# Patient Record
Sex: FEMALE | Race: WHITE | NOT HISPANIC OR LATINO
[De-identification: names, ages, dates, MRNs, and addresses within clinical notes are randomized per-mention and may not be internally consistent; named-entity substitution may affect disease eponyms.]

---

## 2017-02-23 ENCOUNTER — APPOINTMENT (OUTPATIENT)
Dept: INTERNAL MEDICINE | Facility: CLINIC | Age: 82
End: 2017-02-23

## 2017-02-23 VITALS
OXYGEN SATURATION: 96 % | BODY MASS INDEX: 23.92 KG/M2 | TEMPERATURE: 98.2 F | SYSTOLIC BLOOD PRESSURE: 140 MMHG | WEIGHT: 130 LBS | HEIGHT: 62 IN | HEART RATE: 74 BPM | DIASTOLIC BLOOD PRESSURE: 60 MMHG

## 2017-02-23 VITALS — DIASTOLIC BLOOD PRESSURE: 76 MMHG | SYSTOLIC BLOOD PRESSURE: 136 MMHG

## 2017-02-23 DIAGNOSIS — Z86.19 PERSONAL HISTORY OF OTHER INFECTIOUS AND PARASITIC DISEASES: ICD-10-CM

## 2017-02-28 PROBLEM — Z86.19 HISTORY OF SHINGLES: Status: ACTIVE | Noted: 2017-02-28

## 2017-03-07 LAB
ALBUMIN SERPL ELPH-MCNC: 4.3 G/DL
ALP BLD-CCNC: 47 U/L
ALT SERPL-CCNC: 19 U/L
ANION GAP SERPL CALC-SCNC: 16 MMOL/L
AST SERPL-CCNC: 21 U/L
BASOPHILS # BLD AUTO: 0.02 K/UL
BASOPHILS NFR BLD AUTO: 0.3 %
BILIRUB SERPL-MCNC: 0.5 MG/DL
BUN SERPL-MCNC: 30 MG/DL
CALCIUM SERPL-MCNC: 10.1 MG/DL
CHLORIDE SERPL-SCNC: 102 MMOL/L
CHOLEST SERPL-MCNC: 201 MG/DL
CHOLEST/HDLC SERPL: 2.4 RATIO
CO2 SERPL-SCNC: 23 MMOL/L
CREAT SERPL-MCNC: 0.76 MG/DL
EOSINOPHIL # BLD AUTO: 0.22 K/UL
EOSINOPHIL NFR BLD AUTO: 2.8 %
GLUCOSE SERPL-MCNC: 87 MG/DL
HCT VFR BLD CALC: 40.5 %
HDLC SERPL-MCNC: 85 MG/DL
HGB BLD-MCNC: 12.7 G/DL
IMM GRANULOCYTES NFR BLD AUTO: 0.3 %
LDLC SERPL CALC-MCNC: 103 MG/DL
LYMPHOCYTES # BLD AUTO: 1.58 K/UL
LYMPHOCYTES NFR BLD AUTO: 20.4 %
MAN DIFF?: NORMAL
MCHC RBC-ENTMCNC: 30.2 PG
MCHC RBC-ENTMCNC: 31.4 GM/DL
MCV RBC AUTO: 96.4 FL
MONOCYTES # BLD AUTO: 0.85 K/UL
MONOCYTES NFR BLD AUTO: 11 %
NEUTROPHILS # BLD AUTO: 5.07 K/UL
NEUTROPHILS NFR BLD AUTO: 65.2 %
PLATELET # BLD AUTO: 229 K/UL
POTASSIUM SERPL-SCNC: 4.6 MMOL/L
PROT SERPL-MCNC: 7.4 G/DL
RBC # BLD: 4.2 M/UL
RBC # FLD: 15.9 %
SODIUM SERPL-SCNC: 141 MMOL/L
TRIGL SERPL-MCNC: 64 MG/DL
TSH SERPL-ACNC: 1.4 UIU/ML
WBC # FLD AUTO: 7.76 K/UL

## 2017-03-16 ENCOUNTER — RX RENEWAL (OUTPATIENT)
Age: 82
End: 2017-03-16

## 2017-05-28 ENCOUNTER — INPATIENT (INPATIENT)
Facility: HOSPITAL | Age: 82
LOS: 5 days | Discharge: HOME | DRG: 64 | End: 2017-06-03
Attending: PSYCHIATRY & NEUROLOGY | Admitting: PSYCHIATRY & NEUROLOGY
Payer: MEDICARE

## 2017-05-28 VITALS
HEART RATE: 79 BPM | SYSTOLIC BLOOD PRESSURE: 149 MMHG | DIASTOLIC BLOOD PRESSURE: 84 MMHG | OXYGEN SATURATION: 97 % | RESPIRATION RATE: 18 BRPM | TEMPERATURE: 98 F

## 2017-05-28 LAB — GAS PNL BLDV: SIGNIFICANT CHANGE UP

## 2017-05-28 PROCEDURE — 93010 ELECTROCARDIOGRAM REPORT: CPT

## 2017-05-28 PROCEDURE — 99285 EMERGENCY DEPT VISIT HI MDM: CPT | Mod: 25,GC

## 2017-05-29 DIAGNOSIS — Z98.890 OTHER SPECIFIED POSTPROCEDURAL STATES: Chronic | ICD-10-CM

## 2017-05-29 DIAGNOSIS — I63.9 CEREBRAL INFARCTION, UNSPECIFIED: ICD-10-CM

## 2017-05-29 LAB
ALBUMIN SERPL ELPH-MCNC: 4.6 G/DL — SIGNIFICANT CHANGE UP (ref 3.3–5)
ALP SERPL-CCNC: 56 U/L — SIGNIFICANT CHANGE UP (ref 40–120)
ALT FLD-CCNC: 28 U/L RC — SIGNIFICANT CHANGE UP (ref 10–45)
ANION GAP SERPL CALC-SCNC: 16 MMOL/L — SIGNIFICANT CHANGE UP (ref 5–17)
APPEARANCE UR: CLEAR — SIGNIFICANT CHANGE UP
APTT BLD: 35.2 SEC — SIGNIFICANT CHANGE UP (ref 27.5–37.4)
AST SERPL-CCNC: 30 U/L — SIGNIFICANT CHANGE UP (ref 10–40)
BASOPHILS # BLD AUTO: 0.1 K/UL — SIGNIFICANT CHANGE UP (ref 0–0.2)
BASOPHILS NFR BLD AUTO: 0.6 % — SIGNIFICANT CHANGE UP (ref 0–2)
BILIRUB SERPL-MCNC: 0.2 MG/DL — SIGNIFICANT CHANGE UP (ref 0.2–1.2)
BILIRUB UR-MCNC: NEGATIVE — SIGNIFICANT CHANGE UP
BUN SERPL-MCNC: 33 MG/DL — HIGH (ref 7–23)
CALCIUM SERPL-MCNC: 9.6 MG/DL — SIGNIFICANT CHANGE UP (ref 8.4–10.5)
CHLORIDE SERPL-SCNC: 102 MMOL/L — SIGNIFICANT CHANGE UP (ref 96–108)
CHOLEST SERPL-MCNC: 177 MG/DL — SIGNIFICANT CHANGE UP (ref 10–199)
CO2 SERPL-SCNC: 25 MMOL/L — SIGNIFICANT CHANGE UP (ref 22–31)
COLOR SPEC: COLORLESS — SIGNIFICANT CHANGE UP
CREAT SERPL-MCNC: 0.87 MG/DL — SIGNIFICANT CHANGE UP (ref 0.5–1.3)
DIFF PNL FLD: NEGATIVE — SIGNIFICANT CHANGE UP
EOSINOPHIL # BLD AUTO: 0.2 K/UL — SIGNIFICANT CHANGE UP (ref 0–0.5)
EOSINOPHIL NFR BLD AUTO: 2.8 % — SIGNIFICANT CHANGE UP (ref 0–6)
GLUCOSE SERPL-MCNC: 98 MG/DL — SIGNIFICANT CHANGE UP (ref 70–99)
GLUCOSE UR QL: NEGATIVE — SIGNIFICANT CHANGE UP
HBA1C BLD-MCNC: 5.8 % — HIGH (ref 4–5.6)
HCT VFR BLD CALC: 38.6 % — SIGNIFICANT CHANGE UP (ref 34.5–45)
HDLC SERPL-MCNC: 79 MG/DL — SIGNIFICANT CHANGE UP (ref 40–125)
HGB BLD-MCNC: 12.9 G/DL — SIGNIFICANT CHANGE UP (ref 11.5–15.5)
INR BLD: 0.98 RATIO — SIGNIFICANT CHANGE UP (ref 0.88–1.16)
KETONES UR-MCNC: NEGATIVE — SIGNIFICANT CHANGE UP
LEUKOCYTE ESTERASE UR-ACNC: NEGATIVE — SIGNIFICANT CHANGE UP
LIPID PNL WITH DIRECT LDL SERPL: 91 MG/DL — SIGNIFICANT CHANGE UP
LYMPHOCYTES # BLD AUTO: 1.5 K/UL — SIGNIFICANT CHANGE UP (ref 1–3.3)
LYMPHOCYTES # BLD AUTO: 17.8 % — SIGNIFICANT CHANGE UP (ref 13–44)
MCHC RBC-ENTMCNC: 31.9 PG — SIGNIFICANT CHANGE UP (ref 27–34)
MCHC RBC-ENTMCNC: 33.3 GM/DL — SIGNIFICANT CHANGE UP (ref 32–36)
MCV RBC AUTO: 95.7 FL — SIGNIFICANT CHANGE UP (ref 80–100)
MONOCYTES # BLD AUTO: 0.9 K/UL — SIGNIFICANT CHANGE UP (ref 0–0.9)
MONOCYTES NFR BLD AUTO: 10.4 % — SIGNIFICANT CHANGE UP (ref 2–14)
NEUTROPHILS # BLD AUTO: 5.6 K/UL — SIGNIFICANT CHANGE UP (ref 1.8–7.4)
NEUTROPHILS NFR BLD AUTO: 68.3 % — SIGNIFICANT CHANGE UP (ref 43–77)
NITRITE UR-MCNC: NEGATIVE — SIGNIFICANT CHANGE UP
PH UR: 6.5 — SIGNIFICANT CHANGE UP (ref 5–8)
PLATELET # BLD AUTO: 193 K/UL — SIGNIFICANT CHANGE UP (ref 150–400)
POTASSIUM SERPL-MCNC: 4.1 MMOL/L — SIGNIFICANT CHANGE UP (ref 3.5–5.3)
POTASSIUM SERPL-SCNC: 4.1 MMOL/L — SIGNIFICANT CHANGE UP (ref 3.5–5.3)
PROT SERPL-MCNC: 7.5 G/DL — SIGNIFICANT CHANGE UP (ref 6–8.3)
PROT UR-MCNC: NEGATIVE — SIGNIFICANT CHANGE UP
PROTHROM AB SERPL-ACNC: 10.7 SEC — SIGNIFICANT CHANGE UP (ref 9.8–12.7)
RBC # BLD: 4.04 M/UL — SIGNIFICANT CHANGE UP (ref 3.8–5.2)
RBC # FLD: 12 % — SIGNIFICANT CHANGE UP (ref 10.3–14.5)
SODIUM SERPL-SCNC: 143 MMOL/L — SIGNIFICANT CHANGE UP (ref 135–145)
SP GR SPEC: 1.01 — LOW (ref 1.01–1.02)
TOTAL CHOLESTEROL/HDL RATIO MEASUREMENT: 2.2 RATIO — LOW (ref 3.3–7.1)
TRIGL SERPL-MCNC: 35 MG/DL — SIGNIFICANT CHANGE UP (ref 10–149)
TROPONIN T SERPL-MCNC: <0.01 NG/ML — SIGNIFICANT CHANGE UP (ref 0–0.06)
UROBILINOGEN FLD QL: NEGATIVE — SIGNIFICANT CHANGE UP
WBC # BLD: 8.2 K/UL — SIGNIFICANT CHANGE UP (ref 3.8–10.5)
WBC # FLD AUTO: 8.2 K/UL — SIGNIFICANT CHANGE UP (ref 3.8–10.5)
WBC UR QL: SIGNIFICANT CHANGE UP /HPF (ref 0–5)

## 2017-05-29 PROCEDURE — 99223 1ST HOSP IP/OBS HIGH 75: CPT

## 2017-05-29 PROCEDURE — 70450 CT HEAD/BRAIN W/O DYE: CPT | Mod: 26

## 2017-05-29 PROCEDURE — 71010: CPT | Mod: 26

## 2017-05-29 RX ORDER — ATORVASTATIN CALCIUM 80 MG/1
80 TABLET, FILM COATED ORAL AT BEDTIME
Qty: 0 | Refills: 0 | Status: DISCONTINUED | OUTPATIENT
Start: 2017-05-29 | End: 2017-05-30

## 2017-05-29 RX ORDER — ENOXAPARIN SODIUM 100 MG/ML
40 INJECTION SUBCUTANEOUS EVERY 24 HOURS
Qty: 0 | Refills: 0 | Status: DISCONTINUED | OUTPATIENT
Start: 2017-05-29 | End: 2017-05-30

## 2017-05-29 RX ORDER — ASPIRIN/CALCIUM CARB/MAGNESIUM 324 MG
81 TABLET ORAL ONCE
Qty: 0 | Refills: 0 | Status: DISCONTINUED | OUTPATIENT
Start: 2017-05-29 | End: 2017-05-29

## 2017-05-29 RX ORDER — ASPIRIN/CALCIUM CARB/MAGNESIUM 324 MG
81 TABLET ORAL DAILY
Qty: 0 | Refills: 0 | Status: DISCONTINUED | OUTPATIENT
Start: 2017-05-29 | End: 2017-05-29

## 2017-05-29 RX ORDER — AMIODARONE HYDROCHLORIDE 400 MG/1
200 TABLET ORAL DAILY
Qty: 0 | Refills: 0 | Status: DISCONTINUED | OUTPATIENT
Start: 2017-05-29 | End: 2017-05-30

## 2017-05-29 RX ORDER — SODIUM CHLORIDE 9 MG/ML
1000 INJECTION INTRAMUSCULAR; INTRAVENOUS; SUBCUTANEOUS
Qty: 0 | Refills: 0 | Status: DISCONTINUED | OUTPATIENT
Start: 2017-05-29 | End: 2017-06-02

## 2017-05-29 RX ORDER — AMIODARONE HYDROCHLORIDE 400 MG/1
150 TABLET ORAL ONCE
Qty: 0 | Refills: 0 | Status: COMPLETED | OUTPATIENT
Start: 2017-05-29 | End: 2017-05-29

## 2017-05-29 RX ORDER — DIGOXIN 250 MCG
0.25 TABLET ORAL ONCE
Qty: 0 | Refills: 0 | Status: COMPLETED | OUTPATIENT
Start: 2017-05-29 | End: 2017-05-29

## 2017-05-29 RX ORDER — ASPIRIN/CALCIUM CARB/MAGNESIUM 324 MG
300 TABLET ORAL DAILY
Qty: 0 | Refills: 0 | Status: DISCONTINUED | OUTPATIENT
Start: 2017-05-29 | End: 2017-05-30

## 2017-05-29 RX ORDER — METOPROLOL TARTRATE 50 MG
5 TABLET ORAL EVERY 6 HOURS
Qty: 0 | Refills: 0 | Status: DISCONTINUED | OUTPATIENT
Start: 2017-05-29 | End: 2017-05-29

## 2017-05-29 RX ADMIN — AMIODARONE HYDROCHLORIDE 600 MILLIGRAM(S): 400 TABLET ORAL at 16:06

## 2017-05-29 RX ADMIN — Medication 0.25 MILLIGRAM(S): at 16:06

## 2017-05-29 RX ADMIN — ENOXAPARIN SODIUM 40 MILLIGRAM(S): 100 INJECTION SUBCUTANEOUS at 15:21

## 2017-05-29 RX ADMIN — SODIUM CHLORIDE 65 MILLILITER(S): 9 INJECTION INTRAMUSCULAR; INTRAVENOUS; SUBCUTANEOUS at 21:59

## 2017-05-29 RX ADMIN — SODIUM CHLORIDE 65 MILLILITER(S): 9 INJECTION INTRAMUSCULAR; INTRAVENOUS; SUBCUTANEOUS at 04:13

## 2017-05-29 NOTE — ED PROVIDER NOTE - OBJECTIVE STATEMENT
89 y/o F hx of htn bibems s/p aphasia/difficulty responding appropriately . Daughter last spoke to pt approx 1 hour prior to arrival unknown last normal. Pt denies any 89 y/o F hx of htn bibems s/p aphasia/difficulty responding appropriately . Daughter last spoke to pt approx 1 hour prior to arrival unknown last normal.

## 2017-05-29 NOTE — H&P ADULT. - HISTORY OF PRESENT ILLNESS
89 yo  woman brought by EMS for aphasia, confusion and not feeling good/herself. Last normal was 12PM. Pt leaves alone, but at 12PM, she spoke to her daughter in-law and was fine. At 5:46PM, she told her good friend that she didn't feel good and that she feels funny. At 10PM, she called one of her daughters and reported that she is going to bed because she doesn't feel good. Then, She called and left a message for another daughter where she sounds confused. So, the last daughter called the previous one who decided to go see her and called EMS who brought her here. 89 yo  woman brought by EMS for aphasia, confusion and not feeling good/herself. Last normal was 12PM. Pt leaves alone, but at 12PM, she spoke to her daughter in-law and was fine. At 5:46PM, she told her good friend that she didn't feel good and that she feels funny. At 10PM, she called one of her daughters and reported that she is going to bed because she doesn't feel good. Then, She called and left a message for another daughter where she sounds confused. So, the last daughter called the previous one who decided to go see her and called EMS who brought her here. Pt was found to be aphasic and confused. NIHSS 3. MRS 0 89 yo  woman brought by EMS for aphasia, confusion and not feeling good/herself. Last normal was 12PM. Pt leaves alone, but at 12PM, she spoke to her daughter in-law and was fine. At 5:46PM, she told her good friend that she didn't feel good and that she feels funny. At 10PM, she called one of her daughters and reported that she is going to bed because she doesn't feel good. Then, She called and left a message for another daughter where she sounds confused. So, the last daughter called the previous one who decided to go see her and called EMS who brought her here. Pt was found to be aphasic and confused. NIHSS 5. MRS 0 89 yo  woman brought by EMS for aphasia, confusion and not feeling good/herself. Last known normal was 12PM. Pt lives alone, but at 12PM, she spoke to her daughter in-law and was fine. At 5:46PM, she told her good friend that she didn't feel good and that she feels funny. Per her friend, patient sounded loopy at that time.  At 10PM, she called one of her daughters and reported that she is going to bed because she doesn't feel good. Then, She called and left a message for another daughter where she sounds confused. Family denies weakness, numbness, diplopia, recent infection Pt was found to be aphasic and confused. NIHSS 5. MRS 0

## 2017-05-29 NOTE — H&P ADULT. - PROBLEM SELECTOR PLAN 1
[] CT head  [] MRI head  [] MRA head w/o contrast  [] MRA neck w/contrast  [] TTE   [] SCOTT  [] TELE  [] LABS: CBC, BMP, HgbA1C, Lipid panel  [] DVT prophylactic  [] PT/OT/SS Would get MRI brain without contrast to look at the extent and distribution of the stroke and MRA H/N to look at the cerebral vasculature.   Aspirin for secondary stroke prevention at this time. Atorvastatin 80, titrate the dose according to LDL.   TTE with bubble study and telemetry to look for a cardiac source of embolism.   DVT prophylaxis, Neurochecks  Permissive HTN up to 220/120 mmHg for first 24 hours after the symptom onset followed by gradual normotension.   HbA1C and LDL.   PT/OT/Speech and swallow/safety eval.

## 2017-05-29 NOTE — ED PROVIDER NOTE - ATTENDING CONTRIBUTION TO CARE
89yoF with initial complaint malaise around noon. Then found to be aphasic by family. Pt not able to provide more history.   On exam, pt not following commands.   A: Aphasia 2/2 CVA  P: CT brain, EKG, labs, CXR, neuro/stroke consult.

## 2017-05-29 NOTE — H&P ADULT. - RADIOLOGY RESULTS AND INTERPRETATION
CT head: Area of hypodensity involving the left insula and left temporal lobe suspicious for an acute infarct in the left MCA territory distribution.   No acute intracranial hemorrhage.

## 2017-05-29 NOTE — H&P ADULT. - MENTAL STATUS
Awake and alert only to self. Confused to place and time. Aphasic. Follow simple commands. Awake and alert only to self. Can not say place and time. fluent verbal output normal prosody no obvious paraphasias some circumlocutions, and repetitions. simple naming for some object intact lost for others, p had difficulty following some commands. could not understand command to repeat

## 2017-05-29 NOTE — H&P ADULT. - ATTENDING COMMENTS
Ms. Bethea is a 89 yo  woman with PMHx of HTN, and HLD brought by EMS to [facility] for Aphasia, confusion and not feeling good. Patient last normal was 12PM. So TPA was not given. PE c/w sensory aphasia. CT head shows: Area of hypodensity involving the left insula and left temporal lobe suspicious for an acute infarct in the left MCA territory distribution. No acute intracranial hemorrhage. Patient was outside window for TPA or endovascular. NIHSS:5 on admission  On neurological exam she is fluent, does not follow simple or complex commands, wernicke's aphasia; appears to understand parts of conversation, able to mimic.it is difficult to assess if she has a visual field deficit.  impression: Left MCA territory, inferior division infarction  Recommendations:   ASA, DVT prophylaxis, statins  Limits of hypertension  MRI brain stroke protocol, vascular imaging  PT/OT rehabilitation consult, speech therapy consult  I spoke with the patient's niece in detail and reviewed the above plan in detail.

## 2017-05-29 NOTE — ED PROVIDER NOTE - PHYSICAL EXAMINATION
Jasper Marshall DO:   Gen: Skin pink and warm  HEENT: PERRL, EOMI, atraumatic  Neck: supple  Heart: RRR, S1S2  Lungs: CTA bl. no w/r/r  Abd: soft, nontender, nondistended  Ext: No deformity. No erythema. No calf tenderness or edema.   Neuro: Not following simple commands. CN2-12 appear intact. Able to move all 4 extremities.

## 2017-05-29 NOTE — H&P ADULT. - ASSESSMENT
89 yo  woman with PMHx of HTN, and HLD brought by EMS to [facility] for Aphasia, confusion and not feeling good. Patient last normal was 12PM. So TPA was not given. Patient was found to be aphasic and confused with no other focal deficit. CT head shows... 89 yo  woman with PMHx of HTN, and HLD brought by EMS to [facility] for Aphasia, confusion and not feeling good. Patient last normal was 12PM. So TPA was not given. Patient was found to be aphasic and confused with no other focal deficit. CT head shows: Area of hypodensity involving the left insula and left temporal lobe suspicious for an acute infarct in the left MCA territory distribution. No acute intracranial hemorrhage. 89 yo  woman with PMHx of HTN, and HLD brought by EMS to [facility] for Aphasia, confusion and not feeling good. Patient last normal was 12PM. So TPA was not given. Patient was found to be aphasic and confused with no other focal deficit. CT head shows: Area of hypodensity involving the left insula and left temporal lobe suspicious for an acute infarct in the left MCA territory distribution. No acute intracranial hemorrhage. Patient was outside window for TPA or endovascular. NIHSS:5  Dysphagia:  hMRS: 1 89 yo  woman with PMHx of HTN, and HLD brought by EMS to [facility] for Aphasia, confusion and not feeling good. Patient last normal was 12PM. So TPA was not given. PE c/w sensory aphasia. CT head shows: Area of hypodensity involving the left insula and left temporal lobe suspicious for an acute infarct in the left MCA territory distribution. No acute intracranial hemorrhage. Patient was outside window for TPA or endovascular. NIHSS:5  Dysphagia: pending  hMRS: 1

## 2017-05-30 LAB
CK MB CFR SERPL CALC: 2.7 NG/ML — SIGNIFICANT CHANGE UP (ref 0–3.8)
CK SERPL-CCNC: 35 U/L — SIGNIFICANT CHANGE UP (ref 25–170)
TROPONIN T SERPL-MCNC: <0.01 NG/ML — SIGNIFICANT CHANGE UP (ref 0–0.06)

## 2017-05-30 PROCEDURE — 93010 ELECTROCARDIOGRAM REPORT: CPT

## 2017-05-30 PROCEDURE — 99233 SBSQ HOSP IP/OBS HIGH 50: CPT

## 2017-05-30 PROCEDURE — 70551 MRI BRAIN STEM W/O DYE: CPT | Mod: 26

## 2017-05-30 PROCEDURE — 99223 1ST HOSP IP/OBS HIGH 75: CPT

## 2017-05-30 PROCEDURE — 70548 MR ANGIOGRAPHY NECK W/DYE: CPT | Mod: 26

## 2017-05-30 RX ORDER — ASPIRIN/CALCIUM CARB/MAGNESIUM 324 MG
81 TABLET ORAL DAILY
Qty: 0 | Refills: 0 | Status: DISCONTINUED | OUTPATIENT
Start: 2017-05-30 | End: 2017-05-30

## 2017-05-30 RX ORDER — APIXABAN 2.5 MG/1
2.5 TABLET, FILM COATED ORAL
Qty: 0 | Refills: 0 | Status: DISCONTINUED | OUTPATIENT
Start: 2017-05-30 | End: 2017-06-03

## 2017-05-30 RX ORDER — SERTRALINE 25 MG/1
25 TABLET, FILM COATED ORAL DAILY
Qty: 0 | Refills: 0 | Status: DISCONTINUED | OUTPATIENT
Start: 2017-05-30 | End: 2017-05-31

## 2017-05-30 RX ORDER — AMIODARONE HYDROCHLORIDE 400 MG/1
200 TABLET ORAL DAILY
Qty: 0 | Refills: 0 | Status: DISCONTINUED | OUTPATIENT
Start: 2017-05-30 | End: 2017-05-31

## 2017-05-30 RX ORDER — ATORVASTATIN CALCIUM 80 MG/1
80 TABLET, FILM COATED ORAL AT BEDTIME
Qty: 0 | Refills: 0 | Status: DISCONTINUED | OUTPATIENT
Start: 2017-05-30 | End: 2017-05-31

## 2017-05-30 RX ORDER — METOPROLOL TARTRATE 50 MG
5 TABLET ORAL ONCE
Qty: 0 | Refills: 0 | Status: COMPLETED | OUTPATIENT
Start: 2017-05-30 | End: 2017-05-30

## 2017-05-30 RX ORDER — ASPIRIN/CALCIUM CARB/MAGNESIUM 324 MG
300 TABLET ORAL DAILY
Qty: 0 | Refills: 0 | Status: DISCONTINUED | OUTPATIENT
Start: 2017-05-30 | End: 2017-05-30

## 2017-05-30 RX ADMIN — Medication 300 MILLIGRAM(S): at 06:35

## 2017-05-30 RX ADMIN — Medication 5 MILLIGRAM(S): at 06:16

## 2017-05-30 RX ADMIN — ATORVASTATIN CALCIUM 80 MILLIGRAM(S): 80 TABLET, FILM COATED ORAL at 21:49

## 2017-05-30 RX ADMIN — APIXABAN 2.5 MILLIGRAM(S): 2.5 TABLET, FILM COATED ORAL at 21:49

## 2017-05-30 RX ADMIN — ENOXAPARIN SODIUM 40 MILLIGRAM(S): 100 INJECTION SUBCUTANEOUS at 19:18

## 2017-05-30 RX ADMIN — SODIUM CHLORIDE 65 MILLILITER(S): 9 INJECTION INTRAMUSCULAR; INTRAVENOUS; SUBCUTANEOUS at 21:49

## 2017-05-30 NOTE — SPEECH LANGUAGE PATHOLOGY EVALUATION - SLP DIAGNOSIS
Initial evaluation completed. Pt presents with a nonfluent aphasia with deficits in expressive and receptive language. Language is marked by phonemic paraphasias and anomia. Repetition is impaired at the word level. Pt is able to read and write at at least the phrase level. Perseveration noted throughout assessment. Pt demonstrates awareness of deficits and is easily frustrated. Further assessment tomorrow, 5/31.

## 2017-05-30 NOTE — SWALLOW BEDSIDE ASSESSMENT ADULT - ASR SWALLOW ASPIRATION MONITOR
throat clearing/Monitor for s/s aspiration/laryngeal penetration. If noted:  D/C p.o. intake, provide non-oral nutrition/hydration/meds, and contact this service @ [phone]/change of breathing pattern/upper respiratory infection/pneumonia/cough/gurgly voice/fever

## 2017-05-30 NOTE — OCCUPATIONAL THERAPY INITIAL EVALUATION ADULT - NS ASR FOLLOW COMMAND OT EVAL
with tactile and visual cues able to mimic/aphasia/oral apraxia/75% of the time/able to follow single-step instructions

## 2017-05-30 NOTE — OCCUPATIONAL THERAPY INITIAL EVALUATION ADULT - PLANNED THERAPY INTERVENTIONS, OT EVAL
balance training/bed mobility training/ADL retraining/cognitive, visual perceptual/transfer training

## 2017-05-30 NOTE — OCCUPATIONAL THERAPY INITIAL EVALUATION ADULT - ADDITIONAL COMMENTS
At 10PM, she called one of her daughters and reported that she is going to bed because she doesn't feel good. Then, She called and left a message for another daughter where she sounds confused. Pt was found to be aphasic and confused. CTH: Area of hypodensity involving the left insula and left temporal lobe suspicious for an acute infarct in the left MCA territory distribution.

## 2017-05-30 NOTE — SWALLOW BEDSIDE ASSESSMENT ADULT - SLP PERTINENT HISTORY OF CURRENT PROBLEM
89 yo  woman with PMHx of HTN, and HLD brought by EMS to [facility] for Aphasia, confusion and not feeling good. Patient last normal was 12PM. So TPA was not given. PE c/w sensory aphasia. CT head shows: Area of hypodensity involving the left insula and left temporal lobe suspicious for an acute infarct in the left MCA territory distribution. No acute intracranial hemorrhage. Patient was outside window for TPA or endovascular. NIHSS:5 Dysphagia: pending; hMRS: 1 per attending note: On neurological exam she is fluent, does not follow simple or complex commands, wernicke's aphasia; appears to understand parts of conversation, able to mimic.it is difficult to assess if she has a visual field deficit. impression: Left MCA territory, inferior division infarction

## 2017-05-30 NOTE — OCCUPATIONAL THERAPY INITIAL EVALUATION ADULT - PERTINENT HX OF CURRENT PROBLEM, REHAB EVAL
89 yo F brought by EMS for aphasia, confusion & not feeling good/herself. Last known normal was 12PM. Pt lives alone, but at 12PM, she spoke to her daughter in-law and was fine. At 5:46PM, she told her good friend that she didn't feel good and that she feels funny. Per her friend, pt sounded loopy at that time. .. see below

## 2017-05-30 NOTE — PHYSICAL THERAPY INITIAL EVALUATION ADULT - ADDITIONAL COMMENTS
5/29 CT head: CT head: Area of hypodensity involving the left insula and left temporal lobe suspicious for an acute infarct in the left MCA territory distribution. No acute intracranial hemorrhage.

## 2017-05-30 NOTE — PHYSICAL THERAPY INITIAL EVALUATION ADULT - LIVES WITH, PROFILE
alone alone/As per pt daughters at b/s, pt lives alone in apartment with 2 steps to entrance then elevator access.  PTA pt was independent with all functional mobiltiy & ambulating without AD.

## 2017-05-30 NOTE — SWALLOW BEDSIDE ASSESSMENT ADULT - SWALLOW EVAL: DIAGNOSIS
Pt presents with suspected pharyngeal dysphagia marked by a delay in the trigger of the swallow and overt s/s of laryngeal penetration and/or aspiration with honey and nectar thickened fluids.

## 2017-05-30 NOTE — OCCUPATIONAL THERAPY INITIAL EVALUATION ADULT - RANGE OF MOTION EXAMINATION, UPPER EXTREMITY
except B digits 2* dupturyens contracture/bilateral UE Active ROM was WFL  (within functional limits)

## 2017-05-30 NOTE — SPEECH LANGUAGE PATHOLOGY EVALUATION - SLP VERBAL STRATEGIES
written cues, i.e. family members names, pt's name, common nouns, personally related words and phrases

## 2017-05-30 NOTE — SWALLOW BEDSIDE ASSESSMENT ADULT - SLP GENERAL OBSERVATIONS
Pt supine in bed, alert, +aphasia, unable to answer orientation questions even when given choices. Able to follow simple commands with visual model (some perseveration noted, motor vs thought), no dysarthria. Pt failed dysphagia screen 5/29 part 3; Pt supine in bed, alert, +aphasia, unable to answer orientation questions even when given choices. Able to follow simple commands with visual model (some perseveration noted, motor vs thought), no dysarthria.

## 2017-05-30 NOTE — SWALLOW BEDSIDE ASSESSMENT ADULT - SWALLOW EVAL: RECOMMENDED FEEDING/EATING TECHNIQUES
position upright (90 degrees)/small sips/bites/Provide meds via alternate source or crushed in applesauce./maintain upright posture during/after eating for 30 mins

## 2017-05-30 NOTE — SWALLOW BEDSIDE ASSESSMENT ADULT - COMMENTS
5/29 CT Head: Area of hypodensity involving the left insula and left temporal lobe suspicious for an acute infarct in the left MCA territory distribution. No acute intracranial hemorrhage. Episodes of sustained tachycardia, Cardizem IVP. 5/29 CT Head: Area of hypodensity involving the left insula and left temporal lobe suspicious for an acute infarct in the left MCA territory distribution. No acute intracranial hemorrhage. Episodes of sustained tachycardia, Cardizem IVP. Seen by cards, converted to sinus this am, c/w amiodarone, check TTE, will need AC when cleared by neuro. Await MRI.

## 2017-05-31 PROCEDURE — 99497 ADVNCD CARE PLAN 30 MIN: CPT | Mod: 25

## 2017-05-31 PROCEDURE — 99222 1ST HOSP IP/OBS MODERATE 55: CPT

## 2017-05-31 PROCEDURE — 99233 SBSQ HOSP IP/OBS HIGH 50: CPT

## 2017-05-31 PROCEDURE — 93306 TTE W/DOPPLER COMPLETE: CPT | Mod: 26

## 2017-05-31 RX ORDER — ATORVASTATIN CALCIUM 80 MG/1
40 TABLET, FILM COATED ORAL AT BEDTIME
Qty: 0 | Refills: 0 | Status: DISCONTINUED | OUTPATIENT
Start: 2017-05-31 | End: 2017-06-03

## 2017-05-31 RX ORDER — SERTRALINE 25 MG/1
100 TABLET, FILM COATED ORAL DAILY
Qty: 0 | Refills: 0 | Status: DISCONTINUED | OUTPATIENT
Start: 2017-05-31 | End: 2017-06-03

## 2017-05-31 RX ORDER — AMIODARONE HYDROCHLORIDE 400 MG/1
200 TABLET ORAL DAILY
Qty: 0 | Refills: 0 | Status: DISCONTINUED | OUTPATIENT
Start: 2017-05-31 | End: 2017-06-03

## 2017-05-31 RX ORDER — METOPROLOL TARTRATE 50 MG
5 TABLET ORAL ONCE
Qty: 0 | Refills: 0 | Status: DISCONTINUED | OUTPATIENT
Start: 2017-05-31 | End: 2017-06-01

## 2017-05-31 RX ADMIN — AMIODARONE HYDROCHLORIDE 200 MILLIGRAM(S): 400 TABLET ORAL at 06:30

## 2017-05-31 RX ADMIN — APIXABAN 2.5 MILLIGRAM(S): 2.5 TABLET, FILM COATED ORAL at 17:50

## 2017-05-31 RX ADMIN — ATORVASTATIN CALCIUM 40 MILLIGRAM(S): 80 TABLET, FILM COATED ORAL at 20:47

## 2017-05-31 RX ADMIN — SODIUM CHLORIDE 65 MILLILITER(S): 9 INJECTION INTRAMUSCULAR; INTRAVENOUS; SUBCUTANEOUS at 06:31

## 2017-05-31 RX ADMIN — APIXABAN 2.5 MILLIGRAM(S): 2.5 TABLET, FILM COATED ORAL at 06:30

## 2017-05-31 NOTE — SPEECH LANGUAGE PATHOLOGY EVALUATION - BODY PART ID
intact/0/5xs; improved after visual and verbal cues 0/2xs; pt then provided command via reading and task comprehension improved significantly/intact

## 2017-05-31 NOTE — SWALLOW BEDSIDE ASSESSMENT ADULT - SLP GENERAL OBSERVATIONS
Pt received in bed. +aphasia, Grossly A&Ox3 with field of two written choices. Able to follow simple commands with visual model, no dysarthria.

## 2017-05-31 NOTE — SWALLOW BEDSIDE ASSESSMENT ADULT - SWALLOW EVAL: PATIENT/FAMILY GOALS STATEMENT
Pt's daughter reports pt with chronic throat clearing and audible swallows during PO intake.
"Can I have water?" Family reports no dysphagia PTA.

## 2017-05-31 NOTE — SWALLOW BEDSIDE ASSESSMENT ADULT - SWALLOW EVAL: DIAGNOSIS
Pt presents with suspected pharyngeal dysphagia marked by a delay in the trigger of the swallow and overt s/s of laryngeal penetration and/or aspiration with honey and nectar thickened fluids. Pt presents with suspected pharyngeal dysphagia marked by a delay in the trigger of the swallow, audible swallow of honey thick liquid and overt s/s of laryngeal penetration and/or aspiration with honey thickened fluids.

## 2017-05-31 NOTE — SWALLOW BEDSIDE ASSESSMENT ADULT - SWALLOW EVAL: RECOMMENDED FEEDING/EATING TECHNIQUES
position upright (90 degrees)/maintain upright posture during/after eating for 30 mins/small bites/allow for swallow between intakes/crush medication (when feasible)

## 2017-05-31 NOTE — SWALLOW BEDSIDE ASSESSMENT ADULT - ASR SWALLOW ASPIRATION MONITOR
change of breathing pattern/upper respiratory infection/throat clearing/cough/fever/pneumonia/Monitor for s/s aspiration/laryngeal penetration. If noted:  D/C p.o. intake, provide non-oral nutrition/hydration/meds, and contact this service @ [phone]/jeff voice

## 2017-05-31 NOTE — SPEECH LANGUAGE PATHOLOGY EVALUATION - 1-STEP
yes/1/4xs
pt is able to follow 1/5 one step directives independently; improves to 100% with visual models. Some perseveration noted on prior command.

## 2017-05-31 NOTE — SWALLOW BEDSIDE ASSESSMENT ADULT - COMMENTS
5/29 CT Head: Area of hypodensity involving the left insula and left temporal lobe suspicious for an acute infarct in the left MCA territory distribution. No acute intracranial hemorrhage. Episodes of sustained tachycardia, Cardizem IVP. Seen by cards, converted to sinus this am, c/w amiodarone, check TTE, will need AC when cleared by neuro. s/p Bedside Swallow evaluation with rx for dysphagia 1 with no PO fluids. NGT placed on 5/30, pt/family asked for it to be removed. 5/30 Brain MRI: Acute left middle cerebral artery territory infarct involving the left temporal lobe and a small portion of the subinsular cortex. Neck MRA: No evidence for hemodynamically significant stenosis. Brain MRA: No high-grade stenosis or occlusion. Neuro: L MCA distribution stroke - likely etiology being cardioembolism in the setting of newly diagnosed A flutter but possibility of large vessel disease needs to be r/o. Palliative care consult called for GOC discussion. Per neuro, family to discuss code status, pt full code at this point.

## 2017-05-31 NOTE — SWALLOW BEDSIDE ASSESSMENT ADULT - SLP PERTINENT HISTORY OF CURRENT PROBLEM
89 yo  woman with PMHx of HTN, and HLD brought by EMS to [facility] for Aphasia, confusion and not feeling good. Patient last normal was 12PM. So TPA was not given. PE c/w sensory aphasia. CT head shows: Area of hypodensity involving the left insula and left temporal lobe suspicious for an acute infarct in the left MCA territory distribution. No acute intracranial hemorrhage. Patient was outside window for TPA or endovascular. NIHSS:5 Dysphagia: pending; hMRS: 1 per attending note: On neurological exam she is fluent, does not follow simple or complex commands, wernicke's aphasia; appears to understand parts of conversation, able to mimic.it is difficult to assess if she has a visual field deficit. impression: Left MCA territory, inferior division infarction

## 2017-05-31 NOTE — SPEECH LANGUAGE PATHOLOGY EVALUATION - SLP GENERAL OBSERVATIONS
Pt supine in bed, alert, +aphasia, unable to answer orientation questions even when given choices. Able to follow simple commands with visual model (some perseveration noted, motor vs thought), no dysarthria.
Pt received in bed. Alert and cooperative for evaluation.

## 2017-05-31 NOTE — SPEECH LANGUAGE PATHOLOGY EVALUATION - SLP DIAGNOSIS
Pt presents with a nonfluent aphasia and, as per neurology team, pure word deafness. Pt with breakdown of receptive language at the simple level, pt with discrepancy in auditory comprehension and reading. Pt with greater deficits noted in auditory comprehension, inconsistently improved with reading task. Expressive language deficits include perseveration of speech, semantic/phonemic paraphasias, word finding deficits. Pt also noted with perseveration of task and reduced task comprehension. Pt presents with a nonfluent aphasia and, as per neurology team, pure word deafness. Pt with breakdown of receptive language at the simple level, with a discrepancy in auditory comprehension and reading. Pt with greater deficits noted in auditory comprehension, inconsistently improved with reading task. Expressive language deficits include perseveration of speech, semantic/phonemic paraphasias, word finding deficits. Pt also noted with perseveration of task and reduced task comprehension.

## 2017-05-31 NOTE — SPEECH LANGUAGE PATHOLOGY EVALUATION - SLP ORIENTATION
Grossly A&Ox3 when presented via writing and given field of two choices (to self verbally, to month and year given field of two written choices, to place given field of two written choices).

## 2017-05-31 NOTE — SPEECH LANGUAGE PATHOLOGY EVALUATION - COMMENTS
5/29 CT Head: Area of hypodensity involving the left insula and left temporal lobe suspicious for an acute infarct in the left MCA territory distribution. No acute intracranial hemorrhage. Episodes of sustained tachycardia, Cardizem IVP. Seen by cards, converted to sinus this am, c/w amiodarone, check TTE, will need AC when cleared by neuro. s/p Bedside Swallow evaluation with rx for dysphagia 1 with no PO fluids. NGT placed on 5/30, pt/family asked for it to be removed. 5/30 Brain MRI: Acute left middle cerebral artery territory infarct involving the left temporal lobe and a small portion of the subinsular cortex. Neck MRA: No evidence for hemodynamically significant stenosis. Brain MRA: No high-grade stenosis or occlusion. Neuro: L MCA distribution stroke - likely etiology being cardioembolism in the setting of newly diagnosed A flutter but possibility of large vessel disease needs to be r/o. Palliative care consult called for GOC discussion. Per neuro, family to discuss code status, pt full code at this point. Pt would benefit from continued restorative language tx post discharge. Opposites: 1/4xs Not formally assessed + paraphasias

## 2017-05-31 NOTE — SWALLOW BEDSIDE ASSESSMENT ADULT - PHARYNGEAL PHASE
Within functional limits audible swallow/Throat clear post oral intake/Delayed throat clear post oral intake/Delayed pharyngeal swallow

## 2017-06-01 ENCOUNTER — TRANSCRIPTION ENCOUNTER (OUTPATIENT)
Age: 82
End: 2017-06-01

## 2017-06-01 LAB
ANION GAP SERPL CALC-SCNC: 15 MMOL/L — SIGNIFICANT CHANGE UP (ref 5–17)
BUN SERPL-MCNC: 23 MG/DL — SIGNIFICANT CHANGE UP (ref 7–23)
CALCIUM SERPL-MCNC: 8.8 MG/DL — SIGNIFICANT CHANGE UP (ref 8.4–10.5)
CHLORIDE SERPL-SCNC: 107 MMOL/L — SIGNIFICANT CHANGE UP (ref 96–108)
CO2 SERPL-SCNC: 19 MMOL/L — LOW (ref 22–31)
CREAT SERPL-MCNC: 0.6 MG/DL — SIGNIFICANT CHANGE UP (ref 0.5–1.3)
GLUCOSE SERPL-MCNC: 105 MG/DL — HIGH (ref 70–99)
HCT VFR BLD CALC: 35.9 % — SIGNIFICANT CHANGE UP (ref 34.5–45)
HGB BLD-MCNC: 11.8 G/DL — SIGNIFICANT CHANGE UP (ref 11.5–15.5)
MCHC RBC-ENTMCNC: 30.1 PG — SIGNIFICANT CHANGE UP (ref 27–34)
MCHC RBC-ENTMCNC: 32.9 GM/DL — SIGNIFICANT CHANGE UP (ref 32–36)
MCV RBC AUTO: 91.6 FL — SIGNIFICANT CHANGE UP (ref 80–100)
PLATELET # BLD AUTO: 188 K/UL — SIGNIFICANT CHANGE UP (ref 150–400)
POTASSIUM SERPL-MCNC: 3.8 MMOL/L — SIGNIFICANT CHANGE UP (ref 3.5–5.3)
POTASSIUM SERPL-SCNC: 3.8 MMOL/L — SIGNIFICANT CHANGE UP (ref 3.5–5.3)
RBC # BLD: 3.92 M/UL — SIGNIFICANT CHANGE UP (ref 3.8–5.2)
RBC # FLD: 14.1 % — SIGNIFICANT CHANGE UP (ref 10.3–14.5)
SODIUM SERPL-SCNC: 141 MMOL/L — SIGNIFICANT CHANGE UP (ref 135–145)
T3 SERPL-MCNC: 88 NG/DL — SIGNIFICANT CHANGE UP (ref 80–200)
T4 AB SER-ACNC: 6.9 UG/DL — SIGNIFICANT CHANGE UP (ref 4.6–12)
TSH SERPL-MCNC: 3.08 UIU/ML — SIGNIFICANT CHANGE UP (ref 0.27–4.2)
WBC # BLD: 8.29 K/UL — SIGNIFICANT CHANGE UP (ref 3.8–10.5)
WBC # FLD AUTO: 8.29 K/UL — SIGNIFICANT CHANGE UP (ref 3.8–10.5)

## 2017-06-01 PROCEDURE — 99233 SBSQ HOSP IP/OBS HIGH 50: CPT

## 2017-06-01 PROCEDURE — 99232 SBSQ HOSP IP/OBS MODERATE 35: CPT

## 2017-06-01 PROCEDURE — 74230 X-RAY XM SWLNG FUNCJ C+: CPT | Mod: 26

## 2017-06-01 RX ORDER — METOPROLOL TARTRATE 50 MG
5 TABLET ORAL EVERY 6 HOURS
Qty: 0 | Refills: 0 | Status: DISCONTINUED | OUTPATIENT
Start: 2017-06-01 | End: 2017-06-01

## 2017-06-01 RX ORDER — METOPROLOL TARTRATE 50 MG
50 TABLET ORAL
Qty: 0 | Refills: 0 | Status: DISCONTINUED | OUTPATIENT
Start: 2017-06-01 | End: 2017-06-03

## 2017-06-01 RX ORDER — METOPROLOL TARTRATE 50 MG
5 TABLET ORAL ONCE
Qty: 0 | Refills: 0 | Status: COMPLETED | OUTPATIENT
Start: 2017-06-01 | End: 2017-06-01

## 2017-06-01 RX ORDER — ONDANSETRON 8 MG/1
4 TABLET, FILM COATED ORAL EVERY 6 HOURS
Qty: 0 | Refills: 0 | Status: DISCONTINUED | OUTPATIENT
Start: 2017-06-01 | End: 2017-06-03

## 2017-06-01 RX ORDER — METOPROLOL TARTRATE 50 MG
5 TABLET ORAL ONCE
Qty: 0 | Refills: 0 | Status: DISCONTINUED | OUTPATIENT
Start: 2017-06-01 | End: 2017-06-03

## 2017-06-01 RX ADMIN — Medication 5 MILLIGRAM(S): at 11:04

## 2017-06-01 RX ADMIN — SERTRALINE 100 MILLIGRAM(S): 25 TABLET, FILM COATED ORAL at 11:04

## 2017-06-01 RX ADMIN — Medication 5 MILLIGRAM(S): at 19:57

## 2017-06-01 RX ADMIN — APIXABAN 2.5 MILLIGRAM(S): 2.5 TABLET, FILM COATED ORAL at 06:01

## 2017-06-01 RX ADMIN — APIXABAN 2.5 MILLIGRAM(S): 2.5 TABLET, FILM COATED ORAL at 18:19

## 2017-06-01 RX ADMIN — Medication 50 MILLIGRAM(S): at 21:42

## 2017-06-01 RX ADMIN — ATORVASTATIN CALCIUM 40 MILLIGRAM(S): 80 TABLET, FILM COATED ORAL at 21:43

## 2017-06-01 RX ADMIN — AMIODARONE HYDROCHLORIDE 200 MILLIGRAM(S): 400 TABLET ORAL at 06:01

## 2017-06-01 RX ADMIN — Medication 5 MILLIGRAM(S): at 18:19

## 2017-06-01 NOTE — DISCHARGE NOTE ADULT - CARE PLAN
Principal Discharge DX:	Cerebrovascular accident (CVA), unspecified mechanism  Goal:	prevent recurrence  Instructions for follow-up, activity and diet:	take medications as indicated   follow up with your doctors   return to ED immediately for any new symptoms Principal Discharge DX:	Cerebrovascular accident (CVA), unspecified mechanism  Goal:	prevent recurrence  Instructions for follow-up, activity and diet:	take medications as indicated   follow up with your doctors   return to ED immediately for any new symptoms  Diet Mechanichal soft with thin liquids

## 2017-06-01 NOTE — SWALLOW VFSS/MBS ASSESSMENT ADULT - LARYNGEAL PENETRATION DURING THE SWALLOW - SILENT
Trace/remaining shallow on the laryngeal surface of the epiglottis, without descent over the laryngeal surface of the epiglottis/Trace shallow, over the laryngeal surface of the epiglottis with retrieval/Trace Trace/isolated instance when presented via straw, shallow, over the laryngeal surface of the epiglottis and with retrieval on subsequent swallow

## 2017-06-01 NOTE — DISCHARGE NOTE ADULT - NS AS DC STROKE ED MATERIALS
Stroke Education Booklet/Call 911 for Stroke/Stroke Warning Signs and Symptoms/Risk Factors for Stroke/Prescribed Medications/Need for Followup After Discharge

## 2017-06-01 NOTE — SWALLOW VFSS/MBS ASSESSMENT ADULT - PHARYNGEAL PHASE COMMENTS
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   Spontaneous repeat swallow does not significantly reduced residue in oropharynx/pharyngocele. 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   Spontaneous repeat swallow does not significantly reduce residue in oropharynx/pharyngocele.

## 2017-06-01 NOTE — SWALLOW VFSS/MBS ASSESSMENT ADULT - DEMONSTRATES NEED FOR REFERRAL TO ANOTHER SERVICE
ENT consult as clinically indicated given finding of pharyngocele; GI given retention of material in the cervical esophagus.

## 2017-06-01 NOTE — DISCHARGE NOTE ADULT - MEDICATION SUMMARY - MEDICATIONS TO TAKE
I will START or STAY ON the medications listed below when I get home from the hospital:    amiodarone 200 mg oral tablet  -- 1 tab(s) by mouth once a day  -- Indication: For a flutter    apixaban 2.5 mg oral tablet  -- 1 tab(s) by mouth 2 times a day  -- Indication: For a flutter    sertraline 100 mg oral tablet  -- 1 tab(s) by mouth once a day  -- Indication: For depresion    atorvastatin 40 mg oral tablet  -- 1 tab(s) by mouth once a day (at bedtime)  -- Indication: For DLD    metoprolol tartrate 50 mg oral tablet  -- 1 tab(s) by mouth 2 times a day  -- Indication: For A flutter I will START or STAY ON the medications listed below when I get home from the hospital:    amiodarone 200 mg oral tablet  -- 1 tab(s) by mouth once a day  -- Indication: For a flutter    Eliquis 5 mg oral tablet  -- 1 tab(s) by mouth 2 times a day  -- Indication: For aflutter    sertraline 100 mg oral tablet  -- 1 tab(s) by mouth once a day  -- Indication: For depresion    atorvastatin 40 mg oral tablet  -- 1 tab(s) by mouth once a day (at bedtime)  -- Indication: For DLD    metoprolol tartrate 50 mg oral tablet  -- 1 tab(s) by mouth 2 times a day  -- Indication: For A flutter

## 2017-06-01 NOTE — SWALLOW VFSS/MBS ASSESSMENT ADULT - RECOMMENDED FEEDING/EATING TECHNIQUES
allow for swallow between intakes/no straws/maintain upright posture during/after eating for 30 mins/alternate food with liquid/position upright (90 degrees)/small sips/bites/allow for repeat swallows, small, single cup sips, no straws/crush medication (when feasible)

## 2017-06-01 NOTE — DISCHARGE NOTE ADULT - CARE PROVIDER_API CALL
Malik Madrid ([email or web address]), Neurology; Vascular Neurology  [address] [place] [address]  Phone: [phone]  Fax: [phone]    Krzysztof Broja (DO), Cardiology; Internal Medicine  [address] [place] [address] [address]  [place] [address]  Phone: [phone]  Fax: [phone]

## 2017-06-01 NOTE — SWALLOW VFSS/MBS ASSESSMENT ADULT - ADDITIONAL RECOMMENDATIONS
Maintain good oral hygiene. Maintain good oral hygiene.  Restorative swallow tx. Tx to include trials of soft solid texture with SLP only for possible diet upgrade to soft texture diet pending improved oral management skills.

## 2017-06-01 NOTE — DISCHARGE NOTE ADULT - HOSPITAL COURSE
89 yo  woman brought by EMS for aphasia, confusion. On arrival to [facility], NIHSS 5. MRS 0, patient not a tPA or endovascular candidate. Admitted to the stroke service for further workup where MRI 89 yo  woman brought by EMS on 5/29 for aphasia, confusion and not feeling good/herself. Last known normal was 12PM. Pt lives alone, but at 12PM, she spoke to her daughter in-law and was fine. At 5:46PM, she told her good friend that she didn't feel good and that she feels funny. Per her friend, patient sounded loopy at that time.  At 10PM, she called one of her daughters and reported that she is going to bed because she doesn't feel good. Then, She called and left a message for another daughter where she sounds confused. Family denies weakness, numbness, diplopia, recent infection Pt was found to be aphasic and confused. NIHSS 5. MRS 0. CT head showed left MCA stroke. She was admitted to Neurology service for further management. MRI brain showed left mca stroke. MRA head and neck was normal. On telemetry she was found to have atrial flutter with heart rate into 160's. She was treated with amiodarone and metoprolol and apixaban for anticoagulation. TTE showed mitral regurg and Dilated LA. Swallow therapy reccomended Mechanichal soft diet. PT recommended acute rehab. She was discharged to rehab in safe condition and will followup with Neurology as an outpatient

## 2017-06-01 NOTE — SWALLOW VFSS/MBS ASSESSMENT ADULT - LARYNGEAL PENETRATION AFTER THE SWALLOW - SILENT
during repeat swallow, remaining shallow on the laryngeal surface of the epiglottis, without descent/Mild Mild/during repeat swallow, of material from pharyngocele mixed with oral residue, without retrieval and with descent further into the laryngeal vestibule when fluoro re-initiated.

## 2017-06-01 NOTE — DISCHARGE NOTE ADULT - PATIENT PORTAL LINK FT
“You can access the Follow[facility] Patient Portal, offered by [facility], by registering with the following website: [email or web address]”

## 2017-06-01 NOTE — SWALLOW VFSS/MBS ASSESSMENT ADULT - DIAGNOSTIC IMPRESSIONS
Pt presents with an oropharyngeal dysphagia. Pt presents with an oropharyngeal dysphagia characterized by pharyngeal residue post swallow, silent laryngeal penetration with retrieval with thin puree texture, honey thick liquid, nectar thick liquid and thin liquid and silent laryngeal penetration without retrieval with thick puree texture. Airway protection and pharyngeal residue improved with thick puree texture with alternation of food and liquid. 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 Pt presents with an oropharyngoesophageal dysphagia characterized by delayed oral transit time, reduced A-P transport, pharyngeal residue post swallow, shallow, silent laryngeal penetration with retrieval with thin puree texture, honey thick liquid, nectar thick liquid and thin liquid and silent laryngeal penetration without retrieval with thick puree texture. Alternation of food and liquid reduced pharyngeal residue and subsequently improved airway protection with thick puree texture. 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 Pt with reduced movement of the palate suggestive of VPI. Esophageal findings include cricopharyngeal bar vs other and retention of material in the cervical esophagus.   Disorders: reduced lingual strength/ROM/Rate of motion, reduced BOT to posterior pharyngeal wall contact, reduced hyo-laryngeal excursion, reduced laryngeal closure, reduced pharyngeal contractility, signs of reduced supraglottic sensation.

## 2017-06-01 NOTE — SWALLOW VFSS/MBS ASSESSMENT ADULT - NS SWALLOW VFSS REC ASPIR MON
fever/pneumonia/change of breathing pattern/cough/gurgly voice/throat clearing/upper respiratory infection/Monitor for s/s aspiration/laryngeal penetration. If noted:  D/C p.o. intake, provide non-oral nutrition/hydration/meds, and contact this service @ [phone]

## 2017-06-01 NOTE — DISCHARGE NOTE ADULT - PLAN OF CARE
prevent recurrence take medications as indicated   follow up with your doctors   return to ED immediately for any new symptoms take medications as indicated   follow up with your doctors   return to ED immediately for any new symptoms  Diet Mechanichal soft with thin liquids

## 2017-06-01 NOTE — SWALLOW VFSS/MBS ASSESSMENT ADULT - ESOPHAGEAL STAGE
Trace retention of material in the cervical esophagus.  + cricopharyngeal bar vs other. Retention of material in the cervical esophagus. Trace retrograde flow of material to the pyriform sinuses.  + cricopharyngeal bar vs other. + aerophagia Retention of material in the cervical esophagus.   + cricopharyngeal bar vs other.

## 2017-06-01 NOTE — SWALLOW VFSS/MBS ASSESSMENT ADULT - SLP PERTINENT HISTORY OF CURRENT PROBLEM
89 yo  woman with PMHx of HTN, and HLD brought by EMS to [facility] for Aphasia, confusion and not feeling good. Patient last normal was 12PM. So TPA was not given. PE c/w sensory aphasia. CT head shows: Area of hypodensity involving the left insula and left temporal lobe suspicious for an acute infarct in the left MCA territory distribution. No acute intracranial hemorrhage. Patient was outside window for TPA or endovascular. NIHSS:5 Dysphagia: pending; hMRS: 1 per attending note: On neurological exam she is fluent, does not follow simple or complex commands, wernicke's aphasia; appears to understand parts of conversation, able to mimic.it is difficult to assess if she has a visual field deficit. impression: Left MCA territory, inferior division infarction

## 2017-06-01 NOTE — SWALLOW VFSS/MBS ASSESSMENT ADULT - SUCCESSFUL STRATEGIES TRIALED DURING PROCEDURE
alternation of food and liquid reduces pharyngeal residue and subsequently improves airway protection

## 2017-06-01 NOTE — SWALLOW VFSS/MBS ASSESSMENT ADULT - ORAL PHASE
Delayed oral transit time/Reduced anterior - posterior transport/Residue in oral cavity Uncontrolled bolus / spillover in hailey-pharynx/Maximal amount of premature spillover to the valleculae - inconsistent/Reduced anterior - posterior transport/Incomplete tongue to palate contact/Delayed oral transit time/Residue in oral cavity within functional limits Within functional limits Reduced anterior - posterior transport/Residue in oral cavity/Delayed oral transit time

## 2017-06-02 LAB
ANION GAP SERPL CALC-SCNC: 14 MMOL/L — SIGNIFICANT CHANGE UP (ref 5–17)
BUN SERPL-MCNC: 31 MG/DL — HIGH (ref 7–23)
CALCIUM SERPL-MCNC: 8.5 MG/DL — SIGNIFICANT CHANGE UP (ref 8.4–10.5)
CHLORIDE SERPL-SCNC: 107 MMOL/L — SIGNIFICANT CHANGE UP (ref 96–108)
CO2 SERPL-SCNC: 20 MMOL/L — LOW (ref 22–31)
CREAT SERPL-MCNC: 0.59 MG/DL — SIGNIFICANT CHANGE UP (ref 0.5–1.3)
GLUCOSE SERPL-MCNC: 107 MG/DL — HIGH (ref 70–99)
HCT VFR BLD CALC: 34.5 % — SIGNIFICANT CHANGE UP (ref 34.5–45)
HGB BLD-MCNC: 11.1 G/DL — LOW (ref 11.5–15.5)
MCHC RBC-ENTMCNC: 29.9 PG — SIGNIFICANT CHANGE UP (ref 27–34)
MCHC RBC-ENTMCNC: 32.2 GM/DL — SIGNIFICANT CHANGE UP (ref 32–36)
MCV RBC AUTO: 93 FL — SIGNIFICANT CHANGE UP (ref 80–100)
PLATELET # BLD AUTO: 175 K/UL — SIGNIFICANT CHANGE UP (ref 150–400)
POTASSIUM SERPL-MCNC: 3.6 MMOL/L — SIGNIFICANT CHANGE UP (ref 3.5–5.3)
POTASSIUM SERPL-SCNC: 3.6 MMOL/L — SIGNIFICANT CHANGE UP (ref 3.5–5.3)
RBC # BLD: 3.71 M/UL — LOW (ref 3.8–5.2)
RBC # FLD: 14 % — SIGNIFICANT CHANGE UP (ref 10.3–14.5)
SODIUM SERPL-SCNC: 141 MMOL/L — SIGNIFICANT CHANGE UP (ref 135–145)
WBC # BLD: 6.82 K/UL — SIGNIFICANT CHANGE UP (ref 3.8–10.5)
WBC # FLD AUTO: 6.82 K/UL — SIGNIFICANT CHANGE UP (ref 3.8–10.5)

## 2017-06-02 PROCEDURE — 99232 SBSQ HOSP IP/OBS MODERATE 35: CPT

## 2017-06-02 PROCEDURE — 99233 SBSQ HOSP IP/OBS HIGH 50: CPT

## 2017-06-02 RX ORDER — AMIODARONE HYDROCHLORIDE 400 MG/1
1 TABLET ORAL
Qty: 0 | Refills: 0 | DISCHARGE
Start: 2017-06-02

## 2017-06-02 RX ORDER — APIXABAN 2.5 MG/1
1 TABLET, FILM COATED ORAL
Qty: 0 | Refills: 0 | COMMUNITY
Start: 2017-06-02

## 2017-06-02 RX ORDER — METOPROLOL TARTRATE 50 MG
1 TABLET ORAL
Qty: 0 | Refills: 0 | DISCHARGE
Start: 2017-06-02

## 2017-06-02 RX ORDER — ATORVASTATIN CALCIUM 80 MG/1
1 TABLET, FILM COATED ORAL
Qty: 0 | Refills: 0 | DISCHARGE
Start: 2017-06-02

## 2017-06-02 RX ORDER — SERTRALINE 25 MG/1
1 TABLET, FILM COATED ORAL
Qty: 0 | Refills: 0 | DISCHARGE
Start: 2017-06-02

## 2017-06-02 RX ADMIN — SODIUM CHLORIDE 65 MILLILITER(S): 9 INJECTION INTRAMUSCULAR; INTRAVENOUS; SUBCUTANEOUS at 08:31

## 2017-06-02 RX ADMIN — Medication 50 MILLIGRAM(S): at 20:57

## 2017-06-02 RX ADMIN — AMIODARONE HYDROCHLORIDE 200 MILLIGRAM(S): 400 TABLET ORAL at 07:09

## 2017-06-02 RX ADMIN — APIXABAN 2.5 MILLIGRAM(S): 2.5 TABLET, FILM COATED ORAL at 07:09

## 2017-06-02 RX ADMIN — Medication 50 MILLIGRAM(S): at 07:51

## 2017-06-02 RX ADMIN — APIXABAN 2.5 MILLIGRAM(S): 2.5 TABLET, FILM COATED ORAL at 18:18

## 2017-06-02 RX ADMIN — ATORVASTATIN CALCIUM 40 MILLIGRAM(S): 80 TABLET, FILM COATED ORAL at 20:57

## 2017-06-02 RX ADMIN — SERTRALINE 100 MILLIGRAM(S): 25 TABLET, FILM COATED ORAL at 11:06

## 2017-06-03 ENCOUNTER — CHART COPY (OUTPATIENT)
Age: 82
End: 2017-06-03

## 2017-06-03 VITALS
HEART RATE: 57 BPM | OXYGEN SATURATION: 95 % | DIASTOLIC BLOOD PRESSURE: 81 MMHG | SYSTOLIC BLOOD PRESSURE: 134 MMHG | TEMPERATURE: 98 F | RESPIRATION RATE: 18 BRPM

## 2017-06-03 PROCEDURE — 92523 SPEECH SOUND LANG COMPREHEN: CPT

## 2017-06-03 PROCEDURE — 85027 COMPLETE CBC AUTOMATED: CPT

## 2017-06-03 PROCEDURE — 83605 ASSAY OF LACTIC ACID: CPT

## 2017-06-03 PROCEDURE — G0515: CPT

## 2017-06-03 PROCEDURE — 81001 URINALYSIS AUTO W/SCOPE: CPT

## 2017-06-03 PROCEDURE — 80061 LIPID PANEL: CPT

## 2017-06-03 PROCEDURE — 97166 OT EVAL MOD COMPLEX 45 MIN: CPT

## 2017-06-03 PROCEDURE — 84443 ASSAY THYROID STIM HORMONE: CPT

## 2017-06-03 PROCEDURE — 82553 CREATINE MB FRACTION: CPT

## 2017-06-03 PROCEDURE — 97161 PT EVAL LOW COMPLEX 20 MIN: CPT

## 2017-06-03 PROCEDURE — 84295 ASSAY OF SERUM SODIUM: CPT

## 2017-06-03 PROCEDURE — 84484 ASSAY OF TROPONIN QUANT: CPT

## 2017-06-03 PROCEDURE — 93005 ELECTROCARDIOGRAM TRACING: CPT

## 2017-06-03 PROCEDURE — 85610 PROTHROMBIN TIME: CPT

## 2017-06-03 PROCEDURE — 82550 ASSAY OF CK (CPK): CPT

## 2017-06-03 PROCEDURE — 92610 EVALUATE SWALLOWING FUNCTION: CPT

## 2017-06-03 PROCEDURE — 84480 ASSAY TRIIODOTHYRONINE (T3): CPT

## 2017-06-03 PROCEDURE — 70548 MR ANGIOGRAPHY NECK W/DYE: CPT

## 2017-06-03 PROCEDURE — 97530 THERAPEUTIC ACTIVITIES: CPT

## 2017-06-03 PROCEDURE — 84132 ASSAY OF SERUM POTASSIUM: CPT

## 2017-06-03 PROCEDURE — 80053 COMPREHEN METABOLIC PANEL: CPT

## 2017-06-03 PROCEDURE — 85730 THROMBOPLASTIN TIME PARTIAL: CPT

## 2017-06-03 PROCEDURE — 82330 ASSAY OF CALCIUM: CPT

## 2017-06-03 PROCEDURE — 82435 ASSAY OF BLOOD CHLORIDE: CPT

## 2017-06-03 PROCEDURE — 84436 ASSAY OF TOTAL THYROXINE: CPT

## 2017-06-03 PROCEDURE — 97116 GAIT TRAINING THERAPY: CPT

## 2017-06-03 PROCEDURE — 71045 X-RAY EXAM CHEST 1 VIEW: CPT

## 2017-06-03 PROCEDURE — 82947 ASSAY GLUCOSE BLOOD QUANT: CPT

## 2017-06-03 PROCEDURE — 70551 MRI BRAIN STEM W/O DYE: CPT

## 2017-06-03 PROCEDURE — 93306 TTE W/DOPPLER COMPLETE: CPT

## 2017-06-03 PROCEDURE — 99285 EMERGENCY DEPT VISIT HI MDM: CPT | Mod: 25

## 2017-06-03 PROCEDURE — 74230 X-RAY XM SWLNG FUNCJ C+: CPT

## 2017-06-03 PROCEDURE — 82803 BLOOD GASES ANY COMBINATION: CPT

## 2017-06-03 PROCEDURE — 83036 HEMOGLOBIN GLYCOSYLATED A1C: CPT

## 2017-06-03 PROCEDURE — 85014 HEMATOCRIT: CPT

## 2017-06-03 PROCEDURE — 80048 BASIC METABOLIC PNL TOTAL CA: CPT

## 2017-06-03 PROCEDURE — 92611 MOTION FLUOROSCOPY/SWALLOW: CPT

## 2017-06-03 PROCEDURE — 99233 SBSQ HOSP IP/OBS HIGH 50: CPT

## 2017-06-03 PROCEDURE — 70544 MR ANGIOGRAPHY HEAD W/O DYE: CPT

## 2017-06-03 PROCEDURE — 70450 CT HEAD/BRAIN W/O DYE: CPT

## 2017-06-03 RX ORDER — APIXABAN 2.5 MG/1
1 TABLET, FILM COATED ORAL
Qty: 0 | Refills: 0 | DISCHARGE
Start: 2017-06-03

## 2017-06-03 RX ORDER — APIXABAN 2.5 MG/1
5 TABLET, FILM COATED ORAL
Qty: 0 | Refills: 0 | Status: DISCONTINUED | OUTPATIENT
Start: 2017-06-03 | End: 2017-06-03

## 2017-06-03 RX ADMIN — SERTRALINE 100 MILLIGRAM(S): 25 TABLET, FILM COATED ORAL at 11:39

## 2017-06-03 RX ADMIN — APIXABAN 2.5 MILLIGRAM(S): 2.5 TABLET, FILM COATED ORAL at 06:28

## 2017-06-03 RX ADMIN — Medication 50 MILLIGRAM(S): at 06:28

## 2017-06-03 RX ADMIN — AMIODARONE HYDROCHLORIDE 200 MILLIGRAM(S): 400 TABLET ORAL at 06:28

## 2017-06-03 NOTE — PROVIDER CONTACT NOTE (OTHER) - BACKGROUND
HX OF FLUTTER
Dx: Cerebral infarction
hx stroke, CVA
Admitted for left MCA territory infarct. Pt was tachycardic during AM shift; meds IVP given prior.
patient diagnosed with stroke
Admitted for left MCA territory infarct. New onset A.flutter since yesterday. Pt HR fluctuated throughout the night between 80-130s; Cardizem 5mg IVP given once during PM shift.

## 2017-06-03 NOTE — PROVIDER CONTACT NOTE (OTHER) - RECOMMENDATIONS
EVAL/TX( RATE CONTROL) MONITOR BP
Eval and treat
Eval and treat
MD notified
No new orders.
12 lead EKG, assess and treat

## 2017-06-03 NOTE — PROVIDER CONTACT NOTE (OTHER) - DATE AND TIME:
02-Jun-2017 14:02
03-Jun-2017 08:00
29-May-2017 14:30
29-May-2017 21:33
30-May-2017 06:00
31-May-2017 21:00

## 2017-06-03 NOTE — PROVIDER CONTACT NOTE (OTHER) - ASSESSMENT
Pt resting comfortably in bed, no acute distress. /66, , 98% o2, temp 37C.
pT bp 145/81 OSAT 93% ON RA  SUPPLEMENTAL O2 REPALCED AFTER BEING REMOVED BY PT AND FAMILY
Pt states she "dose not feel well." Pt complains of slight chest pain and abdominal pain. HR in the 130-150s on tele. /82, , RR 18 02 95%, temp 98F.
pt asymptomatic. O2 88-96%; HR fluctuating between ; /84; RR 20. pt placed back in bed from chair, assessment performed
Pt AOx3, asymptomatic with no c/o of head ache, SOB, or chest pain.  /80, HR 58, T 99.4, Sp)2 95% on room air.
pt. asymptomatic. VSS except for HR of 52.

## 2017-06-03 NOTE — PROVIDER CONTACT NOTE (OTHER) - ACTION/TREATMENT ORDERED:
EKG, cardiac enzymes, Lopressor 5mg IVP, and administer Aspirin suppository now
METROPROPOL 5 MG X1 iv
12 lead EKG at bedside, cardizem 10mg IV push
Cardizem 5mg IVP
MD orders no changes. pt. to be d/c home
Monitoring continued.

## 2017-06-03 NOTE — PROVIDER CONTACT NOTE (OTHER) - REASON
-130s
10 beats y complex
Pt bradycardic with HR of 47; pt AOx3, asymptomatic with no c/o of head ache, SOB, or chest pain.
Pt states "she does not feel well" HR 130s
tachycardia; possible arrhythmia
pt with a flutter approx 130s-140s sudden onset maintained on tel.

## 2017-06-03 NOTE — PROVIDER CONTACT NOTE (OTHER) - SITUATION
pt with a flutter approx 130s-140s sudden onset maintained on tel.
Pt bradycardic with HR of 47; pt AOx3, asymptomatic with no c/o of head ache, SOB, or chest pain.
Pt states she "dose not feel well." HR in the 130-150s on tele.
pt on continuous tele monitoring. strip showing tachycardia and possible arrhythmia.
tele monitoring. 10 beats Y complex
Sustained heart rate between 120-130s

## 2017-06-14 ENCOUNTER — NON-APPOINTMENT (OUTPATIENT)
Age: 82
End: 2017-06-14

## 2017-06-14 ENCOUNTER — APPOINTMENT (OUTPATIENT)
Dept: CARDIOLOGY | Facility: CLINIC | Age: 82
End: 2017-06-14

## 2017-06-14 VITALS
SYSTOLIC BLOOD PRESSURE: 154 MMHG | HEART RATE: 64 BPM | DIASTOLIC BLOOD PRESSURE: 82 MMHG | WEIGHT: 134 LBS | BODY MASS INDEX: 24.51 KG/M2 | OXYGEN SATURATION: 89 %

## 2017-06-14 VITALS — DIASTOLIC BLOOD PRESSURE: 70 MMHG | SYSTOLIC BLOOD PRESSURE: 125 MMHG

## 2017-06-15 ENCOUNTER — MEDICATION RENEWAL (OUTPATIENT)
Age: 82
End: 2017-06-15

## 2017-06-15 ENCOUNTER — APPOINTMENT (OUTPATIENT)
Dept: CARDIOLOGY | Facility: CLINIC | Age: 82
End: 2017-06-15

## 2017-06-15 LAB
ALBUMIN SERPL ELPH-MCNC: 3.9 G/DL
ALP BLD-CCNC: 70 U/L
ALT SERPL-CCNC: 123 U/L
ANION GAP SERPL CALC-SCNC: 21 MMOL/L
AST SERPL-CCNC: 61 U/L
BILIRUB SERPL-MCNC: 0.4 MG/DL
BUN SERPL-MCNC: 28 MG/DL
CALCIUM SERPL-MCNC: 10.1 MG/DL
CHLORIDE SERPL-SCNC: 101 MMOL/L
CO2 SERPL-SCNC: 19 MMOL/L
CREAT SERPL-MCNC: 1.02 MG/DL
GLUCOSE SERPL-MCNC: 24 MG/DL
NT-PROBNP SERPL-MCNC: 1209 PG/ML
POTASSIUM SERPL-SCNC: 3.9 MMOL/L
PROT SERPL-MCNC: 6.9 G/DL
SODIUM SERPL-SCNC: 141 MMOL/L

## 2017-06-16 ENCOUNTER — CLINICAL ADVICE (OUTPATIENT)
Age: 82
End: 2017-06-16

## 2017-06-21 ENCOUNTER — APPOINTMENT (OUTPATIENT)
Dept: CARDIOLOGY | Facility: CLINIC | Age: 82
End: 2017-06-21

## 2017-06-21 VITALS
WEIGHT: 126 LBS | OXYGEN SATURATION: 94 % | HEART RATE: 63 BPM | DIASTOLIC BLOOD PRESSURE: 72 MMHG | BODY MASS INDEX: 23.05 KG/M2 | SYSTOLIC BLOOD PRESSURE: 122 MMHG

## 2017-06-22 LAB
ANION GAP SERPL CALC-SCNC: 17 MMOL/L
BUN SERPL-MCNC: 21 MG/DL
CALCIUM SERPL-MCNC: 9.7 MG/DL
CHLORIDE SERPL-SCNC: 102 MMOL/L
CO2 SERPL-SCNC: 24 MMOL/L
CREAT SERPL-MCNC: 1.08 MG/DL
GLUCOSE SERPL-MCNC: 107 MG/DL
POTASSIUM SERPL-SCNC: 4.3 MMOL/L
SODIUM SERPL-SCNC: 143 MMOL/L

## 2017-06-30 ENCOUNTER — MEDICATION RENEWAL (OUTPATIENT)
Age: 82
End: 2017-06-30

## 2017-07-05 ENCOUNTER — MEDICATION RENEWAL (OUTPATIENT)
Age: 82
End: 2017-07-05

## 2017-07-06 ENCOUNTER — APPOINTMENT (OUTPATIENT)
Dept: INTERNAL MEDICINE | Facility: CLINIC | Age: 82
End: 2017-07-06

## 2017-07-06 VITALS
OXYGEN SATURATION: 95 % | SYSTOLIC BLOOD PRESSURE: 120 MMHG | DIASTOLIC BLOOD PRESSURE: 70 MMHG | TEMPERATURE: 98.2 F | BODY MASS INDEX: 22.74 KG/M2 | WEIGHT: 122 LBS | HEIGHT: 61.5 IN | HEART RATE: 53 BPM

## 2017-07-06 VITALS — SYSTOLIC BLOOD PRESSURE: 130 MMHG | HEART RATE: 56 BPM | DIASTOLIC BLOOD PRESSURE: 74 MMHG

## 2017-07-20 ENCOUNTER — APPOINTMENT (OUTPATIENT)
Dept: CARDIOLOGY | Facility: CLINIC | Age: 82
End: 2017-07-20

## 2017-07-20 VITALS
HEART RATE: 51 BPM | BODY MASS INDEX: 22.92 KG/M2 | OXYGEN SATURATION: 95 % | HEIGHT: 61.5 IN | TEMPERATURE: 97.8 F | SYSTOLIC BLOOD PRESSURE: 134 MMHG | DIASTOLIC BLOOD PRESSURE: 65 MMHG | WEIGHT: 123 LBS

## 2017-07-21 LAB
ALBUMIN SERPL ELPH-MCNC: 4.3 G/DL
ALP BLD-CCNC: 57 U/L
ALT SERPL-CCNC: 21 U/L
ANION GAP SERPL CALC-SCNC: 20 MMOL/L
AST SERPL-CCNC: 20 U/L
BASOPHILS # BLD AUTO: 0.02 K/UL
BASOPHILS NFR BLD AUTO: 0.3 %
BILIRUB SERPL-MCNC: 0.5 MG/DL
BUN SERPL-MCNC: 20 MG/DL
CALCIUM SERPL-MCNC: 10 MG/DL
CHLORIDE SERPL-SCNC: 101 MMOL/L
CHOLEST SERPL-MCNC: 174 MG/DL
CHOLEST/HDLC SERPL: 2.3 RATIO
CO2 SERPL-SCNC: 23 MMOL/L
CREAT SERPL-MCNC: 1.01 MG/DL
EOSINOPHIL # BLD AUTO: 0.17 K/UL
EOSINOPHIL NFR BLD AUTO: 2.6 %
GLUCOSE SERPL-MCNC: 72 MG/DL
HCT VFR BLD CALC: 42.2 %
HDLC SERPL-MCNC: 75 MG/DL
HGB BLD-MCNC: 13.4 G/DL
IMM GRANULOCYTES NFR BLD AUTO: 0.2 %
LDLC SERPL CALC-MCNC: 88 MG/DL
LYMPHOCYTES # BLD AUTO: 0.96 K/UL
LYMPHOCYTES NFR BLD AUTO: 15 %
MAN DIFF?: NORMAL
MCHC RBC-ENTMCNC: 30 PG
MCHC RBC-ENTMCNC: 31.8 GM/DL
MCV RBC AUTO: 94.4 FL
MONOCYTES # BLD AUTO: 0.58 K/UL
MONOCYTES NFR BLD AUTO: 9 %
NEUTROPHILS # BLD AUTO: 4.68 K/UL
NEUTROPHILS NFR BLD AUTO: 72.9 %
PLATELET # BLD AUTO: 208 K/UL
POTASSIUM SERPL-SCNC: 4.2 MMOL/L
PROT SERPL-MCNC: 7.6 G/DL
RBC # BLD: 4.47 M/UL
RBC # FLD: 15.3 %
SODIUM SERPL-SCNC: 144 MMOL/L
TRIGL SERPL-MCNC: 53 MG/DL
WBC # FLD AUTO: 6.42 K/UL

## 2017-07-28 NOTE — ED ADULT NURSE NOTE - CARDIO ASSESSMENT
WDL cranial nerves 2-12 intact/no dysmetria, normal finger to nose. Is stil slightly post ictal, is grunting to answers

## 2017-08-03 ENCOUNTER — MEDICATION RENEWAL (OUTPATIENT)
Age: 82
End: 2017-08-03

## 2017-08-03 RX ORDER — RISEDRONATE SODIUM 150 MG/1
150 TABLET, FILM COATED ORAL
Qty: 3 | Refills: 2 | Status: DISCONTINUED | COMMUNITY
Start: 2017-07-06 | End: 2017-08-03

## 2017-08-23 ENCOUNTER — APPOINTMENT (OUTPATIENT)
Dept: UROLOGY | Facility: CLINIC | Age: 82
End: 2017-08-23
Payer: MEDICARE

## 2017-08-23 ENCOUNTER — OUTPATIENT (OUTPATIENT)
Dept: OUTPATIENT SERVICES | Facility: HOSPITAL | Age: 82
LOS: 1 days | End: 2017-08-23
Payer: MEDICARE

## 2017-08-23 VITALS
HEART RATE: 53 BPM | TEMPERATURE: 97.8 F | SYSTOLIC BLOOD PRESSURE: 130 MMHG | DIASTOLIC BLOOD PRESSURE: 71 MMHG | RESPIRATION RATE: 16 BRPM

## 2017-08-23 DIAGNOSIS — Z98.890 OTHER SPECIFIED POSTPROCEDURAL STATES: Chronic | ICD-10-CM

## 2017-08-23 DIAGNOSIS — R35.0 FREQUENCY OF MICTURITION: ICD-10-CM

## 2017-08-23 PROCEDURE — 52000 CYSTOURETHROSCOPY: CPT

## 2017-08-24 LAB
APPEARANCE: CLEAR
BACTERIA: NEGATIVE
BILIRUBIN URINE: NEGATIVE
BLOOD URINE: NEGATIVE
COLOR: YELLOW
CORE LAB FLUID CYTOLOGY: NORMAL
GLUCOSE QUALITATIVE U: NORMAL MG/DL
HYALINE CASTS: 1 /LPF
KETONES URINE: NEGATIVE
LEUKOCYTE ESTERASE URINE: NEGATIVE
MICROSCOPIC-UA: NORMAL
NITRITE URINE: NEGATIVE
PH URINE: 5.5
PROTEIN URINE: NEGATIVE MG/DL
RED BLOOD CELLS URINE: 1 /HPF
SPECIFIC GRAVITY URINE: 1.01
SQUAMOUS EPITHELIAL CELLS: 1 /HPF
UROBILINOGEN URINE: NORMAL MG/DL
WHITE BLOOD CELLS URINE: 0 /HPF

## 2017-08-25 DIAGNOSIS — N39.41 URGE INCONTINENCE: ICD-10-CM

## 2017-08-25 DIAGNOSIS — C67.9 MALIGNANT NEOPLASM OF BLADDER, UNSPECIFIED: ICD-10-CM

## 2017-08-26 LAB — BACTERIA UR CULT: NORMAL

## 2017-09-26 ENCOUNTER — MEDICATION RENEWAL (OUTPATIENT)
Age: 82
End: 2017-09-26

## 2017-09-28 ENCOUNTER — NON-APPOINTMENT (OUTPATIENT)
Age: 82
End: 2017-09-28

## 2017-09-28 ENCOUNTER — APPOINTMENT (OUTPATIENT)
Dept: CARDIOLOGY | Facility: CLINIC | Age: 82
End: 2017-09-28
Payer: MEDICARE

## 2017-09-28 VITALS
SYSTOLIC BLOOD PRESSURE: 128 MMHG | OXYGEN SATURATION: 94 % | WEIGHT: 122 LBS | BODY MASS INDEX: 22.68 KG/M2 | HEART RATE: 54 BPM | DIASTOLIC BLOOD PRESSURE: 62 MMHG

## 2017-09-28 PROCEDURE — 36415 COLL VENOUS BLD VENIPUNCTURE: CPT

## 2017-09-28 PROCEDURE — 99215 OFFICE O/P EST HI 40 MIN: CPT

## 2017-09-28 PROCEDURE — 93000 ELECTROCARDIOGRAM COMPLETE: CPT

## 2017-09-29 LAB
ALBUMIN SERPL ELPH-MCNC: 4.4 G/DL
ALP BLD-CCNC: 58 U/L
ALT SERPL-CCNC: 77 U/L
ANION GAP SERPL CALC-SCNC: 16 MMOL/L
AST SERPL-CCNC: 39 U/L
BASOPHILS # BLD AUTO: 0.02 K/UL
BASOPHILS NFR BLD AUTO: 0.3 %
BILIRUB SERPL-MCNC: 0.5 MG/DL
BUN SERPL-MCNC: 19 MG/DL
CALCIUM SERPL-MCNC: 9.6 MG/DL
CHLORIDE SERPL-SCNC: 103 MMOL/L
CO2 SERPL-SCNC: 26 MMOL/L
CREAT SERPL-MCNC: 0.94 MG/DL
EOSINOPHIL # BLD AUTO: 0.11 K/UL
EOSINOPHIL NFR BLD AUTO: 1.7 %
GLUCOSE SERPL-MCNC: 86 MG/DL
HCT VFR BLD CALC: 41.3 %
HGB BLD-MCNC: 13.2 G/DL
IMM GRANULOCYTES NFR BLD AUTO: 0.2 %
LYMPHOCYTES # BLD AUTO: 1.04 K/UL
LYMPHOCYTES NFR BLD AUTO: 16.4 %
MAN DIFF?: NORMAL
MCHC RBC-ENTMCNC: 29.6 PG
MCHC RBC-ENTMCNC: 32 GM/DL
MCV RBC AUTO: 92.6 FL
MONOCYTES # BLD AUTO: 0.69 K/UL
MONOCYTES NFR BLD AUTO: 10.9 %
NEUTROPHILS # BLD AUTO: 4.46 K/UL
NEUTROPHILS NFR BLD AUTO: 70.5 %
NT-PROBNP SERPL-MCNC: 1830 PG/ML
PLATELET # BLD AUTO: 205 K/UL
POTASSIUM SERPL-SCNC: 4.4 MMOL/L
PROT SERPL-MCNC: 7.2 G/DL
RBC # BLD: 4.46 M/UL
RBC # FLD: 16.8 %
SODIUM SERPL-SCNC: 145 MMOL/L
TSH SERPL-ACNC: 2.32 UIU/ML
WBC # FLD AUTO: 6.33 K/UL

## 2017-10-02 ENCOUNTER — APPOINTMENT (OUTPATIENT)
Dept: INTERNAL MEDICINE | Facility: CLINIC | Age: 82
End: 2017-10-02
Payer: MEDICARE

## 2017-10-02 VITALS
OXYGEN SATURATION: 95 % | TEMPERATURE: 97.7 F | SYSTOLIC BLOOD PRESSURE: 130 MMHG | HEART RATE: 53 BPM | BODY MASS INDEX: 22.74 KG/M2 | DIASTOLIC BLOOD PRESSURE: 70 MMHG | HEIGHT: 61.5 IN | WEIGHT: 122 LBS

## 2017-10-02 DIAGNOSIS — F32.9 MAJOR DEPRESSIVE DISORDER, SINGLE EPISODE, UNSPECIFIED: ICD-10-CM

## 2017-10-02 PROCEDURE — 99497 ADVNCD CARE PLAN 30 MIN: CPT

## 2017-10-02 PROCEDURE — 99214 OFFICE O/P EST MOD 30 MIN: CPT | Mod: 25

## 2017-10-02 RX ORDER — SENNOSIDES 8.6 MG/1
8.6 TABLET ORAL
Qty: 60 | Refills: 0 | Status: COMPLETED | COMMUNITY
Start: 2017-06-07

## 2017-10-02 RX ORDER — ACETAMINOPHEN 325 MG/1
325 TABLET ORAL
Qty: 90 | Refills: 0 | Status: COMPLETED | COMMUNITY
Start: 2017-06-07

## 2017-11-14 ENCOUNTER — NON-APPOINTMENT (OUTPATIENT)
Age: 82
End: 2017-11-14

## 2017-11-14 ENCOUNTER — MEDICATION RENEWAL (OUTPATIENT)
Age: 82
End: 2017-11-14

## 2017-11-14 ENCOUNTER — APPOINTMENT (OUTPATIENT)
Dept: CARDIOLOGY | Facility: CLINIC | Age: 82
End: 2017-11-14
Payer: MEDICARE

## 2017-11-14 VITALS
WEIGHT: 121 LBS | TEMPERATURE: 98.9 F | DIASTOLIC BLOOD PRESSURE: 68 MMHG | OXYGEN SATURATION: 92 % | HEART RATE: 60 BPM | BODY MASS INDEX: 22.55 KG/M2 | SYSTOLIC BLOOD PRESSURE: 128 MMHG | HEIGHT: 61.5 IN

## 2017-11-14 PROCEDURE — 93000 ELECTROCARDIOGRAM COMPLETE: CPT

## 2017-11-14 PROCEDURE — 99214 OFFICE O/P EST MOD 30 MIN: CPT

## 2017-11-15 ENCOUNTER — MEDICATION RENEWAL (OUTPATIENT)
Age: 82
End: 2017-11-15

## 2017-12-14 ENCOUNTER — APPOINTMENT (OUTPATIENT)
Dept: INTERNAL MEDICINE | Facility: CLINIC | Age: 82
End: 2017-12-14

## 2017-12-28 ENCOUNTER — MEDICATION RENEWAL (OUTPATIENT)
Age: 82
End: 2017-12-28

## 2018-03-19 ENCOUNTER — NON-APPOINTMENT (OUTPATIENT)
Age: 83
End: 2018-03-19

## 2018-03-19 ENCOUNTER — APPOINTMENT (OUTPATIENT)
Dept: CARDIOLOGY | Facility: CLINIC | Age: 83
End: 2018-03-19
Payer: MEDICARE

## 2018-03-19 VITALS
SYSTOLIC BLOOD PRESSURE: 135 MMHG | OXYGEN SATURATION: 98 % | DIASTOLIC BLOOD PRESSURE: 77 MMHG | BODY MASS INDEX: 22.74 KG/M2 | HEIGHT: 61.5 IN | TEMPERATURE: 98.6 F | HEART RATE: 53 BPM | WEIGHT: 122 LBS

## 2018-03-19 PROCEDURE — 93000 ELECTROCARDIOGRAM COMPLETE: CPT

## 2018-03-19 PROCEDURE — 99214 OFFICE O/P EST MOD 30 MIN: CPT

## 2018-03-19 PROCEDURE — 36415 COLL VENOUS BLD VENIPUNCTURE: CPT

## 2018-03-20 LAB
ALBUMIN SERPL ELPH-MCNC: 4.1 G/DL
ALP BLD-CCNC: 76 U/L
ALT SERPL-CCNC: 44 U/L
ANION GAP SERPL CALC-SCNC: 17 MMOL/L
AST SERPL-CCNC: 33 U/L
BILIRUB SERPL-MCNC: 0.4 MG/DL
BUN SERPL-MCNC: 26 MG/DL
CALCIUM SERPL-MCNC: 9.8 MG/DL
CHLORIDE SERPL-SCNC: 96 MMOL/L
CO2 SERPL-SCNC: 26 MMOL/L
CREAT SERPL-MCNC: 1.07 MG/DL
GLUCOSE SERPL-MCNC: 86 MG/DL
POTASSIUM SERPL-SCNC: 3.5 MMOL/L
PROT SERPL-MCNC: 7.2 G/DL
SODIUM SERPL-SCNC: 139 MMOL/L
TSH SERPL-ACNC: 2.68 UIU/ML

## 2018-04-10 ENCOUNTER — CHART COPY (OUTPATIENT)
Age: 83
End: 2018-04-10

## 2018-04-12 ENCOUNTER — RX RENEWAL (OUTPATIENT)
Age: 83
End: 2018-04-12

## 2018-04-23 ENCOUNTER — MEDICATION RENEWAL (OUTPATIENT)
Age: 83
End: 2018-04-23

## 2018-05-01 ENCOUNTER — MEDICATION RENEWAL (OUTPATIENT)
Age: 83
End: 2018-05-01

## 2018-05-24 ENCOUNTER — APPOINTMENT (OUTPATIENT)
Dept: CARDIOLOGY | Facility: CLINIC | Age: 83
End: 2018-05-24
Payer: MEDICARE

## 2018-05-24 VITALS
OXYGEN SATURATION: 95 % | TEMPERATURE: 98.3 F | WEIGHT: 122 LBS | BODY MASS INDEX: 22.74 KG/M2 | HEART RATE: 57 BPM | HEIGHT: 61.5 IN | SYSTOLIC BLOOD PRESSURE: 125 MMHG | DIASTOLIC BLOOD PRESSURE: 63 MMHG

## 2018-05-24 PROCEDURE — 99214 OFFICE O/P EST MOD 30 MIN: CPT

## 2018-05-29 ENCOUNTER — APPOINTMENT (OUTPATIENT)
Dept: CARDIOLOGY | Facility: CLINIC | Age: 83
End: 2018-05-29
Payer: MEDICARE

## 2018-05-29 VITALS
SYSTOLIC BLOOD PRESSURE: 133 MMHG | BODY MASS INDEX: 20.16 KG/M2 | WEIGHT: 121 LBS | TEMPERATURE: 97.5 F | HEIGHT: 65.1 IN | HEART RATE: 57 BPM | OXYGEN SATURATION: 97 % | DIASTOLIC BLOOD PRESSURE: 75 MMHG

## 2018-05-29 PROCEDURE — 99214 OFFICE O/P EST MOD 30 MIN: CPT

## 2018-05-29 PROCEDURE — 93306 TTE W/DOPPLER COMPLETE: CPT

## 2018-06-07 NOTE — DISCHARGE NOTE ADULT - NS AS DC STROKE ANTITHROMB CONTRAIN
Patient ID:  Lashonda Estevez  [id number]  1960 58 year old    CHIEF COMPLAINT: Fever        ASSESSMENT AND PLAN:  #. Fever  -Failed outpatient antibiotic course  -Cellulitis versus erythema nodosum  -Dermatology on consult, appreciate recommendation.  -Infectious disease on consult, appreciate recommendation.  -IV vancomycin  -Hold IV Solu-Medrol until patient has skin biopsy.  -Tylenol and Toradol as needed for pain and fever control  -Blood culture pending.  #. History of breast cancer: Noted  #. Type 2 diabetes mellitus with hyperglycemia  -Carb controlled diet  -Restart home insulin  -Insulin sliding scale  #. Inflammatory bowel disease: Noted  #. History of seizures  -Continue medication  #. History of CVA: Noted  #. F/E/N: Normal saline 1 25 cc an hour: Replace as needed: Carb controlled diet.  #. Prophylaxis: Lovenox and famotidine.  #. Disposition: MedSurg and full code.    Code status: Full code    This physician, recommends Lashonda Estevez be admitted as an INPATIENT to the medical unit/ICU. The expected LOS exceeds 2 midnights. Reason(s) for INPATIENT CARE include fever. Based on severity of presenting sx, co-morbidities, and lab/imaging, this patient has an increased risk of adverse outcomes.       HPI: 58-year-old female with past medical history of breast cancer presented to [facility] with a chief complaint of fever. Patient has had fever for 3-4 days and noticed to have developed rash of bilateral lower extremity over past 3 days. Rash is warm, tender and nodular. Per patient the rash has been spreading over past 3 days and now it's involving her upper extremities. She has decrease in appetite but denies any nausea, vomiting, abdominal discomfort, constipation or diarrhea.    Patient was recently seen by infectious disease and dermatologist as an outpatient for similar rash and was diagnosed with erythema nodosum. She finished clindamycin course and was started with steroids and her  wrist symptoms resolved. No skin biopsy were done recently due to resolution of rash with IV steroids. She also recently started new chemotherapy regimen for her breast cancer.    In the ER she was evaluated. She does not any leukocytosis. Patient does have Temp and she is tachycardic. Patient rash involves bilateral legs below the knee. She does have decrease range of motion of her knee due to pain. Ankle has full range of motion. [facility] for signs of sepsis.    PAST MEDICAL HISTORY:     Disorder of bone and cartilage, unspecified     11/23/2011    Hyperlipidemia                                                Asthma                                                        Rhinitis                                                      Fibromyalgia                                                  Inflammatory arthritis                                        IBS (irritable bowel syndrome)                                Chronic constipation                                          Seizures (CMS/HCC)                                            Weakness generalized                                          TIA (transient ischemic attack)                               Anemia                                                        History of hypoglycemia                                       Multinodular goiter                             2013          Inflammatory bowel disease                                    Esophageal reflux                                             Migraine                                                      Sleep apnea                                                     Comment: cpap    Unspecified sinusitis (chronic)                               Osteoporosis                                                  Anxiety                                                       Hypothyroid                                                    Nontoxic multinodular goiter                                  Vitamin D insufficiency                                       Diabetes mellitus (CMS/HCC)                                   Recurrent UTI                                                   Comment: last one 6/27/2016-trip to ER and admitted to                hospital     Depression                                                    H. pylori infection                             7/28/2016     Breast CA (CMS/HCC)                             8/2016          Comment: Right    Cerebral infarction (CMS/HCC)                   2014            Comment: tia    PAST SURGICAL HISTORY:    HYSTERECTOMY                                    10/06/2011    DEXA BONE DENSITY AXIAL SKELETON                11/23/2011    HAND TENDON SURGERY                             11/17/2011      Comment: excision lesion on tendon sheath of hand    BARTHOLIN GLAND CYST EXCISION                   05/02/2011    HAND TENDON SURGERY                             12/30/2010      Comment: arthroplasty carpal joint with implant    HAND TENDON SURGERY                             09/04/2008      Comment: tendon sheath incision/trigger finger    VAGINAL DELIVERY                                                Comment: x4     SHOULDER ARTHROSCOPY                            11/19/2013      Comment: right shoulder- rotator cuff repair, biceps                tendonesis     THYROID LOBECTOMY,UNILAT                        1/9/2014        Comment: left thyroid lobectomy -Dr Harlan Kothari     SERVICE TO GASTROENTEROLOGY                     12/21/2011      Comment: colonoscopy with polypectomy     SERVICE TO GASTROENTEROLOGY                     7/12/13         Comment: EGD    SERVICE TO GASTROENTEROLOGY                     2/23/2009       Comment: FLEX SIGMOID     BUNIONECTOMY                                    12/11/2014      Comment: Dr Vinicius Acosta -with osteotomy     ESOPHAGOGASTRODUODENOSCOPY TRANSORAL FLEX  W/BX* 7/28/2016     ECHO HEART RESTING W DOPPLER & COLOR FLOW       04/07/2017      Comment: Post-chemo    PORTACATH PLACEMENT                             10/15/2016    AXILLARY SURGERY                                                Comment: Cyst removal    REMOVAL OF ACCESS PORT                          06/06/2017    BREAST SURGERY                                  8/30/2016       Comment: Right mastectomy with sentinel lymph node                biopsy    ECHO HEART RESTING W DOPPLER & COLOR FLOW       03/27/2018    FAMILY HISTORY:  Family History   Problem Relation Age of Onset   • Diabetes Mother    • Arthritis Mother    • Heart disease Mother    • Stroke Father         x4    • Arthritis Brother    • High cholesterol Daughter    • Liver Disease Sister    • Kidney disease Sister    • Arthritis Sister    • Arthritis Sister    • Diabetes Sister    • Arthritis Sister    • High cholesterol Brother    • Arthritis Brother    • Arthritis Daughter    • Stroke/TIA Daughter    • Gastrointestinal Daughter         acid reflux    • Stroke/TIA Daughter         related to medications    • Cancer Maternal Aunt         breast   • Cancer Maternal Aunt        SOCIAL HISTORY:  Social History     Social History   • Marital status:      Spouse name: N/A   • Number of children: 4   • Years of education: N/A     Occupational History   • Not on file.     Social History Main Topics   • Smoking status: Never Smoker   • Smokeless tobacco: Never Used   • Alcohol use No   • Drug use: No   • Sexual activity: Not on file     Other Topics Concern   • Not on file     Social History Narrative   • No narrative on file       MEDICATIONS:     (Not in a hospital admission)    ALLERGIES:  Allergies as of 06/07/2018 - Reviewed 06/07/2018   Allergen Reaction Noted   • Contrast media SEIZURES and Nausea & Vomiting 07/08/2013   • Benadryl [altaryl]     • Latex  08/24/2013   • Percocet [oxycodone-acetaminophen] RASH    • Shrimp     • Cefdinir GI UPSET  11/08/2016   • Cephalexin GI UPSET 11/08/2016   • Penicillins RASH    • Meloxicam RASH    • Sulfate RASH    • Zithromax [azithromycin dihydrate] RASH        REVIEW OF SYSTEMS:  11 systems reviewed, all systems are negative other than stated in history of present illness..      PHYSICAL EXAM:  VITAL SIGNS:   Visit Vitals  /62   Pulse 119   Temp 100.1 °F (37.8 °C)   Resp 22   Ht 5' 3\" (1.6 m)   Wt 72.6 kg   SpO2 95%   BMI 28.34 kg/m²     GEN: Alert, oriented x 3, no acute distress. Well developed female appears stated age.  EYES: PERRLA, no scleral icterus, conjunctiva are pink.  ENT: Oral mucous membranes are moist. No oral lesions.  NECK: Supple. Trachea is midline. No thyromegaly.  CV: Regular rate and rhythm. + heart murmur. .  LUNGS: Clear to auscultation bilaterally. Normal respiratory effort.  ABD: Soft, non-tender, non-distended, active bowel sounds. No HSM appreciated.  EXT: No edema, cyanosis or clubbing.  MS: Normal muscle mass and tone. 3/5 strength of RLE and 4/5 of LLE..  SKIN: Warm, tender, nodular, erythematous rash of b/l extremeties. Tender nodules of LUE.        LABS:   Lab Results   Component Value Date    SODIUM 136 06/07/2018    POTASSIUM 4.2 06/07/2018    CHLORIDE 98 06/07/2018    CO2 28 06/07/2018    GLUCOSE 167 (H) 06/07/2018    BUN 10 06/07/2018    CREATININE 0.60 06/07/2018    TIMMY 1.19 12/28/2017    ALBUMIN 2.8 (L) 06/07/2018    BILIRUBIN 0.8 06/07/2018    LACTA 2.1 (HH) 05/14/2018    AST 34 06/07/2018    PHOS 4.3 12/28/2017    INR 1.0 05/14/2018     Lab Results   Component Value Date    PTT 25 05/14/2018    WBC 7.4 06/07/2018    HGB 9.2 (L) 06/07/2018    HCT 29.5 (L) 06/07/2018     06/07/2018    BAND 1 05/14/2018    BNP 8 03/29/2017    RAPDTR <0.02 12/24/2017    CPK 23 (L) 08/06/2013    NH3 11 10/20/2014    CRP 12.5 (H) 06/07/2018    TSH 0.804 05/09/2018         Richard Bourgeois MD  6/7/2018  4:12 PM     risk of bleeding

## 2018-06-18 ENCOUNTER — RX RENEWAL (OUTPATIENT)
Age: 83
End: 2018-06-18

## 2018-06-18 ENCOUNTER — MEDICATION RENEWAL (OUTPATIENT)
Age: 83
End: 2018-06-18

## 2018-06-27 ENCOUNTER — RX RENEWAL (OUTPATIENT)
Age: 83
End: 2018-06-27

## 2018-07-06 ENCOUNTER — LABORATORY RESULT (OUTPATIENT)
Age: 83
End: 2018-07-06

## 2018-07-06 ENCOUNTER — APPOINTMENT (OUTPATIENT)
Dept: INTERNAL MEDICINE | Facility: CLINIC | Age: 83
End: 2018-07-06
Payer: MEDICARE

## 2018-07-06 VITALS
OXYGEN SATURATION: 97 % | SYSTOLIC BLOOD PRESSURE: 100 MMHG | BODY MASS INDEX: 22.37 KG/M2 | WEIGHT: 120 LBS | DIASTOLIC BLOOD PRESSURE: 60 MMHG | HEIGHT: 61.5 IN | HEART RATE: 63 BPM | TEMPERATURE: 97.7 F

## 2018-07-06 VITALS — DIASTOLIC BLOOD PRESSURE: 82 MMHG | SYSTOLIC BLOOD PRESSURE: 120 MMHG

## 2018-07-06 DIAGNOSIS — Z87.898 PERSONAL HISTORY OF OTHER SPECIFIED CONDITIONS: ICD-10-CM

## 2018-07-06 DIAGNOSIS — Z87.19 PERSONAL HISTORY OF OTHER DISEASES OF THE DIGESTIVE SYSTEM: ICD-10-CM

## 2018-07-06 DIAGNOSIS — Z71.89 OTHER SPECIFIED COUNSELING: ICD-10-CM

## 2018-07-06 DIAGNOSIS — M25.511 PAIN IN RIGHT SHOULDER: ICD-10-CM

## 2018-07-06 PROCEDURE — G0444 DEPRESSION SCREEN ANNUAL: CPT | Mod: 59

## 2018-07-06 PROCEDURE — 36415 COLL VENOUS BLD VENIPUNCTURE: CPT

## 2018-07-06 PROCEDURE — G0439: CPT

## 2018-07-06 PROCEDURE — 99214 OFFICE O/P EST MOD 30 MIN: CPT | Mod: 25

## 2018-07-08 PROBLEM — Z87.19 HISTORY OF RECTAL BLEEDING: Status: RESOLVED | Noted: 2017-02-28 | Resolved: 2018-07-08

## 2018-07-08 PROBLEM — Z87.898 HISTORY OF CHEST PAIN: Status: RESOLVED | Noted: 2017-06-14 | Resolved: 2018-07-08

## 2018-07-08 PROBLEM — Z71.89 COUNSELING REGARDING END OF LIFE DECISION MAKING: Status: RESOLVED | Noted: 2017-10-03 | Resolved: 2018-07-08

## 2018-07-08 NOTE — HISTORY OF PRESENT ILLNESS
[FreeTextEntry1] : Annual wellness visit\par Hypertension\par Hyperlipidemia\par Atrial fibrillation\par Chronic anticoagulation\par Unsteady gait\par  [de-identified] : Patient is independent with all activities but is not driving. She feels her balance is not perfect and wants to continue physical therapy.  She doesn't want to use  a  cane or walker. She did lose her balance once a cracked a rib.   She has no bruising or bleeding .  She is in the supermarket she walks with a cart.   She sees ophthalmology.  She  needs reading glasses.   She sees ENT  and was told she has  some diminished hearing but normal for age .  She sees  the dentist.   She hasn't had issues with chest pain shortness of breath or palpitations.  She has no edema.  She has had no neurologic episodes She is due to get a followup chest x-ray per cardiology for pleural effusions. \par She has ongoing stresses related to family issues and sees a psychologist regularly

## 2018-07-08 NOTE — ASSESSMENT
[FreeTextEntry1] : Her  blood pressure is controlled. She does not appear to be in congestive heart failure or atrial fibrillation. . She will go for a followup chest x-ray as recommended by cardiology. She will continue with regular cardiology followup. Blood work was sent for CBC, comprehensive  metabolic panel lipids and thyroid. Advanced directives were reviewed and the patient has them been in place. We discussed the shingrix vaccine. \par She was given a referral for physical therapy for balance and shoulder pain. Fall precautions reviewed.  Patient told if any head trauma must be evaluated in the ER. \par She was reminded to follow up with urology for her history of bladder cancer. \par She will go for a bone density in 2 years.  she has been on Alendronate for 1 year

## 2018-07-08 NOTE — HEALTH RISK ASSESSMENT
[Good] : ~his/her~ current health as good [Fair] :  ~his/her~ mood as fair [Any fall with injury in past year] : Patient reported fall with injury in the past year [0] : 1) Little interest or pleasure doing things: Not at all (0) [1] : 2) Feeling down, depressed, or hopeless for several days (1) [None] : None [Alone] : lives alone [Retired] : retired [College] : College [] :  [Feels Safe at Home] : Feels safe at home [Fully functional (bathing, dressing, toileting, transferring, walking, feeding)] : Fully functional (bathing, dressing, toileting, transferring, walking, feeding) [Reports changes in hearing] : Reports changes in hearing [Reports changes in vision] : Reports changes in vision [Smoke Detector] : smoke detector [Sunscreen] : uses sunscreen [Discussed at today's visit] : Advance Directives Discussed at today's visit [Designated Healthcare Proxy] : Designated healthcare proxy [Name: ___] : Health Care Proxy's Name: [unfilled]  [Relationship: ___] : Relationship: [unfilled] [Independent] : managing finances [Some assistance needed] : housekeeping [] : No [de-identified] : rib fracture [[id number]] : 1 [Change in mental status noted] : No change in mental status noted [Language] : denies difficulty with language [Behavior] : denies difficulty with behavior [Learning/Retaining New Information] : denies difficulty learning/retaining new information [Handling Complex Tasks] : denies difficulty handling complex tasks [Reasoning] : denies difficulty with reasoning [Spatial Ability and Orientation] : denies difficulty with spatial ability and orientation [Sexually Active] : not sexually active [High Risk Behavior] : no high risk behavior [Reports changes in dental health] : Reports no changes in dental health [Guns at Home] : no guns at home [Seat Belt] : does not use seat belt [MammogramComments] : patient defers [BoneDensityDate] : 2/2017 [BoneDensityComments] : osteoporosis [de-identified] : not driving [de-identified] : decreased due to age [de-identified] : reading glASSES

## 2018-07-17 LAB
25(OH)D3 SERPL-MCNC: 38.6 NG/ML
ALBUMIN SERPL ELPH-MCNC: 4.3 G/DL
ALP BLD-CCNC: 50 U/L
ALT SERPL-CCNC: 30 U/L
ANION GAP SERPL CALC-SCNC: 15 MMOL/L
APPEARANCE: CLEAR
AST SERPL-CCNC: 33 U/L
BACTERIA: NEGATIVE
BASOPHILS # BLD AUTO: 0.02 K/UL
BASOPHILS NFR BLD AUTO: 0.3 %
BILIRUB SERPL-MCNC: 0.4 MG/DL
BILIRUBIN URINE: NEGATIVE
BLOOD URINE: NEGATIVE
BUN SERPL-MCNC: 19 MG/DL
CALCIUM SERPL-MCNC: 9.8 MG/DL
CHLORIDE SERPL-SCNC: 101 MMOL/L
CHOLEST SERPL-MCNC: 208 MG/DL
CHOLEST/HDLC SERPL: 2.5 RATIO
CO2 SERPL-SCNC: 26 MMOL/L
COLOR: YELLOW
CREAT SERPL-MCNC: 1.04 MG/DL
EOSINOPHIL # BLD AUTO: 0.11 K/UL
EOSINOPHIL NFR BLD AUTO: 1.7 %
GLUCOSE QUALITATIVE U: NEGATIVE MG/DL
GLUCOSE SERPL-MCNC: 70 MG/DL
HCT VFR BLD CALC: 40.7 %
HDLC SERPL-MCNC: 83 MG/DL
HGB BLD-MCNC: 12.6 G/DL
HYALINE CASTS: 3 /LPF
IMM GRANULOCYTES NFR BLD AUTO: 0.2 %
KETONES URINE: NEGATIVE
LDLC SERPL CALC-MCNC: 111 MG/DL
LEUKOCYTE ESTERASE URINE: NEGATIVE
LYMPHOCYTES # BLD AUTO: 1.37 K/UL
LYMPHOCYTES NFR BLD AUTO: 20.9 %
MAN DIFF?: NORMAL
MCHC RBC-ENTMCNC: 30.2 PG
MCHC RBC-ENTMCNC: 31 GM/DL
MCV RBC AUTO: 97.6 FL
MICROSCOPIC-UA: NORMAL
MONOCYTES # BLD AUTO: 0.77 K/UL
MONOCYTES NFR BLD AUTO: 11.7 %
NEUTROPHILS # BLD AUTO: 4.28 K/UL
NEUTROPHILS NFR BLD AUTO: 65.2 %
NITRITE URINE: NEGATIVE
PH URINE: 5.5
PLATELET # BLD AUTO: 195 K/UL
POTASSIUM SERPL-SCNC: 4.2 MMOL/L
PROT SERPL-MCNC: 7.5 G/DL
PROTEIN URINE: NEGATIVE MG/DL
RBC # BLD: 4.17 M/UL
RBC # FLD: 15.3 %
RED BLOOD CELLS URINE: 2 /HPF
SODIUM SERPL-SCNC: 142 MMOL/L
SPECIFIC GRAVITY URINE: 1.01
SQUAMOUS EPITHELIAL CELLS: 0 /HPF
T3RU NFR SERPL: 0.96 INDEX
T4 SERPL-MCNC: 6.9 UG/DL
TRIGL SERPL-MCNC: 71 MG/DL
TSH SERPL-ACNC: 2.08 UIU/ML
UROBILINOGEN URINE: NEGATIVE MG/DL
WBC # FLD AUTO: 6.56 K/UL
WHITE BLOOD CELLS URINE: 0 /HPF

## 2018-09-06 NOTE — SWALLOW BEDSIDE ASSESSMENT ADULT - SLP PRECAUTIONS/LIMITATIONS: HEARING
Outcome Facilitation Team (OFT) round progress note     Patient: Caroline Zapata, 28 year old       LOS: 1 day(s)  Hours in Observation:     Living situation : Living Arrangements: Family members  Type of residence : Type of Residence: House  Payor: JUDY MEDICARE COMPLETE / Plan: [id number] / Product Type: MEDADV    Needs Assessment:   IV fluids/rate appropriate - N/A  IV antibiotics required - NO  Telemetry needs -  YES  Therapy needs - No   Last bowel movement -  ,  PTA  Pineda present -   N/A  Isolation - Droplet  Adequate pain control - N/A    LACE(5-9):Moderate            1 LACE Length of Stay    3 LACE Acute Admission    2 LACE Charlson Comorbidity Index Evalution        Criteria that do not apply:    LACE Visits to ED Previous 6 Months               Barriers to discharge: not medically ready for discharge  +wheezing, tachy  Next Steps: continue to monitor    Pharmacy discussion/recommendations (specify if any):    Anticipated/Expected discharge      Destination at discharge: Home:     Team members present: nurse, , , pharmacist, nurse educator/nurse manager and transition nurse    
within functional limits
within functional limits

## 2018-09-14 ENCOUNTER — APPOINTMENT (OUTPATIENT)
Dept: INTERNAL MEDICINE | Facility: CLINIC | Age: 83
End: 2018-09-14
Payer: MEDICARE

## 2018-09-14 VITALS
DIASTOLIC BLOOD PRESSURE: 70 MMHG | HEART RATE: 60 BPM | SYSTOLIC BLOOD PRESSURE: 120 MMHG | WEIGHT: 122 LBS | HEIGHT: 61.5 IN | OXYGEN SATURATION: 92 % | TEMPERATURE: 97.8 F | BODY MASS INDEX: 22.74 KG/M2

## 2018-09-14 PROCEDURE — 99214 OFFICE O/P EST MOD 30 MIN: CPT

## 2018-09-16 NOTE — ASSESSMENT
[FreeTextEntry1] : discussed w pt \par vitals normal today\par reassured her exam is non concerning, no neurological deficits. her recurrent neck pain and headache may be due to OA of spine. will obtain xrays done at urgent care.\par advised she may try heat to her neck. \par advised on best option for pain control is tylenol, discussed dosing, she cannot take NSAIds due to anticoagulation. \par she will monitor the pattern of headaches and may consider further eval if becomes more frequent or severe. \par \par reviewed current rx \par call or return prn if any worsening concerns

## 2018-09-16 NOTE — PHYSICAL EXAM
[No Acute Distress] : no acute distress [Well-Appearing] : well-appearing [Normal Voice/Communication] : normal voice/communication [No JVD] : no jugular venous distention [Supple] : supple [No Respiratory Distress] : no respiratory distress  [Clear to Auscultation] : lungs were clear to auscultation bilaterally [No Accessory Muscle Use] : no accessory muscle use [Normal Rate] : normal rate  [Regular Rhythm] : with a regular rhythm [Normal S1, S2] : normal S1 and S2 [No Carotid Bruits] : no carotid bruits [No Edema] : there was no peripheral edema [Normal Supraclavicular Nodes] : no supraclavicular lymphadenopathy [Normal Posterior Cervical Nodes] : no posterior cervical lymphadenopathy [Normal Anterior Cervical Nodes] : no anterior cervical lymphadenopathy [No Joint Swelling] : no joint swelling [Normal Gait] : normal gait [Coordination Grossly Intact] : coordination grossly intact [No Focal Deficits] : no focal deficits [Speech Grossly Normal] : speech grossly normal [Memory Grossly Normal] : memory grossly normal [Normal Affect] : the affect was normal [Alert and Oriented x3] : oriented to person, place, and time [Normal Mood] : the mood was normal [Normal Insight/Judgement] : insight and judgment were intact [de-identified] : mild systolic murmur grade 2/6 at LSB  [de-identified] : R shoulder limited ROM due to OA, normal  . mild pain when rotating neck to L side, normal gentle flexion and extension

## 2018-09-16 NOTE — REVIEW OF SYSTEMS
[Joint Pain] : joint pain [Joint Stiffness] : joint stiffness [Back Pain] : back pain [Negative] : Heme/Lymph [Fever] : no fever [Chills] : no chills [Vision Problems] : no vision problems [Chest Pain] : no chest pain [Palpitations] : no palpitations [Lower Ext Edema] : no lower extremity edema [Shortness Of Breath] : no shortness of breath [Abdominal Pain] : no abdominal pain [Vomiting] : no vomiting [Muscle Weakness] : no muscle weakness [Dizziness] : no dizziness [Fainting] : no fainting [Confusion] : no confusion [FreeTextEntry9] : - R shoulder chronic pain due to OA

## 2018-09-16 NOTE — HISTORY OF PRESENT ILLNESS
[FreeTextEntry8] : presents for eval of few episodes of dull neck pain radiating uo R side of neck , sometimes resulting in R sided headache for past 1-2 weeks. no injury to neck. headache is dull, not severe, no visual or mental status changes. pain was recurring so she went to a [facility] urgent care near her home few days ago and evaluation, vitals were normal. 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 she feels well currently w no pain at this time, she wanted to be sure no other process was occurring. she has had no slurred speech, muscular weakness or falls in past few weeks. 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 she is anticoagulated on Eliquis for PAF, rate and rhythm-controlled on amiodarone, following w cardiology. \par she is ambulating currently steadily without assistive device. \par HTN currently well controlled

## 2018-09-17 ENCOUNTER — APPOINTMENT (OUTPATIENT)
Dept: CARDIOLOGY | Facility: CLINIC | Age: 83
End: 2018-09-17
Payer: MEDICARE

## 2018-09-17 ENCOUNTER — NON-APPOINTMENT (OUTPATIENT)
Age: 83
End: 2018-09-17

## 2018-09-17 VITALS
SYSTOLIC BLOOD PRESSURE: 131 MMHG | DIASTOLIC BLOOD PRESSURE: 71 MMHG | HEIGHT: 61.5 IN | HEART RATE: 54 BPM | TEMPERATURE: 98.7 F | BODY MASS INDEX: 22.37 KG/M2 | OXYGEN SATURATION: 95 % | WEIGHT: 120 LBS

## 2018-09-17 PROCEDURE — 99214 OFFICE O/P EST MOD 30 MIN: CPT

## 2018-09-17 PROCEDURE — 93000 ELECTROCARDIOGRAM COMPLETE: CPT

## 2018-10-13 ENCOUNTER — RX RENEWAL (OUTPATIENT)
Age: 83
End: 2018-10-13

## 2018-10-18 ENCOUNTER — RX RENEWAL (OUTPATIENT)
Age: 83
End: 2018-10-18

## 2018-10-19 ENCOUNTER — RX RENEWAL (OUTPATIENT)
Age: 83
End: 2018-10-19

## 2018-10-30 ENCOUNTER — OUTPATIENT (OUTPATIENT)
Dept: OUTPATIENT SERVICES | Facility: HOSPITAL | Age: 83
LOS: 1 days | End: 2018-10-30
Payer: MEDICARE

## 2018-10-30 ENCOUNTER — APPOINTMENT (OUTPATIENT)
Dept: UROLOGY | Facility: CLINIC | Age: 83
End: 2018-10-30
Payer: MEDICARE

## 2018-10-30 VITALS
SYSTOLIC BLOOD PRESSURE: 133 MMHG | DIASTOLIC BLOOD PRESSURE: 62 MMHG | RESPIRATION RATE: 16 BRPM | HEART RATE: 48 BPM | TEMPERATURE: 98.2 F

## 2018-10-30 DIAGNOSIS — N28.1 CYST OF KIDNEY, ACQUIRED: ICD-10-CM

## 2018-10-30 DIAGNOSIS — Z98.890 OTHER SPECIFIED POSTPROCEDURAL STATES: Chronic | ICD-10-CM

## 2018-10-30 PROCEDURE — 52000 CYSTOURETHROSCOPY: CPT

## 2018-10-30 PROCEDURE — 76775 US EXAM ABDO BACK WALL LIM: CPT | Mod: 26

## 2018-10-30 PROCEDURE — 99213 OFFICE O/P EST LOW 20 MIN: CPT | Mod: 25

## 2018-10-30 PROCEDURE — 76775 US EXAM ABDO BACK WALL LIM: CPT

## 2018-10-30 RX ORDER — RIVAROXABAN 15 MG/1
15 TABLET, FILM COATED ORAL
Qty: 30 | Refills: 10 | Status: COMPLETED | OUTPATIENT
Start: 2018-09-17 | End: 2018-10-30

## 2018-11-01 LAB — BACTERIA UR CULT: NORMAL

## 2018-11-06 DIAGNOSIS — I63.132 CEREBRAL INFARCTION DUE TO EMBOLISM OF LEFT CAROTID ARTERY: ICD-10-CM

## 2018-11-06 DIAGNOSIS — C67.9 MALIGNANT NEOPLASM OF BLADDER, UNSPECIFIED: ICD-10-CM

## 2018-11-06 DIAGNOSIS — N39.41 URGE INCONTINENCE: ICD-10-CM

## 2018-11-06 DIAGNOSIS — N28.1 CYST OF KIDNEY, ACQUIRED: ICD-10-CM

## 2019-01-02 ENCOUNTER — APPOINTMENT (OUTPATIENT)
Dept: INTERNAL MEDICINE | Facility: CLINIC | Age: 84
End: 2019-01-02
Payer: MEDICARE

## 2019-01-02 VITALS
HEART RATE: 57 BPM | TEMPERATURE: 98.4 F | WEIGHT: 121 LBS | BODY MASS INDEX: 22.55 KG/M2 | DIASTOLIC BLOOD PRESSURE: 50 MMHG | HEIGHT: 61.5 IN | SYSTOLIC BLOOD PRESSURE: 114 MMHG | OXYGEN SATURATION: 95 %

## 2019-01-02 PROCEDURE — 36415 COLL VENOUS BLD VENIPUNCTURE: CPT

## 2019-01-02 PROCEDURE — 99214 OFFICE O/P EST MOD 30 MIN: CPT | Mod: 25

## 2019-01-02 RX ORDER — APIXABAN 2.5 MG/1
2.5 TABLET, FILM COATED ORAL
Refills: 0 | Status: DISCONTINUED | COMMUNITY
Start: 2018-10-30 | End: 2019-01-02

## 2019-01-02 RX ORDER — SERTRALINE HYDROCHLORIDE 100 MG/1
100 TABLET, FILM COATED ORAL DAILY
Refills: 11 | Status: DISCONTINUED | COMMUNITY
Start: 2018-10-30 | End: 2019-01-02

## 2019-01-03 ENCOUNTER — RX RENEWAL (OUTPATIENT)
Age: 84
End: 2019-01-03

## 2019-01-06 NOTE — HISTORY OF PRESENT ILLNESS
[FreeTextEntry1] : Hypertension\par Hyperlipidemia\par Fatigue [de-identified] : Patient is here for reevaluation of her blood pressure and lipids. She has been feeling well. She goes to the gym several times a week and is on the treadmill for at least 15 minutes without chest pain or dyspnea.   There have  been no falls. She walks without any assistive device She feels she is slowing down a bit. She has no edema PND or orthopnea. She has no chest pain.  There has been no unusual bruising or bleeding. She wants to go back to physical therapy for conditioning and balance. She is requesting a small supply of Eliquis due to her history of PAF   as she is awaiting this from her mail away pharmacy. She is also complaining about the cost of this.

## 2019-01-06 NOTE — PHYSICAL EXAM
[No Acute Distress] : no acute distress [Well Nourished] : well nourished [Well Developed] : well developed [Well-Appearing] : well-appearing [Normal Voice/Communication] : normal voice/communication [Normal Sclera/Conjunctiva] : normal sclera/conjunctiva [PERRL] : pupils equal round and reactive to light [EOMI] : extraocular movements intact [Normal Outer Ear/Nose] : the outer ears and nose were normal in appearance [Normal Oropharynx] : the oropharynx was normal [Normal TMs] : both tympanic membranes were normal [No JVD] : no jugular venous distention [Supple] : supple [No Lymphadenopathy] : no lymphadenopathy [Thyroid Normal, No Nodules] : the thyroid was normal and there were no nodules present [No Respiratory Distress] : no respiratory distress  [Clear to Auscultation] : lungs were clear to auscultation bilaterally [No Accessory Muscle Use] : no accessory muscle use [Normal Percussion] : the chest was normal to percussion [Normal Rate] : normal rate  [Regular Rhythm] : with a regular rhythm [Normal S1, S2] : normal S1 and S2 [No Carotid Bruits] : no carotid bruits [Pedal Pulses Present] : the pedal pulses are present [No Edema] : there was no peripheral edema [Soft] : abdomen soft [Non Tender] : non-tender [Non-distended] : non-distended [No HSM] : no HSM [Normal Bowel Sounds] : normal bowel sounds [Normal Supraclavicular Nodes] : no supraclavicular lymphadenopathy [Normal Axillary Nodes] : no axillary lymphadenopathy [Normal Posterior Cervical Nodes] : no posterior cervical lymphadenopathy [Normal Anterior Cervical Nodes] : no anterior cervical lymphadenopathy [Normal Inguinal Nodes] : no inguinal lymphadenopathy [No CVA Tenderness] : no CVA  tenderness [No Spinal Tenderness] : no spinal tenderness [Kyphosis] : kyphosis [Scoliosis] : scoliosis [No Joint Swelling] : no joint swelling [Grossly Normal Strength/Tone] : grossly normal strength/tone [No Rash] : no rash [Normal Gait] : normal gait [Coordination Grossly Intact] : coordination grossly intact [No Focal Deficits] : no focal deficits [Deep Tendon Reflexes (DTR)] : deep tendon reflexes were 2+ and symmetric [Speech Grossly Normal] : speech grossly normal [Memory Grossly Normal] : memory grossly normal [Normal Affect] : the affect was normal [Alert and Oriented x3] : oriented to person, place, and time [Normal Mood] : the mood was normal [Normal Insight/Judgement] : insight and judgment were intact [Normal Appearance] : normal in appearance [No Masses] : no palpable masses [No Nipple Discharge] : no nipple discharge [No Axillary Lymphadenopathy] : no axillary lymphadenopathy [de-identified] : 2/6 VISHNU at base [de-identified] : pinprick and filament intact

## 2019-01-06 NOTE — ASSESSMENT
[FreeTextEntry1] : Patient appears to be clinically doing well. Her blood pressure is controlled .  She does not appear to be in any degree of congestive heart failure at the current time. Blood work was sent for evaluation of her electrolytes and CBC as well as thyroid for her complaint of fatigue. She was given a referral to physical therapy for balance and conditioning.  She'll return in 6 months for repeat physical exam bone density and we will discuss the Shingrix vaccine which she will get him one year due to episode of shingles 2 years ago.

## 2019-01-07 ENCOUNTER — APPOINTMENT (OUTPATIENT)
Dept: CARDIOLOGY | Facility: CLINIC | Age: 84
End: 2019-01-07
Payer: MEDICARE

## 2019-01-07 ENCOUNTER — NON-APPOINTMENT (OUTPATIENT)
Age: 84
End: 2019-01-07

## 2019-01-07 VITALS
OXYGEN SATURATION: 97 % | HEIGHT: 61.5 IN | WEIGHT: 123 LBS | BODY MASS INDEX: 22.92 KG/M2 | DIASTOLIC BLOOD PRESSURE: 76 MMHG | SYSTOLIC BLOOD PRESSURE: 128 MMHG | HEART RATE: 54 BPM

## 2019-01-07 PROCEDURE — 99214 OFFICE O/P EST MOD 30 MIN: CPT

## 2019-01-07 PROCEDURE — 93306 TTE W/DOPPLER COMPLETE: CPT

## 2019-01-07 PROCEDURE — 93000 ELECTROCARDIOGRAM COMPLETE: CPT

## 2019-01-07 NOTE — DISCUSSION/SUMMARY
[FreeTextEntry1] : Mrs. Bethea remains without cardiac symptoms and is unchanged. Her exam shows a normal blood pressure, clear lungs, and unchanged murmur, and no edema. Her cardiogram shows sinus rhythm and left bundle branch block and is unchanged.\par \par Her echocardiogram was repeated and is unchanged from her last echo done in April. She'll continue on her current regimen and followup here in 4 months.

## 2019-01-07 NOTE — PHYSICAL EXAM
[General Appearance - Well Developed] : well developed [Normal Appearance] : normal appearance [Well Groomed] : well groomed [General Appearance - Well Nourished] : well nourished [No Deformities] : no deformities [General Appearance - In No Acute Distress] : no acute distress [Normal Conjunctiva] : the conjunctiva exhibited no abnormalities [Eyelids - No Xanthelasma] : the eyelids demonstrated no xanthelasmas [Normal Oral Mucosa] : normal oral mucosa [No Oral Pallor] : no oral pallor [No Oral Cyanosis] : no oral cyanosis [Normal Jugular Venous A Waves Present] : normal jugular venous A waves present [Normal Jugular Venous V Waves Present] : normal jugular venous V waves present [No Jugular Venous Owens A Waves] : no jugular venous owens A waves [Respiration, Rhythm And Depth] : normal respiratory rhythm and effort [Exaggerated Use Of Accessory Muscles For Inspiration] : no accessory muscle use [Auscultation Breath Sounds / Voice Sounds] : lungs were clear to auscultation bilaterally [Heart Rate And Rhythm] : heart rate and rhythm were normal [Heart Sounds] : normal S1 and S2 [Edema] : no peripheral edema present [Abdomen Soft] : soft [Abdomen Tenderness] : non-tender [Abdomen Mass (___ Cm)] : no abdominal mass palpated [Abnormal Walk] : normal gait [Gait - Sufficient For Exercise Testing] : the gait was sufficient for exercise testing [Nail Clubbing] : no clubbing of the fingernails [Cyanosis, Localized] : no localized cyanosis [Petechial Hemorrhages (___cm)] : no petechial hemorrhages [Skin Color & Pigmentation] : normal skin color and pigmentation [] : no rash [No Venous Stasis] : no venous stasis [Skin Lesions] : no skin lesions [No Skin Ulcers] : no skin ulcer [No Xanthoma] : no  xanthoma was observed [Oriented To Time, Place, And Person] : oriented to person, place, and time [Affect] : the affect was normal [Mood] : the mood was normal [No Anxiety] : not feeling anxious [FreeTextEntry1] : Dupuytren's contracture right hand

## 2019-01-07 NOTE — HISTORY OF PRESENT ILLNESS
[FreeTextEntry1] : 89-year-old female with a history of paroxysmal atrial fibrillation, hypertension, CAD, diastolic CHF, and aortic stenosis.  She remains asymptomatic with no change in her exercise capacity.  She seems a little confused

## 2019-01-07 NOTE — REVIEW OF SYSTEMS
[Eyeglasses] : currently wearing eyeglasses [Shortness Of Breath] : shortness of breath [Joint Pain] : joint pain [Joint Stiffness] : joint stiffness [Anxiety] : anxiety [Negative] : Heme/Lymph [Feeling Fatigued] : not feeling fatigued [Dyspnea on exertion] : not dyspnea during exertion [Palpitations] : no palpitations [Confusion] : no confusion was observed

## 2019-01-08 LAB
ALBUMIN SERPL ELPH-MCNC: 4.7 G/DL
ALP BLD-CCNC: 75 U/L
ALT SERPL-CCNC: 42 U/L
ANION GAP SERPL CALC-SCNC: 13 MMOL/L
AST SERPL-CCNC: 29 U/L
BASOPHILS # BLD AUTO: 0.02 K/UL
BASOPHILS NFR BLD AUTO: 0.3 %
BILIRUB SERPL-MCNC: 0.3 MG/DL
BUN SERPL-MCNC: 27 MG/DL
CALCIUM SERPL-MCNC: 10.1 MG/DL
CHLORIDE SERPL-SCNC: 103 MMOL/L
CHOLEST SERPL-MCNC: 216 MG/DL
CHOLEST/HDLC SERPL: 2.3 RATIO
CO2 SERPL-SCNC: 30 MMOL/L
CREAT SERPL-MCNC: 0.8 MG/DL
EOSINOPHIL # BLD AUTO: 0.12 K/UL
EOSINOPHIL NFR BLD AUTO: 1.6 %
GLUCOSE SERPL-MCNC: 71 MG/DL
HCT VFR BLD CALC: 39.9 %
HDLC SERPL-MCNC: 94 MG/DL
HGB BLD-MCNC: 12.6 G/DL
IMM GRANULOCYTES NFR BLD AUTO: 0.3 %
LDLC SERPL CALC-MCNC: 111 MG/DL
LYMPHOCYTES # BLD AUTO: 1.18 K/UL
LYMPHOCYTES NFR BLD AUTO: 16.2 %
MAN DIFF?: NORMAL
MCHC RBC-ENTMCNC: 30.4 PG
MCHC RBC-ENTMCNC: 31.6 GM/DL
MCV RBC AUTO: 96.4 FL
MONOCYTES # BLD AUTO: 1 K/UL
MONOCYTES NFR BLD AUTO: 13.7 %
NEUTROPHILS # BLD AUTO: 4.94 K/UL
NEUTROPHILS NFR BLD AUTO: 67.9 %
PLATELET # BLD AUTO: 171 K/UL
POTASSIUM SERPL-SCNC: 4.2 MMOL/L
PROT SERPL-MCNC: 7.6 G/DL
RBC # BLD: 4.14 M/UL
RBC # FLD: 15.1 %
SODIUM SERPL-SCNC: 146 MMOL/L
T4 FREE SERPL-MCNC: 1.4 NG/DL
TRIGL SERPL-MCNC: 57 MG/DL
TSH SERPL-ACNC: 1.66 UIU/ML
WBC # FLD AUTO: 7.28 K/UL

## 2019-01-14 ENCOUNTER — RX RENEWAL (OUTPATIENT)
Age: 84
End: 2019-01-14

## 2019-02-12 ENCOUNTER — APPOINTMENT (OUTPATIENT)
Dept: INTERNAL MEDICINE | Facility: CLINIC | Age: 84
End: 2019-02-12
Payer: MEDICARE

## 2019-02-12 VITALS
OXYGEN SATURATION: 98 % | HEIGHT: 62.5 IN | HEART RATE: 56 BPM | WEIGHT: 121 LBS | BODY MASS INDEX: 21.71 KG/M2 | TEMPERATURE: 98.1 F | SYSTOLIC BLOOD PRESSURE: 110 MMHG | DIASTOLIC BLOOD PRESSURE: 56 MMHG

## 2019-02-12 LAB
CLARITY UR: CLEAR
COLLECTION METHOD: NORMAL
GLUCOSE UR-MCNC: NORMAL
HGB UR QL STRIP.AUTO: NORMAL
KETONES UR-MCNC: NORMAL
LEUKOCYTE ESTERASE UR QL STRIP: NORMAL
NITRITE UR QL STRIP: NORMAL
PH UR STRIP: 5.5
PROT UR STRIP-MCNC: NORMAL
SP GR UR STRIP: 1.01

## 2019-02-12 PROCEDURE — 36415 COLL VENOUS BLD VENIPUNCTURE: CPT

## 2019-02-12 PROCEDURE — 81003 URINALYSIS AUTO W/O SCOPE: CPT | Mod: QW

## 2019-02-12 PROCEDURE — 99214 OFFICE O/P EST MOD 30 MIN: CPT | Mod: 25

## 2019-02-12 PROCEDURE — 82570 ASSAY OF URINE CREATININE: CPT | Mod: QW

## 2019-02-13 LAB
ALBUMIN SERPL ELPH-MCNC: 4.4 G/DL
ALP BLD-CCNC: 49 U/L
ALT SERPL-CCNC: 46 U/L
ANION GAP SERPL CALC-SCNC: 12 MMOL/L
AST SERPL-CCNC: 33 U/L
BASOPHILS # BLD AUTO: 0.02 K/UL
BASOPHILS NFR BLD AUTO: 0.4 %
BILIRUB SERPL-MCNC: 0.5 MG/DL
BUN SERPL-MCNC: 18 MG/DL
CALCIUM SERPL-MCNC: 10.3 MG/DL
CHLORIDE SERPL-SCNC: 103 MMOL/L
CO2 SERPL-SCNC: 27 MMOL/L
CREAT SERPL-MCNC: 1.03 MG/DL
EOSINOPHIL # BLD AUTO: 0.13 K/UL
EOSINOPHIL NFR BLD AUTO: 2.3 %
GLUCOSE SERPL-MCNC: 73 MG/DL
HCT VFR BLD CALC: 39.9 %
HGB BLD-MCNC: 12.6 G/DL
IMM GRANULOCYTES NFR BLD AUTO: 0.2 %
LYMPHOCYTES # BLD AUTO: 0.89 K/UL
LYMPHOCYTES NFR BLD AUTO: 16 %
MAN DIFF?: NORMAL
MCHC RBC-ENTMCNC: 30.6 PG
MCHC RBC-ENTMCNC: 31.6 GM/DL
MCV RBC AUTO: 96.8 FL
MONOCYTES # BLD AUTO: 0.6 K/UL
MONOCYTES NFR BLD AUTO: 10.8 %
NEUTROPHILS # BLD AUTO: 3.9 K/UL
NEUTROPHILS NFR BLD AUTO: 70.3 %
PLATELET # BLD AUTO: 193 K/UL
POTASSIUM SERPL-SCNC: 3.8 MMOL/L
PROT SERPL-MCNC: 7.6 G/DL
RBC # BLD: 4.12 M/UL
RBC # FLD: 15 %
SODIUM SERPL-SCNC: 142 MMOL/L
WBC # FLD AUTO: 5.55 K/UL

## 2019-02-13 NOTE — HISTORY OF PRESENT ILLNESS
[FreeTextEntry8] : Patient comes for an acute visit. \par \par She is here for evaluation of left lower quadrant pain x 2 days. Pain radiates into left lower back. No urinary complaints.  Appetite slightly diminished. Denies N/V, constipation, or diarrhea. No fever or chills. Her LLQ pain resembles prior episodes of diverticulitis, last occurred in 2012, at that time had RLQ pain and CT confirmed cecal diverticulitis. She spoke with Dr. Samuel yesterday on phone who prescribed Augmentin. She notes the abdominal pain has diminished this morning. Pain more situated in periumbilical area now. \par

## 2019-02-13 NOTE — ADDENDUM
[FreeTextEntry1] : The CBC and CMP were normal. I notified patient of results today. She will call next week with another clinical update.

## 2019-02-13 NOTE — PHYSICAL EXAM
[No Acute Distress] : no acute distress [Well Nourished] : well nourished [Well Developed] : well developed [Well-Appearing] : well-appearing [Normal Oropharynx] : the oropharynx was normal [Normal TMs] : both tympanic membranes were normal [Supple] : supple [No Lymphadenopathy] : no lymphadenopathy [No Respiratory Distress] : no respiratory distress  [Clear to Auscultation] : lungs were clear to auscultation bilaterally [Normal Rate] : normal rate  [Regular Rhythm] : with a regular rhythm [Normal S1, S2] : normal S1 and S2 [No Edema] : there was no peripheral edema [Soft] : abdomen soft [Non-distended] : non-distended [Normal Bowel Sounds] : normal bowel sounds [No Spinal Tenderness] : no spinal tenderness [No Joint Swelling] : no joint swelling [No Rash] : no rash [Normal Gait] : normal gait [Normal Mood] : the mood was normal [de-identified] : 2-3/6 VISHNU in RUSB  [de-identified] : mild periumbilical tenderness; no rebound or guarding; no LLQ tenderness  [de-identified] : left flank tenderness

## 2019-02-13 NOTE — REVIEW OF SYSTEMS
[Abdominal Pain] : abdominal pain [Back Pain] : back pain [Fever] : no fever [Chills] : no chills [Chest Pain] : no chest pain [Lower Ext Edema] : no lower extremity edema [Shortness Of Breath] : no shortness of breath [Cough] : no cough [Nausea] : no nausea [Constipation] : no constipation [Diarrhea] : diarrhea [Vomiting] : no vomiting [Melena] : no melena [Dysuria] : no dysuria [Frequency] : no frequency [Joint Pain] : no joint pain [Muscle Pain] : no muscle pain [Skin Rash] : no skin rash [Headache] : no headache [Memory Loss] : no memory loss

## 2019-02-13 NOTE — ASSESSMENT
[FreeTextEntry1] : Patient initially p/w LLQ pain x 2 days. Now with mild periumbilical tenderness, no LLQ tenderness on exam. Unclear if she has acute diverticulitis. Started Augmentin yesterday - suggest taking course for 7 days. Check CBC and CMP. Urine dipstick was negative, not suggestive of UTI. Rest and hydrate well. Will call patient in next 24-48 hr for a clinical update. \par \par F/u with Dr. Samuel.

## 2019-02-15 ENCOUNTER — APPOINTMENT (OUTPATIENT)
Dept: INTERNAL MEDICINE | Facility: CLINIC | Age: 84
End: 2019-02-15
Payer: MEDICARE

## 2019-02-15 VITALS
SYSTOLIC BLOOD PRESSURE: 108 MMHG | HEIGHT: 62.5 IN | HEART RATE: 61 BPM | WEIGHT: 120 LBS | TEMPERATURE: 98.6 F | BODY MASS INDEX: 21.53 KG/M2 | OXYGEN SATURATION: 96 % | DIASTOLIC BLOOD PRESSURE: 62 MMHG

## 2019-02-15 DIAGNOSIS — K57.92 DIVERTICULITIS OF INTESTINE, PART UNSPECIFIED, W/OUT PERFORATION OR ABSCESS W/OUT BLEEDING: ICD-10-CM
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
## 2019-02-15 PROCEDURE — 99214 OFFICE O/P EST MOD 30 MIN: CPT

## 2019-02-15 RX ORDER — AMOXICILLIN AND CLAVULANATE POTASSIUM 875; 125 MG/1; MG/1
875-125 TABLET, COATED ORAL TWICE DAILY
Qty: 20 | Refills: 1 | Status: DISCONTINUED | COMMUNITY
Start: 2019-02-11 | End: 2019-02-15

## 2019-02-15 RX ORDER — SERTRALINE HYDROCHLORIDE 50 MG/1
50 TABLET, FILM COATED ORAL
Qty: 90 | Refills: 0 | Status: COMPLETED | COMMUNITY
Start: 2018-10-03

## 2019-02-15 RX ORDER — BENZONATATE 100 MG/1
100 CAPSULE ORAL
Qty: 20 | Refills: 0 | Status: COMPLETED | COMMUNITY
Start: 2018-08-21

## 2019-02-15 NOTE — ASSESSMENT
[FreeTextEntry1] : Patient's presentation is most consistent with a musculoskeletal issue. There has been no trauma but perhaps she has a sacral insufficiency fracture or a pelvic insufficiency fracture. I do not think she has diverticulitis and will therefore stop her Augmentin. She will take Tylenol  1000 milligrams twice a day.She was advised to rest and take it easy for the next several days

## 2019-02-15 NOTE — PHYSICAL EXAM
[No Acute Distress] : no acute distress [Well Nourished] : well nourished [Well Developed] : well developed [Well-Appearing] : well-appearing [Normal Voice/Communication] : normal voice/communication [Normal Sclera/Conjunctiva] : normal sclera/conjunctiva [PERRL] : pupils equal round and reactive to light [EOMI] : extraocular movements intact [Normal Outer Ear/Nose] : the outer ears and nose were normal in appearance [Normal Oropharynx] : the oropharynx was normal [No JVD] : no jugular venous distention [Supple] : supple [No Lymphadenopathy] : no lymphadenopathy [Thyroid Normal, No Nodules] : the thyroid was normal and there were no nodules present [No Respiratory Distress] : no respiratory distress  [Clear to Auscultation] : lungs were clear to auscultation bilaterally [No Accessory Muscle Use] : no accessory muscle use [Normal Percussion] : the chest was normal to percussion [Normal Rate] : normal rate  [Regular Rhythm] : with a regular rhythm [Normal S1, S2] : normal S1 and S2 [No Murmur] : no murmur heard [No Carotid Bruits] : no carotid bruits [No Edema] : there was no peripheral edema [Soft] : abdomen soft [Non Tender] : non-tender [Non-distended] : non-distended [No Masses] : no abdominal mass palpated [No HSM] : no HSM [Normal Bowel Sounds] : normal bowel sounds [Normal Sphincter Tone] : normal sphincter tone [No Mass] : no mass [Normal Supraclavicular Nodes] : no supraclavicular lymphadenopathy [Normal Posterior Cervical Nodes] : no posterior cervical lymphadenopathy [Normal Anterior Cervical Nodes] : no anterior cervical lymphadenopathy [Normal Inguinal Nodes] : no inguinal lymphadenopathy [No CVA Tenderness] : no CVA  tenderness [No Joint Swelling] : no joint swelling [Grossly Normal Strength/Tone] : grossly normal strength/tone [No Rash] : no rash [Normal Gait] : normal gait [Speech Grossly Normal] : speech grossly normal [Memory Grossly Normal] : memory grossly normal [Normal Affect] : the affect was normal [Alert and Oriented x3] : oriented to person, place, and time [Normal Mood] : the mood was normal [Normal Insight/Judgement] : insight and judgment were intact [FreeTextEntry1] : No rectal tenderness. There is some tenderness on palpation of the sacrum [de-identified] : There is tenderness to palpation of the left posterior pelvic brim and  the anterior pubic symphysis

## 2019-02-22 DIAGNOSIS — R10.32 LEFT LOWER QUADRANT PAIN: ICD-10-CM

## 2019-02-24 ENCOUNTER — FORM ENCOUNTER (OUTPATIENT)
Age: 84
End: 2019-02-24

## 2019-02-25 ENCOUNTER — APPOINTMENT (OUTPATIENT)
Dept: CT IMAGING | Facility: IMAGING CENTER | Age: 84
End: 2019-02-25

## 2019-02-25 ENCOUNTER — OUTPATIENT (OUTPATIENT)
Dept: OUTPATIENT SERVICES | Facility: HOSPITAL | Age: 84
LOS: 1 days | End: 2019-02-25
Payer: MEDICARE

## 2019-02-25 DIAGNOSIS — Z98.890 OTHER SPECIFIED POSTPROCEDURAL STATES: Chronic | ICD-10-CM

## 2019-02-25 DIAGNOSIS — R10.32 LEFT LOWER QUADRANT PAIN: ICD-10-CM

## 2019-02-25 DIAGNOSIS — M54.5 LOW BACK PAIN: ICD-10-CM

## 2019-02-25 DIAGNOSIS — R93.2 ABNORMAL FINDINGS ON DIAGNOSTIC IMAGING OF LIVER AND BILIARY TRACT: ICD-10-CM

## 2019-02-25 PROCEDURE — 74177 CT ABD & PELVIS W/CONTRAST: CPT | Mod: 26

## 2019-02-25 PROCEDURE — 74177 CT ABD & PELVIS W/CONTRAST: CPT

## 2019-03-11 ENCOUNTER — RX RENEWAL (OUTPATIENT)
Age: 84
End: 2019-03-11

## 2019-05-06 ENCOUNTER — NON-APPOINTMENT (OUTPATIENT)
Age: 84
End: 2019-05-06

## 2019-05-06 ENCOUNTER — APPOINTMENT (OUTPATIENT)
Dept: CARDIOLOGY | Facility: CLINIC | Age: 84
End: 2019-05-06
Payer: MEDICARE

## 2019-05-06 VITALS
SYSTOLIC BLOOD PRESSURE: 137 MMHG | WEIGHT: 121 LBS | HEART RATE: 50 BPM | OXYGEN SATURATION: 96 % | BODY MASS INDEX: 21.71 KG/M2 | DIASTOLIC BLOOD PRESSURE: 70 MMHG | HEIGHT: 62.5 IN

## 2019-05-06 PROCEDURE — 99215 OFFICE O/P EST HI 40 MIN: CPT

## 2019-05-06 PROCEDURE — 93000 ELECTROCARDIOGRAM COMPLETE: CPT

## 2019-05-06 NOTE — PHYSICAL EXAM
[General Appearance - Well Developed] : well developed [Normal Appearance] : normal appearance [Well Groomed] : well groomed [General Appearance - Well Nourished] : well nourished [General Appearance - In No Acute Distress] : no acute distress [No Deformities] : no deformities [Normal Conjunctiva] : the conjunctiva exhibited no abnormalities [Normal Oral Mucosa] : normal oral mucosa [Eyelids - No Xanthelasma] : the eyelids demonstrated no xanthelasmas [No Oral Pallor] : no oral pallor [No Oral Cyanosis] : no oral cyanosis [Normal Jugular Venous A Waves Present] : normal jugular venous A waves present [Normal Jugular Venous V Waves Present] : normal jugular venous V waves present [No Jugular Venous Owens A Waves] : no jugular venous owens A waves [Respiration, Rhythm And Depth] : normal respiratory rhythm and effort [Exaggerated Use Of Accessory Muscles For Inspiration] : no accessory muscle use [Auscultation Breath Sounds / Voice Sounds] : lungs were clear to auscultation bilaterally [Heart Rate And Rhythm] : heart rate and rhythm were normal [Heart Sounds] : normal S1 and S2 [Edema] : no peripheral edema present [Abdomen Soft] : soft [Abdomen Tenderness] : non-tender [Abdomen Mass (___ Cm)] : no abdominal mass palpated [Abnormal Walk] : normal gait [Gait - Sufficient For Exercise Testing] : the gait was sufficient for exercise testing [Nail Clubbing] : no clubbing of the fingernails [Cyanosis, Localized] : no localized cyanosis [Petechial Hemorrhages (___cm)] : no petechial hemorrhages [] : no ischemic changes [Skin Color & Pigmentation] : normal skin color and pigmentation [No Skin Ulcers] : no skin ulcer [No Venous Stasis] : no venous stasis [Skin Lesions] : no skin lesions [No Xanthoma] : no  xanthoma was observed [Oriented To Time, Place, And Person] : oriented to person, place, and time [Mood] : the mood was normal [Affect] : the affect was normal [No Anxiety] : not feeling anxious [FreeTextEntry1] : Dupuytren's contracture right hand

## 2019-05-06 NOTE — HISTORY OF PRESENT ILLNESS
[FreeTextEntry1] : 89-year-old female with a history of CAD, severe aortic stenosis, paroxysmal atrial fibrillation, and hypertension.  She is walking slowly because she's fallen and is frightened of falling again. However, she denies any dyspnea or chest pain and thinks her cardiac status is stable.

## 2019-05-06 NOTE — DISCUSSION/SUMMARY
[FreeTextEntry1] : Mrs. Bethea has no new cardiac complaints and looks unchanged. Her exam shows normal blood pressure, clear lungs, and change in murmur. Her EKG shows sinus rhythm and left bundle branch block and is also unchanged. I made no changes in her regimen. She'll followup here again in 4 months.

## 2019-05-20 ENCOUNTER — RX RENEWAL (OUTPATIENT)
Age: 84
End: 2019-05-20

## 2019-05-31 ENCOUNTER — CHART COPY (OUTPATIENT)
Age: 84
End: 2019-05-31

## 2019-06-04 ENCOUNTER — OUTPATIENT (OUTPATIENT)
Dept: OUTPATIENT SERVICES | Facility: HOSPITAL | Age: 84
LOS: 1 days | End: 2019-06-04
Payer: MEDICARE

## 2019-06-04 ENCOUNTER — APPOINTMENT (OUTPATIENT)
Dept: CARDIOTHORACIC SURGERY | Facility: CLINIC | Age: 84
End: 2019-06-04
Payer: MEDICARE

## 2019-06-04 ENCOUNTER — APPOINTMENT (OUTPATIENT)
Dept: CV DIAGNOSITCS | Facility: HOSPITAL | Age: 84
End: 2019-06-04

## 2019-06-04 ENCOUNTER — NON-APPOINTMENT (OUTPATIENT)
Age: 84
End: 2019-06-04

## 2019-06-04 VITALS
OXYGEN SATURATION: 98 % | HEART RATE: 55 BPM | RESPIRATION RATE: 12 BRPM | TEMPERATURE: 97.5 F | HEIGHT: 62 IN | DIASTOLIC BLOOD PRESSURE: 83 MMHG | SYSTOLIC BLOOD PRESSURE: 144 MMHG | BODY MASS INDEX: 22.08 KG/M2 | WEIGHT: 120 LBS

## 2019-06-04 DIAGNOSIS — Z98.890 OTHER SPECIFIED POSTPROCEDURAL STATES: Chronic | ICD-10-CM

## 2019-06-04 DIAGNOSIS — I35.0 NONRHEUMATIC AORTIC (VALVE) STENOSIS: ICD-10-CM

## 2019-06-04 PROCEDURE — 99205 OFFICE O/P NEW HI 60 MIN: CPT

## 2019-06-04 PROCEDURE — 93306 TTE W/DOPPLER COMPLETE: CPT | Mod: 26

## 2019-06-04 PROCEDURE — 94010 BREATHING CAPACITY TEST: CPT

## 2019-06-04 PROCEDURE — 93306 TTE W/DOPPLER COMPLETE: CPT

## 2019-06-04 PROCEDURE — 93000 ELECTROCARDIOGRAM COMPLETE: CPT

## 2019-06-04 NOTE — PHYSICAL EXAM
[General Appearance - Alert] : alert [General Appearance - In No Acute Distress] : in no acute distress [Sclera] : the sclera and conjunctiva were normal [PERRL With Normal Accommodation] : pupils were equal in size, round, and reactive to light [Extraocular Movements] : extraocular movements were intact [Oropharynx] : the oropharynx was normal [Outer Ear] : the ears and nose were normal in appearance [Respiration, Rhythm And Depth] : normal respiratory rhythm and effort [Jugular Venous Distention Increased] : there was no jugular-venous distention [] : no respiratory distress [III] : a grade 3 [II] : a grade 2 [No Pitting Edema] : no pitting edema present [Examination Of The Chest] : the chest was normal in appearance [Left Carotid Bruit] : left carotid bruit heard [Chest Visual Inspection Thoracic Asymmetry] : no chest asymmetry [Edema] : there was no peripheral edema [Abdomen Soft] : soft [Bowel Sounds] : normal bowel sounds [Breast Appearance] : normal in appearance [Abdomen Tenderness] : non-tender [No CVA Tenderness] : no ~M costovertebral angle tenderness [Involuntary Movements] : no involuntary movements were seen [No Spinal Tenderness] : no spinal tenderness [Skin Color & Pigmentation] : normal skin color and pigmentation [Skin Turgor] : normal skin turgor [Affect] : the affect was normal [Oriented To Time, Place, And Person] : oriented to person, place, and time [Mood] : the mood was normal [Right Carotid Bruit] : no bruit heard over the right carotid [FreeTextEntry1] : deferred

## 2019-06-04 NOTE — HISTORY OF PRESENT ILLNESS
[FreeTextEntry1] : Lyssa is a 89-year-old female with PMH of falls, CAD,  paroxysmal atrial fibrillation (Eliquis), hypertension, and severe aortic stenosis,. She called her cardiologist this past week complaining of DAWKINS (NYHA ll).  She was referred to valve clinic for consideration for ADIN.

## 2019-06-04 NOTE — HISTORY OF PRESENT ILLNESS
[FreeTextEntry1] : Lyssa is referred by Dr JENNIE Jones for evaluation in the setting of severe aortic stenosis with recently progressive symptoms.  Her PMH includes CHF (systolic and likely diastolic), HTN, PAF (on Eliquis and Amiodarone), HLD, and a recent fall.  She had a TTE today that showed progression of AS with a deterioration in LV function as compared to the prior study with Dr Jones in January.\par \par Last week she reports having tiredness and shortness of breath while catching a bus.  Over the past year she has noted a significant functional decline (NYHA II).  She denies angina or syncope.  She reports gait disturbances over the past year, including recent falls.  She reports a stroke a few years ago, possibly due to AFIB (currently on Eliquis, she was not on OAC at that time).  Her only residual deficit is word finding difficulties.  She denies CAD, prior MI, or stents; she has never had an angiogram.  She reports feeling "tired" all the time and has a decreased appetite.

## 2019-06-04 NOTE — ASSESSMENT
[FreeTextEntry1] : Ms Bethea is a 89 year old female with progressive severe symptomatic aortic stenosis.  Her echocardiogram also shows significant MR and decreased cardiac function.  Her STS score is 7.8% which places her in the high risk surgical cohort.  She will be scheduled for right and left cardiac catheterization and a cardiac structural CTA.  Also, in light of her conduction disturbances, we will place a ZioPatch today for cardiac monitoring--if indicated, a PPM would be considered pre-TAVR (potentially at the time of her cardiac catheterization).  She is on an optimized CHF regimen and has no findings of decompensated CHF today on exam.  She will have labs today in preparation for these exams.  Her grandson is getting  in [place] on July 8th--she hopes to complete all necessary testing prior to that date and then be prepared to move forward with her TAVR immediately thereafter.

## 2019-06-04 NOTE — CARDIOLOGY SUMMARY
[LVEF ___%] : LVEF [unfilled]% [Mild] : mild pulmonary hypertension [Enlarged] : enlarged LA size [___] : [unfilled]

## 2019-06-04 NOTE — CONSULT LETTER
[Dear  ___] : Dear ~TAMEKA, [Courtesy Letter:] : I had the pleasure of seeing your patient, [unfilled], in my office today. [Please see my note below.] : Please see my note below. [Consult Closing:] : Thank you very much for allowing me to participate in the care of this patient.  If you have any questions, please do not hesitate to contact me. [Sincerely,] : Sincerely, [FreeTextEntry2] : Justin Jones MD \par [address] [facility]  [address] [FreeTextEntry3] : Renzo Gibson MD\par \par Cardiovascular & Thoracic Surgery\par Professor\par [facility]\par \par

## 2019-06-04 NOTE — REVIEW OF SYSTEMS
[Feeling Tired] : feeling tired [Palpitations] : palpitations [SOB on Exertion] : shortness of breath during exertion [Negative] : Heme/Lymph [Lower Ext Edema] : no lower extremity edema [Dizziness] : no dizziness [Fainting] : no fainting [Anxiety] : no anxiety [Depression] : no depression

## 2019-06-04 NOTE — ASSESSMENT
[FreeTextEntry1] : 89 year old with symptomatic aortic valve stenosis and severe mitral valve regurgitation with recent decrease in EF 35-40%.  Patient is on Eliquis for paroxysmal a fib and has a resting EKG c/w first degree heart block and a intermittant RBBB.  She is a candidate for TAVR as the preliminary procedure assuming the cath is normal.  Also has a known pericardial effusion.

## 2019-06-04 NOTE — PHYSICAL EXAM
[IV] : a grade 4 [III] : a grade 3 [No Pitting Edema] : no pitting edema present [] : no respiratory distress [Respiration, Rhythm And Depth] : normal respiratory rhythm and effort [Auscultation Breath Sounds / Voice Sounds] : lungs were clear to auscultation bilaterally [Exaggerated Use Of Accessory Muscles For Inspiration] : no accessory muscle use [Abdomen Tenderness] : non-tender [Bowel Sounds] : normal bowel sounds [Abdomen Soft] : soft [Abnormal Walk] : normal gait [Skin Color & Pigmentation] : normal skin color and pigmentation [FreeTextEntry1] : thin and frail woman [Eyelids - No Xanthelasma] : the eyelids demonstrated no xanthelasmas [No Oral Pallor] : no oral pallor [Normal Jugular Venous V Waves Present] : normal jugular venous V waves present [No Oral Cyanosis] : no oral cyanosis [Nail Clubbing] : no clubbing of the fingernails [Cyanosis, Localized] : no localized cyanosis [Oriented To Time, Place, And Person] : oriented to person, place, and time [Mood] : the mood was normal [Affect] : the affect was normal

## 2019-06-04 NOTE — REVIEW OF SYSTEMS
[Chills] : no chills [Feeling Fatigued] : feeling fatigued [Fever] : no fever [Chest Pain] : no chest pain [Dyspnea on exertion] : dyspnea during exertion [Lower Ext Edema] : no extremity edema [Change in Appetite] : change in appetite [Palpitations] : no palpitations [see HPI] : see HPI [Negative] : Endocrine

## 2019-06-04 NOTE — REASON FOR VISIT
[Consultation] : a consultation regarding [Heart Failure] : congestive heart failure [Aortic Stenosis] : aortic stenosis

## 2019-06-04 NOTE — DATA REVIEWED
[FreeTextEntry1] : Echocardiogram (1/7/2019) demonstrated \par calcified trileaflet aortic valve with decreased opening.\par pGr 44 mmHg, DEE 0.7 sqcm, AoV 3.3 m/sec\par severely dilated left atrium\par moderate right atrial enlargement \par mild to moderate TR\par EF 45-50%

## 2019-06-13 ENCOUNTER — OUTPATIENT (OUTPATIENT)
Dept: OUTPATIENT SERVICES | Facility: HOSPITAL | Age: 84
LOS: 1 days | End: 2019-06-13
Payer: MEDICARE

## 2019-06-13 VITALS
WEIGHT: 119.93 LBS | SYSTOLIC BLOOD PRESSURE: 145 MMHG | RESPIRATION RATE: 17 BRPM | DIASTOLIC BLOOD PRESSURE: 63 MMHG | HEART RATE: 53 BPM | HEIGHT: 62 IN

## 2019-06-13 DIAGNOSIS — Z98.890 OTHER SPECIFIED POSTPROCEDURAL STATES: Chronic | ICD-10-CM

## 2019-06-13 DIAGNOSIS — I35.0 NONRHEUMATIC AORTIC (VALVE) STENOSIS: ICD-10-CM

## 2019-06-13 LAB
ALBUMIN SERPL ELPH-MCNC: 4.5 G/DL — SIGNIFICANT CHANGE UP (ref 3.3–5)
ALP SERPL-CCNC: 67 U/L — SIGNIFICANT CHANGE UP (ref 40–120)
ALT FLD-CCNC: 58 U/L — HIGH (ref 10–45)
ANION GAP SERPL CALC-SCNC: 14 MMOL/L — SIGNIFICANT CHANGE UP (ref 5–17)
AST SERPL-CCNC: 41 U/L — HIGH (ref 10–40)
BASOPHILS # BLD AUTO: 0 K/UL — SIGNIFICANT CHANGE UP (ref 0–0.2)
BASOPHILS NFR BLD AUTO: 0.4 % — SIGNIFICANT CHANGE UP (ref 0–2)
BILIRUB SERPL-MCNC: 0.5 MG/DL — SIGNIFICANT CHANGE UP (ref 0.2–1.2)
BUN SERPL-MCNC: 26 MG/DL — HIGH (ref 7–23)
CALCIUM SERPL-MCNC: 9.8 MG/DL — SIGNIFICANT CHANGE UP (ref 8.4–10.5)
CHLORIDE SERPL-SCNC: 103 MMOL/L — SIGNIFICANT CHANGE UP (ref 96–108)
CO2 SERPL-SCNC: 25 MMOL/L — SIGNIFICANT CHANGE UP (ref 22–31)
CREAT SERPL-MCNC: 0.88 MG/DL — SIGNIFICANT CHANGE UP (ref 0.5–1.3)
EOSINOPHIL # BLD AUTO: 0.2 K/UL — SIGNIFICANT CHANGE UP (ref 0–0.5)
EOSINOPHIL NFR BLD AUTO: 2.8 % — SIGNIFICANT CHANGE UP (ref 0–6)
GLUCOSE SERPL-MCNC: 87 MG/DL — SIGNIFICANT CHANGE UP (ref 70–99)
HCT VFR BLD CALC: 39.2 % — SIGNIFICANT CHANGE UP (ref 34.5–45)
HGB BLD-MCNC: 13.2 G/DL — SIGNIFICANT CHANGE UP (ref 11.5–15.5)
LYMPHOCYTES # BLD AUTO: 0.9 K/UL — LOW (ref 1–3.3)
LYMPHOCYTES # BLD AUTO: 15.3 % — SIGNIFICANT CHANGE UP (ref 13–44)
MCHC RBC-ENTMCNC: 32.3 PG — SIGNIFICANT CHANGE UP (ref 27–34)
MCHC RBC-ENTMCNC: 33.6 GM/DL — SIGNIFICANT CHANGE UP (ref 32–36)
MCV RBC AUTO: 96.3 FL — SIGNIFICANT CHANGE UP (ref 80–100)
MONOCYTES # BLD AUTO: 0.8 K/UL — SIGNIFICANT CHANGE UP (ref 0–0.9)
MONOCYTES NFR BLD AUTO: 13 % — SIGNIFICANT CHANGE UP (ref 2–14)
NEUTROPHILS # BLD AUTO: 4.2 K/UL — SIGNIFICANT CHANGE UP (ref 1.8–7.4)
NEUTROPHILS NFR BLD AUTO: 68.5 % — SIGNIFICANT CHANGE UP (ref 43–77)
NT-PROBNP SERPL-SCNC: 2258 PG/ML — HIGH (ref 0–300)
PLATELET # BLD AUTO: 167 K/UL — SIGNIFICANT CHANGE UP (ref 150–400)
POTASSIUM SERPL-MCNC: 3.6 MMOL/L — SIGNIFICANT CHANGE UP (ref 3.5–5.3)
POTASSIUM SERPL-SCNC: 3.6 MMOL/L — SIGNIFICANT CHANGE UP (ref 3.5–5.3)
PROT SERPL-MCNC: 7.4 G/DL — SIGNIFICANT CHANGE UP (ref 6–8.3)
RBC # BLD: 4.07 M/UL — SIGNIFICANT CHANGE UP (ref 3.8–5.2)
RBC # FLD: 13.2 % — SIGNIFICANT CHANGE UP (ref 10.3–14.5)
SODIUM SERPL-SCNC: 142 MMOL/L — SIGNIFICANT CHANGE UP (ref 135–145)
TSH SERPL-MCNC: 1.99 UIU/ML — SIGNIFICANT CHANGE UP (ref 0.27–4.2)
WBC # BLD: 6.2 K/UL — SIGNIFICANT CHANGE UP (ref 3.8–10.5)
WBC # FLD AUTO: 6.2 K/UL — SIGNIFICANT CHANGE UP (ref 3.8–10.5)

## 2019-06-13 PROCEDURE — 84443 ASSAY THYROID STIM HORMONE: CPT

## 2019-06-13 PROCEDURE — 93010 ELECTROCARDIOGRAM REPORT: CPT

## 2019-06-13 PROCEDURE — 93456 R HRT CORONARY ARTERY ANGIO: CPT

## 2019-06-13 PROCEDURE — C1769: CPT

## 2019-06-13 PROCEDURE — 83880 ASSAY OF NATRIURETIC PEPTIDE: CPT

## 2019-06-13 PROCEDURE — 99204 OFFICE O/P NEW MOD 45 MIN: CPT | Mod: 25

## 2019-06-13 PROCEDURE — C1894: CPT

## 2019-06-13 PROCEDURE — 80053 COMPREHEN METABOLIC PANEL: CPT

## 2019-06-13 PROCEDURE — 99152 MOD SED SAME PHYS/QHP 5/>YRS: CPT

## 2019-06-13 PROCEDURE — 85027 COMPLETE CBC AUTOMATED: CPT

## 2019-06-13 PROCEDURE — 99152 MOD SED SAME PHYS/QHP 5/>YRS: CPT | Mod: GC

## 2019-06-13 PROCEDURE — 93456 R HRT CORONARY ARTERY ANGIO: CPT | Mod: 26,GC

## 2019-06-13 PROCEDURE — 93005 ELECTROCARDIOGRAM TRACING: CPT

## 2019-06-13 PROCEDURE — C1887: CPT

## 2019-06-13 NOTE — H&P CARDIOLOGY - PMH
Aortic valve stenosis, etiology of cardiac valve disease unspecified    HLD (hyperlipidemia)    HTN (hypertension)    Paroxysmal atrial fibrillation

## 2019-06-13 NOTE — H&P CARDIOLOGY - HISTORY OF PRESENT ILLNESS
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 The patient states she experiences shortness of breath on mild exertion. She is able to only walk 1 block and then she feels short of breath. She does feel relief with rest. The patient is being evaluated for possible TAVR by . She is scheduled for right and left heart catheterization.  She currently denies chest pain, syncope, dizziness or orthopnea

## 2019-06-14 ENCOUNTER — OTHER (OUTPATIENT)
Age: 84
End: 2019-06-14

## 2019-06-19 ENCOUNTER — APPOINTMENT (OUTPATIENT)
Dept: CARDIOLOGY | Facility: CLINIC | Age: 84
End: 2019-06-19

## 2019-06-19 ENCOUNTER — OUTPATIENT (OUTPATIENT)
Dept: OUTPATIENT SERVICES | Facility: HOSPITAL | Age: 84
LOS: 1 days | End: 2019-06-19
Payer: MEDICARE

## 2019-06-19 DIAGNOSIS — I35.0 NONRHEUMATIC AORTIC (VALVE) STENOSIS: ICD-10-CM

## 2019-06-19 DIAGNOSIS — R07.9 CHEST PAIN, UNSPECIFIED: ICD-10-CM

## 2019-06-19 DIAGNOSIS — Z98.890 OTHER SPECIFIED POSTPROCEDURAL STATES: Chronic | ICD-10-CM

## 2019-06-19 LAB
ALBUMIN SERPL ELPH-MCNC: 4.3 G/DL
ALP BLD-CCNC: 68 U/L
ALT SERPL-CCNC: 57 U/L
ANION GAP SERPL CALC-SCNC: 13 MMOL/L
AST SERPL-CCNC: 35 U/L
BILIRUB SERPL-MCNC: 0.5 MG/DL
BUN SERPL-MCNC: 20 MG/DL
CALCIUM SERPL-MCNC: 9.7 MG/DL
CHLORIDE SERPL-SCNC: 105 MMOL/L
CO2 SERPL-SCNC: 25 MMOL/L
CREAT SERPL-MCNC: 0.95 MG/DL
GLUCOSE SERPL-MCNC: 84 MG/DL
POTASSIUM SERPL-SCNC: 3.9 MMOL/L
PROT SERPL-MCNC: 6.8 G/DL
SODIUM SERPL-SCNC: 143 MMOL/L

## 2019-06-19 PROCEDURE — 75572 CT HRT W/3D IMAGE: CPT | Mod: 26

## 2019-06-19 PROCEDURE — 75572 CT HRT W/3D IMAGE: CPT

## 2019-06-28 ENCOUNTER — RX RENEWAL (OUTPATIENT)
Age: 84
End: 2019-06-28

## 2019-07-11 ENCOUNTER — INPATIENT (INPATIENT)
Facility: HOSPITAL | Age: 84
LOS: 4 days | Discharge: HOME | DRG: 193 | End: 2019-07-16
Attending: HOSPITALIST | Admitting: HOSPITALIST
Payer: MEDICARE

## 2019-07-11 VITALS
DIASTOLIC BLOOD PRESSURE: 64 MMHG | SYSTOLIC BLOOD PRESSURE: 133 MMHG | TEMPERATURE: 98 F | HEART RATE: 73 BPM | RESPIRATION RATE: 19 BRPM | WEIGHT: 130.07 LBS | HEIGHT: 60 IN

## 2019-07-11 DIAGNOSIS — E78.5 HYPERLIPIDEMIA, UNSPECIFIED: ICD-10-CM

## 2019-07-11 DIAGNOSIS — I10 ESSENTIAL (PRIMARY) HYPERTENSION: ICD-10-CM

## 2019-07-11 DIAGNOSIS — I48.0 PAROXYSMAL ATRIAL FIBRILLATION: ICD-10-CM

## 2019-07-11 DIAGNOSIS — R50.9 FEVER, UNSPECIFIED: ICD-10-CM

## 2019-07-11 DIAGNOSIS — I50.20 UNSPECIFIED SYSTOLIC (CONGESTIVE) HEART FAILURE: ICD-10-CM

## 2019-07-11 DIAGNOSIS — Z98.890 OTHER SPECIFIED POSTPROCEDURAL STATES: Chronic | ICD-10-CM

## 2019-07-11 DIAGNOSIS — Z29.9 ENCOUNTER FOR PROPHYLACTIC MEASURES, UNSPECIFIED: ICD-10-CM

## 2019-07-11 DIAGNOSIS — J18.9 PNEUMONIA, UNSPECIFIED ORGANISM: ICD-10-CM

## 2019-07-11 DIAGNOSIS — J96.91 RESPIRATORY FAILURE, UNSPECIFIED WITH HYPOXIA: ICD-10-CM

## 2019-07-11 DIAGNOSIS — K52.9 NONINFECTIVE GASTROENTERITIS AND COLITIS, UNSPECIFIED: ICD-10-CM

## 2019-07-11 DIAGNOSIS — I35.0 NONRHEUMATIC AORTIC (VALVE) STENOSIS: ICD-10-CM

## 2019-07-11 LAB
ALBUMIN SERPL ELPH-MCNC: 4.6 G/DL — SIGNIFICANT CHANGE UP (ref 3.3–5)
ALP SERPL-CCNC: 58 U/L — SIGNIFICANT CHANGE UP (ref 40–120)
ALT FLD-CCNC: 31 U/L — SIGNIFICANT CHANGE UP (ref 10–45)
ANION GAP SERPL CALC-SCNC: 13 MMOL/L — SIGNIFICANT CHANGE UP (ref 5–17)
APPEARANCE UR: CLEAR — SIGNIFICANT CHANGE UP
APTT BLD: 37.8 SEC — HIGH (ref 27.5–36.3)
AST SERPL-CCNC: 29 U/L — SIGNIFICANT CHANGE UP (ref 10–40)
BACTERIA # UR AUTO: NEGATIVE — SIGNIFICANT CHANGE UP
BASOPHILS # BLD AUTO: 0 K/UL — SIGNIFICANT CHANGE UP (ref 0–0.2)
BASOPHILS NFR BLD AUTO: 0.1 % — SIGNIFICANT CHANGE UP (ref 0–2)
BILIRUB SERPL-MCNC: 0.5 MG/DL — SIGNIFICANT CHANGE UP (ref 0.2–1.2)
BILIRUB UR-MCNC: NEGATIVE — SIGNIFICANT CHANGE UP
BUN SERPL-MCNC: 18 MG/DL — SIGNIFICANT CHANGE UP (ref 7–23)
CALCIUM SERPL-MCNC: 9.3 MG/DL — SIGNIFICANT CHANGE UP (ref 8.4–10.5)
CHLORIDE SERPL-SCNC: 103 MMOL/L — SIGNIFICANT CHANGE UP (ref 96–108)
CO2 SERPL-SCNC: 25 MMOL/L — SIGNIFICANT CHANGE UP (ref 22–31)
COLOR SPEC: YELLOW — SIGNIFICANT CHANGE UP
CREAT SERPL-MCNC: 0.82 MG/DL — SIGNIFICANT CHANGE UP (ref 0.5–1.3)
DIFF PNL FLD: NEGATIVE — SIGNIFICANT CHANGE UP
EOSINOPHIL # BLD AUTO: 0.1 K/UL — SIGNIFICANT CHANGE UP (ref 0–0.5)
EOSINOPHIL NFR BLD AUTO: 1 % — SIGNIFICANT CHANGE UP (ref 0–6)
EPI CELLS # UR: 1 /HPF — SIGNIFICANT CHANGE UP
FLU A RESULT: SIGNIFICANT CHANGE UP
FLU A RESULT: SIGNIFICANT CHANGE UP
FLUAV AG NPH QL: SIGNIFICANT CHANGE UP
FLUBV AG NPH QL: SIGNIFICANT CHANGE UP
GAS PNL BLDV: SIGNIFICANT CHANGE UP
GLUCOSE SERPL-MCNC: 96 MG/DL — SIGNIFICANT CHANGE UP (ref 70–99)
GLUCOSE UR QL: NEGATIVE — SIGNIFICANT CHANGE UP
HCT VFR BLD CALC: 39.4 % — SIGNIFICANT CHANGE UP (ref 34.5–45)
HGB BLD-MCNC: 13.4 G/DL — SIGNIFICANT CHANGE UP (ref 11.5–15.5)
HYALINE CASTS # UR AUTO: 4 /LPF — HIGH (ref 0–2)
INR BLD: 1.19 RATIO — HIGH (ref 0.88–1.16)
KETONES UR-MCNC: ABNORMAL
LEUKOCYTE ESTERASE UR-ACNC: NEGATIVE — SIGNIFICANT CHANGE UP
LIDOCAIN IGE QN: 34 U/L — SIGNIFICANT CHANGE UP (ref 7–60)
LYMPHOCYTES # BLD AUTO: 0.5 K/UL — LOW (ref 1–3.3)
LYMPHOCYTES # BLD AUTO: 5.8 % — LOW (ref 13–44)
MAGNESIUM SERPL-MCNC: 1.9 MG/DL — SIGNIFICANT CHANGE UP (ref 1.6–2.6)
MCHC RBC-ENTMCNC: 32.4 PG — SIGNIFICANT CHANGE UP (ref 27–34)
MCHC RBC-ENTMCNC: 34.1 GM/DL — SIGNIFICANT CHANGE UP (ref 32–36)
MCV RBC AUTO: 95.3 FL — SIGNIFICANT CHANGE UP (ref 80–100)
MONOCYTES # BLD AUTO: 1 K/UL — HIGH (ref 0–0.9)
MONOCYTES NFR BLD AUTO: 11.8 % — SIGNIFICANT CHANGE UP (ref 2–14)
NEUTROPHILS # BLD AUTO: 7 K/UL — SIGNIFICANT CHANGE UP (ref 1.8–7.4)
NEUTROPHILS NFR BLD AUTO: 81.2 % — HIGH (ref 43–77)
NITRITE UR-MCNC: NEGATIVE — SIGNIFICANT CHANGE UP
NT-PROBNP SERPL-SCNC: 7383 PG/ML — HIGH (ref 0–300)
PH UR: 6 — SIGNIFICANT CHANGE UP (ref 5–8)
PHOSPHATE SERPL-MCNC: 2.6 MG/DL — SIGNIFICANT CHANGE UP (ref 2.5–4.5)
PLATELET # BLD AUTO: 128 K/UL — LOW (ref 150–400)
POTASSIUM SERPL-MCNC: 4.4 MMOL/L — SIGNIFICANT CHANGE UP (ref 3.5–5.3)
POTASSIUM SERPL-SCNC: 4.4 MMOL/L — SIGNIFICANT CHANGE UP (ref 3.5–5.3)
PROT SERPL-MCNC: 7.1 G/DL — SIGNIFICANT CHANGE UP (ref 6–8.3)
PROT UR-MCNC: ABNORMAL
PROTHROM AB SERPL-ACNC: 13.6 SEC — HIGH (ref 10–12.9)
RBC # BLD: 4.13 M/UL — SIGNIFICANT CHANGE UP (ref 3.8–5.2)
RBC # FLD: 12.8 % — SIGNIFICANT CHANGE UP (ref 10.3–14.5)
RBC CASTS # UR COMP ASSIST: 3 /HPF — SIGNIFICANT CHANGE UP (ref 0–4)
RSV RESULT: SIGNIFICANT CHANGE UP
RSV RNA RESP QL NAA+PROBE: SIGNIFICANT CHANGE UP
SODIUM SERPL-SCNC: 141 MMOL/L — SIGNIFICANT CHANGE UP (ref 135–145)
SP GR SPEC: 1.03 — HIGH (ref 1.01–1.02)
TROPONIN T, HIGH SENSITIVITY RESULT: 20 NG/L — SIGNIFICANT CHANGE UP (ref 0–51)
TROPONIN T, HIGH SENSITIVITY RESULT: 20 NG/L — SIGNIFICANT CHANGE UP (ref 0–51)
UROBILINOGEN FLD QL: NEGATIVE — SIGNIFICANT CHANGE UP
WBC # BLD: 8.6 K/UL — SIGNIFICANT CHANGE UP (ref 3.8–10.5)
WBC # FLD AUTO: 8.6 K/UL — SIGNIFICANT CHANGE UP (ref 3.8–10.5)
WBC UR QL: 2 /HPF — SIGNIFICANT CHANGE UP (ref 0–5)

## 2019-07-11 PROCEDURE — 99285 EMERGENCY DEPT VISIT HI MDM: CPT

## 2019-07-11 PROCEDURE — 93010 ELECTROCARDIOGRAM REPORT: CPT

## 2019-07-11 PROCEDURE — 99223 1ST HOSP IP/OBS HIGH 75: CPT | Mod: GC

## 2019-07-11 PROCEDURE — 71045 X-RAY EXAM CHEST 1 VIEW: CPT | Mod: 26

## 2019-07-11 RX ORDER — CEFTRIAXONE 500 MG/1
1000 INJECTION, POWDER, FOR SOLUTION INTRAMUSCULAR; INTRAVENOUS ONCE
Refills: 0 | Status: COMPLETED | OUTPATIENT
Start: 2019-07-11 | End: 2019-07-11

## 2019-07-11 RX ORDER — CEFTRIAXONE 500 MG/1
1000 INJECTION, POWDER, FOR SOLUTION INTRAMUSCULAR; INTRAVENOUS EVERY 24 HOURS
Refills: 0 | Status: DISCONTINUED | OUTPATIENT
Start: 2019-07-11 | End: 2019-07-15

## 2019-07-11 RX ORDER — AZITHROMYCIN 500 MG/1
TABLET, FILM COATED ORAL
Refills: 0 | Status: DISCONTINUED | OUTPATIENT
Start: 2019-07-11 | End: 2019-07-13

## 2019-07-11 RX ORDER — AZITHROMYCIN 500 MG/1
500 TABLET, FILM COATED ORAL EVERY 24 HOURS
Refills: 0 | Status: DISCONTINUED | OUTPATIENT
Start: 2019-07-12 | End: 2019-07-13

## 2019-07-11 RX ORDER — METOPROLOL TARTRATE 50 MG
25 TABLET ORAL
Refills: 0 | Status: DISCONTINUED | OUTPATIENT
Start: 2019-07-11 | End: 2019-07-12

## 2019-07-11 RX ORDER — APIXABAN 2.5 MG/1
2.5 TABLET, FILM COATED ORAL EVERY 12 HOURS
Refills: 0 | Status: DISCONTINUED | OUTPATIENT
Start: 2019-07-11 | End: 2019-07-16

## 2019-07-11 RX ORDER — FUROSEMIDE 40 MG
20 TABLET ORAL ONCE
Refills: 0 | Status: COMPLETED | OUTPATIENT
Start: 2019-07-11 | End: 2019-07-11

## 2019-07-11 RX ORDER — ACETAMINOPHEN 500 MG
975 TABLET ORAL ONCE
Refills: 0 | Status: COMPLETED | OUTPATIENT
Start: 2019-07-11 | End: 2019-07-11

## 2019-07-11 RX ORDER — FUROSEMIDE 40 MG
20 TABLET ORAL DAILY
Refills: 0 | Status: DISCONTINUED | OUTPATIENT
Start: 2019-07-11 | End: 2019-07-16

## 2019-07-11 RX ORDER — AZITHROMYCIN 500 MG/1
500 TABLET, FILM COATED ORAL ONCE
Refills: 0 | Status: COMPLETED | OUTPATIENT
Start: 2019-07-11 | End: 2019-07-11

## 2019-07-11 RX ORDER — AMIODARONE HYDROCHLORIDE 400 MG/1
200 TABLET ORAL DAILY
Refills: 0 | Status: DISCONTINUED | OUTPATIENT
Start: 2019-07-11 | End: 2019-07-16

## 2019-07-11 RX ORDER — ATORVASTATIN CALCIUM 80 MG/1
40 TABLET, FILM COATED ORAL AT BEDTIME
Refills: 0 | Status: DISCONTINUED | OUTPATIENT
Start: 2019-07-11 | End: 2019-07-16

## 2019-07-11 RX ORDER — TRAZODONE HCL 50 MG
50 TABLET ORAL AT BEDTIME
Refills: 0 | Status: DISCONTINUED | OUTPATIENT
Start: 2019-07-11 | End: 2019-07-16

## 2019-07-11 RX ORDER — SERTRALINE 25 MG/1
100 TABLET, FILM COATED ORAL DAILY
Refills: 0 | Status: DISCONTINUED | OUTPATIENT
Start: 2019-07-12 | End: 2019-07-16

## 2019-07-11 RX ADMIN — ATORVASTATIN CALCIUM 40 MILLIGRAM(S): 80 TABLET, FILM COATED ORAL at 22:53

## 2019-07-11 RX ADMIN — CEFTRIAXONE 100 MILLIGRAM(S): 500 INJECTION, POWDER, FOR SOLUTION INTRAMUSCULAR; INTRAVENOUS at 16:49

## 2019-07-11 RX ADMIN — AZITHROMYCIN 250 MILLIGRAM(S): 500 TABLET, FILM COATED ORAL at 16:50

## 2019-07-11 RX ADMIN — APIXABAN 2.5 MILLIGRAM(S): 2.5 TABLET, FILM COATED ORAL at 22:53

## 2019-07-11 RX ADMIN — Medication 25 MILLIGRAM(S): at 22:53

## 2019-07-11 RX ADMIN — Medication 50 MILLIGRAM(S): at 22:53

## 2019-07-11 RX ADMIN — Medication 975 MILLIGRAM(S): at 16:49

## 2019-07-11 NOTE — PATIENT PROFILE ADULT - PRIMARY ROLES/RESPONSIBILITIES
----- Message from Kelly Prajapati MD sent at 8/30/2018 10:45 AM CDT -----   Please call patient Friday/next business day to -    2. RFA - check on how patient is doing. Determine relief if any. If still in significant post-procedural pain remind to ice for 20 minutes every 2 hours for rest of day; gentle heat can be started for muscle spasm once full 1-2 days of icing is complete. Make sure patient has a 4 week clinic follow up with me after the LAST RFA.    
Noted.   
none

## 2019-07-11 NOTE — H&P ADULT - NSHPSOCIALHISTORY_GEN_ALL_CORE
At baseline, she lives alone independently (independent on ADL/IADL),   started to use walker,   never smoked, EtOH, or recreational drug use.

## 2019-07-11 NOTE — ED PROVIDER NOTE - ATTENDING CONTRIBUTION TO CARE
89 yo female with h/o chf, AS for tavr next week, htn, afib, hld presents with weakness, low grad temp noted, hypoxix 91% with rales r/o chf but likely pna, abd soft, nt with diarrhea. r/o pna vs chf, labs sent, possible viral nasal swab sent, no resp distress noted.

## 2019-07-11 NOTE — H&P ADULT - PROBLEM SELECTOR PLAN 4
Severe AS undergoing TAVR evaluation  - currently asymptomatic as long as patient is not exerting herself.  - continue to monitor. HFrEF. possible component of HF exacerbation with bibasilar crackles and increased O2 requirement. On PO lasix 20mg at home.  - c/w PO lasix 20mg qD  - strict I/O, daily weight  - c/w home metop 25mg BID  - Patient used to take valsartan 80mg, yet since last admission (under neuroservice for CVA), the medication has fell off the list. Not mentioned anything in outpatient record. The medication list daughter had was taken by ED person, yet only half of the list was returned, with the other half lost. Patient and daughter not 100% sure on the all the meds, yet daughter states the medication on our e-record is correct, which does not include ACEi/ARBs.  - Will need to contact outpatient provider for any contraindication for restarting ACEi. (maybe her falling episodes?)

## 2019-07-11 NOTE — ED PROVIDER NOTE - OBJECTIVE STATEMENT
89 yo female with pmh of afib (eliquis), CHF, htn, hld, s/p cva 2 yrs ago presenting with generalized weakness and >10 nonbloody bowel movements starting last night with associated lower abdominal cramping. Pt has been eating and drinking well, now has generalized weakness, stools are loose, not watery. no recent hospitalizations or abx. Endorses dry nonproductive cough x 3 days, no sob, chest pain, difficulty breathing. no sick contacts, or recent travelling, no current abdominal pain.

## 2019-07-11 NOTE — H&P ADULT - PROBLEM SELECTOR PLAN 8
- DVT ppx: Eliquis  - Diet: DASH diet.  - will need PT eval given her baseline balance issue and advancing AS symptoms.  - fall precaution. - c/w home atorvastatin

## 2019-07-11 NOTE — ED ADULT NURSE NOTE - NSIMPLEMENTINTERV_GEN_ALL_ED
Implemented All Fall with Harm Risk Interventions:  [place] to call system. Call bell, personal items and telephone within reach. Instruct patient to call for assistance. Room bathroom lighting operational. Non-slip footwear when patient is off stretcher. Physically safe environment: no spills, clutter or unnecessary equipment. Stretcher in lowest position, wheels locked, appropriate side rails in place. Provide visual cue, wrist band, yellow gown, etc. Monitor gait and stability. Monitor for mental status changes and reorient to person, place, and time. Review medications for side effects contributing to fall risk. Reinforce activity limits and safety measures with patient and family. Provide visual clues: red socks.

## 2019-07-11 NOTE — ED ADULT NURSE NOTE - OBJECTIVE STATEMENT
89 y/o female hx aortic stenosis, HDL, HTN, Afib  presents to the ED via EMS from home c/o N/V/ cough . Pt states. Tuesday started with N/V/ D , cough Denies fever, chills, weakness, abd pain, constipation, numbness/tingling, urinary symptoms . Pt A&Ox3, in no respiratory distress, no CP, PT safety maintained with family at bedside, call bell within reach and bed in the lowest position. pt schedule for aortic valve replacement next week. 89 y/o female hx aortic stenosis, HDL, HTN, Afib  presents to the ED via EMS from home c/o N/V/ cough . Pt states. Tuesday started with N/V/ D , cough Denies fever, chills, weakness, abd pain, constipation, numbness/tingling, urinary symptoms . Pt A&Ox3, in no respiratory distress, no CP, PT safety maintained with family at bedside, call bell within reach and bed in the lowest position. pt schedule for aortic valve replacement next week. pt is ambulatory.

## 2019-07-11 NOTE — ED PROVIDER NOTE - NS ED ROS FT
Constitutional: No fever or chills  Eyes: No visual changes, eye pain or redness  HEENT: No throat pain, ear pain, nasal pain. No nose bleeding.  CV: No chest pain or lower extremity edema  Resp: see hpi  GI: see hpi  : No dysuria, hematuria.   MSK: No musculoskeletal pain  Skin: No rash  Neuro: No headache. No numbness or tingling. No weakness.

## 2019-07-11 NOTE — H&P ADULT - NSHPLABSRESULTS_GEN_ALL_CORE
13.4   8.6   )-----------( 128      ( 2019 15:23 )             39.4       07-11    141  |  103  |  18  ----------------------------<  96  4.4   |  25  |  0.82    Ca    9.3      2019 15:23  Phos  2.6     07-  Mg     1.9         TPro  7.1  /  Alb  4.6  /  TBili  0.5  /  DBili  x   /  AST  29  /  ALT  31  /  AlkPhos  58  07-11              Urinalysis Basic - ( 2019 17:45 )    Color: Yellow / Appearance: Clear / S.026 / pH: x  Gluc: x / Ketone: Small  / Bili: Negative / Urobili: Negative   Blood: x / Protein: 30 mg/dL / Nitrite: Negative   Leuk Esterase: Negative / RBC: 3 /hpf / WBC 2 /HPF   Sq Epi: x / Non Sq Epi: 1 /hpf / Bacteria: Negative        PT/INR - ( 2019 15:23 )   PT: 13.6 sec;   INR: 1.19 ratio         PTT - ( 2019 15:23 )  PTT:37.8 sec    Lactate Trend            CAPILLARY BLOOD GLUCOSE                RADIOLOGY, EKG & ADDITIONAL TESTS: Reviewed.     CXR: biblasilar consolidation.    EKG: NSR HR 66, 1st degree AVB, LBBB, L axis deviation, TwI I and aVL, unchanged from last EKG. I personally reviewed labs, imaging and ekg           13.4   8.6   )-----------( 128      ( 2019 15:23 )             39.4       07-11    141  |  103  |  18  ----------------------------<  96  4.4   |  25  |  0.82    Ca    9.3      2019 15:23  Phos  2.6     -  Mg     1.9         TPro  7.1  /  Alb  4.6  /  TBili  0.5  /  DBili  x   /  AST  29  /  ALT  31  /  AlkPhos  58  07-11              Urinalysis Basic - ( 2019 17:45 )    Color: Yellow / Appearance: Clear / S.026 / pH: x  Gluc: x / Ketone: Small  / Bili: Negative / Urobili: Negative   Blood: x / Protein: 30 mg/dL / Nitrite: Negative   Leuk Esterase: Negative / RBC: 3 /hpf / WBC 2 /HPF   Sq Epi: x / Non Sq Epi: 1 /hpf / Bacteria: Negative        PT/INR - ( 2019 15:23 )   PT: 13.6 sec;   INR: 1.19 ratio         PTT - ( 2019 15:23 )  PTT:37.8 sec    Lactate Trend  Serum Pro-Brain Natriuretic Peptide (19 @ 11:44)    Serum Pro-Brain Natriuretic Peptide: 2258 pg/mL  Serum Pro-Brain Natriuretic Peptide (19 @ 15:23)    Serum Pro-Brain Natriuretic Peptide: 7383 pg/mL      CAPILLARY BLOOD GLUCOSE      RADIOLOGY, EKG & ADDITIONAL TESTS: Reviewed.     CXR: biblasilar opacifications    EKG: NSR HR 66, 1st degree AVB, LBBB, L axis deviation, TwI I and aVL, unchanged from last EKG.

## 2019-07-11 NOTE — H&P ADULT - NSHPPHYSICALEXAM_GEN_ALL_CORE
Vital Signs Last 24 Hrs  T(C): 37.1 (11 Jul 2019 19:29), Max: 38.3 (11 Jul 2019 15:41)  T(F): 98.7 (11 Jul 2019 19:29), Max: 100.9 (11 Jul 2019 15:41)  HR: 62 (11 Jul 2019 19:29) (62 - 73)  BP: 99/51 (11 Jul 2019 19:29) (99/51 - 133/64)  BP(mean): --  RR: 18 (11 Jul 2019 15:15) (18 - 19)  SpO2: 96% (11 Jul 2019 15:15) (91% - 96%)    PHYSICAL EXAM:    Constitutional: NAD. well-developed; well-groomed; well-nourished elderly lady lying on bed w/ 2L NC  HEENT: AT/NC, PERRLA; EOMI, MMM, Supple neck;  Respiratory: Bibasilar crackles, equal aeration bilaterally. no wheezing or rhonchi. no increase in WOB  Cardiovascular: RRR, systolic murmur on both apex and RUSB masking S2. 2+ distal pulses. Cap refill < 2 seconds.  Gastrointestinal: soft; NT/ND.  Genitourinary: not examined.  Extremities: no cyanosis; no pedal edema, non-tender to palpation, DP and Radial pulses intact.  Neurological: A&Ox 3; responds to pain; responds to verbal commands; epicritic and protopathic sensation intact: CN nerves grossly intact.   MSK/Back: no deformities. Active and passive ROM intact; strength intact  Psychiatric: normal mood/affect. Denies SI/HI Vital Signs Last 24 Hrs  T(C): 37.1 (11 Jul 2019 19:29), Max: 38.3 (11 Jul 2019 15:41)  T(F): 98.7 (11 Jul 2019 19:29), Max: 100.9 (11 Jul 2019 15:41)  HR: 62 (11 Jul 2019 19:29) (62 - 73)  BP: 99/51 (11 Jul 2019 19:29) (99/51 - 133/64)  BP(mean): --  RR: 18 (11 Jul 2019 15:15) (18 - 19)  SpO2: 96% (11 Jul 2019 15:15) (91% - 96%) on 2L NC    PHYSICAL EXAM:    Constitutional: NAD. well-developed; well-groomed; well-nourished elderly lady lying on bed w/ 2L NC  HEENT: AT/NC, PERRLA; EOMI, MMM, Supple neck;  Respiratory: Bibasilar crackles, equal aeration bilaterally. no wheezing or rhonchi. no increase in WOB  Cardiovascular: RRR, systolic murmur on both apex and RUSB masking S2. 2+ distal pulses. Cap refill < 2 seconds.  Gastrointestinal: soft; NT/ND.  Genitourinary: not examined.  Extremities: no cyanosis; no pedal edema, non-tender to palpation, DP and Radial pulses intact.  Neurological: A&Ox 3; responds to pain; responds to verbal commands; epicritic and protopathic sensation intact: CN nerves grossly intact.   MSK/Back: no deformities. Active and passive ROM intact; strength intact  Psychiatric: normal mood/affect. Denies SI/HI

## 2019-07-11 NOTE — H&P ADULT - HISTORY OF PRESENT ILLNESS
Mrs. 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    Patient attended Wedding of her grandchild on Monday, and felt cold due to high AC in the venue, yet does not recall having any sick contact at the event. Starting Tuesday, she had rhinorrhea followed by subjective fever, chills, malaise, and nonproductive cough. She had mild SOB and was breathing faster than her normal. At the same time, she developed abdominal cramp, which required her to go to bathroom more often than usual, yet BM was normal. The next day, she continued to have these symptoms and her stool turned into watery diarrhea. She had multiple episodes of watery diarrhea at home, yet denies having melena or hematochezia. She later developed nausea, and was unable to tolerate any medications today. She denied dysphagia/odynophagia or emesis.   Otherwise, she denies CP, palpitation, lightheadedness, visual disturbance, headache, pre-syncope/syncopal episode, fall, weakness.  She had no recent foreign travel or changes in her medications since last visit to the hospital.     At baseline, she lives alone independently (independent on ADL/IADL), started to use walker, never smoked, EtOH, or recreational drug use.  She is compliant with all her medications and she has pillbox that she uses.  Recently she has undergone evaluation for TAVR. LHC/RHC was done on 6/13/2019, which showed normal coronary arteries other than minor luminar irregularity in LAD/RCA.  CT of heart was done on 6/19/2019 which showed calcified aortic valve.  TTE (4/19/2019) showed severe AS, severely dilated LA, global hypokinesis w/ LVEF 35-40%, severe pulm HTN.    In ED, Tmax 100.9, HR 69, /82, RR 18, SpO2 96% on 2L NC  Patient's GI symptoms has resolved and has not had BM yet.  She had her appetite and had some small meal today, yet had quick satiety.

## 2019-07-11 NOTE — H&P ADULT - PROBLEM SELECTOR PLAN 3
HFrEF. possible component of HF exacerbation with bibasilar crackles and increased O2 requirement. On PO lasix 20mg at home.  - c/w PO lasix 20mg qD  - strict I/O, daily weight  - c/w home metop 25mg BID  - Patient used to take valsartan 80mg, yet since last admission (under neuroservice for CVA), the medication has fell off the list. Not mentioned anything in outpatient record. The medication list daughter had was taken by ED person, yet only half of the list was returned, with the other half lost. Patient and daughter not 100% sure on the all the meds, yet daughter states the medication on our e-record is correct, which does not include ACEi/ARBs.  - Will need to contact outpatient provider for any contraindication for restarting ACEi. (maybe her falling episodes?) 1 day history of diarrhea + abdominal cramps. Now resolved. No recent ABx use.  - most likely viral gastroenteritis 2/2 enterovirus.  - monitor VS.  - Encourage PO hydration.  - holding home bowel regimen.

## 2019-07-11 NOTE — ED PROVIDER NOTE - PHYSICAL EXAMINATION
A&Ox3, NAD. NCAT. PERRL, EOMI. Neck supple, no LAD. Lungs rhonchi to RLL. CTAB. +S1S2, RRR, + systolic murmur, no r/g. Abd soft, NT/ND, +BS, no rebound or guarding. Extremities: cap refill <2, pulses in distal extremities 4+, no edema. Skin without rash. CN II-XII intact. Strength 5/5 UE/LE. Sensations intact throughout.

## 2019-07-11 NOTE — H&P ADULT - NSHPREVIEWOFSYSTEMS_GEN_ALL_CORE
CONSTITUTIONAL: fever, chills, malaise, faitgue.  EYES: No eye pain, visual disturbances, or discharge  ENMT:  No difficulty hearing, tinnitus, vertigo; No sinus or throat pain  RESPIRATORY: nonproductive cough, SOB, no wheezing or hemoptysis;  CARDIOVASCULAR: No chest pain, palpitations, dizziness, or leg swelling  GASTROINTESTINAL: Now she has no abdominal or epigastric pain. No nausea, vomiting, or hematemesis; No diarrhea or constipation. No melena or hematochezia.  GENITOURINARY: No dysuria, frequency, hematuria, or incontinence  NEUROLOGICAL: No headaches, loss of strength, numbness, or tremors  LYMPH NODES: No enlarged glands  ENDOCRINE: No heat or cold intolerance; No polydipsia or polyuria  PSYCHIATRIC: Denies depression, anxiety  HEME/LYMPH: No easy bruising, or bleeding gums  ALLERGY AND IMMUNOLOGIC: No hives or eczema CONSTITUTIONAL: fever, chills, malaise, faitgue.  EYES: No eye pain, visual disturbances, or discharge  ENMT:  No difficulty hearing, tinnitus, vertigo; No sinus or throat pain  RESPIRATORY: nonproductive cough, SOB, no wheezing or hemoptysis;  CARDIOVASCULAR: No chest pain, palpitations, dizziness, or leg swelling  GASTROINTESTINAL: Now she has no abdominal or epigastric pain. No nausea, vomiting, or hematemesis; Had diarrhea  GENITOURINARY: No dysuria, frequency, hematuria, or incontinence  NEUROLOGICAL: No headaches, loss of strength, numbness, or tremors  LYMPH NODES: No enlarged glands  ENDOCRINE: No heat or cold intolerance; No polydipsia or polyuria  PSYCHIATRIC: Denies depression, anxiety  HEME/LYMPH: No easy bruising, or bleeding gums  ALLERGY AND IMMUNOLOGIC: No hives or eczema

## 2019-07-11 NOTE — H&P ADULT - PROBLEM SELECTOR PLAN 1
Patient w/ fever and URI symtpoms, CXR concerning for multifocal pneumonia.  - s/p CTX x1 and azithro  - BCx and Urine legionella pending  - c/w CTX + Azithromycin for CAP coverage.   - monitor VS   - CBC daily  - supp O2 PRN. wean of as tolerated. Patient w/ fever and URI symtpoms, CXR concerning for multifocal pneumonia.  - s/p CTX x1 and azithro  - RVP + for enterovirus. BCx and Urine legionella pending  - though viral pneumonia is possible, given concurrent GI symptoms and patient being in a large venue w/ multiple people, will cover for possible superimposed bacterial infection with antibiotics.  - c/w CTX + Azithromycin for CAP coverage.   - monitor VS   - CBC daily  - supp O2 PRN. wean of as tolerated.

## 2019-07-11 NOTE — H&P ADULT - PROBLEM SELECTOR PLAN 2
1 day history of diarrhea + abdominal cramps. Now resolved. No recent ABx use.  - most likely viral gastroenteritis   - if diarrhea resumes, consider sending GI PCR and stool culture +/- C.diff.  - monitor VS.  - Encourage PO hydration.  - holding home bowel regimen. Patient w/ increased O2 requirement in setting of PNA and possible HFrEF component.  - treat infection as above.  - Patient w/ increased O2 requirement in setting of PNA and possible HFrEF component given rise in BNP  - treat infection as above.  -

## 2019-07-11 NOTE — H&P ADULT - PROBLEM SELECTOR PLAN 5
pAF on Amio and Eliquis  - c/w home amiodarone and eliquis Severe AS undergoing TAVR evaluation  - currently asymptomatic as long as patient is not exerting herself.  - continue to monitor.  - please contact her primary cardiologist in the AM as patient was planned for pre-op clearance for her TAVR tomorrow.   - Her fluid balance will be important as for HF we do not want to have volume overload, but given severe AS, we want enough pre-load to ensure good perfusion.

## 2019-07-11 NOTE — H&P ADULT - ASSESSMENT
Mrs. 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 Mrs. 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

## 2019-07-11 NOTE — H&P ADULT - NSICDXPASTMEDICALHX_GEN_ALL_CORE_FT
PAST MEDICAL HISTORY:  Aortic valve stenosis, etiology of cardiac valve disease unspecified     HLD (hyperlipidemia)     HTN (hypertension)     Paroxysmal atrial fibrillation

## 2019-07-11 NOTE — H&P ADULT - PROBLEM SELECTOR PLAN 9
- DVT ppx: Eliquis  - Diet: DASH diet.  - will need PT eval given her baseline balance issue and advancing AS symptoms.  - fall precaution.

## 2019-07-11 NOTE — H&P ADULT - ATTENDING COMMENTS
89F w/ Severe AS being evaluated for TAVR, HFrEF (35-40% Jun2019), Mod-Sev MR, Afib on eliquis, hx of L MCA CVA(2017) w/ anomic aphasia presents with several complaints of suggestive of URI (runny nose, coughm sore throat, diarrhea, malaise), however following several days developed cough w/ green sputum/sob/fever and found to have bibasilar opacifications and respiratory failure w/ hypoxia and therefore admitted for likely superimposed multifocal PNA with entero/rhinovirus infection.  - Agree with resident to continue CAP coverage for streptococcus and atypical bacteria given patient's age and comorbidities. Pending clinical resolution plan for deescalation of antibiotics. cultures and legionella study will need f/u.    Patient assigned to me by night hospitalist in charge for management and care for patient for this evening only. Care to be resumed by day hospitalist in the morning and thereafter.

## 2019-07-12 ENCOUNTER — APPOINTMENT (OUTPATIENT)
Dept: ULTRASOUND IMAGING | Facility: HOSPITAL | Age: 84
End: 2019-07-12

## 2019-07-12 LAB
ALBUMIN SERPL ELPH-MCNC: 3.6 G/DL — SIGNIFICANT CHANGE UP (ref 3.3–5)
ALP SERPL-CCNC: 44 U/L — SIGNIFICANT CHANGE UP (ref 40–120)
ALT FLD-CCNC: 27 U/L — SIGNIFICANT CHANGE UP (ref 10–45)
ANION GAP SERPL CALC-SCNC: 12 MMOL/L — SIGNIFICANT CHANGE UP (ref 5–17)
AST SERPL-CCNC: 25 U/L — SIGNIFICANT CHANGE UP (ref 10–40)
BASOPHILS # BLD AUTO: 0 K/UL — SIGNIFICANT CHANGE UP (ref 0–0.2)
BASOPHILS NFR BLD AUTO: 0.2 % — SIGNIFICANT CHANGE UP (ref 0–2)
BILIRUB SERPL-MCNC: 0.4 MG/DL — SIGNIFICANT CHANGE UP (ref 0.2–1.2)
BUN SERPL-MCNC: 16 MG/DL — SIGNIFICANT CHANGE UP (ref 7–23)
CALCIUM SERPL-MCNC: 8.7 MG/DL — SIGNIFICANT CHANGE UP (ref 8.4–10.5)
CHLORIDE SERPL-SCNC: 104 MMOL/L — SIGNIFICANT CHANGE UP (ref 96–108)
CO2 SERPL-SCNC: 23 MMOL/L — SIGNIFICANT CHANGE UP (ref 22–31)
CREAT SERPL-MCNC: 0.74 MG/DL — SIGNIFICANT CHANGE UP (ref 0.5–1.3)
CULTURE RESULTS: NO GROWTH — SIGNIFICANT CHANGE UP
EOSINOPHIL # BLD AUTO: 0.2 K/UL — SIGNIFICANT CHANGE UP (ref 0–0.5)
EOSINOPHIL NFR BLD AUTO: 2.2 % — SIGNIFICANT CHANGE UP (ref 0–6)
GLUCOSE SERPL-MCNC: 94 MG/DL — SIGNIFICANT CHANGE UP (ref 70–99)
HCT VFR BLD CALC: 36.5 % — SIGNIFICANT CHANGE UP (ref 34.5–45)
HGB BLD-MCNC: 12.5 G/DL — SIGNIFICANT CHANGE UP (ref 11.5–15.5)
LEGIONELLA AG UR QL: NEGATIVE — SIGNIFICANT CHANGE UP
LYMPHOCYTES # BLD AUTO: 0.6 K/UL — LOW (ref 1–3.3)
LYMPHOCYTES # BLD AUTO: 7 % — LOW (ref 13–44)
MAGNESIUM SERPL-MCNC: 1.8 MG/DL — SIGNIFICANT CHANGE UP (ref 1.6–2.6)
MCHC RBC-ENTMCNC: 32.8 PG — SIGNIFICANT CHANGE UP (ref 27–34)
MCHC RBC-ENTMCNC: 34.4 GM/DL — SIGNIFICANT CHANGE UP (ref 32–36)
MCV RBC AUTO: 95.2 FL — SIGNIFICANT CHANGE UP (ref 80–100)
MONOCYTES # BLD AUTO: 1 K/UL — HIGH (ref 0–0.9)
MONOCYTES NFR BLD AUTO: 11.9 % — SIGNIFICANT CHANGE UP (ref 2–14)
NEUTROPHILS # BLD AUTO: 6.5 K/UL — SIGNIFICANT CHANGE UP (ref 1.8–7.4)
NEUTROPHILS NFR BLD AUTO: 78.6 % — HIGH (ref 43–77)
PHOSPHATE SERPL-MCNC: 2.7 MG/DL — SIGNIFICANT CHANGE UP (ref 2.5–4.5)
PLATELET # BLD AUTO: 112 K/UL — LOW (ref 150–400)
POTASSIUM SERPL-MCNC: 4 MMOL/L — SIGNIFICANT CHANGE UP (ref 3.5–5.3)
POTASSIUM SERPL-SCNC: 4 MMOL/L — SIGNIFICANT CHANGE UP (ref 3.5–5.3)
PROT SERPL-MCNC: 6 G/DL — SIGNIFICANT CHANGE UP (ref 6–8.3)
RBC # BLD: 3.83 M/UL — SIGNIFICANT CHANGE UP (ref 3.8–5.2)
RBC # FLD: 12.8 % — SIGNIFICANT CHANGE UP (ref 10.3–14.5)
SODIUM SERPL-SCNC: 139 MMOL/L — SIGNIFICANT CHANGE UP (ref 135–145)
SPECIMEN SOURCE: SIGNIFICANT CHANGE UP
WBC # BLD: 8.3 K/UL — SIGNIFICANT CHANGE UP (ref 3.8–10.5)
WBC # FLD AUTO: 8.3 K/UL — SIGNIFICANT CHANGE UP (ref 3.8–10.5)

## 2019-07-12 PROCEDURE — 99233 SBSQ HOSP IP/OBS HIGH 50: CPT

## 2019-07-12 PROCEDURE — 93010 ELECTROCARDIOGRAM REPORT: CPT

## 2019-07-12 PROCEDURE — 99233 SBSQ HOSP IP/OBS HIGH 50: CPT | Mod: GC

## 2019-07-12 RX ORDER — IPRATROPIUM/ALBUTEROL SULFATE 18-103MCG
3 AEROSOL WITH ADAPTER (GRAM) INHALATION EVERY 6 HOURS
Refills: 0 | Status: DISCONTINUED | OUTPATIENT
Start: 2019-07-12 | End: 2019-07-16

## 2019-07-12 RX ORDER — CALCIUM CARBONATE 500(1250)
1 TABLET ORAL EVERY 4 HOURS
Refills: 0 | Status: DISCONTINUED | OUTPATIENT
Start: 2019-07-12 | End: 2019-07-12

## 2019-07-12 RX ORDER — METOPROLOL TARTRATE 50 MG
25 TABLET ORAL
Refills: 0 | Status: DISCONTINUED | OUTPATIENT
Start: 2019-07-12 | End: 2019-07-16

## 2019-07-12 RX ORDER — CALCIUM CARBONATE 500(1250)
1 TABLET ORAL EVERY 6 HOURS
Refills: 0 | Status: DISCONTINUED | OUTPATIENT
Start: 2019-07-12 | End: 2019-07-16

## 2019-07-12 RX ORDER — ACETAMINOPHEN 500 MG
650 TABLET ORAL EVERY 6 HOURS
Refills: 0 | Status: DISCONTINUED | OUTPATIENT
Start: 2019-07-12 | End: 2019-07-16

## 2019-07-12 RX ORDER — ONDANSETRON 8 MG/1
4 TABLET, FILM COATED ORAL ONCE
Refills: 0 | Status: COMPLETED | OUTPATIENT
Start: 2019-07-12 | End: 2019-07-12

## 2019-07-12 RX ADMIN — Medication 650 MILLIGRAM(S): at 16:19

## 2019-07-12 RX ADMIN — ATORVASTATIN CALCIUM 40 MILLIGRAM(S): 80 TABLET, FILM COATED ORAL at 22:38

## 2019-07-12 RX ADMIN — AMIODARONE HYDROCHLORIDE 200 MILLIGRAM(S): 400 TABLET ORAL at 05:09

## 2019-07-12 RX ADMIN — APIXABAN 2.5 MILLIGRAM(S): 2.5 TABLET, FILM COATED ORAL at 22:38

## 2019-07-12 RX ADMIN — Medication 50 MILLIGRAM(S): at 23:19

## 2019-07-12 RX ADMIN — Medication 25 MILLIGRAM(S): at 11:35

## 2019-07-12 RX ADMIN — Medication 3 MILLILITER(S): at 17:04

## 2019-07-12 RX ADMIN — Medication 1 TABLET(S): at 17:37

## 2019-07-12 RX ADMIN — Medication 20 MILLIGRAM(S): at 05:10

## 2019-07-12 RX ADMIN — AZITHROMYCIN 250 MILLIGRAM(S): 500 TABLET, FILM COATED ORAL at 17:36

## 2019-07-12 RX ADMIN — Medication 3 MILLILITER(S): at 23:51

## 2019-07-12 RX ADMIN — CEFTRIAXONE 100 MILLIGRAM(S): 500 INJECTION, POWDER, FOR SOLUTION INTRAMUSCULAR; INTRAVENOUS at 17:36

## 2019-07-12 RX ADMIN — Medication 600 MILLIGRAM(S): at 17:36

## 2019-07-12 RX ADMIN — SERTRALINE 100 MILLIGRAM(S): 25 TABLET, FILM COATED ORAL at 12:42

## 2019-07-12 RX ADMIN — APIXABAN 2.5 MILLIGRAM(S): 2.5 TABLET, FILM COATED ORAL at 11:35

## 2019-07-12 RX ADMIN — Medication 3 MILLILITER(S): at 12:16

## 2019-07-12 RX ADMIN — Medication 25 MILLIGRAM(S): at 22:38

## 2019-07-12 RX ADMIN — ONDANSETRON 4 MILLIGRAM(S): 8 TABLET, FILM COATED ORAL at 15:15

## 2019-07-12 NOTE — PROGRESS NOTE ADULT - PROBLEM SELECTOR PLAN 1
Pt. was scheduled for Pre Surgical testing today in anticipation of TAVR Wednesday 7/17. Given acute illness, will defer TAVR. I notified PST of patients admission.  Will reassess timing of TAVR when she is closer to discharge and PNA is resolved.   Discussed with Dr. Ayala and primary team.    [id number]

## 2019-07-12 NOTE — PROGRESS NOTE ADULT - SUBJECTIVE AND OBJECTIVE BOX
HPI:  Mrs. 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    Patient attended Wedding of her grandchild on Monday, and felt cold due to high AC in the venue, yet does not recall having any sick contact at the event. Starting Tuesday, she had rhinorrhea followed by subjective fever, chills, malaise, and nonproductive cough. She had mild SOB and was breathing faster than her normal. At the same time, she developed abdominal cramp, which required her to go to bathroom more often than usual, yet BM was normal. The next day, she continued to have these symptoms and her stool turned into watery diarrhea. She had multiple episodes of watery diarrhea at home, yet denies having melena or hematochezia. She later developed nausea, and was unable to tolerate any medications today. She denied dysphagia/odynophagia or emesis.  (2019 20:25)     Pt. known to the structural heart service. She was evaluated by Dr. Gibson and Dr. Ayala as outpt and completed testing for TAVR. Scheduled for       PAST MEDICAL & SURGICAL HISTORY:  Aortic valve stenosis, etiology of cardiac valve disease unspecified  Paroxysmal atrial fibrillation  HLD (hyperlipidemia)  HTN (hypertension)  Status post bladder repair  H/O shoulder surgery      REVIEW OF SYSTEMS:    CONSTITUTIONAL: No weakness, fevers or chills, (+) fatigue  EYES/ENT: No visual changes;  No vertigo or throat pain   NECK: No pain or stiffness  RESPIRATORY: No cough, wheezing, hemoptysis; No shortness of breath  CARDIOVASCULAR: No chest pain or palpitations, PND, orthopnea, or LE edema  GASTROINTESTINAL: Now tolerating small amounts of food, no further diarrhea  GENITOURINARY: No dysuria, frequency or hematuria  NEUROLOGICAL: No numbness or weakness  SKIN: No itching, rashes      MEDICATIONS  (STANDING):  ALBUTerol/ipratropium for Nebulization 3 milliLiter(s) Nebulizer every 6 hours  amiodarone    Tablet 200 milliGRAM(s) Oral daily  apixaban 2.5 milliGRAM(s) Oral every 12 hours  atorvastatin 40 milliGRAM(s) Oral at bedtime  azithromycin  IVPB      azithromycin  IVPB 500 milliGRAM(s) IV Intermittent every 24 hours  cefTRIAXone   IVPB 1000 milliGRAM(s) IV Intermittent every 24 hours  furosemide    Tablet 20 milliGRAM(s) Oral daily  guaiFENesin  milliGRAM(s) Oral every 12 hours  metoprolol tartrate 25 milliGRAM(s) Oral two times a day  ondansetron Injectable 4 milliGRAM(s) IV Push once  sertraline 100 milliGRAM(s) Oral daily    MEDICATIONS  (PRN):  acetaminophen   Tablet .. 650 milliGRAM(s) Oral every 6 hours PRN Temp greater or equal to 38C (100.4F)  benzonatate 100 milliGRAM(s) Oral three times a day PRN Cough  calcium carbonate    500 mG (Tums) Chewable 1 Tablet(s) Chew every 6 hours PRN Heartburn  traZODone 50 milliGRAM(s) Oral at bedtime PRN insomnia      Allergies    codeine (Unknown)  Flagyl (Unknown)  Sulfur (Unknown)    Intolerances        SOCIAL HISTORY:    FAMILY HISTORY:  No pertinent family history in first degree relatives      Vital Signs Last 24 Hrs  T(C): 38.1 (2019 14:47), Max: 38.3 (2019 15:41)  T(F): 100.6 (2019 14:47), Max: 100.9 (2019 15:41)  HR: 60 (2019 13:12) (56 - 78)  BP: 132/72 (2019 13:12) (99/51 - 147/84)  BP(mean): --  RR: 16 (2019 13:12) (16 - 18)  SpO2: 94% (2019 13:12) (88% - 97%)    Physical Exam  General: A/ox 3, No acute Distress  Neck: Supple, NO JVD  Cardiac: S1 S2, No M/R/G  Pulmonary: CTAB, Breathing unlabored, No Rhonchi/Rales/Wheezing  Abdomen: Soft, Non -tender, +BS x 4 quads  Extremities: No Rashes, No edema  Neuro: A/o x 3, No focal deficits    LABS:                        12.5   8.3   )-----------( 112      ( 2019 07:20 )             36.5     07-12    139  |  104  |  16  ----------------------------<  94  4.0   |  23  |  0.74    Ca    8.7      2019 07:19  Phos  2.7     07-12  Mg     1.8     07-12    TPro  6.0  /  Alb  3.6  /  TBili  0.4  /  DBili  x   /  AST  25  /  ALT  27  /  AlkPhos  44  07-12    PT/INR - ( 2019 15:23 )   PT: 13.6 sec;   INR: 1.19 ratio         PTT - ( 2019 15:23 )  PTT:37.8 sec  Urinalysis Basic - ( 2019 17:45 )    Color: Yellow / Appearance: Clear / S.026 / pH: x  Gluc: x / Ketone: Small  / Bili: Negative / Urobili: Negative   Blood: x / Protein: 30 mg/dL / Nitrite: Negative   Leuk Esterase: Negative / RBC: 3 /hpf / WBC 2 /HPF   Sq Epi: x / Non Sq Epi: 1 /hpf / Bacteria: Negative        RADIOLOGY & ADDITIONAL STUDIES:  < from: Transthoracic Echocardiogram (19 @ 09:13) >  Patient name: CELINA KESSLER  YOB: 1926   Age: 89 (F)   MR#: [id number]  Study Date: 2019  Location: O/PSonographer: Judie Masterson RDCS  Study quality: Technically fair  Referring Physician: GANESH MEDINA MD  Blood Pressure:118/66 mmHg  Height: 157 cm  Weight: 55 kg  BSA: 1.6 m2  Heart Rate: 65 mmHg  ------------------------------------------------------------------------  PROCEDURE: Transthoracic echocardiogram with 2-D, M-Mode  and complete spectral and color flow Doppler.  INDICATION: Nonrheumatic aortic (valve) stenosis (I35.0)  ------------------------------------------------------------------------  MEASUREMENTS: (See below)  ------------------------------------------------------------------------  OBSERVATIONS:  Mitral Valve: There is mitral annular calcification with  thickening of the  mitral valve leaflets. Moderate-severe  mitral regurgitation.  Aortic Root: Aortic Root: 3 cm.  Ascending Aorta: 3.1 cm.  LVOT diameter: 2.1 cm.  The aortic root and proximal ascending thoracic aorta are  normal in size.  There is effacement of the sinotubular  junction.  Aortic Valve: The aortic valve is severely calcified.  The  number of cusps and morphology is not well seen. Peak  transaortic valve gradient [id number] mm Hg, mean  transaortic valve gradient equals 19 mm Hg, estimated  aortic valve area equals 0.7 sqcm (by continuity equation),  the DVI= 0.21, consistent with severe aortic stenosis.  The  LV stroke volume index= 32ml/m2/beat.  This is low  gradient/low stroke volume/severe aortic stenosis.   No  aortic valve regurgitation seen. Peak left ventricular  outflow tract gradient equals 1 mm Hg, mean gradient is  equal to 1 mm Hg, LVOT velocity time integral equals 15 cm.  Left Atrium: Severely dilated left atrium.  LA volume index  = 69 cc/m2.  Left Ventricle: There is global hypokinesis with regional  variation.  The inferolateral wall is the most hypokinetic.   The LVEF= 35-40%. Normal left ventricular internal  dimensions and wall thicknesses.  There is an apical  pseudotendon seen.   There is pseudonormal filling pattern.   The left atrial pressure is elevated.  E/E'= 30.  Right Heart: Normal right atrium.  The right atrium= 20cm2.  Normal right ventricular size and function.  Minimal  tricuspid regurgitation. Normal pulmonic valve. Minimal  pulmonic regurgitation.  Pericardium/Pleura There is no pericardial effusion.  Hemodynamic: The IVC is dilated with minimal variation with  sniff suggestive of elevated right atrial pressure.  Estimated right ventricular systolic pressure equals 67 mm  Hg, assuming right atrial pressure equals 15 mm Hg,  consistent with severe pulmonary hypertension.  ------------------------------------------------------------------------  CONCLUSIONS:  1.There is mitral annular calcification with thickening of  the  mitral valve leaflets. Moderate-severe mitral  regurgitation.  2. The aortic valve is severely calcified.  The number of  cusps and morphology is not well seen. Peak transaortic  valve gradient equals 38 mm Hg, mean transaortic valve  gradient equals 19 mm Hg, estimated aortic valve area  equals 0.7 sqcm (by continuity equation), the DVI= 0.21,  consistent with severe aortic stenosis.  The LV stroke  volume index= 32ml/m2/beat.  This is low gradient/low  stroke volume/severe aortic stenosis.   No aortic valve  regurgitation seen.  3. Aortic Root: 3 cm.  Ascending Aorta: 3.1 cm.  LVOT diameter: 2.1 cm.  The aortic root and proximal ascending thoracic aorta are  normal in size.  There is effacement of the sinotubular  junction.  4. Severely dilated left atrium.  LA volume index = 69  cc/m2.  5. Normal left ventricular internal dimensions and wall  thicknesses.  There is an apical pseudotendon seen.  6. There is global hypokinesis with regional variation.  The inferolateral wall is the most hypokinetic.  The LVEF=  35-40%.  7. Normal right ventricular size and function.  8. The IVC is dilated with minimal variation with sniff  suggestive of elevated right atrial pressure.  9.Estimated right ventricular systolic pressure equals 67  mm Hg, assuming right atrial pressure equals 15 mm Hg,  consistent with severe pulmonary hypertension.  10.  Minimal tricuspid regurgitation.  11.  There is no pericardial effusion.  *** Compared with prior report from 2017, the left  ventricular function has declined,  the calculated aortic  stenosis is now severe, there is worse mitral regurgitation  and now severe pulmonary hypertension.  ------------------------------------------------------------------------  PROCEDURE DESCRIPTION: Transthoracic echocardiogram with  2-D, M-Mode and complete spectral and color flow Doppler.  ------------------------------------------------------------------------  ECHOCARDIOGRAPHIC EXAMINATION:  AORTIC ROOT:  Aortic Root (Leaflet): 3.0 cm  Aortic Root Index: 0.9  Aortic Tubular: 3.1 cm  LEFT ATRIUM:  AP Dimensions: 4.7 cm  LA Volume Index: 69.00 cc/sqm  LA Volume: 107.6 cc  LEFT VENTRICLE:  LVIDd: 5.3 cm  LVIDs: 4.3 cm  Fraction Short: 19 %  IVS: 1.08  ILWT: 0.9 cm  RWT: 0.33  LV Mass: 198.0 gm  LV Mass Index: 128 gm/sqm  HR and BP:  HR: 65 bpm  BP: 118/66  BSA: 1.55  TRICUSPID VALVE:  TR Velocity: 3.8 M/s  TR Gradient: 57.7 mm Hg  ------------------------------------------------------------------------  HEMODYNAMICS:  RA Pressure Estimate: 8  RVSP: 66  LA Pressure Estimate: < 20  PAS: 67  IVRT: 95 ms  ------------------------------------------------------------------------  COLOR FLOW and SPECIAL DOPPLER:  AORTIC VALVE:  AV Peak Velocity: 3.1 M/sec  AV Peak Gradient: 38 mm Hg  AV Mean Gradient: 19 mm Hg  Valve Area: 0.7 sqcm  LVOT:  LVOT Velocity: .5 M/sec  LVOT Diameter: 2.1 cm  LVOT Peak Gradient Rest: 1 mm Hg  LVOT Mean Gradient Rest: 1 mm Hg  ------------------------------------------------------------------------  DIASTOLIC FUNCTION:  DT:144 ms  E/A: 2.25  e' Septal: 3.0 cm/s  E/e' Septal: 30.0  e' Lateral: 3.0 cm/s  E/e' Lateral: 30.0  MV E wave: 0.9 m/s  MV A wave: 0.4 m/s  Septal a': 4.0 cm/s  Lateral a': 2.0 cm/s  ------------------------------------------------------------------------  Confirmed on  2019 - 11:29:48 by Patricia Kovacs M.D.  ------------------------------------------------------------------------    < end of copied text >    < from: Cardiac Cath Lab - Adult (19 @ 12:25) >  [facility]  Department of Cardiology  [address] [place] [address]  [phone]  Cath Lab Report -- Comprehensive Report  Patient: CELINA KESSLER  Study date: 2019  Account number: [id number]  MR number: [id number]  : 1926  Gender: Female  Race: W  Case Physician(s):  Jaxon Solano M.D.  Referring Physician:  BROWN Worley M.D.  INDICATIONS: Aortic valve stenosis.  HISTORY: Aortic valve: history of severe stenosis. The patient has  hypertension and medication-treated dyslipidemia.  PROCEDURE:  --  Right heart catheterization.  --  Left coronary angiography.  --  Right coronary angiography.  --  Sonosite - Diagnostic.  TECHNIQUE: The risks and alternatives of the procedures and conscious  sedation were explained to the patient and informed consent was obtained.  Cardiac catheterization performed electively.  Local anesthetic given. Right femoral artery access. Right femoral vein  access. Right heart catheterization. The procedure was performed utilizing  a catheter. Left coronary artery angiography. The vessel was injected  utilizing a catheter. Right coronary artery angiography. The vessel was  injected utilizing a catheter. Sonosite - Diagnostic. RADIATION EXPOSURE:  7.1 min.  CONTRAST GIVEN: Omnipaque 14 ml.  MEDICATIONS GIVEN: Fentanyl, 25 mcg, IV.  CORONARY VESSELS: The coronary circulation is right dominant.  LM:   --  LM: Normal.  LAD:   --  LAD: Angiography showed minor luminal irregularities with no  flow limiting lesions.  CX:   --  Circumflex: Normal.  RCA:   --  RCA: Angiography showed minor luminal irregularities with no  flow limiting lesions.  COMPLICATIONS: There were no complications.  DIAGNOSTIC RECOMMENDATIONS: The patient should continue with the present  medications.  Prepared and signed by  Jaxon Solano M.D.  Signed 2019 14:01:49  HEMODYNAMIC TABLES  Pressures:  Baseline  Pressures:  - HR: 54  Pressures:  - Rhythm:  Pressures:  -- Aortic Pressure (S/D/M): 154/61/96  Pressures:  -- Right Atrium (a/v/M): 12/12/9  Pressures:  -- Right Ventricle (s/edp): 63/12/--  O2 Sats:  Baseline  O2 Sats:  - HR: 54  O2 Sats:  - Rhythm:  O2 Sats:  -- AO: 11.6/89.8/14.17  O2 Sats:  -- PA: 11.6/--/--  Outputs:  Baseline  Outputs:  -- CALCULATIONS[age]  Outputs:  -- CALCULATIONS: Body Surface Area: 1.53  Outputs:  -- CALCULATIONS: Height in cm: 157.00  Outputs:  -- CALCULATIONS: Sex: Female  Outputs:  -- CALCULATIONS: Weight in k.40  Outputs:  -- OUTPUTS: O2 consumption: 205.00  Outputs:  -- OUTPUTS: Vo2 Indexed: 133.58    < end of copied text >

## 2019-07-12 NOTE — PHYSICAL THERAPY INITIAL EVALUATION ADULT - BALANCE TRAINING, PT EVAL
GOAL: Patient will maintain midline orientation throughout 200 feet ambulation independently with RW in 2 weeks.

## 2019-07-12 NOTE — PROGRESS NOTE ADULT - SUBJECTIVE AND OBJECTIVE BOX
Junie Kirk, PGY-1  Pager: [phone]      Patient is a 89y old  Female who presents with a chief complaint of 89F with presenting with cough, fever and diarrhea (2019 20:25)      SUBJECTIVE / OVERNIGHT EVENTS:  No acute events overnight.     Patient seen and examined in AM. Reported coughing, but hard to bring up sputum. Breathing is better. Feeling hungry. No diarrhea, and no bowel movements in the past 3 days.     Patient denied fevers, CP, SOB, abdominal pain, or lower extremity pain/swelling.     MEDICATIONS  (STANDING):  amiodarone    Tablet 200 milliGRAM(s) Oral daily  apixaban 2.5 milliGRAM(s) Oral every 12 hours  atorvastatin 40 milliGRAM(s) Oral at bedtime  azithromycin  IVPB      azithromycin  IVPB 500 milliGRAM(s) IV Intermittent every 24 hours  cefTRIAXone   IVPB 1000 milliGRAM(s) IV Intermittent every 24 hours  furosemide    Tablet 20 milliGRAM(s) Oral daily  metoprolol tartrate 25 milliGRAM(s) Oral two times a day  sertraline 100 milliGRAM(s) Oral daily    MEDICATIONS  (PRN):  traZODone 50 milliGRAM(s) Oral at bedtime PRN insomnia      I&O's Summary      Vital Signs Last 24 Hrs  T(C): 37 (2019 05:10), Max: 38.3 (2019 15:41)  T(F): 98.6 (2019 05:10), Max: 100.9 (2019 15:41)  HR: 60 (2019 05:10) (56 - 73)  BP: 105/63 (2019 05:10) (99/51 - 133/64)  BP(mean): --  RR: 18 (2019 05:10) (18 - 19)  SpO2: 93% (2019 05:10) (91% - 96%)    PHYSICAL EXAM:  General: No acute distress, well-nourished  HEENT: Extraocular muscles intact, clear conjunctiva, pupils round and equal, reactive to light; normal oropharynx  Neck: Supple, no jugular vein distention  Lungs: Normal respiratory effort on room air; bilateral lung fields clear to auscultation; no wheezes or crackles.  Heart: Normal rate and regular rhythm; S1/S2 present; No murmurs, rubs or gallops appreciated; Capillary refill < 2 seconds  Abdomen: Soft, non-distended, non-tender, no masses; bowel sound present  Extremities: No lower extremity edema; no joint swelling; full range of motion  Neurology: Alert and oriented to person, time, place and situation. No focal deficits; strength and sensation grossly intact. Spontaneous movements of all 4 extremities.  Skin: Warm, dry and appropriate color for skin tone; no new rashes or lesions.       LABS:                        13.4   8.6   )-----------( 128      ( 2019 15:23 )             39.4       07-12    139  |  104  |  16  ----------------------------<  94  4.0   |  23  |  0.74    Ca    8.7      2019 07:19  Phos  2.7     07-12  Mg     1.8     12    TPro  6.0  /  Alb  3.6  /  TBili  0.4  /  DBili  x   /  AST  25  /  ALT  27  /  AlkPhos  44  07-12      PT/INR - ( 2019 15:23 )   PT: 13.6 sec;   INR: 1.19 ratio         PTT - ( 2019 15:23 )  PTT:37.8 sec          15:23 - VBG - pH: 7.40  | pCO2: 43    | pO2: 25    | Lactate: 1.4        Urinalysis Basic - ( 2019 17:45 )    Color: Yellow / Appearance: Clear / S.026 / pH: x  Gluc: x / Ketone: Small  / Bili: Negative / Urobili: Negative   Blood: x / Protein: 30 mg/dL / Nitrite: Negative   Leuk Esterase: Negative / RBC: 3 /hpf / WBC 2 /HPF   Sq Epi: x / Non Sq Epi: 1 /hpf / Bacteria: Negative        EKG:   CXR:       CAPILLARY BLOOD GLUCOSE          RADIOLOGY & ADDITIONAL TESTS: Junie Kirk, PGY-1  Pager: [phone]      Patient is a 89y old  Female who presents with a chief complaint of 89F with presenting with cough, fever and diarrhea (2019 20:25)      SUBJECTIVE / OVERNIGHT EVENTS:  No acute events overnight.     Patient seen and examined in AM. Reported coughing, but hard to bring up sputum (green). Breathing is better. Feeling hungry. No diarrhea, and no bowel movements since yesterday.    Patient denied fevers, CP, SOB, abdominal pain, or lower extremity pain/swelling.     MEDICATIONS  (STANDING):  amiodarone    Tablet 200 milliGRAM(s) Oral daily  apixaban 2.5 milliGRAM(s) Oral every 12 hours  atorvastatin 40 milliGRAM(s) Oral at bedtime  azithromycin  IVPB      azithromycin  IVPB 500 milliGRAM(s) IV Intermittent every 24 hours  cefTRIAXone   IVPB 1000 milliGRAM(s) IV Intermittent every 24 hours  furosemide    Tablet 20 milliGRAM(s) Oral daily  metoprolol tartrate 25 milliGRAM(s) Oral two times a day  sertraline 100 milliGRAM(s) Oral daily    MEDICATIONS  (PRN):  traZODone 50 milliGRAM(s) Oral at bedtime PRN insomnia      I&O's Summary      Vital Signs Last 24 Hrs  T(C): 37 (2019 05:10), Max: 38.3 (2019 15:41)  T(F): 98.6 (2019 05:10), Max: 100.9 (2019 15:41)  HR: 60 (2019 05:10) (56 - 73)  BP: 105/63 (2019 05:10) (99/51 - 133/64)  BP(mean): --  RR: 18 (2019 05:10) (18 - 19)  SpO2: 93% (2019 05:10) (91% - 96%)    PHYSICAL EXAM:  Constitutional: NAD. well-developed; well-groomed; well-nourished elderly lady lying on bed w/ 2L NC  HEENT: Extraocular muscles intact, clear conjunctiva, pupils round and equal, reactive to light; normal oropharynx; neck supple  Respiratory: Diffuse upper and bibasilar crackles, expiratory wheezes; no increase in WOB  Cardiovascular: Regular rhythm, normal rate, systolic murmur. 2+ distal pulses. Cap refill < 2 seconds.  Gastrointestinal: soft; non-tender, non-distended  Extremities: no cyanosis; no pedal edema, non-tender to palpation  Neurological: A&Ox 3; Able to have fluent conversation except having difficulties with some names (doctors, medications etc.)  Skin: No new rashes or lesions. Warm and dry to touch.      LABS:                          12.5   8.3   )-----------( 112      ( 2019 07:20 )             36.5     07-12    139  |  104  |  16  ----------------------------<  94  4.0   |  23  |  0.74    Ca    8.7      2019 07:19  Phos  2.7     07-12  Mg     1.8     07-12    TPro  6.0  /  Alb  3.6  /  TBili  0.4  /  DBili  x   /  AST  25  /  ALT  27  /  AlkPhos  44  07-12                          13.4   8.6   )-----------( 128      ( 2019 15:23 )             39.4       07-12    139  |  104  |  16  ----------------------------<  94  4.0   |  23  |  0.74    Ca    8.7      2019 07:19  Phos  2.7     07-12  Mg     1.8     07-12    TPro  6.0  /  Alb  3.6  /  TBili  0.4  /  DBili  x   /  AST  25  /  ALT  27  /  AlkPhos  44  07-12      PT/INR - ( 2019 15:23 )   PT: 13.6 sec;   INR: 1.19 ratio         PTT - ( 2019 15:23 )  PTT:37.8 sec          15:23 - VBG - pH: 7.40  | pCO2: 43    | pO2: 25    | Lactate: 1.4        Urinalysis Basic - ( 2019 17:45 )    Color: Yellow / Appearance: Clear / S.026 / pH: x  Gluc: x / Ketone: Small  / Bili: Negative / Urobili: Negative   Blood: x / Protein: 30 mg/dL / Nitrite: Negative   Leuk Esterase: Negative / RBC: 3 /hpf / WBC 2 /HPF   Sq Epi: x / Non Sq Epi: 1 /hpf / Bacteria: Negative        EKG: < from: 12 Lead ECG (19 @ 16:06) >  Ventricular Rate 67 BPM    Atrial Rate 67 BPM    P-R Interval 260 ms    QRS Duration 140 ms    Q-T Interval 448 ms    QTC Calculation(Bezet) 473 ms    P Axis 73 degrees    R Axis -35 degrees    T Axis 128 degrees    Diagnosis Line SINUS RHYTHM [id number] DEGREE A-V BLOCK  LEFT AXIS DEVIATION  LEFT BUNDLE BRANCH BLOCK  ABNORMAL ECG    < end of copied text >    CXR:   < from: Xray Chest 1 View- PORTABLE-Urgent (19 @ 15:55) >  IMPRESSION:   1. Cardiomegaly.  2. Multifocal pneumonia in both lowerlungs, as above.  This report to ER via PACs system.    < end of copied text >        RADIOLOGY & ADDITIONAL TESTS:    Culture - Urine (19 @ 22:58)    Specimen Source: .Urine    Culture Results:   No growth

## 2019-07-12 NOTE — PHYSICAL THERAPY INITIAL EVALUATION ADULT - PERTINENT HX OF CURRENT PROBLEM, REHAB EVAL
Patient is 89 year old female admitted with c/o cough, fever, and diarrhea. Chest x-ray 7/11/19 positive for multifocal pneumonia.

## 2019-07-12 NOTE — PROGRESS NOTE ADULT - ASSESSMENT
89 year old female with PMH HTN, HLD, AFib on Eliquis, HFrEF, CVA, and severe aortic stenosis awaiting TAVR. Admitted with multifocal PNA with entero/rhino virus.

## 2019-07-12 NOTE — PROGRESS NOTE ADULT - ASSESSMENT
Mrs. 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

## 2019-07-12 NOTE — PROGRESS NOTE ADULT - PROBLEM SELECTOR PLAN 6
pAF on Amio and Eliquis  - c/w home amiodarone and eliquis pAF on Amio and Eliquis  - c/w home amiodarone and eliquis and metoprolol.

## 2019-07-12 NOTE — PROGRESS NOTE ADULT - PROBLEM SELECTOR PLAN 4
HFrEF. possible component of HF exacerbation with bibasilar crackles and increased O2 requirement. On PO lasix 20mg at home.  - c/w PO lasix 20mg qD  - strict I/O, daily weight  - c/w home metop 25mg BID  - Patient used to take valsartan 80mg, yet since last admission (under neuroservice for CVA), the medication has fell off the list. Not mentioned anything in outpatient record. The medication list daughter had was taken by ED person, yet only half of the list was returned, with the other half lost. Patient and daughter not 100% sure on the all the meds, yet daughter states the medication on our e-record is correct, which does not include ACEi/ARBs.  - Will need to contact outpatient provider for any contraindication for restarting ACEi. (maybe her falling episodes?) HFrEF. possible component of HF exacerbation with bibasilar crackles and increased O2 requirement. On PO lasix 20mg at home.  - c/w PO lasix 20mg qD  - strict I/O, daily weight  - c/w home metop 25mg BID  - Patient used to take valsartan 80mg, yet since last admission (under neuroservice for CVA), the medication has fell off the list. Not mentioned anything in outpatient record. The medication list daughter had was taken by ED person, yet only half of the list was returned, with the other half lost. Patient and daughter not 100% sure on the all the meds, yet daughter states the medication on our e-record is correct, which does not include ACEi/ARBs.  - Will need to contact outpatient provider for any contraindication for restarting ACEi. (maybe her falling episodes?) unable to reach her PMD today, will try again.   - Not planing to start ACE/ARB yet, BP reasonable. HFrEF. possible component of HF exacerbation with bibasilar crackles and increased O2 requirement. On PO lasix 20mg at home.  - c/w PO lasix 20mg qD  - strict I/O, daily weight  - c/w home metop 25mg BID  - Patient used to take valsartan 80mg, yet since last admission (under neuroservice for CVA), the medication has fell off the list. Not mentioned anything in outpatient record. The medication list daughter had was taken by ED person, yet only half of the list was returned, with the other half lost. Patient and daughter not 100% sure on the all the meds, yet daughter states the medication on our e-record is correct, which does not include ACEi/ARBs.  - Will need to contact outpatient provider for any contraindication for restarting ACEi. (maybe her falling episodes?) unable to reach her PMD today, will try again.   - Not planing to start ACEi/ARB yet, BP reasonable.

## 2019-07-12 NOTE — PROGRESS NOTE ADULT - PROBLEM SELECTOR PLAN 1
Patient w/ fever and URI symtpoms, CXR concerning for multifocal pneumonia.  - s/p CTX x1 and azithro  - RVP + for enterovirus. BCx and Urine legionella pending  - though viral pneumonia is possible, given concurrent GI symptoms and patient being in a large venue w/ multiple people, will cover for possible superimposed bacterial infection with antibiotics.  - c/w CTX + Azithromycin for CAP coverage.   - monitor VS   - CBC daily  - supp O2 PRN. wean of as tolerated. Patient w/ fever and URI symtpoms, CXR concerning for multifocal pneumonia.  - RVP + for enterovirus. BCx and Urine legionella pending  - though viral pneumonia is possible, given concurrent GI symptoms and patient being in a large venue w/ multiple people, will cover for possible superimposed bacterial infection with antibiotics.  - c/w CTX + Azithromycin for CAP coverage.   - monitor VS   - CBC daily  - supp O2 PRN. Currently on room air with 99% SaO2, no increased work of breathing.  - F/u sputum culture  - Rectal temp 100.6F x 1, give Tylenol 650 mg Q6h PRN Patient w/ fever and URI symtpoms, CXR concerning for multifocal pneumonia.  - RVP + for enterovirus. BCx and Urine legionella pending  - though viral pneumonia is possible, given concurrent GI symptoms and patient being in a large venue w/ multiple people, will cover for possible superimposed bacterial infection with antibiotics.  - c/w CTX + Azithromycin for CAP coverage.   - Added benzonatate TID and Mucinex BID for cough, and Duoneb Q6h.   - monitor VS   - CBC daily  - supp O2 PRN. Currently on room air with 99% SaO2, no increased work of breathing.  - F/u sputum culture  - Rectal temp 100.6F x 1, give Tylenol 650 mg Q6h PRN

## 2019-07-12 NOTE — PROGRESS NOTE ADULT - PROBLEM SELECTOR PLAN 5
Severe AS undergoing TAVR evaluation  - currently asymptomatic as long as patient is not exerting herself.  - continue to monitor.  - please contact her primary cardiologist in the AM as patient was planned for pre-op clearance for her TAVR tomorrow.   - Her fluid balance will be important as for HF we do not want to have volume overload, but given severe AS, we want enough pre-load to ensure good perfusion. Severe AS undergoing TAVR evaluation  - currently asymptomatic as long as patient is not exerting herself. However, per daughter, patient had significant DAWKINS with activities.  - continue to monitor.  - Unable to reach her primary cardiologist in the AM regarding patient's pre-op clearance for her TAVR. Will try again.  - Her fluid balance will be important as for HF we do not want to have volume overload, but given severe AS, we want enough pre-load to ensure good perfusion. Severe AS undergoing TAVR evaluation  - currently asymptomatic as long as patient is not exerting herself. However, per daughter, patient had significant DAWKINS with activities.  - continue to monitor.  - Structural heart team has postponed her procedure.   - Her fluid balance will be important as for HF we do not want to have volume overload, but given severe AS, we want enough pre-load to ensure good perfusion.

## 2019-07-12 NOTE — PHYSICAL THERAPY INITIAL EVALUATION ADULT - ADDITIONAL COMMENTS
Patient resides alone in an apartment building with an elevator/no stairs. Daughter lives nearby and is able to assist as needed. Patient walks to the grocery store and was previously independent in all ADLs and IADLs. Patient owns a RW and utilizes a shopping cart as a walker when shopping.

## 2019-07-12 NOTE — PROGRESS NOTE ADULT - PROBLEM SELECTOR PLAN 2
Patient w/ increased O2 requirement in setting of PNA and possible HFrEF component given rise in BNP  - treat infection as above.  - Patient w/ increased O2 requirement in setting of PNA and possible HFrEF component given rise in BNP  - treat infection as above.

## 2019-07-12 NOTE — PROGRESS NOTE ADULT - PROBLEM SELECTOR PLAN 3
1 day history of diarrhea + abdominal cramps. Now resolved. No recent ABx use.  - most likely viral gastroenteritis 2/2 enterovirus.  - monitor VS.  - Encourage PO hydration.  - holding home bowel regimen. 1 day history of diarrhea + abdominal cramps. Now resolved. No recent ABx use.  - most likely viral gastroenteritis 2/2 enterovirus.  - monitor VS.  - Encourage PO hydration.  - holding home bowel regimen.  - Nauseous, given 4mg IV Zofran x 1 (QTc 450's); Heartburn, given TUMS

## 2019-07-12 NOTE — PROGRESS NOTE ADULT - PROBLEM SELECTOR PLAN 9
- DVT ppx: Eliquis  - Diet: DASH diet.  - will need PT eval given her baseline balance issue and advancing AS symptoms.  - fall precaution. - DVT ppx: Eliquis  - Diet: DASH diet.  - will need PT eval given her baseline balance issue and advancing AS symptoms.  - fall precaution.  - Dispo - pending due to clinical status. Lives alone at home and using walker, may benefit from rehab upon discharge. - DVT ppx: Eliquis  - Diet: DASH diet. Kosher.  - will need PT eval given her baseline balance issue and advancing AS symptoms.  - fall precaution.  - Dispo - pending due to clinical status. Lives alone at home and using walker, may benefit from rehab upon discharge.  - Follow up with PNA team following d/c.

## 2019-07-13 ENCOUNTER — TRANSCRIPTION ENCOUNTER (OUTPATIENT)
Age: 84
End: 2019-07-13

## 2019-07-13 LAB
ANION GAP SERPL CALC-SCNC: 13 MMOL/L — SIGNIFICANT CHANGE UP (ref 5–17)
BUN SERPL-MCNC: 20 MG/DL — SIGNIFICANT CHANGE UP (ref 7–23)
CALCIUM SERPL-MCNC: 9.1 MG/DL — SIGNIFICANT CHANGE UP (ref 8.4–10.5)
CHLORIDE SERPL-SCNC: 103 MMOL/L — SIGNIFICANT CHANGE UP (ref 96–108)
CO2 SERPL-SCNC: 22 MMOL/L — SIGNIFICANT CHANGE UP (ref 22–31)
CREAT SERPL-MCNC: 0.89 MG/DL — SIGNIFICANT CHANGE UP (ref 0.5–1.3)
GLUCOSE SERPL-MCNC: 83 MG/DL — SIGNIFICANT CHANGE UP (ref 70–99)
GRAM STN FLD: SIGNIFICANT CHANGE UP
HCT VFR BLD CALC: 36.6 % — SIGNIFICANT CHANGE UP (ref 34.5–45)
HGB BLD-MCNC: 12.1 G/DL — SIGNIFICANT CHANGE UP (ref 11.5–15.5)
MAGNESIUM SERPL-MCNC: 2 MG/DL — SIGNIFICANT CHANGE UP (ref 1.6–2.6)
MCHC RBC-ENTMCNC: 31.9 PG — SIGNIFICANT CHANGE UP (ref 27–34)
MCHC RBC-ENTMCNC: 33.2 GM/DL — SIGNIFICANT CHANGE UP (ref 32–36)
MCV RBC AUTO: 96.3 FL — SIGNIFICANT CHANGE UP (ref 80–100)
PHOSPHATE SERPL-MCNC: 3.5 MG/DL — SIGNIFICANT CHANGE UP (ref 2.5–4.5)
PLATELET # BLD AUTO: 105 K/UL — LOW (ref 150–400)
POTASSIUM SERPL-MCNC: 3.5 MMOL/L — SIGNIFICANT CHANGE UP (ref 3.5–5.3)
POTASSIUM SERPL-SCNC: 3.5 MMOL/L — SIGNIFICANT CHANGE UP (ref 3.5–5.3)
RBC # BLD: 3.8 M/UL — SIGNIFICANT CHANGE UP (ref 3.8–5.2)
RBC # FLD: 12.8 % — SIGNIFICANT CHANGE UP (ref 10.3–14.5)
SODIUM SERPL-SCNC: 138 MMOL/L — SIGNIFICANT CHANGE UP (ref 135–145)
SPECIMEN SOURCE: SIGNIFICANT CHANGE UP
WBC # BLD: 4.7 K/UL — SIGNIFICANT CHANGE UP (ref 3.8–10.5)
WBC # FLD AUTO: 4.7 K/UL — SIGNIFICANT CHANGE UP (ref 3.8–10.5)

## 2019-07-13 PROCEDURE — 99232 SBSQ HOSP IP/OBS MODERATE 35: CPT | Mod: GC

## 2019-07-13 PROCEDURE — 93010 ELECTROCARDIOGRAM REPORT: CPT

## 2019-07-13 RX ORDER — SERTRALINE 25 MG/1
1.5 TABLET, FILM COATED ORAL
Qty: 0 | Refills: 0 | DISCHARGE
Start: 2019-07-13

## 2019-07-13 RX ORDER — APIXABAN 2.5 MG/1
1 TABLET, FILM COATED ORAL
Qty: 0 | Refills: 0 | DISCHARGE
Start: 2019-07-13

## 2019-07-13 RX ORDER — FUROSEMIDE 40 MG
1 TABLET ORAL
Qty: 0 | Refills: 0 | DISCHARGE
Start: 2019-07-13

## 2019-07-13 RX ORDER — AMIODARONE HYDROCHLORIDE 400 MG/1
1 TABLET ORAL
Qty: 0 | Refills: 0 | DISCHARGE
Start: 2019-07-13

## 2019-07-13 RX ORDER — METOPROLOL TARTRATE 50 MG
1 TABLET ORAL
Qty: 0 | Refills: 0 | DISCHARGE
Start: 2019-07-13

## 2019-07-13 RX ORDER — TRAZODONE HCL 50 MG
1 TABLET ORAL
Qty: 0 | Refills: 0 | DISCHARGE
Start: 2019-07-13

## 2019-07-13 RX ORDER — SERTRALINE 25 MG/1
1 TABLET, FILM COATED ORAL
Qty: 0 | Refills: 0 | DISCHARGE
Start: 2019-07-13

## 2019-07-13 RX ORDER — TRAZODONE HCL 50 MG
0.5 TABLET ORAL
Qty: 0 | Refills: 0 | DISCHARGE
Start: 2019-07-13

## 2019-07-13 RX ORDER — METHYLCELLULOSE 500 MG
2 TABLET ORAL
Qty: 0 | Refills: 0 | DISCHARGE

## 2019-07-13 RX ORDER — ATORVASTATIN CALCIUM 80 MG/1
1 TABLET, FILM COATED ORAL
Qty: 0 | Refills: 0 | DISCHARGE
Start: 2019-07-13

## 2019-07-13 RX ADMIN — Medication 25 MILLIGRAM(S): at 08:54

## 2019-07-13 RX ADMIN — Medication 600 MILLIGRAM(S): at 05:29

## 2019-07-13 RX ADMIN — AZITHROMYCIN 250 MILLIGRAM(S): 500 TABLET, FILM COATED ORAL at 17:23

## 2019-07-13 RX ADMIN — SERTRALINE 100 MILLIGRAM(S): 25 TABLET, FILM COATED ORAL at 08:53

## 2019-07-13 RX ADMIN — CEFTRIAXONE 100 MILLIGRAM(S): 500 INJECTION, POWDER, FOR SOLUTION INTRAMUSCULAR; INTRAVENOUS at 17:23

## 2019-07-13 RX ADMIN — Medication 3 MILLILITER(S): at 11:29

## 2019-07-13 RX ADMIN — Medication 20 MILLIGRAM(S): at 05:26

## 2019-07-13 RX ADMIN — Medication 50 MILLIGRAM(S): at 21:45

## 2019-07-13 RX ADMIN — AMIODARONE HYDROCHLORIDE 200 MILLIGRAM(S): 400 TABLET ORAL at 05:25

## 2019-07-13 RX ADMIN — Medication 3 MILLILITER(S): at 23:40

## 2019-07-13 RX ADMIN — ATORVASTATIN CALCIUM 40 MILLIGRAM(S): 80 TABLET, FILM COATED ORAL at 21:45

## 2019-07-13 RX ADMIN — Medication 3 MILLILITER(S): at 17:12

## 2019-07-13 RX ADMIN — Medication 3 MILLILITER(S): at 05:47

## 2019-07-13 RX ADMIN — APIXABAN 2.5 MILLIGRAM(S): 2.5 TABLET, FILM COATED ORAL at 08:54

## 2019-07-13 RX ADMIN — Medication 600 MILLIGRAM(S): at 17:27

## 2019-07-13 RX ADMIN — APIXABAN 2.5 MILLIGRAM(S): 2.5 TABLET, FILM COATED ORAL at 21:45

## 2019-07-13 RX ADMIN — Medication 25 MILLIGRAM(S): at 21:45

## 2019-07-13 NOTE — PROGRESS NOTE ADULT - PROBLEM SELECTOR PLAN 4
HFrEF. possible component of HF exacerbation with bibasilar crackles and increased O2 requirement. On PO lasix 20mg at home.  - c/w PO lasix 20mg qD  - strict I/O, daily weight  - c/w home metop 25mg BID  - Patient used to take valsartan 80mg, yet since last admission (under neuroservice for CVA), the medication has fell off the list. Not mentioned anything in outpatient record. The medication list daughter had was taken by ED person, yet only half of the list was returned, with the other half lost. Patient and daughter not 100% sure on the all the meds, yet daughter states the medication on our e-record is correct, which does not include ACEi/ARBs.  - Will need to contact outpatient provider for any contraindication for restarting ACEi. (maybe her falling episodes?) unable to reach her PMD today, will try again.   - Not planing to start ACEi/ARB yet, BP reasonable. HFrEF. possible component of HF exacerbation with bibasilar crackles and increased O2 requirement. On PO lasix 20mg at home.  - c/w PO lasix 20mg qD  - strict I/O, daily weight  - c/w home metop 25mg BID   - Will need to contact outpatient provider for any contraindication for restarting ACEi. Was on valsartan 80mg, but unsure why d/c'ed currently. unable to reach her PMD today, will try again.   - Not planing to start ACEi/ARB yet, BP reasonable. HFrEF. possible component of HF exacerbation with bibasilar crackles and increased O2 requirement. On PO lasix 20mg at home.  - c/w PO lasix 20mg qD  - strict I/O per routine  - c/w home metop 25mg BID   - Will need to contact outpatient provider for any contraindication for restarting ACEi. Was on valsartan 80mg, but unsure why d/c'ed currently. unable to reach her PMD today, will try again.   - Not planing to start ACEi/ARB yet, BP reasonable. HFrEF. possible component of HF exacerbation initially with bibasilar crackles and increased O2 requirement. On PO lasix 20mg at home.  - c/w PO lasix 20mg qD  - strict I/O per routine  - c/w home metop 25mg BID   - Will need to contact outpatient provider for any contraindication for restarting ARB. Was on valsartan 80mg, but unsure why d/c'ed currently. unable to reach her PMD today, will try again.   - Not planing to start ACEi/ARB yet, BP reasonable.

## 2019-07-13 NOTE — PROGRESS NOTE ADULT - SUBJECTIVE AND OBJECTIVE BOX
Junie Kirk, PGY-1  Pager: [phone]      Patient is a 89y old  Female who presents with a chief complaint of 89F with presenting with cough, fever and diarrhea (2019 14:56)      SUBJECTIVE / OVERNIGHT EVENTS:  No acute events overnight.    Patient seen and examined in AM. Reported    Patient denied fevers, CP, SOB, lower extremity pain.     MEDICATIONS  (STANDING):  ALBUTerol/ipratropium for Nebulization 3 milliLiter(s) Nebulizer every 6 hours  amiodarone    Tablet 200 milliGRAM(s) Oral daily  apixaban 2.5 milliGRAM(s) Oral every 12 hours  atorvastatin 40 milliGRAM(s) Oral at bedtime  azithromycin  IVPB      azithromycin  IVPB 500 milliGRAM(s) IV Intermittent every 24 hours  cefTRIAXone   IVPB 1000 milliGRAM(s) IV Intermittent every 24 hours  furosemide    Tablet 20 milliGRAM(s) Oral daily  guaiFENesin  milliGRAM(s) Oral every 12 hours  metoprolol tartrate 25 milliGRAM(s) Oral two times a day  sertraline 100 milliGRAM(s) Oral daily    MEDICATIONS  (PRN):  acetaminophen   Tablet .. 650 milliGRAM(s) Oral every 6 hours PRN Temp greater or equal to 38C (100.4F)  benzonatate 100 milliGRAM(s) Oral three times a day PRN Cough  calcium carbonate    500 mG (Tums) Chewable 1 Tablet(s) Chew every 6 hours PRN Heartburn  traZODone 50 milliGRAM(s) Oral at bedtime PRN insomnia      I&O's Summary    2019 07:01  -  2019 07:00  --------------------------------------------------------  IN: 240 mL / OUT: 0 mL / NET: 240 mL        Vital Signs Last 24 Hrs  T(C): 36.4 (2019 05:15), Max: 38.1 (2019 14:47)  T(F): 97.5 (2019 05:15), Max: 100.6 (2019 14:47)  HR: 56 (2019 05:49) (51 - 78)  BP: 107/52 (2019 05:15) (99/51 - 147/84)  BP(mean): --  RR: 18 (2019 05:15) (16 - 18)  SpO2: 95% (2019 05:49) (88% - 97%)    PHYSICAL EXAM:  General: No acute distress, well-nourished  HEENT: Extraocular muscles intact, clear conjunctiva, pupils round and equal, reactive to light; normal oropharynx  Neck: Supple, no jugular vein distention  Lungs: Normal respiratory effort on room air; bilateral lung fields clear to auscultation; no wheezes or crackles.  Heart: Normal rate and regular rhythm; S1/S2 present; No murmurs, rubs or gallops appreciated; Capillary refill < 2 seconds  Abdomen: Soft, non-distended, non-tender, no masses; bowel sound present  Extremities: No lower extremity edema; no joint swelling; full range of motion  Neurology: Alert and oriented to person, time, place and situation. No focal deficits; strength and sensation grossly intact. Spontaneous movements of all 4 extremities.  Skin: Warm, dry and appropriate color for skin tone; no new rashes or lesions.       LABS:                        12.5   8.3   )-----------( 112      ( 2019 07:20 )             36.5       07-12    139  |  104  |  16  ----------------------------<  94  4.0   |  23  |  0.74    Ca    8.7      2019 07:19  Phos  2.7     07-12  Mg     1.8     07-12    TPro  6.0  /  Alb  3.6  /  TBili  0.4  /  DBili  x   /  AST  25  /  ALT  27  /  AlkPhos  44  07-12      PT/INR - ( 2019 15:23 )   PT: 13.6 sec;   INR: 1.19 ratio         PTT - ( 2019 15:23 )  PTT:37.8 sec              Urinalysis Basic - ( 2019 17:45 )    Color: Yellow / Appearance: Clear / S.026 / pH: x  Gluc: x / Ketone: Small  / Bili: Negative / Urobili: Negative   Blood: x / Protein: 30 mg/dL / Nitrite: Negative   Leuk Esterase: Negative / RBC: 3 /hpf / WBC 2 /HPF   Sq Epi: x / Non Sq Epi: 1 /hpf / Bacteria: Negative        EKG:   CXR:       CAPILLARY BLOOD GLUCOSE          RADIOLOGY & ADDITIONAL TESTS: Junie Kirk, PGY-1  Pager: [phone]      Patient is a 89y old  Female who presents with a chief complaint of 89F with presenting with cough, fever and diarrhea (2019 14:56)      SUBJECTIVE / OVERNIGHT EVENTS:  No acute events overnight. Nausea much improved. Slight headache this morning, didn't experience fevers or chills overnight. Able to cough up more sputum after the breathing treatments, but still coughed a lot. Had partial dinner yesterday, and was ready for breakfast.  No BM yet, no bloating.      Patient denied fevers, CP, SOB, abdominal pain or lower extremity pain/swelling.     MEDICATIONS  (STANDING):  ALBUTerol/ipratropium for Nebulization 3 milliLiter(s) Nebulizer every 6 hours  amiodarone    Tablet 200 milliGRAM(s) Oral daily  apixaban 2.5 milliGRAM(s) Oral every 12 hours  atorvastatin 40 milliGRAM(s) Oral at bedtime  azithromycin  IVPB      azithromycin  IVPB 500 milliGRAM(s) IV Intermittent every 24 hours  cefTRIAXone   IVPB 1000 milliGRAM(s) IV Intermittent every 24 hours  furosemide    Tablet 20 milliGRAM(s) Oral daily  guaiFENesin  milliGRAM(s) Oral every 12 hours  metoprolol tartrate 25 milliGRAM(s) Oral two times a day  sertraline 100 milliGRAM(s) Oral daily    MEDICATIONS  (PRN):  acetaminophen   Tablet .. 650 milliGRAM(s) Oral every 6 hours PRN Temp greater or equal to 38C (100.4F)  benzonatate 100 milliGRAM(s) Oral three times a day PRN Cough  calcium carbonate    500 mG (Tums) Chewable 1 Tablet(s) Chew every 6 hours PRN Heartburn  traZODone 50 milliGRAM(s) Oral at bedtime PRN insomnia      I&O's Summary    2019 07:01  -  2019 07:00  --------------------------------------------------------  IN: 240 mL / OUT: 0 mL / NET: 240 mL        Vital Signs Last 24 Hrs  T(C): 36.4 (2019 05:15), Max: 38.1 (2019 14:47)  T(F): 97.5 (2019 05:15), Max: 100.6 (2019 14:47)  HR: 56 (2019 05:49) (51 - 78)  BP: 107/52 (2019 05:15) (99/51 - 147/84)  BP(mean): --  RR: 18 (2019 05:15) (16 - 18)  SpO2: 95% (2019 05:49) (88% - 97%)    PHYSICAL EXAM:  General: NAD. well-developed; well-groomed; well-nourished elderly lady lying on bed on room air.  HEENT: Extraocular muscles intact, clear conjunctiva, pupils round and equal, reactive to light; normal oropharynx; neck supple  Respiratory: Bibasilar crackles L>R, improved from yesterday, no expiratory wheezes; no increase in WOB; O2 sat 97% sitting  Cardiovascular: Regular rhythm, normal rate, systolic murmur. 2+ distal pulses. Cap refill < 2 seconds.  Gastrointestinal: soft; non-tender, non-distended  Extremities: no cyanosis; no pedal edema, non-tender to palpation  Neurological: A&Ox 3; Able to have fluent conversation except having difficulties with some names (doctors, medications etc.)  Skin: No new rashes or lesions. Warm and dry to touch.      LABS:                        12.   4.7   )-----------( 105      ( 2019 07:22 )             36.6   07-13    138  |  103  |  20  ----------------------------<  83  3.5   |  22  |  0.89    Ca    9.1      2019 07:22  Phos  3.5     07-13  Mg     2.0     07-13    TPro  6.0  /  Alb  3.6  /  TBili  0.4  /  DBili  x   /  AST  25  /  ALT  27  /  AlkPhos  44  07-12                        12.5   8.3   )-----------( 112      ( 2019 07:20 )             36.5       07-12    139  |  104  |  16  ----------------------------<  94  4.0   |  23  |  0.74    Ca    8.7      2019 07:19  Phos  2.7     07-12  Mg     1.8     07-12    TPro  6.0  /  Alb  3.6  /  TBili  0.4  /  DBili  x   /  AST  25  /  ALT  27  /  AlkPhos  44  07-12      PT/INR - ( 2019 15:23 )   PT: 13.6 sec;   INR: 1.19 ratio         PTT - ( 2019 15:23 )  PTT:37.8 sec      Urinalysis Basic - ( 2019 17:45 )    Color: Yellow / Appearance: Clear / S.026 / pH: x  Gluc: x / Ketone: Small  / Bili: Negative / Urobili: Negative   Blood: x / Protein: 30 mg/dL / Nitrite: Negative   Leuk Esterase: Negative / RBC: 3 /hpf / WBC 2 /HPF   Sq Epi: x / Non Sq Epi: 1 /hpf / Bacteria: Negative        RADIOLOGY & ADDITIONAL TESTS:  Legionella pneumophila Antigen, Urine (19 @ 00:53)    Legionella Antigen, Urine: Negative    Culture - Sputum . (19 @ 16:23)    Gram Stain:   Few Squamous epithelial cells per low power field  Few polymorphonuclear leukocytes per low power field  Few Gram variable coccobacilli per oil power field    Specimen Source: .Sputum

## 2019-07-13 NOTE — DISCHARGE NOTE PROVIDER - NSDCFUADDAPPT_GEN_ALL_CORE_FT
Please follow up with your primary care doctor and the [facility] team in 1 week for f/u.  Please follow up with your interventional cardiologist to discuss plan for your postponed TAVR procedure. Please follow up with your primary care doctor and the [facility] [facility] team in 1 week for f/u.  Please follow up with your interventional cardiologist on 7/24/19 for TAVR procedure. Please follow up with your primary care doctor and the [facility] team in 1 week for f/u.  Please follow up with your interventional cardiologist in 1 week to discuss the plan for TAVR procedure.

## 2019-07-13 NOTE — PROGRESS NOTE ADULT - PROBLEM SELECTOR PLAN 2
Patient w/ increased O2 requirement in setting of PNA and possible HFrEF component given rise in BNP  - treat infection as above.

## 2019-07-13 NOTE — DISCHARGE NOTE PROVIDER - PROVIDER TOKENS
PROVIDER:[TOKEN:[[id number]],FOLLOWUP:[1 week]],PROVIDER:[TOKEN:[[id number]],FOLLOWUP:[1 week]]

## 2019-07-13 NOTE — PROGRESS NOTE ADULT - PROBLEM SELECTOR PLAN 1
Patient w/ fever and URI symtpoms, CXR concerning for multifocal pneumonia.  - RVP + for enterovirus. BCx and Urine legionella pending  - though viral pneumonia is possible, given concurrent GI symptoms and patient being in a large venue w/ multiple people, will cover for possible superimposed bacterial infection with antibiotics.  - c/w CTX + Azithromycin for CAP coverage.   - Added benzonatate TID and Mucinex BID for cough, and Duoneb Q6h.   - monitor VS   - CBC daily  - supp O2 PRN. Currently on room air with 99% SaO2, no increased work of breathing.  - F/u sputum culture  - Rectal temp 100.6F x 1, give Tylenol 650 mg Q6h PRN Patient w/ fever and URI symtpoms, CXR concerning for multifocal pneumonia.  - RVP + for enterovirus. BCx, Ucx and urine legionella neg; Sputum culture with few gram variable coccobacilli.  - c/w CTX for CAP coverage. d/c azithromycin   - C/w benzonatate TID and Mucinex BID for cough, and Duoneb Q6h. Improved lung exam  - monitor VS   - CBC daily  - supp O2 PRN. O2 Sat was 97% during pre-round, and desat to 89-91%, back on O2, wean as tolerated. Patient w/ fever and URI symptoms, CXR concerning for multifocal pneumonia.  - RVP + for enterovirus. BCx, Ucx and urine legionella neg; Sputum culture with few gram variable coccobacilli.  - c/w CTX for CAP coverage. d/c azithromycin as legionella negative    - C/w benzonatate TID and Mucinex BID for cough, and Duoneb Q6h. Improved lung exam  - monitor VS   - CBC daily  - supp O2 PRN. O2 Sat was 97% during pre-round, and desat to 89-91%, back on O2, wean as tolerated.

## 2019-07-13 NOTE — DISCHARGE NOTE PROVIDER - HOSPITAL COURSE
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 Diarrhea resolved prior to admission. On ceftriaxone and azithromycin for CAP. Azithromycin d/c'ed after the 3rd dose with neg urine legionella. Transitioned ceftriaxone to PO for outpatient. Sputum cx neg. Weaned O2, tolerated PO, afebrile for over 24 hours. Patient is medically stable for discharge with PO abx. D/c to home with outpatient PT, amb with assistant or with walker for balance. Follow up with PMD in 1 week. 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 Diarrhea resolved prior to admission. On ceftriaxone and azithromycin for CAP. Sputum cx neg. Azithromycin d/c'ed after the 3rd dose with neg urine legionella, and completed 5-day course of ceftriaxone. Weaned off O2, tolerated PO, afebrile for over 24 hours. Patient is medically stable for discharge. D/c to home with home PT. Follow up with PMD and [place] pneumonia team in 1 week, as well as interventional cardiologist in 1-2 weeks to schedule postponed TAVR procedure. 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 Diarrhea resolved prior to admission. On ceftriaxone and azithromycin for CAP. Sputum cx neg. Azithromycin d/c'ed after the 3rd dose with neg urine legionella, and completed 5-day course of ceftriaxone. Weaned off O2, tolerated PO, afebrile for over 24 hours. Patient is medically stable for discharge. D/c to home with home PT. Follow up with PMD and [place] pneumonia team in 1 week, as well as interventional cardiologist on 7/24/19 for the TAVR procedure. 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 Diarrhea resolved prior to admission. On ceftriaxone and azithromycin for CAP. Sputum cx neg. Azithromycin d/c'ed after the 3rd dose with neg urine legionella, and completed 5-day course of ceftriaxone. Weaned off O2, tolerated PO, afebrile for over 24 hours. Patient is medically stable for discharge. D/c to home with home PT. Follow up with PMD and [place] pneumonia team in 1 week, as well as interventional cardiologist in 1 week to discuss the plan for the TAVR procedure.

## 2019-07-13 NOTE — DISCHARGE NOTE PROVIDER - NSDCFUSCHEDAPPT_GEN_ALL_CORE_FT
CELINA KESSLER ; 07/17/2019 ; [facility] CELINA KESSLER ; 07/17/2019 ; [facility] CELINA KESSLER ; 07/17/2019 ; [facility] CELIAN KESSLER ; 07/17/2019 ; [facility] CELINA KESSLER ; 07/17/2019 ; [facility]

## 2019-07-13 NOTE — PROGRESS NOTE ADULT - PROBLEM SELECTOR PLAN 5
Severe AS undergoing TAVR evaluation  - currently asymptomatic as long as patient is not exerting herself. However, per daughter, patient had significant DAWKINS with activities.  - continue to monitor.  - Structural heart team has postponed her procedure.   - Her fluid balance will be important as for HF we do not want to have volume overload, but given severe AS, we want enough pre-load to ensure good perfusion.

## 2019-07-13 NOTE — DISCHARGE NOTE PROVIDER - NSDCCPCAREPLAN_GEN_ALL_CORE_FT
PRINCIPAL DISCHARGE DIAGNOSIS  Diagnosis: Multifocal pneumonia  Assessment and Plan of Treatment: You came to the hospital with fever, cough and shortness of breath, found to have pneumonia. You were treated with antibiotics and oxygen, and improved. Continue take antibiotics as prescribed, and follow up with you primary care doctor in 1 week.      SECONDARY DISCHARGE DIAGNOSES  Diagnosis: Gastroenteritis  Assessment and Plan of Treatment: You had diarrhea for 1 day before admission, and was most likely due to viral infection. Home laxatives were held while in the hospital.    Diagnosis: Aortic valve stenosis, etiology of cardiac valve disease unspecified  Assessment and Plan of Treatment: You have hardening of your heart valve, and was scheduled for valve repair. Due to the hospitalization, your procedure was postponed. Please follow up with you cardiologist and the surgery team to reschedule the pre-testing and surgery.    Diagnosis: Paroxysmal atrial fibrillation  Assessment and Plan of Treatment: You have a-fib, and was kept on Eliquis while in the hospital. PRINCIPAL DISCHARGE DIAGNOSIS  Diagnosis: Multifocal pneumonia  Assessment and Plan of Treatment: You came to the hospital with fever, cough and shortness of breath, found to have pneumonia. You were treated with antibiotics and oxygen, and improved. Please follow up with you primary care doctor and the [place] [facility] in 1 week.      SECONDARY DISCHARGE DIAGNOSES  Diagnosis: Gastroenteritis  Assessment and Plan of Treatment: You had diarrhea for 1 day before admission, and was most likely due to viral infection. Home laxatives were held then restarted in the hospital when you symptoms resolved. If you experienced fever, nausea, vomiting, diarrhea or difficulty tolerating food and drink, please call your doctor or come back to the emergency room immediately.    Diagnosis: Aortic valve stenosis, etiology of cardiac valve disease unspecified  Assessment and Plan of Treatment: You have hardening of your heart valve, and was scheduled for valve repair. Due to the hospitalization, your procedure was postponed. Please follow up with your cardiologist and the surgery team to reschedule the pre-testing and surgery.    Diagnosis: Paroxysmal atrial fibrillation  Assessment and Plan of Treatment: You have a-fib, and was kept on Eliquis while in the hospital. PRINCIPAL DISCHARGE DIAGNOSIS  Diagnosis: Multifocal pneumonia  Assessment and Plan of Treatment: You came to the hospital with fever, cough and shortness of breath, found to have pneumonia. You were treated with antibiotics and oxygen, and improved. Please follow up with you primary care doctor and the [facility] in 1 week.      SECONDARY DISCHARGE DIAGNOSES  Diagnosis: Gastroenteritis  Assessment and Plan of Treatment: You had diarrhea for 1 day before admission, and was most likely due to viral infection. Home laxatives were held then restarted in the hospital when you symptoms resolved. If you experienced fever, nausea, vomiting, diarrhea or difficulty tolerating food and drink, please call your doctor or come back to the emergency room immediately.    Diagnosis: Aortic valve stenosis, etiology of cardiac valve disease unspecified  Assessment and Plan of Treatment: You have hardening of your heart valve, and was scheduled for valve repair. Due to the hospitalization, your procedure was postponed. Please follow up with your interventional cardiologist on 7/24/19 for TAVR procedure.    Diagnosis: Paroxysmal atrial fibrillation  Assessment and Plan of Treatment: You have a-fib, and was kept on Eliquis while in the hospital. PRINCIPAL DISCHARGE DIAGNOSIS  Diagnosis: Multifocal pneumonia  Assessment and Plan of Treatment: You came to the hospital with fever, cough and shortness of breath, found to have pneumonia. You were treated with antibiotics and oxygen, and improved. Please follow up with you primary care doctor and the [place] in 1 week.      SECONDARY DISCHARGE DIAGNOSES  Diagnosis: Gastroenteritis  Assessment and Plan of Treatment: You had diarrhea for 1 day before admission, and was most likely due to viral infection. Home laxatives were held then restarted in the hospital when you symptoms resolved. If you experienced fever, nausea, vomiting, diarrhea or difficulty tolerating food and drink, please call your doctor or come back to the emergency room immediately.    Diagnosis: Aortic valve stenosis, etiology of cardiac valve disease unspecified  Assessment and Plan of Treatment: You have hardening of your heart valve, and was scheduled for valve repair. Due to the hospitalization, your procedure was postponed. Please follow up with your interventional cardiologist in 1 week to discuss the plan for TAVR procedure.    Diagnosis: Paroxysmal atrial fibrillation  Assessment and Plan of Treatment: You have a-fib, and was kept on Eliquis while in the hospital.

## 2019-07-13 NOTE — DISCHARGE NOTE PROVIDER - CARE PROVIDERS DIRECT ADDRESSES
[email or web address]

## 2019-07-13 NOTE — DISCHARGE NOTE PROVIDER - CARE PROVIDER_API CALL
Beverly Samuel (MD)  Internal Medicine  [address], [address]  [place] [address]  Phone: [phone]  Fax: [phone]  Follow Up Time: 1 week    Sandoval Ayala)  Cardiovascular Disease  [address] [place] [address]  Phone: [phone]  Fax: [phone]  Follow Up Time: 1 week Beverly Samuel (MD)  Internal Medicine  [address], [address]  [place] [address]  Phone: [phone]  Fax: [phone]  Follow Up Time: 1 week    Sandoval Ayala)  Cardiovascular Disease  [address] [place] [address]  Phone: [phone]  Fax: [phone]  Follow Up Time: 1 week Beverly Samuel (MD)  Internal Medicine  [address], [address]  [place] [address]  Phone: [phone]  Fax: [phone]  Follow Up Time: 1 week    Sandoval Ayala)  Cardiovascular Disease  [address] [place] [address]  Phone: [phone]  Fax: [phone]  Follow Up Time: 1 week

## 2019-07-14 LAB
ANION GAP SERPL CALC-SCNC: 13 MMOL/L — SIGNIFICANT CHANGE UP (ref 5–17)
BUN SERPL-MCNC: 22 MG/DL — SIGNIFICANT CHANGE UP (ref 7–23)
CALCIUM SERPL-MCNC: 8.9 MG/DL — SIGNIFICANT CHANGE UP (ref 8.4–10.5)
CHLORIDE SERPL-SCNC: 103 MMOL/L — SIGNIFICANT CHANGE UP (ref 96–108)
CO2 SERPL-SCNC: 24 MMOL/L — SIGNIFICANT CHANGE UP (ref 22–31)
CREAT SERPL-MCNC: 0.89 MG/DL — SIGNIFICANT CHANGE UP (ref 0.5–1.3)
CULTURE RESULTS: SIGNIFICANT CHANGE UP
GLUCOSE SERPL-MCNC: 90 MG/DL — SIGNIFICANT CHANGE UP (ref 70–99)
HCT VFR BLD CALC: 35.3 % — SIGNIFICANT CHANGE UP (ref 34.5–45)
HGB BLD-MCNC: 11.6 G/DL — SIGNIFICANT CHANGE UP (ref 11.5–15.5)
MCHC RBC-ENTMCNC: 31.4 PG — SIGNIFICANT CHANGE UP (ref 27–34)
MCHC RBC-ENTMCNC: 32.9 GM/DL — SIGNIFICANT CHANGE UP (ref 32–36)
MCV RBC AUTO: 95.4 FL — SIGNIFICANT CHANGE UP (ref 80–100)
PLATELET # BLD AUTO: 118 K/UL — LOW (ref 150–400)
POTASSIUM SERPL-MCNC: 3.5 MMOL/L — SIGNIFICANT CHANGE UP (ref 3.5–5.3)
POTASSIUM SERPL-SCNC: 3.5 MMOL/L — SIGNIFICANT CHANGE UP (ref 3.5–5.3)
RBC # BLD: 3.7 M/UL — LOW (ref 3.8–5.2)
RBC # FLD: 12.7 % — SIGNIFICANT CHANGE UP (ref 10.3–14.5)
SODIUM SERPL-SCNC: 140 MMOL/L — SIGNIFICANT CHANGE UP (ref 135–145)
SPECIMEN SOURCE: SIGNIFICANT CHANGE UP
WBC # BLD: 5.7 K/UL — SIGNIFICANT CHANGE UP (ref 3.8–10.5)
WBC # FLD AUTO: 5.7 K/UL — SIGNIFICANT CHANGE UP (ref 3.8–10.5)

## 2019-07-14 PROCEDURE — 99233 SBSQ HOSP IP/OBS HIGH 50: CPT | Mod: GC

## 2019-07-14 PROCEDURE — 71045 X-RAY EXAM CHEST 1 VIEW: CPT | Mod: 26

## 2019-07-14 RX ORDER — DOCUSATE SODIUM 100 MG
100 CAPSULE ORAL DAILY
Refills: 0 | Status: DISCONTINUED | OUTPATIENT
Start: 2019-07-14 | End: 2019-07-16

## 2019-07-14 RX ORDER — POTASSIUM CHLORIDE 20 MEQ
20 PACKET (EA) ORAL ONCE
Refills: 0 | Status: COMPLETED | OUTPATIENT
Start: 2019-07-14 | End: 2019-07-14

## 2019-07-14 RX ORDER — SENNA PLUS 8.6 MG/1
2 TABLET ORAL AT BEDTIME
Refills: 0 | Status: DISCONTINUED | OUTPATIENT
Start: 2019-07-14 | End: 2019-07-16

## 2019-07-14 RX ADMIN — Medication 100 MILLIGRAM(S): at 05:54

## 2019-07-14 RX ADMIN — Medication 3 MILLILITER(S): at 05:26

## 2019-07-14 RX ADMIN — APIXABAN 2.5 MILLIGRAM(S): 2.5 TABLET, FILM COATED ORAL at 21:15

## 2019-07-14 RX ADMIN — Medication 3 MILLILITER(S): at 11:48

## 2019-07-14 RX ADMIN — APIXABAN 2.5 MILLIGRAM(S): 2.5 TABLET, FILM COATED ORAL at 10:12

## 2019-07-14 RX ADMIN — Medication 25 MILLIGRAM(S): at 21:15

## 2019-07-14 RX ADMIN — Medication 600 MILLIGRAM(S): at 18:29

## 2019-07-14 RX ADMIN — Medication 3 MILLILITER(S): at 23:47

## 2019-07-14 RX ADMIN — Medication 100 MILLIGRAM(S): at 17:24

## 2019-07-14 RX ADMIN — ATORVASTATIN CALCIUM 40 MILLIGRAM(S): 80 TABLET, FILM COATED ORAL at 21:15

## 2019-07-14 RX ADMIN — Medication 100 MILLIGRAM(S): at 10:18

## 2019-07-14 RX ADMIN — Medication 20 MILLIGRAM(S): at 05:53

## 2019-07-14 RX ADMIN — SENNA PLUS 2 TABLET(S): 8.6 TABLET ORAL at 21:20

## 2019-07-14 RX ADMIN — Medication 600 MILLIGRAM(S): at 05:53

## 2019-07-14 RX ADMIN — AMIODARONE HYDROCHLORIDE 200 MILLIGRAM(S): 400 TABLET ORAL at 05:53

## 2019-07-14 RX ADMIN — Medication 20 MILLIEQUIVALENT(S): at 10:11

## 2019-07-14 RX ADMIN — SERTRALINE 100 MILLIGRAM(S): 25 TABLET, FILM COATED ORAL at 10:00

## 2019-07-14 RX ADMIN — CEFTRIAXONE 100 MILLIGRAM(S): 500 INJECTION, POWDER, FOR SOLUTION INTRAMUSCULAR; INTRAVENOUS at 17:24

## 2019-07-14 RX ADMIN — Medication 50 MILLIGRAM(S): at 21:15

## 2019-07-14 NOTE — PROGRESS NOTE ADULT - PROBLEM SELECTOR PLAN 9
- DVT ppx: Eliquis  - Diet: DASH diet. Kosher.  - PT eval: Home with outpatient PT  - fall precaution.  - Follow up with PNA/pulm team following d/c.  - Dispo - home with outpatient PT

## 2019-07-14 NOTE — PROGRESS NOTE ADULT - ASSESSMENT
Mrs. Bethea is a 89 y/o female w/ PMh of HTN, HLD, Afib on eliquis, HFrEF (EF 35-40% on TTE 6/2019),mod-sev MR and sev AS, and L MCA CVA (2017) w/ residual anomic aphasia, who is admitted for multifocal pneumonia with RVP enterococcus however requiring coverage for superimposed bacterial pneumonia (streptococcus and atypical bacteria).

## 2019-07-14 NOTE — PROGRESS NOTE ADULT - PROBLEM SELECTOR PLAN 3
1 day history of diarrhea + abdominal cramps. Now resolved. No recent ABx use.  - most likely viral gastroenteritis 2/2 enterovirus.  - monitor VS.  - Encourage PO hydration.  - holding home bowel regimen.  - If nauseous, given TUMS; re-check EKG if need to give Zofran

## 2019-07-14 NOTE — PROGRESS NOTE ADULT - SUBJECTIVE AND OBJECTIVE BOX
Dr. Omari Dye  Internal Medicine PGY-1   Pager# [phone]    Patient is a 89y old  Female who presents with a chief complaint of 89F with presenting with cough, fever and diarrhea (13 Jul 2019 20:28)      SUBJECTIVE / OVERNIGHT EVENTS: No acute events overnight. Pt is HD stable. Reports cough w/o sputum. Also, reports chest tightness Denies cheat pain, n/v/f/c/h/d.     MEDICATIONS  (STANDING):  ALBUTerol/ipratropium for Nebulization 3 milliLiter(s) Nebulizer every 6 hours  amiodarone    Tablet 200 milliGRAM(s) Oral daily  apixaban 2.5 milliGRAM(s) Oral every 12 hours  atorvastatin 40 milliGRAM(s) Oral at bedtime  cefTRIAXone   IVPB 1000 milliGRAM(s) IV Intermittent every 24 hours  docusate sodium 100 milliGRAM(s) Oral daily  furosemide    Tablet 20 milliGRAM(s) Oral daily  guaiFENesin  milliGRAM(s) Oral every 12 hours  metoprolol tartrate 25 milliGRAM(s) Oral two times a day  senna 2 Tablet(s) Oral at bedtime  sertraline 100 milliGRAM(s) Oral daily    MEDICATIONS  (PRN):  acetaminophen   Tablet .. 650 milliGRAM(s) Oral every 6 hours PRN Temp greater or equal to 38C (100.4F)  benzonatate 100 milliGRAM(s) Oral three times a day PRN Cough  calcium carbonate    500 mG (Tums) Chewable 1 Tablet(s) Chew every 6 hours PRN Heartburn  traZODone 50 milliGRAM(s) Oral at bedtime PRN insomnia      Vital Signs Last 24 Hrs  T(C): 36.6 (14 Jul 2019 13:29), Max: 36.9 (14 Jul 2019 09:07)  T(F): 97.9 (14 Jul 2019 13:29), Max: 98.5 (14 Jul 2019 09:07)  HR: 87 (14 Jul 2019 13:29) (60 - 98)  BP: 95/68 (14 Jul 2019 13:29) (92/64 - 125/77)  BP(mean): --  RR: 16 (14 Jul 2019 13:29) (16 - 18)  SpO2: 94% (14 Jul 2019 13:29) (90% - 98%)  CAPILLARY BLOOD GLUCOSE        I&O's Summary      PHYSICAL EXAM:  GENERAL: NAD, mild distress. On 2L NC  CHEST/LUNG: bibasilar crackles  HEART: Regular rate and rhythm; No murmurs, rubs, or gallops  ABDOMEN: Soft, Nontender, Nondistended; Bowel sounds present  EXTREMITIES:  2+ Peripheral Pulses, No clubbing, cyanosis, or edema  PSYCH: AAOx3  NEUROLOGY: non-focal    LABS:                        11.6   5.7   )-----------( 118      ( 14 Jul 2019 07:23 )             35.3     07-14    140  |  103  |  22  ----------------------------<  90  3.5   |  24  |  0.89    Ca    8.9      14 Jul 2019 07:19  Phos  3.5     07-13  Mg     2.0     07-13                RADIOLOGY & ADDITIONAL TESTS:    Imaging Personally Reviewed:    Consultant(s) Notes Reviewed:      Care Discussed with Consultants/Other Providers: Dr. Omari Dye  Internal Medicine PGY-2  Pager# [phone]    Patient is a 89y old  Female who presents with a chief complaint of 89F with presenting with cough, fever and diarrhea (13 Jul 2019 20:28)      SUBJECTIVE / OVERNIGHT EVENTS: No acute events overnight. Pt is HD stable. Reports cough w/o sputum. Also, reports chest tightness Denies cheat pain, n/v/f/c/h/d.     MEDICATIONS  (STANDING):  ALBUTerol/ipratropium for Nebulization 3 milliLiter(s) Nebulizer every 6 hours  amiodarone    Tablet 200 milliGRAM(s) Oral daily  apixaban 2.5 milliGRAM(s) Oral every 12 hours  atorvastatin 40 milliGRAM(s) Oral at bedtime  cefTRIAXone   IVPB 1000 milliGRAM(s) IV Intermittent every 24 hours  docusate sodium 100 milliGRAM(s) Oral daily  furosemide    Tablet 20 milliGRAM(s) Oral daily  guaiFENesin  milliGRAM(s) Oral every 12 hours  metoprolol tartrate 25 milliGRAM(s) Oral two times a day  senna 2 Tablet(s) Oral at bedtime  sertraline 100 milliGRAM(s) Oral daily    MEDICATIONS  (PRN):  acetaminophen   Tablet .. 650 milliGRAM(s) Oral every 6 hours PRN Temp greater or equal to 38C (100.4F)  benzonatate 100 milliGRAM(s) Oral three times a day PRN Cough  calcium carbonate    500 mG (Tums) Chewable 1 Tablet(s) Chew every 6 hours PRN Heartburn  traZODone 50 milliGRAM(s) Oral at bedtime PRN insomnia      Vital Signs Last 24 Hrs  T(C): 36.6 (14 Jul 2019 13:29), Max: 36.9 (14 Jul 2019 09:07)  T(F): 97.9 (14 Jul 2019 13:29), Max: 98.5 (14 Jul 2019 09:07)  HR: 87 (14 Jul 2019 13:29) (60 - 98)  BP: 95/68 (14 Jul 2019 13:29) (92/64 - 125/77)  BP(mean): --  RR: 16 (14 Jul 2019 13:29) (16 - 18)  SpO2: 94% (14 Jul 2019 13:29) (90% - 98%)  CAPILLARY BLOOD GLUCOSE        I&O's Summary      PHYSICAL EXAM:  GENERAL: NAD, mild distress. On 2L NC  CHEST/LUNG: bibasilar crackles  HEART: Regular rate and rhythm; No murmurs, rubs, or gallops  ABDOMEN: Soft, Nontender, Nondistended; Bowel sounds present  EXTREMITIES:  2+ Peripheral Pulses, No clubbing, cyanosis, or edema  PSYCH: AAOx3  NEUROLOGY: non-focal    LABS:                        11.6   5.7   )-----------( 118      ( 14 Jul 2019 07:23 )             35.3     07-14    140  |  103  |  22  ----------------------------<  90  3.5   |  24  |  0.89    Ca    8.9      14 Jul 2019 07:19  Phos  3.5     07-13  Mg     2.0     07-13                RADIOLOGY & ADDITIONAL TESTS:    Imaging Personally Reviewed:    Consultant(s) Notes Reviewed:      Care Discussed with Consultants/Other Providers:

## 2019-07-14 NOTE — PROGRESS NOTE ADULT - PROBLEM SELECTOR PLAN 1
Patient w/ fever and URI symptoms, CXR concerning for multifocal pneumonia.  - RVP + for enterovirus. BCx, Ucx and urine legionella neg; Sputum culture with few gram variable coccobacilli.  - c/w CTX for CAP coverage (D4/7)  - C/w benzonatate TID and Mucinex BID for cough, and Duoneb Q6h  - On 2L NC. Wean as tolerated.    - monitor VS   - F/u repeat CXR

## 2019-07-14 NOTE — PROGRESS NOTE ADULT - PROBLEM SELECTOR PLAN 4
HFrEF. possible component of HF exacerbation initially with bibasilar crackles and increased O2 requirement. On PO lasix 20mg at home.  - c/w PO lasix 20mg qD  - strict I/O per routine  - c/w home metop 25mg BID   - Will need to contact outpatient provider for any contraindication for restarting ARB. Was on valsartan 80mg, but unsure why d/c'ed currently  - Not planing to start ACEi/ARB yet, BP reasonable.

## 2019-07-15 LAB
ANION GAP SERPL CALC-SCNC: 11 MMOL/L — SIGNIFICANT CHANGE UP (ref 5–17)
BUN SERPL-MCNC: 20 MG/DL — SIGNIFICANT CHANGE UP (ref 7–23)
CALCIUM SERPL-MCNC: 9.5 MG/DL — SIGNIFICANT CHANGE UP (ref 8.4–10.5)
CHLORIDE SERPL-SCNC: 105 MMOL/L — SIGNIFICANT CHANGE UP (ref 96–108)
CO2 SERPL-SCNC: 27 MMOL/L — SIGNIFICANT CHANGE UP (ref 22–31)
CREAT SERPL-MCNC: 0.73 MG/DL — SIGNIFICANT CHANGE UP (ref 0.5–1.3)
GLUCOSE SERPL-MCNC: 91 MG/DL — SIGNIFICANT CHANGE UP (ref 70–99)
HCT VFR BLD CALC: 34.9 % — SIGNIFICANT CHANGE UP (ref 34.5–45)
HGB BLD-MCNC: 11.9 G/DL — SIGNIFICANT CHANGE UP (ref 11.5–15.5)
MCHC RBC-ENTMCNC: 32.3 PG — SIGNIFICANT CHANGE UP (ref 27–34)
MCHC RBC-ENTMCNC: 34.2 GM/DL — SIGNIFICANT CHANGE UP (ref 32–36)
MCV RBC AUTO: 94.6 FL — SIGNIFICANT CHANGE UP (ref 80–100)
PLATELET # BLD AUTO: 131 K/UL — LOW (ref 150–400)
POTASSIUM SERPL-MCNC: 3.8 MMOL/L — SIGNIFICANT CHANGE UP (ref 3.5–5.3)
POTASSIUM SERPL-SCNC: 3.8 MMOL/L — SIGNIFICANT CHANGE UP (ref 3.5–5.3)
RBC # BLD: 3.69 M/UL — LOW (ref 3.8–5.2)
RBC # FLD: 12.7 % — SIGNIFICANT CHANGE UP (ref 10.3–14.5)
SODIUM SERPL-SCNC: 143 MMOL/L — SIGNIFICANT CHANGE UP (ref 135–145)
WBC # BLD: 5.2 K/UL — SIGNIFICANT CHANGE UP (ref 3.8–10.5)
WBC # FLD AUTO: 5.2 K/UL — SIGNIFICANT CHANGE UP (ref 3.8–10.5)

## 2019-07-15 PROCEDURE — 99232 SBSQ HOSP IP/OBS MODERATE 35: CPT | Mod: GC

## 2019-07-15 RX ADMIN — Medication 3 MILLILITER(S): at 17:35

## 2019-07-15 RX ADMIN — Medication 25 MILLIGRAM(S): at 21:00

## 2019-07-15 RX ADMIN — Medication 20 MILLIGRAM(S): at 05:33

## 2019-07-15 RX ADMIN — Medication 600 MILLIGRAM(S): at 17:53

## 2019-07-15 RX ADMIN — SENNA PLUS 2 TABLET(S): 8.6 TABLET ORAL at 21:00

## 2019-07-15 RX ADMIN — SERTRALINE 100 MILLIGRAM(S): 25 TABLET, FILM COATED ORAL at 13:33

## 2019-07-15 RX ADMIN — Medication 100 MILLIGRAM(S): at 13:33

## 2019-07-15 RX ADMIN — Medication 600 MILLIGRAM(S): at 05:32

## 2019-07-15 RX ADMIN — ATORVASTATIN CALCIUM 40 MILLIGRAM(S): 80 TABLET, FILM COATED ORAL at 21:00

## 2019-07-15 RX ADMIN — Medication 3 MILLILITER(S): at 05:46

## 2019-07-15 RX ADMIN — APIXABAN 2.5 MILLIGRAM(S): 2.5 TABLET, FILM COATED ORAL at 21:01

## 2019-07-15 RX ADMIN — Medication 50 MILLIGRAM(S): at 21:00

## 2019-07-15 RX ADMIN — APIXABAN 2.5 MILLIGRAM(S): 2.5 TABLET, FILM COATED ORAL at 13:33

## 2019-07-15 RX ADMIN — AMIODARONE HYDROCHLORIDE 200 MILLIGRAM(S): 400 TABLET ORAL at 05:32

## 2019-07-15 RX ADMIN — CEFTRIAXONE 100 MILLIGRAM(S): 500 INJECTION, POWDER, FOR SOLUTION INTRAMUSCULAR; INTRAVENOUS at 17:53

## 2019-07-15 RX ADMIN — Medication 25 MILLIGRAM(S): at 13:33

## 2019-07-15 RX ADMIN — Medication 3 MILLILITER(S): at 12:07

## 2019-07-15 NOTE — PROGRESS NOTE ADULT - PROBLEM SELECTOR PLAN 1
Patient w/ fever and URI symptoms, CXR concerning for multifocal pneumonia.  - RVP + for enterovirus. BCx, Ucx and urine legionella neg; Sputum culture with few gram variable coccobacilli.  - c/w CTX for CAP coverage (D4/7)  - C/w benzonatate TID and Mucinex BID for cough, and Duoneb Q6h  - On 2L NC. Wean as tolerated.    - monitor VS   - F/u repeat CXR Patient w/ fever and URI symptoms, CXR concerning for multifocal pneumonia.  - RVP + for enterovirus. BCx, Ucx and urine legionella neg; Sputum culture with few gram variable coccobacilli.  - c/w CTX for CAP coverage (D5/7)  - C/w benzonatate TID and Mucinex BID for cough, and Duoneb Q6h  - On 2L NC. Wean as tolerated.    - monitor VS Patient w/ multifocal pneumonia in the setting of (+) entero/rhinovirus, improving  - Complete CTX for CAP coverage today (day 5/5)  - C/w cough suppressants, Chest PT, incentive spirometer, Acapella, and wean O2 as tolerated.

## 2019-07-15 NOTE — PROGRESS NOTE ADULT - PROBLEM SELECTOR PLAN 3
1 day history of diarrhea + abdominal cramps. Now resolved. No recent ABx use.  - most likely viral gastroenteritis 2/2 enterovirus.  - monitor VS.  - Encourage PO hydration.  - holding home bowel regimen.  - If nauseous, given TUMS; re-check EKG if need to give Zofran 1 day history of diarrhea + abdominal cramps. Now resolved. No recent ABx use.  - most likely viral gastroenteritis 2/2 enterovirus.  - monitor VS.  - Encourage PO hydration.  - C/w colace and senna, and monitor for diarrhea  - If nauseous, given TUMS; re-check EKG if need to give Zofran 1 day history of diarrhea + abdominal cramps. Now resolved. No recent ABx use.  - most likely viral gastroenteritis 2/2 enterovirus.  - Encourage PO hydration.  - If nauseous, given TUMS; re-check EKG if need to give Zofran

## 2019-07-15 NOTE — DIETITIAN INITIAL EVALUATION ADULT. - PROBLEM SELECTOR PLAN 2
Patient w/ increased O2 requirement in setting of PNA and possible HFrEF component given rise in BNP  - treat infection as above.  -

## 2019-07-15 NOTE — DIETITIAN INITIAL EVALUATION ADULT. - PERTINENT MEDS FT
MEDICATIONS  (STANDING):  ALBUTerol/ipratropium for Nebulization 3 milliLiter(s) Nebulizer every 6 hours  amiodarone    Tablet 200 milliGRAM(s) Oral daily  apixaban 2.5 milliGRAM(s) Oral every 12 hours  atorvastatin 40 milliGRAM(s) Oral at bedtime  cefTRIAXone   IVPB 1000 milliGRAM(s) IV Intermittent every 24 hours  docusate sodium 100 milliGRAM(s) Oral daily  furosemide    Tablet 20 milliGRAM(s) Oral daily  guaiFENesin  milliGRAM(s) Oral every 12 hours  metoprolol tartrate 25 milliGRAM(s) Oral two times a day  senna 2 Tablet(s) Oral at bedtime  sertraline 100 milliGRAM(s) Oral daily    MEDICATIONS  (PRN):  acetaminophen   Tablet .. 650 milliGRAM(s) Oral every 6 hours PRN Temp greater or equal to 38C (100.4F)  benzonatate 100 milliGRAM(s) Oral three times a day PRN Cough  calcium carbonate    500 mG (Tums) Chewable 1 Tablet(s) Chew every 6 hours PRN Heartburn  traZODone 50 milliGRAM(s) Oral at bedtime PRN insomnia

## 2019-07-15 NOTE — PROGRESS NOTE ADULT - PROBLEM SELECTOR PLAN 4
HFrEF. possible component of HF exacerbation initially with bibasilar crackles and increased O2 requirement. On PO lasix 20mg at home.  - c/w PO lasix 20mg qD  - strict I/O per routine  - c/w home metop 25mg BID   - Will need to contact outpatient provider for any contraindication for restarting ARB. Was on valsartan 80mg, but unsure why d/c'ed currently  - Not planing to start ACEi/ARB yet, BP reasonable. HFrEF. possible component of HF exacerbation initially with bibasilar crackles and increased O2 requirement. On PO lasix 20mg at home.  - c/w PO lasix 20mg qD, metop 25mg BID   - strict I/O per routine  - F/u with primary cardiologist regarding ACE/ARB use.

## 2019-07-15 NOTE — DIETITIAN INITIAL EVALUATION ADULT. - PROBLEM SELECTOR PLAN 3
1 day history of diarrhea + abdominal cramps. Now resolved. No recent ABx use.  - most likely viral gastroenteritis 2/2 enterovirus.  - monitor VS.  - Encourage PO hydration.  - holding home bowel regimen.

## 2019-07-15 NOTE — DIETITIAN INITIAL EVALUATION ADULT. - PROBLEM SELECTOR PLAN 1
Patient w/ fever and URI symtpoms, CXR concerning for multifocal pneumonia.  - s/p CTX x1 and azithro  - RVP + for enterovirus. BCx and Urine legionella pending  - though viral pneumonia is possible, given concurrent GI symptoms and patient being in a large venue w/ multiple people, will cover for possible superimposed bacterial infection with antibiotics.  - c/w CTX + Azithromycin for CAP coverage.   - monitor VS   - CBC daily  - supp O2 PRN. wean of as tolerated.

## 2019-07-15 NOTE — DIETITIAN INITIAL EVALUATION ADULT. - PROBLEM SELECTOR PLAN 5
Severe AS undergoing TAVR evaluation  - currently asymptomatic as long as patient is not exerting herself.  - continue to monitor.  - please contact her primary cardiologist in the AM as patient was planned for pre-op clearance for her TAVR tomorrow.   - Her fluid balance will be important as for HF we do not want to have volume overload, but given severe AS, we want enough pre-load to ensure good perfusion.

## 2019-07-15 NOTE — DIETITIAN INITIAL EVALUATION ADULT. - ENERGY NEEDS
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  No edema, no pressure ulcers noted at this time.

## 2019-07-15 NOTE — PROGRESS NOTE ADULT - SUBJECTIVE AND OBJECTIVE BOX
Junie Kirk, PGY-1  Pager: [phone]      Patient is a 89y old  Female who presents with a chief complaint of 89F with presenting with cough, fever and diarrhea (14 Jul 2019 15:10)      SUBJECTIVE / OVERNIGHT EVENTS:  No acute events overnight.    Patient seen and examined in AM. Reported    Patient denied fevers, CP, SOB, lower extremity pain.     MEDICATIONS  (STANDING):  ALBUTerol/ipratropium for Nebulization 3 milliLiter(s) Nebulizer every 6 hours  amiodarone    Tablet 200 milliGRAM(s) Oral daily  apixaban 2.5 milliGRAM(s) Oral every 12 hours  atorvastatin 40 milliGRAM(s) Oral at bedtime  cefTRIAXone   IVPB 1000 milliGRAM(s) IV Intermittent every 24 hours  docusate sodium 100 milliGRAM(s) Oral daily  furosemide    Tablet 20 milliGRAM(s) Oral daily  guaiFENesin  milliGRAM(s) Oral every 12 hours  metoprolol tartrate 25 milliGRAM(s) Oral two times a day  senna 2 Tablet(s) Oral at bedtime  sertraline 100 milliGRAM(s) Oral daily    MEDICATIONS  (PRN):  acetaminophen   Tablet .. 650 milliGRAM(s) Oral every 6 hours PRN Temp greater or equal to 38C (100.4F)  benzonatate 100 milliGRAM(s) Oral three times a day PRN Cough  calcium carbonate    500 mG (Tums) Chewable 1 Tablet(s) Chew every 6 hours PRN Heartburn  traZODone 50 milliGRAM(s) Oral at bedtime PRN insomnia      I&O's Summary      Vital Signs Last 24 Hrs  T(C): 36.3 (15 Jul 2019 04:18), Max: 36.9 (14 Jul 2019 09:07)  T(F): 97.3 (15 Jul 2019 04:18), Max: 98.5 (14 Jul 2019 09:07)  HR: 80 (15 Jul 2019 05:46) (65 - 100)  BP: 99/60 (15 Jul 2019 04:18) (90/57 - 99/61)  BP(mean): --  RR: 18 (15 Jul 2019 04:18) (16 - 18)  SpO2: 98% (15 Jul 2019 05:46) (93% - 98%)    PHYSICAL EXAM:  General: No acute distress, well-nourished  HEENT: Extraocular muscles intact, clear conjunctiva, pupils round and equal, reactive to light; normal oropharynx  Neck: Supple, no jugular vein distention  Lungs: Normal respiratory effort on room air; bilateral lung fields clear to auscultation; no wheezes or crackles.  Heart: Normal rate and regular rhythm; S1/S2 present; No murmurs, rubs or gallops appreciated; Capillary refill < 2 seconds  Abdomen: Soft, non-distended, non-tender, no masses; bowel sound present  Extremities: No lower extremity edema; no joint swelling; full range of motion  Neurology: Alert and oriented to person, time, place and situation. No focal deficits; strength and sensation grossly intact. Spontaneous movements of all 4 extremities.  Skin: Warm, dry and appropriate color for skin tone; no new rashes or lesions.       LABS:                        11.6   5.7   )-----------( 118      ( 14 Jul 2019 07:23 )             35.3       07-14    140  |  103  |  22  ----------------------------<  90  3.5   |  24  |  0.89    Ca    8.9      14 Jul 2019 07:19  Phos  3.5     07-13  Mg     2.0     07-13                          EKG:   CXR:       CAPILLARY BLOOD GLUCOSE          RADIOLOGY & ADDITIONAL TESTS: Junie Kirk, PGY-1  Pager: [phone]      Patient is a 89y old  Female who presents with a chief complaint of 89F with presenting with cough, fever and diarrhea (14 Jul 2019 15:10)      SUBJECTIVE / OVERNIGHT EVENTS:  No acute events overnight. On 2L O2    Patient seen and examined in AM. Reported decreased cough and sputum; had a BM on colace and senna. feeling well.     Patient denied fevers, CP, SOB, abdominal pain, or lower extremity pain/swelling.     MEDICATIONS  (STANDING):  ALBUTerol/ipratropium for Nebulization 3 milliLiter(s) Nebulizer every 6 hours  amiodarone    Tablet 200 milliGRAM(s) Oral daily  apixaban 2.5 milliGRAM(s) Oral every 12 hours  atorvastatin 40 milliGRAM(s) Oral at bedtime  cefTRIAXone   IVPB 1000 milliGRAM(s) IV Intermittent every 24 hours  docusate sodium 100 milliGRAM(s) Oral daily  furosemide    Tablet 20 milliGRAM(s) Oral daily  guaiFENesin  milliGRAM(s) Oral every 12 hours  metoprolol tartrate 25 milliGRAM(s) Oral two times a day  senna 2 Tablet(s) Oral at bedtime  sertraline 100 milliGRAM(s) Oral daily    MEDICATIONS  (PRN):  acetaminophen   Tablet .. 650 milliGRAM(s) Oral every 6 hours PRN Temp greater or equal to 38C (100.4F)  benzonatate 100 milliGRAM(s) Oral three times a day PRN Cough  calcium carbonate    500 mG (Tums) Chewable 1 Tablet(s) Chew every 6 hours PRN Heartburn  traZODone 50 milliGRAM(s) Oral at bedtime PRN insomnia      I&O's Summary      Vital Signs Last 24 Hrs  T(C): 36.3 (15 Jul 2019 04:18), Max: 36.9 (14 Jul 2019 09:07)  T(F): 97.3 (15 Jul 2019 04:18), Max: 98.5 (14 Jul 2019 09:07)  HR: 80 (15 Jul 2019 05:46) (65 - 100)  BP: 99/60 (15 Jul 2019 04:18) (90/57 - 99/61)  BP(mean): --  RR: 18 (15 Jul 2019 04:18) (16 - 18)  SpO2: 98% (15 Jul 2019 05:46) (93% - 98%)    PHYSICAL EXAM:  General: NAD. well-developed; well-groomed; well-nourished elderly lady lying on bed on 2L O2  HEENT: Extraocular muscles intact, clear conjunctiva, pupils round and equal, reactive to light; normal oropharynx; neck supple  Respiratory: Mild bibasilar crackles L>R, significantly improved, no expiratory wheezes; no increase in WOB;  Cardiovascular: Regular rhythm, normal rate, systolic murmur at bilateral upper sternal borders. 2+ distal pulses. Cap refill < 2 seconds.  Gastrointestinal: soft; non-tender, non-distended; bowel sounds present  Extremities: no cyanosis; no pedal edema, non-tender to palpation  Neurological: A&Ox 3; Able to have fluent conversation except having difficulties with some names (doctors, medications etc.)  Skin: No new rashes or lesions. Warm and dry to touch.      LABS:                        11.9   5.2   )-----------( 131      ( 15 Jul 2019 07:05 )             34.9                           11.6   5.7   )-----------( 118      ( 14 Jul 2019 07:23 )             35.3       07-14    140  |  103  |  22  ----------------------------<  90  3.5   |  24  |  0.89    Ca    8.9      14 Jul 2019 07:19  Phos  3.5     07-13  Mg     2.0     07-13      RADIOLOGY & ADDITIONAL TESTS:    < from: Xray Chest 1 View- PORTABLE-Urgent (07.14.19 @ 14:19) >  IMPRESSION:   1. Interval improvement in bilateral lower lung infiltrates since 7/11/19   exam.  2. Trace, bilateral pleural effusions now noted.    < end of copied text >

## 2019-07-15 NOTE — PROGRESS NOTE ADULT - PROBLEM SELECTOR PLAN 5
Severe AS undergoing TAVR evaluation  - currently asymptomatic as long as patient is not exerting herself. However, per daughter, patient had significant DAWKINS with activities.  - continue to monitor.  - Structural heart team has postponed her procedure.   - Her fluid balance will be important as for HF we do not want to have volume overload, but given severe AS, we want enough pre-load to ensure good perfusion. Severe AS undergoing TAVR evaluation  - currently asymptomatic as long as patient is not exerting herself. However, per daughter, patient had significant DAWKINS with activities.  - Structural heart team has postponed her procedure. Will f/u outpatient for re-schedule

## 2019-07-15 NOTE — PROGRESS NOTE ADULT - ASSESSMENT
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 Currently improving on Ceftriaxone.

## 2019-07-15 NOTE — DIETITIAN INITIAL EVALUATION ADULT. - PERTINENT LABORATORY DATA
Louise Cannon(Resident) Na 143 [135 - 145], K+ 3.8 [3.5 - 5.3], BUN 20 [7 - 23], Cr 0.73 [0.50 - 1.30], BG 91 [70 - 99], Phos --, Alk Phos --, AST --, ALT --, Mg --, Ca 9.5 [8.4 - 10.5], HbA1c --

## 2019-07-15 NOTE — DIETITIAN INITIAL EVALUATION ADULT. - PROBLEM SELECTOR PLAN 4
HFrEF. possible component of HF exacerbation with bibasilar crackles and increased O2 requirement. On PO lasix 20mg at home.  - c/w PO lasix 20mg qD  - strict I/O, daily weight  - c/w home metop 25mg BID  - Patient used to take valsartan 80mg, yet since last admission (under neuroservice for CVA), the medication has fell off the list. Not mentioned anything in outpatient record. The medication list daughter had was taken by ED person, yet only half of the list was returned, with the other half lost. Patient and daughter not 100% sure on the all the meds, yet daughter states the medication on our e-record is correct, which does not include ACEi/ARBs.  - Will need to contact outpatient provider for any contraindication for restarting ACEi. (maybe her falling episodes?)

## 2019-07-15 NOTE — DIETITIAN INITIAL EVALUATION ADULT. - OTHER INFO
Pt reports good appetite and overall 50-75% po intakes of meals, noted 0-90% po intakes per flowsheet. Pt also attributes fluctuations on po intakes in meal choices provided in Kosher diet. Pt reports she does not eat large meals anymore and prefers small frequent meals/snacks a day. Pt reports following strict low sodium diet for her blood pressure, reports drinking Ensure supplements between meals at home. Pt admitted with nausea/diarrhea 1 day PTA, resolved with +BM today. Pt denies chewing/swallowing difficulties. NKFA, but follows Kosher diet. PTA takes multivitamin and eye vitamins.

## 2019-07-16 ENCOUNTER — TRANSCRIPTION ENCOUNTER (OUTPATIENT)
Age: 84
End: 2019-07-16

## 2019-07-16 VITALS
DIASTOLIC BLOOD PRESSURE: 69 MMHG | TEMPERATURE: 97 F | OXYGEN SATURATION: 93 % | HEART RATE: 92 BPM | RESPIRATION RATE: 17 BRPM | SYSTOLIC BLOOD PRESSURE: 102 MMHG

## 2019-07-16 LAB
CULTURE RESULTS: SIGNIFICANT CHANGE UP
CULTURE RESULTS: SIGNIFICANT CHANGE UP
SPECIMEN SOURCE: SIGNIFICANT CHANGE UP
SPECIMEN SOURCE: SIGNIFICANT CHANGE UP

## 2019-07-16 PROCEDURE — 96375 TX/PRO/DX INJ NEW DRUG ADDON: CPT

## 2019-07-16 PROCEDURE — 93005 ELECTROCARDIOGRAM TRACING: CPT

## 2019-07-16 PROCEDURE — 97110 THERAPEUTIC EXERCISES: CPT

## 2019-07-16 PROCEDURE — 84132 ASSAY OF SERUM POTASSIUM: CPT

## 2019-07-16 PROCEDURE — 87040 BLOOD CULTURE FOR BACTERIA: CPT

## 2019-07-16 PROCEDURE — 94640 AIRWAY INHALATION TREATMENT: CPT

## 2019-07-16 PROCEDURE — 99239 HOSP IP/OBS DSCHRG MGMT >30: CPT

## 2019-07-16 PROCEDURE — 85027 COMPLETE CBC AUTOMATED: CPT

## 2019-07-16 PROCEDURE — 97161 PT EVAL LOW COMPLEX 20 MIN: CPT

## 2019-07-16 PROCEDURE — 84484 ASSAY OF TROPONIN QUANT: CPT

## 2019-07-16 PROCEDURE — 71045 X-RAY EXAM CHEST 1 VIEW: CPT

## 2019-07-16 PROCEDURE — 87633 RESP VIRUS 12-25 TARGETS: CPT

## 2019-07-16 PROCEDURE — 99285 EMERGENCY DEPT VISIT HI MDM: CPT | Mod: 25

## 2019-07-16 PROCEDURE — 82435 ASSAY OF BLOOD CHLORIDE: CPT

## 2019-07-16 PROCEDURE — 85610 PROTHROMBIN TIME: CPT

## 2019-07-16 PROCEDURE — 82803 BLOOD GASES ANY COMBINATION: CPT

## 2019-07-16 PROCEDURE — 85730 THROMBOPLASTIN TIME PARTIAL: CPT

## 2019-07-16 PROCEDURE — 84295 ASSAY OF SERUM SODIUM: CPT

## 2019-07-16 PROCEDURE — 87631 RESP VIRUS 3-5 TARGETS: CPT

## 2019-07-16 PROCEDURE — 80048 BASIC METABOLIC PNL TOTAL CA: CPT

## 2019-07-16 PROCEDURE — 81001 URINALYSIS AUTO W/SCOPE: CPT

## 2019-07-16 PROCEDURE — 87486 CHLMYD PNEUM DNA AMP PROBE: CPT

## 2019-07-16 PROCEDURE — 84100 ASSAY OF PHOSPHORUS: CPT

## 2019-07-16 PROCEDURE — 87086 URINE CULTURE/COLONY COUNT: CPT

## 2019-07-16 PROCEDURE — 96374 THER/PROPH/DIAG INJ IV PUSH: CPT

## 2019-07-16 PROCEDURE — 83605 ASSAY OF LACTIC ACID: CPT

## 2019-07-16 PROCEDURE — 83880 ASSAY OF NATRIURETIC PEPTIDE: CPT

## 2019-07-16 PROCEDURE — 87449 NOS EACH ORGANISM AG IA: CPT

## 2019-07-16 PROCEDURE — 83735 ASSAY OF MAGNESIUM: CPT

## 2019-07-16 PROCEDURE — 82330 ASSAY OF CALCIUM: CPT

## 2019-07-16 PROCEDURE — 87581 M.PNEUMON DNA AMP PROBE: CPT

## 2019-07-16 PROCEDURE — 87070 CULTURE OTHR SPECIMN AEROBIC: CPT

## 2019-07-16 PROCEDURE — 97116 GAIT TRAINING THERAPY: CPT

## 2019-07-16 PROCEDURE — 85014 HEMATOCRIT: CPT

## 2019-07-16 PROCEDURE — 87798 DETECT AGENT NOS DNA AMP: CPT

## 2019-07-16 PROCEDURE — 82947 ASSAY GLUCOSE BLOOD QUANT: CPT

## 2019-07-16 PROCEDURE — 99232 SBSQ HOSP IP/OBS MODERATE 35: CPT

## 2019-07-16 PROCEDURE — 80053 COMPREHEN METABOLIC PANEL: CPT

## 2019-07-16 PROCEDURE — 83690 ASSAY OF LIPASE: CPT

## 2019-07-16 RX ORDER — TRAZODONE HCL 50 MG
1 TABLET ORAL
Qty: 0 | Refills: 0 | DISCHARGE

## 2019-07-16 RX ORDER — APIXABAN 2.5 MG/1
1 TABLET, FILM COATED ORAL
Qty: 0 | Refills: 0 | DISCHARGE

## 2019-07-16 RX ORDER — FUROSEMIDE 40 MG
1 TABLET ORAL
Qty: 0 | Refills: 0 | DISCHARGE

## 2019-07-16 RX ORDER — METOPROLOL TARTRATE 50 MG
1 TABLET ORAL
Qty: 0 | Refills: 0 | DISCHARGE

## 2019-07-16 RX ORDER — SENNA PLUS 8.6 MG/1
2 TABLET ORAL
Qty: 0 | Refills: 0 | DISCHARGE
Start: 2019-07-16

## 2019-07-16 RX ORDER — SERTRALINE 25 MG/1
1 TABLET, FILM COATED ORAL
Qty: 0 | Refills: 0 | DISCHARGE

## 2019-07-16 RX ORDER — DOCUSATE SODIUM 100 MG
1 CAPSULE ORAL
Qty: 0 | Refills: 0 | DISCHARGE
Start: 2019-07-16

## 2019-07-16 RX ADMIN — Medication 600 MILLIGRAM(S): at 05:27

## 2019-07-16 RX ADMIN — Medication 20 MILLIGRAM(S): at 05:27

## 2019-07-16 RX ADMIN — SERTRALINE 100 MILLIGRAM(S): 25 TABLET, FILM COATED ORAL at 12:43

## 2019-07-16 RX ADMIN — AMIODARONE HYDROCHLORIDE 200 MILLIGRAM(S): 400 TABLET ORAL at 05:27

## 2019-07-16 RX ADMIN — Medication 3 MILLILITER(S): at 06:29

## 2019-07-16 RX ADMIN — Medication 25 MILLIGRAM(S): at 12:43

## 2019-07-16 RX ADMIN — Medication 100 MILLIGRAM(S): at 12:43

## 2019-07-16 RX ADMIN — APIXABAN 2.5 MILLIGRAM(S): 2.5 TABLET, FILM COATED ORAL at 12:43

## 2019-07-16 NOTE — PROGRESS NOTE ADULT - PROBLEM SELECTOR PLAN 3
1 day history of diarrhea + abdominal cramps. Now resolved. No recent ABx use.  - most likely viral gastroenteritis 2/2 enterovirus. Resolved  - Encourage PO hydration.  - On home bowel regimen

## 2019-07-16 NOTE — PROGRESS NOTE ADULT - SUBJECTIVE AND OBJECTIVE BOX
HPI/Interval Hx    Sitting OOB-Chair, awaiting discharge.     MEDICATIONS  (STANDING):  amiodarone    Tablet 200 milliGRAM(s) Oral daily  apixaban 2.5 milliGRAM(s) Oral every 12 hours  atorvastatin 40 milliGRAM(s) Oral at bedtime  docusate sodium 100 milliGRAM(s) Oral daily  furosemide    Tablet 20 milliGRAM(s) Oral daily  guaiFENesin  milliGRAM(s) Oral every 12 hours  metoprolol tartrate 25 milliGRAM(s) Oral two times a day  senna 2 Tablet(s) Oral at bedtime  sertraline 100 milliGRAM(s) Oral daily    MEDICATIONS  (PRN):  acetaminophen   Tablet .. 650 milliGRAM(s) Oral every 6 hours PRN Temp greater or equal to 38C (100.4F)  benzonatate 100 milliGRAM(s) Oral three times a day PRN Cough  calcium carbonate    500 mG (Tums) Chewable 1 Tablet(s) Chew every 6 hours PRN Heartburn  traZODone 50 milliGRAM(s) Oral at bedtime PRN insomnia      PAST MEDICAL & SURGICAL HISTORY:  Aortic valve stenosis, etiology of cardiac valve disease unspecified  Paroxysmal atrial fibrillation  HLD (hyperlipidemia)  HTN (hypertension)  Status post bladder repair  H/O shoulder surgery      Review of Systems  CONSTITUTIONAL: No weakness, fevers or chills, (+) fatigue  EYES/ENT: No visual changes;  No vertigo or throat pain   NECK: No pain or stiffness  RESPIRATORY: No cough, wheezing, hemoptysis; No shortness of breath at rest  CARDIOVASCULAR: No chest pain or palpitations, PND, orthopnea, or LE edema, (+) exertional dyspnea  GASTROINTESTINAL: No abdominal or epigastric pain. No nausea, vomiting, or hematemesis; No diarrhea or constipation. No melena or hematochezia.  GENITOURINARY: No dysuria, frequency or hematuria  NEUROLOGICAL: No numbness or weakness  SKIN: No itching, burning, rashes, or lesions   All other review of systems is negative unless indicated above.    Physical Exam  General: A/ox 3, No acute Distress  Neck: Supple, NO JVD  Cardiac: S1 S2, III/VI RUSB murmur  Pulmonary: CTAB, Breathing unlabored, No Rhonchi/Rales/Wheezing  Abdomen: Soft, Non -tender, +BS x 4 quads  Extremities: No Rashes, No edema  Neuro: A/o x 3, No focal deficits  Vital Signs Last 24 Hrs  T(C): 36.6 (16 Jul 2019 13:57), Max: 36.8 (16 Jul 2019 09:12)  T(F): 97.9 (16 Jul 2019 13:57), Max: 98.3 (16 Jul 2019 09:12)  HR: 57 (16 Jul 2019 14:20) (46 - 109)  BP: 103/64 (16 Jul 2019 14:20) (94/66 - 117/81)  BP(mean): --  RR: 18 (16 Jul 2019 13:57) (18 - 18)  SpO2: 94% (16 Jul 2019 14:14) (92% - 98%)                        11.9   5.2   )-----------( 131      ( 15 Jul 2019 07:05 )             34.9     07-15    143  |  105  |  20  ----------------------------<  91  3.8   |  27  |  0.73    Ca    9.5      15 Jul 2019 09:07      Other  < from: Transthoracic Echocardiogram (06.04.19 @ 09:13) >  Patient name: CELINA KESSLER  YOB: 1926   Age: 89 (F)   MR#: [id number]  Study Date: 6/4/2019  Location: O/PSonographer: Judie Masterson WILBER  Study quality: Technically fair  Referring Physician: GANESH MEDINA MD  Blood Pressure:118/66 mmHg  Height: 157 cm  Weight: 55 kg  BSA: 1.6 m2  Heart Rate: 65 mmHg  ------------------------------------------------------------------------  PROCEDURE: Transthoracic echocardiogram with 2-D, M-Mode  and complete spectral and color flow Doppler.  INDICATION: Nonrheumatic aortic (valve) stenosis (I35.0)  ------------------------------------------------------------------------  MEASUREMENTS: (See below)  ------------------------------------------------------------------------  OBSERVATIONS:  Mitral Valve: There is mitral annular calcification with  thickening of the  mitral valve leaflets. Moderate-severe  mitral regurgitation.  Aortic Root: Aortic Root: 3 cm.  Ascending Aorta: 3.1 cm.  LVOT diameter: 2.1 cm.  The aortic root and proximal ascending thoracic aorta are  normal in size.  There is effacement of the sinotubular  junction.  Aortic Valve: The aortic valve is severely calcified.  The  number of cusps and morphology is not well seen. Peak  transaortic valve gradient thrvzt45 mm Hg, mean  transaortic valve gradient equals 19 mm Hg, estimated  aortic valve area equals 0.7 sqcm (by continuity equation),  the DVI= 0.21, consistent with severe aortic stenosis.  The  LV stroke volume index= 32ml/m2/beat.  This is low  gradient/low stroke volume/severe aortic stenosis.   No  aortic valve regurgitation seen. Peak left ventricular  outflow tract gradient equals 1 mm Hg, mean gradient is  equal to 1 mm Hg, LVOT velocity time integral equals 15 cm.  Left Atrium: Severely dilated left atrium.  LA volume index  = 69 cc/m2.  Left Ventricle: There is global hypokinesis with regional  variation.  The inferolateral wall is the most hypokinetic.   The LVEF= 35-40%. Normal left ventricular internal  dimensions and wall thicknesses.  There is an apical  pseudotendon seen.   There is pseudonormal filling pattern.   The left atrial pressure is elevated.  E/E'= 30.  Right Heart: Normal right atrium.  The right atrium= 20cm2.  Normal right ventricular size and function.  Minimal  tricuspid regurgitation. Normal pulmonic valve. Minimal  pulmonic regurgitation.  Pericardium/Pleura There is no pericardial effusion.  Hemodynamic: The IVC is dilated with minimal variation with  sniff suggestive of elevated right atrial pressure.  Estimated right ventricular systolic pressure equals 67 mm  Hg, assuming right atrial pressure equals 15 mm Hg,  consistent with severe pulmonary hypertension.  ------------------------------------------------------------------------  CONCLUSIONS:  1.There is mitral annular calcification with thickening of  the  mitral valve leaflets. Moderate-severe mitral  regurgitation.  2. The aortic valve is severely calcified.  The number of  cusps and morphology is not well seen. Peak transaortic  valve gradient equals 38 mm Hg, mean transaortic valve  gradient equals 19 mm Hg, estimated aortic valve area  equals 0.7 sqcm (by continuity equation), the DVI= 0.21,  consistent with severe aortic stenosis.  The LV stroke  volume index= 32ml/m2/beat.  This is low gradient/low  stroke volume/severe aortic stenosis.   No aortic valve  regurgitation seen.  3. Aortic Root: 3 cm.  Ascending Aorta: 3.1 cm.  LVOT diameter: 2.1 cm.  The aortic root and proximal ascending thoracic aorta are  normal in size.  There is effacement of the sinotubular  junction.  4. Severely dilated left atrium.  LA volume index = 69  cc/m2.  5. Normal left ventricular internal dimensions and wall  thicknesses.  There is an apical pseudotendon seen.  6. There is global hypokinesis with regional variation.  The inferolateral wall is the most hypokinetic.  The LVEF=  35-40%.  7. Normal right ventricular size and function.  8. The IVC is dilated with minimal variation with sniff  suggestive of elevated right atrial pressure.  9.Estimated right ventricular systolic pressure equals 67  mm Hg, assuming right atrial pressure equals 15 mm Hg,  consistent with severe pulmonary hypertension.  10.  Minimal tricuspid regurgitation.  11.  There is no pericardial effusion.  *** Compared with prior report from 5/31/2017, the left  ventricular function has declined,  the calculated aortic  stenosis is now severe, there is worse mitral regurgitation  and now severe pulmonary hypertension.  ------------------------------------------------------------------------  PROCEDURE DESCRIPTION: Transthoracic echocardiogram with  2-D, M-Mode and complete spectral and color flow Doppler.  ------------------------------------------------------------------------  ECHOCARDIOGRAPHIC EXAMINATION:  AORTIC ROOT:  Aortic Root (Leaflet): 3.0 cm  Aortic Root Index: 0.9  Aortic Tubular: 3.1 cm  LEFT ATRIUM:  AP Dimensions: 4.7 cm  LA Volume Index: 69.00 cc/sqm  LA Volume: 107.6 cc  LEFT VENTRICLE:  LVIDd: 5.3 cm  LVIDs: 4.3 cm  Fraction Short: 19 %  IVS: 1.08  ILWT: 0.9 cm  RWT: 0.33  LV Mass: 198.0 gm  LV Mass Index: 128 gm/sqm  HR and BP:  HR: 65 bpm  BP: 118/66  BSA: 1.55  TRICUSPID VALVE:  TR Velocity: 3.8 M/s  TR Gradient: 57.7 mm Hg  ------------------------------------------------------------------------  HEMODYNAMICS:  RA Pressure Estimate: 8  RVSP: 66  LA Pressure Estimate: < 20  PAS: 67  IVRT: 95 ms  ------------------------------------------------------------------------  COLOR FLOW and SPECIAL DOPPLER:  AORTIC VALVE:  AV Peak Velocity: 3.1 M/sec  AV Peak Gradient: 38 mm Hg  AV Mean Gradient: 19 mm Hg  Valve Area: 0.7 sqcm  LVOT:  LVOT Velocity: .5 M/sec  LVOT Diameter: 2.1 cm  LVOT Peak Gradient Rest: 1 mm Hg  LVOT Mean Gradient Rest: 1 mm Hg  ------------------------------------------------------------------------  DIASTOLIC FUNCTION:  DT:144 ms  E/A: 2.25  e' Septal: 3.0 cm/s  E/e' Septal: 30.0  e' Lateral: 3.0 cm/s  E/e' Lateral: 30.0  MV E wave: 0.9 m/s  MV A wave: 0.4 m/s  Septal a': 4.0 cm/s  Lateral a': 2.0 cm/s  ------------------------------------------------------------------------  Confirmed on  6/4/2019 - 11:29:48 by Patricia Kovacs M.D.  ------------------------------------------------------------------------    < end of copied text >

## 2019-07-16 NOTE — PROGRESS NOTE ADULT - ASSESSMENT
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 Completed ceftriaxone 5/5, clinically stable.

## 2019-07-16 NOTE — DISCHARGE NOTE NURSING/CASE MANAGEMENT/SOCIAL WORK - NSDCPEPT PROEDHF_GEN_ALL_CORE
Activities as tolerated/Monitor weight daily/Report signs and symptoms to primary care provider/Low salt diet/Call primary care provider for follow up after discharge

## 2019-07-16 NOTE — PROGRESS NOTE ADULT - ASSESSMENT
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 She has now completed a course of Ceftriaxone and is scheduled to be discharged home today.     Discussed rescheduling TAVR for 7/24 with patient and son at bedside, and daughter via phone. Lyssa is hesitant to proceed with scheduling at this time, considering she is just "getting over pneumonia".  Advised that her symptoms of fatigue and exertional dyspnea are not likely to improve given the severity of her aortic stenosis, and TAVR should not be deferred for a prolonged period. She will give our office a call tomorrow ([phone]) to discuss timing of TAVR with Dr. Ayala.    [phone]

## 2019-07-16 NOTE — PROGRESS NOTE ADULT - PROBLEM/PLAN-4
labor/delivery
DISPLAY PLAN FREE TEXT

## 2019-07-16 NOTE — DISCHARGE NOTE NURSING/CASE MANAGEMENT/SOCIAL WORK - NSDCDPATPORTLINK_GEN_ALL_CORE
You can access the [facility] Patient Portal, offered by [facility], by registering with the following website: [email or web address]

## 2019-07-16 NOTE — PROGRESS NOTE ADULT - PROBLEM SELECTOR PLAN 4
Please see telephone note from 1/14/18. Attempting to contact pt with results. HFrEF. On PO lasix 20mg at home. Appears euvolemic.   - c/w PO lasix 20mg qD, metop 25mg BID   - strict I/O per routine  - F/u with primary cardiologist regarding ACE/ARB use.

## 2019-07-16 NOTE — PROGRESS NOTE ADULT - PROBLEM SELECTOR PLAN 5
Severe AS undergoing TAVR evaluation  - currently asymptomatic as long as patient is not exerting herself. However, per daughter, patient had significant DAWKINS with activities.  - Structural heart team has postponed her procedure. Will f/u outpatient for re-schedule Severe AS undergoing TAVR evaluation  - currently asymptomatic as long as patient is not exerting herself. However, per daughter, patient had significant DAWKINS with activities.  - Structural heart team has postponed her procedure. Will see her today to discuss plans.

## 2019-07-16 NOTE — PROGRESS NOTE ADULT - ATTENDING COMMENTS
Check CXR  Will order chest PT to help with secretions  Continue with ceftriaxone for PNA
stable for discharge.  Discharge time spent: 36 min
-Patient seen/examined on 7/13/19. Case/plan discussed with the intern and resident as reviewed/edited by me above and in any comments below.  -Updated patient and her daughter at bedside.   -Patient is overall improving. -Need to continue to wean O2 as tolerated.  -DCP on oral abx soon.
-Patient seen/examined on 7/12/19. Case/plan discussed with the intern and resident as reviewed/edited by me above and in any comments below.  -Updated patient's daughter at bedside.

## 2019-07-16 NOTE — PROGRESS NOTE ADULT - PROBLEM SELECTOR PLAN 2
Patient w/ increased O2 requirement in setting of PNA and possible HFrEF component given rise in BNP  - currently resolved

## 2019-07-16 NOTE — PROGRESS NOTE ADULT - REASON FOR ADMISSION
89F with presenting with cough, fever and diarrhea

## 2019-07-16 NOTE — PROGRESS NOTE ADULT - PROBLEM SELECTOR PLAN 1
Patient w/ multifocal pneumonia in the setting of (+) entero/rhinovirus, improving  - Complete 5/5 CTX for CAP coverage 7/15  - C/w cough suppressants, Chest PT, incentive spirometer, Acapella, and wean O2 as tolerated. Patient w/ multifocal pneumonia in the setting of (+) entero/rhinovirus, improving  - Complete 5/5 CTX for CAP coverage 7/15  - On room air  - C/w cough suppressants, Chest PT, incentive spirometer, Acapella

## 2019-07-16 NOTE — PROGRESS NOTE ADULT - SUBJECTIVE AND OBJECTIVE BOX
Junie Kirk, PGY-1  Pager: [phone]      Patient is a 89y old  Female who presents with a chief complaint of 89F with presenting with cough, fever and diarrhea (15 Jul 2019 06:10)      SUBJECTIVE / OVERNIGHT EVENTS:  No acute events overnight.    Patient seen and examined in AM. Reported decreased cough, tolerating well on 1L of O2. Increased PO.     Patient denied fevers, CP, SOB, abdominal pain or lower extremity pain/swelling.     MEDICATIONS  (STANDING):  ALBUTerol/ipratropium for Nebulization 3 milliLiter(s) Nebulizer every 6 hours  amiodarone    Tablet 200 milliGRAM(s) Oral daily  apixaban 2.5 milliGRAM(s) Oral every 12 hours  atorvastatin 40 milliGRAM(s) Oral at bedtime  docusate sodium 100 milliGRAM(s) Oral daily  furosemide    Tablet 20 milliGRAM(s) Oral daily  guaiFENesin  milliGRAM(s) Oral every 12 hours  metoprolol tartrate 25 milliGRAM(s) Oral two times a day  senna 2 Tablet(s) Oral at bedtime  sertraline 100 milliGRAM(s) Oral daily    MEDICATIONS  (PRN):  acetaminophen   Tablet .. 650 milliGRAM(s) Oral every 6 hours PRN Temp greater or equal to 38C (100.4F)  benzonatate 100 milliGRAM(s) Oral three times a day PRN Cough  calcium carbonate    500 mG (Tums) Chewable 1 Tablet(s) Chew every 6 hours PRN Heartburn  traZODone 50 milliGRAM(s) Oral at bedtime PRN insomnia      I&O's Summary    14 Jul 2019 07:01  -  15 Jul 2019 07:00  --------------------------------------------------------  IN: 500 mL / OUT: 0 mL / NET: 500 mL        Vital Signs Last 24 Hrs  T(C): 36.7 (16 Jul 2019 05:24), Max: 37 (15 Jul 2019 13:25)  T(F): 98 (16 Jul 2019 05:24), Max: 98.6 (15 Jul 2019 13:25)  HR: 80 (16 Jul 2019 05:24) (78 - 109)  BP: 116/70 (16 Jul 2019 05:24) (105/68 - 117/81)  BP(mean): --  RR: 18 (16 Jul 2019 05:24) (17 - 18)  SpO2: 98% (16 Jul 2019 05:24) (96% - 99%)    PHYSICAL EXAM:  GENERAL: NAD. On 1L NC  HEENT: EOMI, clear conjunctiva, normal oropharynx, neck supple  CHEST/LUNG: bibasilar crackles (L>R) improved from yesterday  HEART: Regular rate and rhythm; No murmurs, rubs, or gallops  ABDOMEN: Soft, Nontender, Nondistended; Bowel sounds present  EXTREMITIES:  2+ Peripheral Pulses, No clubbing, cyanosis, or edema  PSYCH: AAOx3  NEUROLOGY: non-focal      LABS:                        11.9   5.2   )-----------( 131      ( 15 Jul 2019 07:05 )             34.9       07-15    143  |  105  |  20  ----------------------------<  91  3.8   |  27  |  0.73    Ca    9.5      15 Jul 2019 09:07                          EKG:   CXR:       CAPILLARY BLOOD GLUCOSE          RADIOLOGY & ADDITIONAL TESTS: Junie Kirk, PGY-1  Pager: [phone]      Patient is a 89y old  Female who presents with a chief complaint of 89F with presenting with cough, fever and diarrhea (15 Jul 2019 06:10)      SUBJECTIVE / OVERNIGHT EVENTS:  No acute events overnight.    Patient seen and examined in AM. Reported breathing fine without oxygen and Increased PO. Still coughing and still having difficulty cough up the sputum.    Patient denied fevers, CP, SOB, abdominal pain or lower extremity pain/swelling.     MEDICATIONS  (STANDING):  ALBUTerol/ipratropium for Nebulization 3 milliLiter(s) Nebulizer every 6 hours  amiodarone    Tablet 200 milliGRAM(s) Oral daily  apixaban 2.5 milliGRAM(s) Oral every 12 hours  atorvastatin 40 milliGRAM(s) Oral at bedtime  docusate sodium 100 milliGRAM(s) Oral daily  furosemide    Tablet 20 milliGRAM(s) Oral daily  guaiFENesin  milliGRAM(s) Oral every 12 hours  metoprolol tartrate 25 milliGRAM(s) Oral two times a day  senna 2 Tablet(s) Oral at bedtime  sertraline 100 milliGRAM(s) Oral daily    MEDICATIONS  (PRN):  acetaminophen   Tablet .. 650 milliGRAM(s) Oral every 6 hours PRN Temp greater or equal to 38C (100.4F)  benzonatate 100 milliGRAM(s) Oral three times a day PRN Cough  calcium carbonate    500 mG (Tums) Chewable 1 Tablet(s) Chew every 6 hours PRN Heartburn  traZODone 50 milliGRAM(s) Oral at bedtime PRN insomnia      I&O's Summary    14 Jul 2019 07:01  -  15 Jul 2019 07:00  --------------------------------------------------------  IN: 500 mL / OUT: 0 mL / NET: 500 mL        Vital Signs Last 24 Hrs  T(C): 36.7 (16 Jul 2019 05:24), Max: 37 (15 Jul 2019 13:25)  T(F): 98 (16 Jul 2019 05:24), Max: 98.6 (15 Jul 2019 13:25)  HR: 80 (16 Jul 2019 05:24) (78 - 109)  BP: 116/70 (16 Jul 2019 05:24) (105/68 - 117/81)  BP(mean): --  RR: 18 (16 Jul 2019 05:24) (17 - 18)  SpO2: 98% (16 Jul 2019 05:24) (96% - 99%)    PHYSICAL EXAM:  GENERAL: NAD. On 1L NC  HEENT: EOMI, clear conjunctiva, normal oropharynx, neck supple  CHEST/LUNG: bibasilar crackles (L>R) improved from yesterday  HEART: Regular rate and rhythm; No murmurs, rubs, or gallops  ABDOMEN: Soft, Nontender, Nondistended; Bowel sounds present  EXTREMITIES:  2+ Peripheral Pulses, No clubbing, cyanosis, or edema  PSYCH: AAOx3  NEUROLOGY: non-focal      LABS:                        11.9   5.2   )-----------( 131      ( 15 Jul 2019 07:05 )             34.9       07-15    143  |  105  |  20  ----------------------------<  91  3.8   |  27  |  0.73    Ca    9.5      15 Jul 2019 09:07        RADIOLOGY & ADDITIONAL TESTS:

## 2019-07-17 NOTE — DISCUSSION/SUMMARY
[Home] : patient was discharged to home [Med Rec Performed] : med rec performed [FreeTextEntry1] : SPoke with patient's son regarding the patient's recent hospitalization to [facility] from 7/11/19-7/16/19.  She was admitted for nausea and vomiting and found to have pneumonia.  She was scheduled for a TAVR which was postponed.  She completed a course of ABT and is feeling better.  There were no medication changes made during this hospitalization.  She is tentatively scheduled for her TAVR procedure on 7/24/19.  She was advised to see pulmonology before this procedure for clearance.  Her son will call to schedule an appointment if the procedure is postponed. The patient will be contacted upon discharge of her procedure should this take place for a HFU appointment.  The patient's son was advised to call the office for any issues or concerns,  Verbal understanding was confirmed.  Dr Samuel made aware.

## 2019-07-22 ENCOUNTER — OUTPATIENT (OUTPATIENT)
Dept: OUTPATIENT SERVICES | Facility: HOSPITAL | Age: 84
LOS: 1 days | End: 2019-07-22
Payer: MEDICARE

## 2019-07-22 VITALS
HEIGHT: 61 IN | SYSTOLIC BLOOD PRESSURE: 105 MMHG | RESPIRATION RATE: 14 BRPM | DIASTOLIC BLOOD PRESSURE: 73 MMHG | WEIGHT: 113.98 LBS | TEMPERATURE: 98 F | HEART RATE: 67 BPM | OXYGEN SATURATION: 98 %

## 2019-07-22 DIAGNOSIS — Z29.9 ENCOUNTER FOR PROPHYLACTIC MEASURES, UNSPECIFIED: ICD-10-CM

## 2019-07-22 DIAGNOSIS — J18.9 PNEUMONIA, UNSPECIFIED ORGANISM: ICD-10-CM

## 2019-07-22 DIAGNOSIS — Z98.890 OTHER SPECIFIED POSTPROCEDURAL STATES: Chronic | ICD-10-CM

## 2019-07-22 DIAGNOSIS — Z01.818 ENCOUNTER FOR OTHER PREPROCEDURAL EXAMINATION: ICD-10-CM

## 2019-07-22 DIAGNOSIS — Z90.722 ACQUIRED ABSENCE OF OVARIES, BILATERAL: Chronic | ICD-10-CM

## 2019-07-22 DIAGNOSIS — I35.0 NONRHEUMATIC AORTIC (VALVE) STENOSIS: ICD-10-CM

## 2019-07-22 DIAGNOSIS — I48.0 PAROXYSMAL ATRIAL FIBRILLATION: ICD-10-CM

## 2019-07-22 LAB
BLD GP AB SCN SERPL QL: NEGATIVE — SIGNIFICANT CHANGE UP
HBA1C BLD-MCNC: 5.6 % — SIGNIFICANT CHANGE UP (ref 4–5.6)
HCT VFR BLD CALC: 40 % — SIGNIFICANT CHANGE UP (ref 34.5–45)
HGB BLD-MCNC: 12.6 G/DL — SIGNIFICANT CHANGE UP (ref 11.5–15.5)
MCHC RBC-ENTMCNC: 30.3 PG — SIGNIFICANT CHANGE UP (ref 27–34)
MCHC RBC-ENTMCNC: 31.5 GM/DL — LOW (ref 32–36)
MCV RBC AUTO: 96.2 FL — SIGNIFICANT CHANGE UP (ref 80–100)
MRSA PCR RESULT.: SIGNIFICANT CHANGE UP
PLATELET # BLD AUTO: 229 K/UL — SIGNIFICANT CHANGE UP (ref 150–400)
RBC # BLD: 4.16 M/UL — SIGNIFICANT CHANGE UP (ref 3.8–5.2)
RBC # FLD: 14.4 % — SIGNIFICANT CHANGE UP (ref 10.3–14.5)
RH IG SCN BLD-IMP: POSITIVE — SIGNIFICANT CHANGE UP
S AUREUS DNA NOSE QL NAA+PROBE: SIGNIFICANT CHANGE UP
WBC # BLD: 7.53 K/UL — SIGNIFICANT CHANGE UP (ref 3.8–10.5)
WBC # FLD AUTO: 7.53 K/UL — SIGNIFICANT CHANGE UP (ref 3.8–10.5)

## 2019-07-22 PROCEDURE — 71046 X-RAY EXAM CHEST 2 VIEWS: CPT | Mod: 26

## 2019-07-22 PROCEDURE — 93880 EXTRACRANIAL BILAT STUDY: CPT | Mod: 26

## 2019-07-22 RX ORDER — LIDOCAINE HCL 20 MG/ML
0.2 VIAL (ML) INJECTION ONCE
Refills: 0 | Status: DISCONTINUED | OUTPATIENT
Start: 2019-07-24 | End: 2019-07-24

## 2019-07-22 RX ORDER — SODIUM CHLORIDE 9 MG/ML
3 INJECTION INTRAMUSCULAR; INTRAVENOUS; SUBCUTANEOUS EVERY 8 HOURS
Refills: 0 | Status: DISCONTINUED | OUTPATIENT
Start: 2019-07-24 | End: 2019-07-24

## 2019-07-22 NOTE — H&P PST ADULT - LAST ECHOCARDIOGRAM
6/4/19 6/4/19  severely calcified aortic valve, LVEF 35-40%, worsening mitral valve regurg compared to previous echo; severe pHTN

## 2019-07-22 NOTE — H&P PST ADULT - NSICDXPROBLEM_GEN_ALL_CORE_FT
PROBLEM DIAGNOSES  Problem: Severe aortic stenosis  Assessment and Plan: Scheduled TAVR  nasal swab collected  pt sent for carotids  reviewed chlorhexidine wash with pt and family       Problem: Paroxysmal atrial fibrillation  Assessment and Plan: Last dose Eliquis will be 7/22 in the AM   s/w Emily from CTSx    Problem: Pneumonia  Assessment and Plan: sent for chest x-ray   O2 sat on exam 98%, lung CTA     Problem: Need for prophylactic measure  Assessment and Plan: The Caprini score indicates that this patient is at high risk for a VTE event (score 6 or greater). Surgical patients in this group will benefit from both pharmacologic prophylaxis and intermittent compression devices.  The surgical team will determine the balance between VTE risk and bleeding risk, and other clinical considerations

## 2019-07-22 NOTE — H&P PST ADULT - RS GEN PE MLT RESP DETAILS PC
respirations non-labored/breath sounds equal/clear to auscultation bilaterally/no rhonchi/no wheezes/no rales

## 2019-07-22 NOTE — H&P PST ADULT - NS NEC GEN PE MLT EXAM PC
Hemostasis: Drysol X Size Of Lesion In Cm: 0 Cryotherapy Text: The wound bed was treated with cryotherapy after the biopsy was performed. Biopsy Method: Personna blade Consent: Written consent was obtained and risks were reviewed including but not limited to scarring, infection, bleeding, scabbing, incomplete removal, nerve damage and allergy to anesthesia. Anesthesia Type: 1% lidocaine with 1:100,000 epinephrine and a 1:10 solution of 8.4% sodium bicarbonate Billing Type: [employer] Detail Level: Simple Destruction After The Procedure: No Biopsy Type: H and E Silver Nitrate Text: The wound bed was treated with silver nitrate after the biopsy was performed. Electrodesiccation Text: The wound bed was treated with electrodesiccation after the biopsy was performed. Lab: [facility] Notification Instructions: Patient will be notified of biopsy results. However, patient instructed to call the office if not contacted within 2 weeks. Curettage Text: The wound bed was treated with curettage after the biopsy was performed. Post-Care Instructions: I reviewed with the patient in detail post-care instructions. Patient is to keep the biopsy site clean and apply vaseline twice daily until healed. Anticipated Plan (Based On Presumed Biopsy Results): Nikko Gamboa Electrodesiccation And Curettage Text: The wound bed was treated with electrodesiccation and curettage after the biopsy was performed. Body Location Override (Optional - Billing Will Still Be Based On Selected Body Map Location If Applicable): Right crown Triangulation (Location Of Lesion In Relation To Distance From Anatomical Landmarks): Lily Smallwood Wound Care: Petrolatum Type Of Destruction Used: Curettage Dressing: bandage Anesthesia Volume In Cc (Will Not Render If 0): 0.3 Lab: 249 Body Location Override (Optional - Billing Will Still Be Based On Selected Body Map Location If Applicable): Left nasal tip Triangulation (Location Of Lesion In Relation To Distance From Anatomical Landmarks): Donaldo Crawford Billing Type: Third-Party Bill Anticipated Plan (Based On Presumed Biopsy Results): Deyvi Julian Body Location Override (Optional - Billing Will Still Be Based On Selected Body Map Location If Applicable): Left preauric Triangulation (Location Of Lesion In Relation To Distance From Anatomical Landmarks): Matthew Maria Anticipated Plan (Based On Presumed Biopsy Results): Margaret Pendleton detailed exam

## 2019-07-22 NOTE — H&P PST ADULT - NSICDXPASTSURGICALHX_GEN_ALL_CORE_FT
PAST SURGICAL HISTORY:  H/O shoulder surgery arthroscopic  10+ yrs ago  right    Status post bladder repair PAST SURGICAL HISTORY:  H/O shoulder surgery arthroscopic  10+ yrs ago  right    History of bilateral oophorectomy     S/P cystoscopy TURBT x 2

## 2019-07-22 NOTE — H&P PST ADULT - HISTORY OF PRESENT ILLNESS
89 y/o female with pmhx of Afib on Eliquis, left MCA CVA in 2017, symptomatic severe aortic stenosis with progressively worsening DAWKINS (able to walk maybe 1 block slowly). Cardiac cath performed on 6/13/19 w/     Pt was also recently admitted to [facility] for PNA - completed 5 day course of Ceftriaxone and was discharged to home on July 16th.  As per patient and her family, CTSx is aware of her recent hospital admission. Presents now for TAVR. 89 y/o female with pmhx of Afib on Eliquis, left MCA CVA in 2017, symptomatic severe aortic stenosis with progressively worsening DAWKINS (able to walk maybe 1 block slowly).     Pt was also recently admitted to [facility] for PNA - completed 5 day course of Ceftriaxone and was discharged to home on July 16th.  As per patient and her family, CTSx is aware of her recent hospital admission. Presents now for TAVR. 89 y/o female with pmhx of Afib on Eliquis, left MCA CVA in 2017, symptomatic severe aortic stenosis with progressively worsening DAWKINS (able to walk maybe 1 block slowly).   Pt was also recently admitted to [facility] for PNA - completed 5 day course of Ceftriaxone and was discharged to home on July 16th.  As per patient and her family, CTSx is aware of her recent hospital admission. Since discharge, she is still experiencing fatigue and DAWKINS. Presents now for TAVR.

## 2019-07-22 NOTE — H&P PST ADULT - NSANTHOSAYNRD_GEN_A_CORE
No. GABRIELLE screening performed.  STOP BANG Legend: 0-2 = LOW Risk; 3-4 = INTERMEDIATE Risk; 5-8 = HIGH Risk

## 2019-07-22 NOTE — H&P PST ADULT - NSICDXPASTMEDICALHX_GEN_ALL_CORE_FT
PAST MEDICAL HISTORY:  Aortic valve stenosis, etiology of cardiac valve disease unspecified     HLD (hyperlipidemia)     HTN (hypertension)     Paroxysmal atrial fibrillation on Eliquis PAST MEDICAL HISTORY:  Bladder cancer TURBT x 2 in the past    Cerebrovascular accident (CVA) left MCA  2017    HLD (hyperlipidemia)     HTN (hypertension)     Paroxysmal atrial fibrillation on Eliquis    Pneumonia discharged 7/16/19    Severe aortic stenosis     Severe mitral valve regurgitation PAST MEDICAL HISTORY:  Bladder cancer TURBT x 2 in the past    Cerebrovascular accident (CVA) left MCA  2017  aphasia    HLD (hyperlipidemia)     HTN (hypertension)     Paroxysmal atrial fibrillation on Eliquis    Pneumonia discharged 7/16/19    Severe aortic stenosis     Severe mitral valve regurgitation

## 2019-07-22 NOTE — H&P PST ADULT - ASSESSMENT
CAPRINI SCORE [CLOT updated 18]    AGE RELATED RISK FACTORS                                                       MOBILITY RELATED FACTORS  [ ] Age 41-60 years                                            (1 Point)                    [ ] Bed rest                                                        (1 Point)  [ ] Age: 61-74 years                                           (2 Points)                  [ ] Plaster cast                                                   (2 Points)  [ ] Age= 75 years                                              (3 Points)                    [ ] Bed bound for more than 72 hours                 (2 Points)    DISEASE RELATED RISK FACTORS                                               GENDER SPECIFIC FACTORS  [ ] Edema in the lower extremities                       (1 Point)              [ ] Pregnancy                                                     (1 Point)  [ ] Varicose veins                                               (1 Point)                     [ ] Post-partum < 6 weeks                                   (1 Point)             [ ] BMI > 25 Kg/m2                                            (1 Point)                     [ ] Hormonal therapy  or oral contraception          (1 Point)                 [ ] Sepsis (in the previous month)                        (1 Point)               [ ] History of pregnancy complications                 (1 point)  [ ] Pneumonia or serious lung disease                                               [ ] Unexplained or recurrent                     (1 Point)           (in the previous month)                               (1 Point)  [ ] Abnormal pulmonary function test                     (1 Point)                 SURGERY RELATED RISK FACTORS  [ ] Acute myocardial infarction                              (1 Point)               [ ]  Section                                             (1 Point)  [ ] Congestive heart failure (in the previous month)  (1 Point)      [ ] Minor surgery                                                  (1 Point)   [ ] Inflammatory bowel disease                             (1 Point)               [ ] Arthroscopic surgery                                        (2 Points)  [ ] Central venous access                                      (2 Points)                [ ] General surgery lasting more than 45 minutes (2 points)  [ ] Present or previous malignancy                     (2 Points)                [ ] Elective arthroplasty                                         (5 points)    [ ] Stroke (in the previous month)                          (5 Points)                                                                                                                                                           HEMATOLOGY RELATED FACTORS                                                 TRAUMA RELATED RISK FACTORS  [ ] Prior episodes of VTE                                     (3 Points)                [ ] Fracture of the hip, pelvis, or leg                       (5 Points)  [ ] Positive family history for VTE                         (3 Points)             [ ] Acute spinal cord injury (in the previous month)  (5 Points)  [ ] Prothrombin 55520 A                                     (3 Points)               [ ] Paralysis  (less than 1 month)                             (5 Points)  [ ] Factor V Leiden                                             (3 Points)                  [ ] Multiple Trauma within 1 month                        (5 Points)  [ ] Lupus anticoagulants                                     (3 Points)                                                           [ ] Anticardiolipin antibodies                               (3 Points)                                                       [ ] High homocysteine in the blood                      (3 Points)                                             [ ] Other congenital or acquired thrombophilia      (3 Points)                                                [ ] Heparin induced thrombocytopenia                  (3 Points)                                     Total Score [ 8         ]

## 2019-07-24 ENCOUNTER — APPOINTMENT (OUTPATIENT)
Dept: CARDIOTHORACIC SURGERY | Facility: HOSPITAL | Age: 84
End: 2019-07-24

## 2019-07-24 ENCOUNTER — INPATIENT (INPATIENT)
Facility: HOSPITAL | Age: 84
LOS: 1 days | Discharge: HOME | DRG: 267 | End: 2019-07-26
Attending: THORACIC SURGERY (CARDIOTHORACIC VASCULAR SURGERY) | Admitting: THORACIC SURGERY (CARDIOTHORACIC VASCULAR SURGERY)
Payer: MEDICARE

## 2019-07-24 VITALS
WEIGHT: 113.98 LBS | SYSTOLIC BLOOD PRESSURE: 115 MMHG | HEIGHT: 61 IN | TEMPERATURE: 98 F | RESPIRATION RATE: 17 BRPM | DIASTOLIC BLOOD PRESSURE: 82 MMHG | OXYGEN SATURATION: 99 % | HEART RATE: 48 BPM

## 2019-07-24 DIAGNOSIS — Z90.722 ACQUIRED ABSENCE OF OVARIES, BILATERAL: Chronic | ICD-10-CM

## 2019-07-24 DIAGNOSIS — I35.0 NONRHEUMATIC AORTIC (VALVE) STENOSIS: ICD-10-CM

## 2019-07-24 DIAGNOSIS — Z98.890 OTHER SPECIFIED POSTPROCEDURAL STATES: Chronic | ICD-10-CM

## 2019-07-24 PROBLEM — I48.0 PAROXYSMAL ATRIAL FIBRILLATION: Chronic | Status: ACTIVE | Noted: 2019-06-13

## 2019-07-24 PROBLEM — C67.9 MALIGNANT NEOPLASM OF BLADDER, UNSPECIFIED: Chronic | Status: ACTIVE | Noted: 2019-07-22

## 2019-07-24 PROBLEM — I63.9 CEREBRAL INFARCTION, UNSPECIFIED: Chronic | Status: ACTIVE | Noted: 2019-07-22

## 2019-07-24 PROBLEM — I34.0 NONRHEUMATIC MITRAL (VALVE) INSUFFICIENCY: Chronic | Status: ACTIVE | Noted: 2019-07-22

## 2019-07-24 PROBLEM — J18.9 PNEUMONIA, UNSPECIFIED ORGANISM: Chronic | Status: ACTIVE | Noted: 2019-07-22

## 2019-07-24 LAB
ALBUMIN SERPL ELPH-MCNC: 3.5 G/DL — SIGNIFICANT CHANGE UP (ref 3.3–5)
ALP SERPL-CCNC: 79 U/L — SIGNIFICANT CHANGE UP (ref 40–120)
ALT FLD-CCNC: 100 U/L — HIGH (ref 10–45)
ANION GAP SERPL CALC-SCNC: 15 MMOL/L — SIGNIFICANT CHANGE UP (ref 5–17)
APTT BLD: 35.1 SEC — SIGNIFICANT CHANGE UP (ref 27.5–36.3)
AST SERPL-CCNC: 66 U/L — HIGH (ref 10–40)
BASOPHILS # BLD AUTO: 0 K/UL — SIGNIFICANT CHANGE UP (ref 0–0.2)
BASOPHILS NFR BLD AUTO: 0.2 % — SIGNIFICANT CHANGE UP (ref 0–2)
BILIRUB SERPL-MCNC: 0.4 MG/DL — SIGNIFICANT CHANGE UP (ref 0.2–1.2)
BUN SERPL-MCNC: 26 MG/DL — HIGH (ref 7–23)
CALCIUM SERPL-MCNC: 8.8 MG/DL — SIGNIFICANT CHANGE UP (ref 8.4–10.5)
CHLORIDE SERPL-SCNC: 101 MMOL/L — SIGNIFICANT CHANGE UP (ref 96–108)
CK MB CFR SERPL CALC: 3.9 NG/ML — HIGH (ref 0–3.8)
CK SERPL-CCNC: 46 U/L — SIGNIFICANT CHANGE UP (ref 25–170)
CO2 SERPL-SCNC: 24 MMOL/L — SIGNIFICANT CHANGE UP (ref 22–31)
CREAT SERPL-MCNC: 1.05 MG/DL — SIGNIFICANT CHANGE UP (ref 0.5–1.3)
EOSINOPHIL # BLD AUTO: 0.1 K/UL — SIGNIFICANT CHANGE UP (ref 0–0.5)
EOSINOPHIL NFR BLD AUTO: 1.7 % — SIGNIFICANT CHANGE UP (ref 0–6)
GLUCOSE BLDC GLUCOMTR-MCNC: 80 MG/DL — SIGNIFICANT CHANGE UP (ref 70–99)
GLUCOSE SERPL-MCNC: 89 MG/DL — SIGNIFICANT CHANGE UP (ref 70–99)
HCT VFR BLD CALC: 34.1 % — LOW (ref 34.5–45)
HGB BLD-MCNC: 11.3 G/DL — LOW (ref 11.5–15.5)
INR BLD: 1.24 RATIO — HIGH (ref 0.88–1.16)
LYMPHOCYTES # BLD AUTO: 1.1 K/UL — SIGNIFICANT CHANGE UP (ref 1–3.3)
LYMPHOCYTES # BLD AUTO: 13.4 % — SIGNIFICANT CHANGE UP (ref 13–44)
MAGNESIUM SERPL-MCNC: 2.1 MG/DL — SIGNIFICANT CHANGE UP (ref 1.6–2.6)
MCHC RBC-ENTMCNC: 31.4 PG — SIGNIFICANT CHANGE UP (ref 27–34)
MCHC RBC-ENTMCNC: 33.1 GM/DL — SIGNIFICANT CHANGE UP (ref 32–36)
MCV RBC AUTO: 94.7 FL — SIGNIFICANT CHANGE UP (ref 80–100)
MONOCYTES # BLD AUTO: 0.8 K/UL — SIGNIFICANT CHANGE UP (ref 0–0.9)
MONOCYTES NFR BLD AUTO: 9.3 % — SIGNIFICANT CHANGE UP (ref 2–14)
NEUTROPHILS # BLD AUTO: 6.3 K/UL — SIGNIFICANT CHANGE UP (ref 1.8–7.4)
NEUTROPHILS NFR BLD AUTO: 75.4 % — SIGNIFICANT CHANGE UP (ref 43–77)
PHOSPHATE SERPL-MCNC: 3.4 MG/DL — SIGNIFICANT CHANGE UP (ref 2.5–4.5)
PLATELET # BLD AUTO: 167 K/UL — SIGNIFICANT CHANGE UP (ref 150–400)
POTASSIUM SERPL-MCNC: 3.4 MMOL/L — LOW (ref 3.5–5.3)
POTASSIUM SERPL-SCNC: 3.4 MMOL/L — LOW (ref 3.5–5.3)
PROT SERPL-MCNC: 5.9 G/DL — LOW (ref 6–8.3)
PROTHROM AB SERPL-ACNC: 14.3 SEC — HIGH (ref 10–12.9)
RBC # BLD: 3.6 M/UL — LOW (ref 3.8–5.2)
RBC # FLD: 12.8 % — SIGNIFICANT CHANGE UP (ref 10.3–14.5)
SODIUM SERPL-SCNC: 140 MMOL/L — SIGNIFICANT CHANGE UP (ref 135–145)
TROPONIN T, HIGH SENSITIVITY RESULT: 24 NG/L — SIGNIFICANT CHANGE UP (ref 0–51)
WBC # BLD: 8.4 K/UL — SIGNIFICANT CHANGE UP (ref 3.8–10.5)
WBC # FLD AUTO: 8.4 K/UL — SIGNIFICANT CHANGE UP (ref 3.8–10.5)

## 2019-07-24 PROCEDURE — 99291 CRITICAL CARE FIRST HOUR: CPT

## 2019-07-24 PROCEDURE — 33361 REPLACE AORTIC VALVE PERQ: CPT | Mod: 62,Q0

## 2019-07-24 PROCEDURE — 93306 TTE W/DOPPLER COMPLETE: CPT | Mod: 26

## 2019-07-24 PROCEDURE — 93010 ELECTROCARDIOGRAM REPORT: CPT

## 2019-07-24 PROCEDURE — 33361 REPLACE AORTIC VALVE PERQ: CPT | Mod: Q0,62

## 2019-07-24 RX ORDER — POTASSIUM CHLORIDE 20 MEQ
40 PACKET (EA) ORAL ONCE
Refills: 0 | Status: COMPLETED | OUTPATIENT
Start: 2019-07-24 | End: 2019-07-24

## 2019-07-24 RX ORDER — CEFUROXIME AXETIL 250 MG
1500 TABLET ORAL EVERY 8 HOURS
Refills: 0 | Status: COMPLETED | OUTPATIENT
Start: 2019-07-24 | End: 2019-07-25

## 2019-07-24 RX ORDER — TRAZODONE HCL 50 MG
25 TABLET ORAL ONCE
Refills: 0 | Status: COMPLETED | OUTPATIENT
Start: 2019-07-24 | End: 2019-07-24

## 2019-07-24 RX ORDER — CEFUROXIME AXETIL 250 MG
1500 TABLET ORAL ONCE
Refills: 0 | Status: DISCONTINUED | OUTPATIENT
Start: 2019-07-24 | End: 2019-07-24

## 2019-07-24 RX ORDER — PANTOPRAZOLE SODIUM 20 MG/1
40 TABLET, DELAYED RELEASE ORAL
Refills: 0 | Status: DISCONTINUED | OUTPATIENT
Start: 2019-07-24 | End: 2019-07-26

## 2019-07-24 RX ORDER — ATORVASTATIN CALCIUM 80 MG/1
40 TABLET, FILM COATED ORAL AT BEDTIME
Refills: 0 | Status: DISCONTINUED | OUTPATIENT
Start: 2019-07-24 | End: 2019-07-26

## 2019-07-24 RX ORDER — TRAZODONE HCL 50 MG
25 TABLET ORAL AT BEDTIME
Refills: 0 | Status: DISCONTINUED | OUTPATIENT
Start: 2019-07-24 | End: 2019-07-24

## 2019-07-24 RX ORDER — PANTOPRAZOLE SODIUM 20 MG/1
40 TABLET, DELAYED RELEASE ORAL DAILY
Refills: 0 | Status: DISCONTINUED | OUTPATIENT
Start: 2019-07-24 | End: 2019-07-24

## 2019-07-24 RX ORDER — CHLORHEXIDINE GLUCONATE 213 G/1000ML
1 SOLUTION TOPICAL ONCE
Refills: 0 | Status: COMPLETED | OUTPATIENT
Start: 2019-07-24 | End: 2019-07-24

## 2019-07-24 RX ORDER — SERTRALINE 25 MG/1
100 TABLET, FILM COATED ORAL DAILY
Refills: 0 | Status: DISCONTINUED | OUTPATIENT
Start: 2019-07-24 | End: 2019-07-26

## 2019-07-24 RX ORDER — APIXABAN 2.5 MG/1
2.5 TABLET, FILM COATED ORAL EVERY 12 HOURS
Refills: 0 | Status: DISCONTINUED | OUTPATIENT
Start: 2019-07-25 | End: 2019-07-26

## 2019-07-24 RX ORDER — ASPIRIN/CALCIUM CARB/MAGNESIUM 324 MG
81 TABLET ORAL DAILY
Refills: 0 | Status: DISCONTINUED | OUTPATIENT
Start: 2019-07-24 | End: 2019-07-26

## 2019-07-24 RX ORDER — SODIUM CHLORIDE 9 MG/ML
1000 INJECTION INTRAMUSCULAR; INTRAVENOUS; SUBCUTANEOUS
Refills: 0 | Status: DISCONTINUED | OUTPATIENT
Start: 2019-07-24 | End: 2019-07-25

## 2019-07-24 RX ORDER — DOCUSATE SODIUM 100 MG
100 CAPSULE ORAL THREE TIMES A DAY
Refills: 0 | Status: DISCONTINUED | OUTPATIENT
Start: 2019-07-24 | End: 2019-07-26

## 2019-07-24 RX ADMIN — ATORVASTATIN CALCIUM 40 MILLIGRAM(S): 80 TABLET, FILM COATED ORAL at 21:08

## 2019-07-24 RX ADMIN — SODIUM CHLORIDE 3 MILLILITER(S): 9 INJECTION INTRAMUSCULAR; INTRAVENOUS; SUBCUTANEOUS at 11:34

## 2019-07-24 RX ADMIN — Medication 100 MILLIGRAM(S): at 21:08

## 2019-07-24 RX ADMIN — Medication 25 MILLIGRAM(S): at 23:34

## 2019-07-24 RX ADMIN — CHLORHEXIDINE GLUCONATE 1 APPLICATION(S): 213 SOLUTION TOPICAL at 11:34

## 2019-07-24 RX ADMIN — Medication 40 MILLIEQUIVALENT(S): at 17:10

## 2019-07-24 RX ADMIN — Medication 81 MILLIGRAM(S): at 21:08

## 2019-07-24 NOTE — PROGRESS NOTE ADULT - SUBJECTIVE AND OBJECTIVE BOX
This patient has been implanted with a Transcatheter Aortic Valve Implant under the following NCT/GORAN:    TAVR:    Commercial Implant NCT [id number], GORAN N/A and will be placed into the TVT Registry.    JOSE L Henriquez  [id number]

## 2019-07-24 NOTE — BRIEF OPERATIVE NOTE - COMMENTS
Left Transfemoral TVP left in, if no EKG changes immediately post-op and after 4 hours TVP can be removed per Dr Ayala. Right Transfemoral TVP left in, if no EKG changes immediately post-op and after 4 hours TVP can be removed per Dr Ayala.

## 2019-07-24 NOTE — PRE-ANESTHESIA EVALUATION ADULT - NSANTHPMHFT_GEN_ALL_CORE
89F PAF on Eliquis now 1st degree AVB Low flow AS 38/19 .7 cm2 EF 35-40% Carotids neg CXR neg (recent PNA) ho L MCA CVA (aphasia)

## 2019-07-24 NOTE — PROGRESS NOTE ADULT - SUBJECTIVE AND OBJECTIVE BOX
CELINA KESSLER   MRN#: [id number]     The patient is a 89y Female who was seen, evaluated, & examined with the CTICU staff post-operatively with a multidisciplinary care plan formulated & implemented.  All available clinical, laboratory, radiographic, pharmacologic, and electrocardiographic data were reviewed & analyzed.      The patient was in the CTICU in critical condition secondary to:     persistent cardiopulmonary dysfunction  conduction disease-cardiovascular dysfunction    For support and evaluation & prevention of further decompensation secondary to persistent cardiopulmonary dysfunction, respiratory status required:     supplemental oxygen with nasal cannula  continuous pulse oximetry monitoring  following ABGs with A-line monitoring    Continuous hemodynamic monitoring was required to ensure adequate cardiovascular support and to evaluate for & help prevent decompensation while receiving temporary pacing via a transvenous pacing wire secondary to conduction disease-cardiovascular dysfunction.    In addition:  Pre-procedure left bundle branch block, monitoring with back-up pacing via temporary transvenous wire  QRS is currently 146, no other signs of significant conduction system disease  Will recheck EKG later this evening to monitor QRS; will remove trans venous pacer if EKG remains stable  Apixaban for AF; ASA, statin    The patient required critical care management and I personally provided 30 minutes of non-continuous care to the patient, excluding separate procedures, in addition to  discussing the patient and plan at length with the CTICU staff and helping coordinate care.

## 2019-07-24 NOTE — BRIEF OPERATIVE NOTE - NSICDXBRIEFPROCEDURE_GEN_ALL_CORE_FT
PROCEDURES:  TAVR, percutaneous 24-Jul-2019 14:02:59 Right Transfemoral TAVR Felipe Tellez PROCEDURES:  TAVR, percutaneous 24-Jul-2019 14:02:59 Right Transfemoral TAVR #26 MedFelipe Epps PROCEDURES:  TAVR, percutaneous 24-Jul-2019 14:02:59 Left Transfemoral TAVR #26 Felipe Hsu

## 2019-07-24 NOTE — PRE-ANESTHESIA EVALUATION ADULT - LAST ECHOCARDIOGRAM
6/4/19  severely calcified aortic valve, LVEF 35-40%, worsening mitral valve regurg compared to previous echo; severe pHTN

## 2019-07-25 LAB
ALBUMIN SERPL ELPH-MCNC: 3.4 G/DL — SIGNIFICANT CHANGE UP (ref 3.3–5)
ALP SERPL-CCNC: 72 U/L — SIGNIFICANT CHANGE UP (ref 40–120)
ALT FLD-CCNC: 85 U/L — HIGH (ref 10–45)
ANION GAP SERPL CALC-SCNC: 12 MMOL/L — SIGNIFICANT CHANGE UP (ref 5–17)
AST SERPL-CCNC: 51 U/L — HIGH (ref 10–40)
BILIRUB SERPL-MCNC: 0.3 MG/DL — SIGNIFICANT CHANGE UP (ref 0.2–1.2)
BUN SERPL-MCNC: 27 MG/DL — HIGH (ref 7–23)
CALCIUM SERPL-MCNC: 8.5 MG/DL — SIGNIFICANT CHANGE UP (ref 8.4–10.5)
CHLORIDE SERPL-SCNC: 108 MMOL/L — SIGNIFICANT CHANGE UP (ref 96–108)
CO2 SERPL-SCNC: 24 MMOL/L — SIGNIFICANT CHANGE UP (ref 22–31)
CREAT SERPL-MCNC: 1.25 MG/DL — SIGNIFICANT CHANGE UP (ref 0.5–1.3)
GLUCOSE SERPL-MCNC: 88 MG/DL — SIGNIFICANT CHANGE UP (ref 70–99)
HCT VFR BLD CALC: 30.4 % — LOW (ref 34.5–45)
HGB BLD-MCNC: 10.7 G/DL — LOW (ref 11.5–15.5)
MAGNESIUM SERPL-MCNC: 2 MG/DL — SIGNIFICANT CHANGE UP (ref 1.6–2.6)
MCHC RBC-ENTMCNC: 33.4 PG — SIGNIFICANT CHANGE UP (ref 27–34)
MCHC RBC-ENTMCNC: 35.1 GM/DL — SIGNIFICANT CHANGE UP (ref 32–36)
MCV RBC AUTO: 95.2 FL — SIGNIFICANT CHANGE UP (ref 80–100)
PHOSPHATE SERPL-MCNC: 3.1 MG/DL — SIGNIFICANT CHANGE UP (ref 2.5–4.5)
PLATELET # BLD AUTO: 146 K/UL — LOW (ref 150–400)
POTASSIUM SERPL-MCNC: 3.8 MMOL/L — SIGNIFICANT CHANGE UP (ref 3.5–5.3)
POTASSIUM SERPL-SCNC: 3.8 MMOL/L — SIGNIFICANT CHANGE UP (ref 3.5–5.3)
PROT SERPL-MCNC: 5.4 G/DL — LOW (ref 6–8.3)
RBC # BLD: 3.2 M/UL — LOW (ref 3.8–5.2)
RBC # FLD: 12.5 % — SIGNIFICANT CHANGE UP (ref 10.3–14.5)
SODIUM SERPL-SCNC: 144 MMOL/L — SIGNIFICANT CHANGE UP (ref 135–145)
WBC # BLD: 8.2 K/UL — SIGNIFICANT CHANGE UP (ref 3.8–10.5)
WBC # FLD AUTO: 8.2 K/UL — SIGNIFICANT CHANGE UP (ref 3.8–10.5)

## 2019-07-25 PROCEDURE — 93010 ELECTROCARDIOGRAM REPORT: CPT | Mod: 76

## 2019-07-25 PROCEDURE — 99233 SBSQ HOSP IP/OBS HIGH 50: CPT

## 2019-07-25 PROCEDURE — 93306 TTE W/DOPPLER COMPLETE: CPT | Mod: 26

## 2019-07-25 PROCEDURE — 71045 X-RAY EXAM CHEST 1 VIEW: CPT | Mod: 26

## 2019-07-25 PROCEDURE — 99232 SBSQ HOSP IP/OBS MODERATE 35: CPT

## 2019-07-25 RX ORDER — FUROSEMIDE 40 MG
40 TABLET ORAL DAILY
Refills: 0 | Status: DISCONTINUED | OUTPATIENT
Start: 2019-07-25 | End: 2019-07-26

## 2019-07-25 RX ORDER — SODIUM CHLORIDE 9 MG/ML
3 INJECTION INTRAMUSCULAR; INTRAVENOUS; SUBCUTANEOUS EVERY 8 HOURS
Refills: 0 | Status: DISCONTINUED | OUTPATIENT
Start: 2019-07-25 | End: 2019-07-26

## 2019-07-25 RX ORDER — AMLODIPINE BESYLATE 2.5 MG/1
5 TABLET ORAL DAILY
Refills: 0 | Status: DISCONTINUED | OUTPATIENT
Start: 2019-07-25 | End: 2019-07-25

## 2019-07-25 RX ORDER — TRAZODONE HCL 50 MG
25 TABLET ORAL AT BEDTIME
Refills: 0 | Status: DISCONTINUED | OUTPATIENT
Start: 2019-07-25 | End: 2019-07-25

## 2019-07-25 RX ORDER — TRAZODONE HCL 50 MG
50 TABLET ORAL AT BEDTIME
Refills: 0 | Status: DISCONTINUED | OUTPATIENT
Start: 2019-07-25 | End: 2019-07-26

## 2019-07-25 RX ORDER — LOSARTAN POTASSIUM 100 MG/1
25 TABLET, FILM COATED ORAL DAILY
Refills: 0 | Status: DISCONTINUED | OUTPATIENT
Start: 2019-07-25 | End: 2019-07-26

## 2019-07-25 RX ORDER — AMIODARONE HYDROCHLORIDE 400 MG/1
200 TABLET ORAL DAILY
Refills: 0 | Status: DISCONTINUED | OUTPATIENT
Start: 2019-07-25 | End: 2019-07-26

## 2019-07-25 RX ADMIN — SODIUM CHLORIDE 3 MILLILITER(S): 9 INJECTION INTRAMUSCULAR; INTRAVENOUS; SUBCUTANEOUS at 12:25

## 2019-07-25 RX ADMIN — Medication 100 MILLIGRAM(S): at 05:13

## 2019-07-25 RX ADMIN — Medication 50 MILLIGRAM(S): at 22:22

## 2019-07-25 RX ADMIN — SODIUM CHLORIDE 3 MILLILITER(S): 9 INJECTION INTRAMUSCULAR; INTRAVENOUS; SUBCUTANEOUS at 22:30

## 2019-07-25 RX ADMIN — APIXABAN 2.5 MILLIGRAM(S): 2.5 TABLET, FILM COATED ORAL at 18:29

## 2019-07-25 RX ADMIN — LOSARTAN POTASSIUM 25 MILLIGRAM(S): 100 TABLET, FILM COATED ORAL at 12:23

## 2019-07-25 RX ADMIN — Medication 100 MILLIGRAM(S): at 12:23

## 2019-07-25 RX ADMIN — SERTRALINE 100 MILLIGRAM(S): 25 TABLET, FILM COATED ORAL at 12:23

## 2019-07-25 RX ADMIN — Medication 40 MILLIGRAM(S): at 18:29

## 2019-07-25 RX ADMIN — AMIODARONE HYDROCHLORIDE 200 MILLIGRAM(S): 400 TABLET ORAL at 12:23

## 2019-07-25 RX ADMIN — Medication 81 MILLIGRAM(S): at 12:23

## 2019-07-25 RX ADMIN — ATORVASTATIN CALCIUM 40 MILLIGRAM(S): 80 TABLET, FILM COATED ORAL at 22:23

## 2019-07-25 RX ADMIN — Medication 100 MILLIGRAM(S): at 05:12

## 2019-07-25 NOTE — PROGRESS NOTE ADULT - ASSESSMENT
89 y/o female with pmhx of Afib on Eliquis, left MCA CVA in 2017, symptomatic severe aortic stenosis with progressively worsening DAWKINS (able to walk maybe 1 block slowly).   Pt was also recently admitted to [facility] for PNA - completed 5 day course of Ceftriaxone and was discharged to home on July 16th.  As per patient and her family, CTSx is aware of her recent hospital admission. Since discharge, she is still experiencing fatigue and DAWKINS. Now s/p TAVR on 7/24 7/25 Transferred to [address] [place] [address]

## 2019-07-25 NOTE — PROGRESS NOTE ADULT - SUBJECTIVE AND OBJECTIVE BOX
VITAL SIGNS    Telemetry:    Vital Signs Last 24 Hrs  T(C): 36.7 (19 @ 12:19), Max: 37.1 (19 @ 00:00)  T(F): 98.1 (19 @ 12:19), Max: 98.8 (19 @ 00:00)  HR: 58 (19 @ :19) (45 - 58)  BP: 113/56 (19 @ 12:19) (108/49 - 151/63)  RR: 20 (19 @ 12:19) (16 - 33)  SpO2: 97% (19 @ 12:19) (91% - 100%)             @ 07:01  -   @ 07:00  --------------------------------------------------------  IN: 510 mL / OUT: 600 mL / NET: -90 mL     @ 07:01  -   @ 12:54  --------------------------------------------------------  IN: 190 mL / OUT: 300 mL / NET: -110 mL       Daily Height in cm: 154.94 (2019 15:15)    Daily Weight in k.8 (2019 00:00)  Admit Wt: Drug Dosing Weight  Height (cm): 154.9 (2019 15:15)  Weight (kg): 51.7 (2019 15:15)  BMI (kg/m2): 21.5 (2019 15:15)  BSA (m2): 1.49 (2019 15:15)     Daily Weight in k.8 (19 @ 00:00)    LABS      144  |  108  |  27<H>  ----------------------------<  88  3.8   |    |  1.25    Ca    8.5      2019 01:19  Phos  3.1     -  Mg     2.0         TPro  5.4<L>  /  Alb  3.4  /  TBili  0.3  /  DBili  x   /  AST  51<H>  /  ALT  85<H>  /  AlkPhos  72                                   10.7   8.2   )-----------( 146      ( 2019 01:19 )             30.4          PT/INR - ( 2019 16:14 )   PT: 14.3 sec;   INR: 1.24 ratio         PTT - ( 2019 16:14 )  PTT:35.1 sec        Bilirubin Total, Serum: 0.3 mg/dL ( @ 01:19)  Bilirubin Total, Serum: 0.4 mg/dL ( @ 16:14)    CAPILLARY BLOOD GLUCOSE              Drains:     MS       [  ]   [  ]            L Pleural [  ]            R Pleural  [  ]            LADY  [  ]           Nair  [  ]    Pacing Wires      [  ]   Settings:                                  Isolated  [  ]                    CXR:      MEDICATIONS  amiodarone    Tablet 200 milliGRAM(s) Oral daily  apixaban 2.5 milliGRAM(s) Oral every 12 hours  aspirin enteric coated 81 milliGRAM(s) Oral daily  atorvastatin 40 milliGRAM(s) Oral at bedtime  docusate sodium 100 milliGRAM(s) Oral three times a day  losartan 25 milliGRAM(s) Oral daily  pantoprazole    Tablet 40 milliGRAM(s) Oral before breakfast  sertraline 100 milliGRAM(s) Oral daily  sodium chloride 0.9% lock flush 3 milliLiter(s) IV Push every 8 hours      PHYSICAL EXAM      Neurology: alert and oriented x 3, nonfocal, no gross deficits  CV :S1S2  Sternal Wound :  CDI , Stable  Lungs: CTA  Abdomen: soft, nontender, nondistended, positive bowel sounds, last bowel movement   :   Voids    Extremities:   b/l groins no hematoma and soft.               PAST MEDICAL & SURGICAL HISTORY:  Pneumonia: discharged 19  Bladder cancer: TURBT x 2 in the past  Cerebrovascular accident (CVA): left MCA  2017  aphasia  Severe mitral valve regurgitation  Severe aortic stenosis  Paroxysmal atrial fibrillation: on Eliquis  HLD (hyperlipidemia)  HTN (hypertension)  History of bilateral oophorectomy  S/P cystoscopy: TURBT x 2  H/O shoulder surgery: arthroscopic  10+ yrs ago  right                 Discussed with Cardiothoracic Team at AM rounds.

## 2019-07-25 NOTE — PROGRESS NOTE ADULT - SUBJECTIVE AND OBJECTIVE BOX
CELINA KESSLER  MRN#: [id number]  Subjective:  Patient was seen and evaluate on AM rounds offering no specific complaints at this time.    OBJECTIVE:  ICU Vital Signs Last 24 Hrs  T(C): 36.6 (2019 08:00), Max: 37.1 (2019 00:00)  T(F): 97.9 (2019 08:00), Max: 98.8 (2019 00:00)  HR: 54 (2019 08:00) (45 - 56)  BP: 112/54 (2019 08:00) (108/49 - 151/63)  BP(mean): 78 (2019 08:00) (71 - 93)  ABP: --  ABP(mean): --  RR: 33 (2019 08:00) (16 - 33)  SpO2: 95% (2019 08:00) (91% - 100%)       @ 07:01  -   @ 07:00  --------------------------------------------------------  IN: 510 mL / OUT: 600 mL / NET: -90 mL     @ 07:01  -   @ 09:13  --------------------------------------------------------  IN: 20 mL / OUT: 0 mL / NET: 20 mL    PHYSICAL EXAM:Daily Height in cm: 154.94 (2019 15:15)    Daily Weight in k.8 (2019 00:00)  General: WN/WD NAD    HEENT:     + NCAT  + EOMI  - Conjuctival edema   - Icterus   - Thrush   - ETT  - NGT/OGT  Neck:         + FROM    + JVD     - Nodes     - Masses    + Mid-line trachea   - Tracheostomy  Chest:         - Sternal click  - Sternal drainage  - Pacing wires  - Chest tubes  - SubQ emphysema  Lungs:          + CTA   - Rhonchi    - Rales    - Wheezing     - Decreased BS   - Dullness R L  Cardiac:       + S1 + S2    + RRR   - Irregular   - S3  - S4    - Murmurs   - Rub   - Hamman’s sign   Abdomen:    + BS     + Soft    + Non-tender     - Distended    - Organomegaly  - PEG  Extremities:   - Cyanosis U/L   - Clubbing  U/L  - LE/UE Edema   + Capillary refill    + Pulses + groin TVP  Neuro:        + Awake   +  Alert   - Confused   - Lethargic   - Sedated   - Generalized Weakness  Skin:        - Rashes    - Erythema   + Normal incisions   + IV sites intact  - Sacral decubitus    HOSPITAL MEDICATIONS: All mediciations reviewed and analyzed  MEDICATIONS  (STANDING):  apixaban 2.5 milliGRAM(s) Oral every 12 hours  aspirin enteric coated 81 milliGRAM(s) Oral daily  atorvastatin 40 milliGRAM(s) Oral at bedtime  cefuroxime  IVPB 1500 milliGRAM(s) IV Intermittent every 8 hours  docusate sodium 100 milliGRAM(s) Oral three times a day  pantoprazole    Tablet 40 milliGRAM(s) Oral before breakfast  sertraline 100 milliGRAM(s) Oral daily  sodium chloride 0.9% lock flush 3 milliLiter(s) IV Push every 8 hours    MEDICATIONS  (PRN):    LABS: All Lab data reviewed and analyzed                        10.7   8.2   )-----------( 146      ( 2019 01:19 )             30.4    07-    144  |  108  |  27<H>  ----------------------------<  88  3.8   |  24  |  1.25    Ca    8.5      2019 01:19  Phos  3.1     -  Mg     2.0     -    TPro  5.4<L>  /  Alb  3.4  /  TBili  0.3  /  DBili  x   /  AST  51<H>  /  ALT  85<H>  /  AlkPhos  72  07-25    PT/INR - ( 2019 16:14 )   PT: 14.3 sec;   INR: 1.24 ratio         PTT - ( 2019 16:14 )  PTT:35.1 sec LIVER FUNCTIONS - ( 2019 01:19 )  Alb: 3.4 g/dL / Pro: 5.4 g/dL / ALK PHOS: 72 U/L / ALT: 85 U/L / AST: 51 U/L / GGT: x           RADIOLOGY: - Reviewed and analyzed     Assessment:  S/P transfemoral ADIN    Plan:  Respiratory status required supplemental oxygen & the following of continuous pulse oximetry for support & to prevent decompensation  Continued early mobilization as tolerated  Addressed analgesic regimen to optimize function  ASA and Plavix continued for thromboembolism prophylaxis  Protonix maintained for GI bleeding prophylaxis  Removed TVP-EPS consult  Reviewed & addressed surgical site infection prophylaxis regimen

## 2019-07-26 ENCOUNTER — TRANSCRIPTION ENCOUNTER (OUTPATIENT)
Age: 84
End: 2019-07-26

## 2019-07-26 VITALS
TEMPERATURE: 99 F | RESPIRATION RATE: 19 BRPM | DIASTOLIC BLOOD PRESSURE: 74 MMHG | OXYGEN SATURATION: 94 % | SYSTOLIC BLOOD PRESSURE: 130 MMHG | HEART RATE: 59 BPM

## 2019-07-26 LAB
ANION GAP SERPL CALC-SCNC: 13 MMOL/L — SIGNIFICANT CHANGE UP (ref 5–17)
BASOPHILS # BLD AUTO: 0.02 K/UL — SIGNIFICANT CHANGE UP (ref 0–0.2)
BASOPHILS NFR BLD AUTO: 0.2 % — SIGNIFICANT CHANGE UP (ref 0–2)
BUN SERPL-MCNC: 18 MG/DL — SIGNIFICANT CHANGE UP (ref 7–23)
CALCIUM SERPL-MCNC: 8.9 MG/DL — SIGNIFICANT CHANGE UP (ref 8.4–10.5)
CHLORIDE SERPL-SCNC: 102 MMOL/L — SIGNIFICANT CHANGE UP (ref 96–108)
CO2 SERPL-SCNC: 25 MMOL/L — SIGNIFICANT CHANGE UP (ref 22–31)
CREAT SERPL-MCNC: 0.82 MG/DL — SIGNIFICANT CHANGE UP (ref 0.5–1.3)
EOSINOPHIL # BLD AUTO: 0.12 K/UL — SIGNIFICANT CHANGE UP (ref 0–0.5)
EOSINOPHIL NFR BLD AUTO: 1.4 % — SIGNIFICANT CHANGE UP (ref 0–6)
GLUCOSE SERPL-MCNC: 91 MG/DL — SIGNIFICANT CHANGE UP (ref 70–99)
HCT VFR BLD CALC: 32.9 % — LOW (ref 34.5–45)
HGB BLD-MCNC: 10.8 G/DL — LOW (ref 11.5–15.5)
IMM GRANULOCYTES NFR BLD AUTO: 0.5 % — SIGNIFICANT CHANGE UP (ref 0–1.5)
LYMPHOCYTES # BLD AUTO: 0.62 K/UL — LOW (ref 1–3.3)
LYMPHOCYTES # BLD AUTO: 7.5 % — LOW (ref 13–44)
MCHC RBC-ENTMCNC: 30.8 PG — SIGNIFICANT CHANGE UP (ref 27–34)
MCHC RBC-ENTMCNC: 32.8 GM/DL — SIGNIFICANT CHANGE UP (ref 32–36)
MCV RBC AUTO: 93.7 FL — SIGNIFICANT CHANGE UP (ref 80–100)
MONOCYTES # BLD AUTO: 1.08 K/UL — HIGH (ref 0–0.9)
MONOCYTES NFR BLD AUTO: 13 % — SIGNIFICANT CHANGE UP (ref 2–14)
NEUTROPHILS # BLD AUTO: 6.43 K/UL — SIGNIFICANT CHANGE UP (ref 1.8–7.4)
NEUTROPHILS NFR BLD AUTO: 77.4 % — HIGH (ref 43–77)
PLATELET # BLD AUTO: 143 K/UL — LOW (ref 150–400)
POTASSIUM SERPL-MCNC: 3.2 MMOL/L — LOW (ref 3.5–5.3)
POTASSIUM SERPL-SCNC: 3.2 MMOL/L — LOW (ref 3.5–5.3)
RBC # BLD: 3.51 M/UL — LOW (ref 3.8–5.2)
RBC # FLD: 14.3 % — SIGNIFICANT CHANGE UP (ref 10.3–14.5)
SODIUM SERPL-SCNC: 140 MMOL/L — SIGNIFICANT CHANGE UP (ref 135–145)
WBC # BLD: 8.31 K/UL — SIGNIFICANT CHANGE UP (ref 3.8–10.5)
WBC # FLD AUTO: 8.31 K/UL — SIGNIFICANT CHANGE UP (ref 3.8–10.5)

## 2019-07-26 PROCEDURE — 99239 HOSP IP/OBS DSCHRG MGMT >30: CPT

## 2019-07-26 PROCEDURE — 99222 1ST HOSP IP/OBS MODERATE 55: CPT

## 2019-07-26 PROCEDURE — 71045 X-RAY EXAM CHEST 1 VIEW: CPT | Mod: 26

## 2019-07-26 PROCEDURE — 93010 ELECTROCARDIOGRAM REPORT: CPT

## 2019-07-26 RX ORDER — AMIODARONE HYDROCHLORIDE 400 MG/1
1 TABLET ORAL
Qty: 0 | Refills: 0 | DISCHARGE
Start: 2019-07-26

## 2019-07-26 RX ORDER — PANTOPRAZOLE SODIUM 20 MG/1
1 TABLET, DELAYED RELEASE ORAL
Qty: 30 | Refills: 0
Start: 2019-07-26 | End: 2019-08-24

## 2019-07-26 RX ORDER — POLYETHYLENE GLYCOL 3350 17 G/17G
17 POWDER, FOR SOLUTION ORAL DAILY
Refills: 0 | Status: DISCONTINUED | OUTPATIENT
Start: 2019-07-26 | End: 2019-07-26

## 2019-07-26 RX ORDER — ASPIRIN/CALCIUM CARB/MAGNESIUM 324 MG
1 TABLET ORAL
Qty: 0 | Refills: 0 | DISCHARGE
Start: 2019-07-26

## 2019-07-26 RX ORDER — POTASSIUM CHLORIDE 20 MEQ
20 PACKET (EA) ORAL
Refills: 0 | Status: COMPLETED | OUTPATIENT
Start: 2019-07-26 | End: 2019-07-26

## 2019-07-26 RX ADMIN — SERTRALINE 100 MILLIGRAM(S): 25 TABLET, FILM COATED ORAL at 12:27

## 2019-07-26 RX ADMIN — APIXABAN 2.5 MILLIGRAM(S): 2.5 TABLET, FILM COATED ORAL at 05:37

## 2019-07-26 RX ADMIN — PANTOPRAZOLE SODIUM 40 MILLIGRAM(S): 20 TABLET, DELAYED RELEASE ORAL at 05:37

## 2019-07-26 RX ADMIN — Medication 20 MILLIEQUIVALENT(S): at 12:27

## 2019-07-26 RX ADMIN — Medication 100 MILLIGRAM(S): at 12:27

## 2019-07-26 RX ADMIN — AMIODARONE HYDROCHLORIDE 200 MILLIGRAM(S): 400 TABLET ORAL at 05:36

## 2019-07-26 RX ADMIN — SODIUM CHLORIDE 3 MILLILITER(S): 9 INJECTION INTRAMUSCULAR; INTRAVENOUS; SUBCUTANEOUS at 06:16

## 2019-07-26 RX ADMIN — Medication 81 MILLIGRAM(S): at 12:27

## 2019-07-26 RX ADMIN — POLYETHYLENE GLYCOL 3350 17 GRAM(S): 17 POWDER, FOR SOLUTION ORAL at 09:52

## 2019-07-26 RX ADMIN — Medication 100 MILLIGRAM(S): at 05:36

## 2019-07-26 RX ADMIN — LOSARTAN POTASSIUM 25 MILLIGRAM(S): 100 TABLET, FILM COATED ORAL at 05:36

## 2019-07-26 RX ADMIN — Medication 20 MILLIEQUIVALENT(S): at 09:52

## 2019-07-26 RX ADMIN — Medication 40 MILLIGRAM(S): at 05:36

## 2019-07-26 NOTE — DISCHARGE NOTE NURSING/CASE MANAGEMENT/SOCIAL WORK - NSDCDPATPORTLINK_GEN_ALL_CORE
You can access the [facility] Portal, offered by [facility], by registering with the following website: [email or web address]

## 2019-07-26 NOTE — DISCHARGE NOTE PROVIDER - CARE PROVIDERS DIRECT ADDRESSES
[email or web address]

## 2019-07-26 NOTE — DISCHARGE NOTE PROVIDER - HOSPITAL COURSE
This ia a 89 y/o female with pmhx of Afib on Eliquis, left MCA CVA in 2017, symptomatic severe aortic stenosis with progressively worsening DAWKINS (able to walk maybe 1 block slowly).     Pt was also recently admitted to [facility] for PNA - completed 5 day course of Ceftriaxone and was discharged to home on July 16th.  As per patient and her family, CTSx is aware of her recent hospital admission. Since discharge, she is still experiencing fatigue and DAWKINS. Now s/p TAVR on 7/24 7/25 Transferred to [address] [place] [address] stable    7/26 VSS ZIO Patch placed EKG read and approved by Dr Ayala patient eager and stable for discharge

## 2019-07-26 NOTE — CONSULT NOTE ADULT - ASSESSMENT
89 y/o female with PMH of HTN, pAF on Eliquis, Left MCA CVA 2017, HLD, moderate to severe MR, severe AS, s/p TAVR on 7/24/19 with LBBB. TTE from 6/24/19 with LVEF of 35-40%, post TAVR LVEF 30-35%. EPS consulted for LBBB.    #LBBB  # s/p TAVR on 7/24/19  - Review of ECg pre and post TAVR with existing LBBB  - Continue current management  - Zyopatch prior to discharge today    #[phone] 89 y/o female with PMH of HTN, pAF on Eliquis, Left MCA CVA 2017, HLD, moderate to severe MR, severe AS, s/p TAVR on 7/24/19 with LBBB. TTE from 6/24/19 with LVEF of 35-40%, post TAVR LVEF 30-35%. EPS consulted for LBBB.    #LBBB  # s/p TAVR on 7/24/19  - Review of ECG with pre existing LBBB and noted on post op ecg  - Continue current management  - Zyopatch prior to discharge today as d/w EPS attending    #[phone]

## 2019-07-26 NOTE — DISCHARGE NOTE PROVIDER - NSDCPNSUBOBJ_GEN_ALL_CORE
AxOx3    NSR 50-70    S1 S2 RRR    Lungs CTA    ab soft nontender + BM    Voids    Bl groin sites benign    B/l le warm well perfused + DP    T(C): 36.8 (07-26-19 @ 04:42), Max: 36.8 (07-25-19 @ 19:36)    T(F): 98.3 (07-26-19 @ 04:42), Max: 98.3 (07-26-19 @ 04:42)    HR: 60 (07-26-19 @ 04:42) (58 - 62)    BP: 118/64 (07-26-19 @ 04:42) (113/56 - 120/62)    RR: 18 (07-26-19 @ 04:42) (18 - 20)    SpO2: 95% (07-26-19 @ 04:42) (94% - 97%)                  Stable and eager for discharge    Greater then 40 minutes spent on discharge

## 2019-07-26 NOTE — DISCHARGE NOTE PROVIDER - NSDCCPCAREPLAN_GEN_ALL_CORE_FT
PRINCIPAL DISCHARGE DIAGNOSIS  Diagnosis: S/P TAVR (transcatheter aortic valve replacement)  Assessment and Plan of Treatment: s/p TAVR via right femoral 7/24  Follow up wit Dr Gibson  in one week on July 30 at 2pm at [address] [address]  [phone]  Follow  up with Dr Ayala in one month have an echocardiogram prior to office visit    Take all medications as prescribed   Shower daily no creams or lotions on incision site  No heavy lifting   resume activity as tolerated   Followup withyour PCP within 7-10 days of discharge  Call our office for fever chills or any concerns [phone]

## 2019-07-26 NOTE — DISCHARGE NOTE PROVIDER - CARE PROVIDER_API CALL
Renzo Gibson (MD)  Thoracic and Cardiac Surgery  [address] [place] [address]  Phone: [phone]  Fax: [phone]  Follow Up Time:     Sandoval Ayala)  Cardiovascular Disease  [address] [place] [address]  Phone: [phone]  Fax: [phone]  Follow Up Time:

## 2019-07-26 NOTE — CONSULT NOTE ADULT - SUBJECTIVE AND OBJECTIVE BOX
CHIEF COMPLAINT:  TAVR    HISTORY OF PRESENT ILLNESS:   89 y/o female with pmhx of Afib on Eliquis, left MCA CVA in 2017, symptomatic severe aortic stenosis with progressively worsening DAWKINS (able to walk maybe 1 block slowly). Pt was also recently admitted to [facility] for PNA - completed 5 day course of Ceftriaxone and was discharged to home on July 16th.  As per patient and her family, [facility] is aware of her recent hospital admission. Since discharge, she is still experiencing fatigue and DAWKINS. Presents now for TAVR.       Allergies    Sulfur (Fever)    Intolerances    codeine (Nausea)  	    MEDICATIONS:  amiodarone    Tablet 200 milliGRAM(s) Oral daily  apixaban 2.5 milliGRAM(s) Oral every 12 hours  aspirin enteric coated 81 milliGRAM(s) Oral daily  furosemide    Tablet 40 milliGRAM(s) Oral daily  losartan 25 milliGRAM(s) Oral daily  sertraline 100 milliGRAM(s) Oral daily  traZODone 50 milliGRAM(s) Oral at bedtime PRN  docusate sodium 100 milliGRAM(s) Oral three times a day  pantoprazole    Tablet 40 milliGRAM(s) Oral before breakfast  polyethylene glycol 3350 17 Gram(s) Oral daily  atorvastatin 40 milliGRAM(s) Oral at bedtime  potassium chloride    Tablet ER 20 milliEquivalent(s) Oral every 2 hours  sodium chloride 0.9% lock flush 3 milliLiter(s) IV Push every 8 hours      PAST MEDICAL & SURGICAL HISTORY:  Pneumonia: discharged 7/16/19  Bladder cancer: TURBT x 2 in the past  Cerebrovascular accident (CVA): left MCA  2017  aphasia  Severe mitral valve regurgitation  Severe aortic stenosis  Paroxysmal atrial fibrillation: on Eliquis  HLD (hyperlipidemia)  HTN (hypertension)  History of bilateral oophorectomy  S/P cystoscopy: TURBT x 2  H/O shoulder surgery: arthroscopic  10+ yrs ago  right      FAMILY HISTORY:      SOCIAL HISTORY:    [ ] Non-smoker  [ ] Smoker  [ ] Alcohol      REVIEW OF SYSTEMS:  Constitutional: no fever or chills  Neuro: no dizziness or lightheadedness  Respiratory: no sob or cough  Cardiac: no chest pain or palpitations  GI: no nausea or vomiting  : no dysuria or hematuria  Ext: some weakness    PHYSICAL EXAM:  T(C): 36.8 (07-26-19 @ 04:42), Max: 36.8 (07-25-19 @ 19:36)  HR: 60 (07-26-19 @ 04:42) (58 - 62)  BP: 118/64 (07-26-19 @ 04:42) (113/56 - 120/62)  RR: 18 (07-26-19 @ 04:42) (18 - 20)  SpO2: 95% (07-26-19 @ 04:42) (94% - 97%)  Wt(kg): --  I&O's Summary    25 Jul 2019 07:01  -  26 Jul 2019 07:00  --------------------------------------------------------  IN: 430 mL / OUT: 500 mL / NET: -70 mL    26 Jul 2019 07:01  -  26 Jul 2019 12:12  --------------------------------------------------------  IN: 240 mL / OUT: 350 mL / NET: -110 mL        Appearance: Normal, frail, in NAD	  HEENT: Normocephalic, atraumatic, normal oral mucosa  Cardiovascular: Normal S1 S2, No JVD, No murmurs, No edema  Respiratory: Lungs clear to auscultation	  Psychiatry: A & O x 3, Mood & affect appropriate  Gastrointestinal:  Soft, Non-tender, + BS	  Skin: No rashes, No ecchymoses, No cyanosis	  Extremities: Normal range of motion, No clubbing, cyanosis or edema  Vascular: Peripheral pulses palpable 2+ bilaterally        LABS:	 	    CBC Full  -  ( 26 Jul 2019 08:27 )  WBC Count : 8.31 K/uL  Hemoglobin : 10.8 g/dL  Hematocrit : 32.9 %  Platelet Count - Automated : 143 K/uL  Mean Cell Volume : 93.7 fl  Mean Cell Hemoglobin : 30.8 pg  Mean Cell Hemoglobin Concentration : 32.8 gm/dL  Auto Neutrophil # : 6.43 K/uL  Auto Lymphocyte # : 0.62 K/uL  Auto Monocyte # : 1.08 K/uL  Auto Eosinophil # : 0.12 K/uL  Auto Basophil # : 0.02 K/uL  Auto Neutrophil % : 77.4 %  Auto Lymphocyte % : 7.5 %  Auto Monocyte % : 13.0 %  Auto Eosinophil % : 1.4 %  Auto Basophil % : 0.2 %    07-26    140  |  102  |  18  ----------------------------<  91  3.2<L>   |  25  |  0.82  07-25    144  |  108  |  27<H>  ----------------------------<  88  3.8   |  24  |  1.25    Ca    8.9      26 Jul 2019 05:50  Ca    8.5      25 Jul 2019 01:19  Phos  3.1     07-25  Phos  3.4     07-24  Mg     2.0     07-25  Mg     2.1     07-24    TPro  5.4<L>  /  Alb  3.4  /  TBili  0.3  /  DBili  x   /  AST  51<H>  /  ALT  85<H>  /  AlkPhos  72  07-25  TPro  5.9<L>  /  Alb  3.5  /  TBili  0.4  /  DBili  x   /  AST  66<H>  /  ALT  100<H>  /  AlkPhos  79  07-24      TELEMETRY: SInus Bradycardia/NSR 50-60's  	    ECG 7/26: NSR with 1st Degree AV Block @ 62 bpm, LBBB, ME 264ms, QRS 152ms    ECG 6/13: Sinus Bradycardia with 1st Degree AV Block @ 53 bpm, LBBB, ME 260ms, QRS 148ms    Study date: 06/13/2019    PROCEDURE:  --  Right heart catheterization.  --  Left coronary angiography.  --  Right coronary angiography.  --  Sonosite - Diagnostic.  TECHNIQUE: The risks and alternatives of the procedures and conscious  sedation were explained to the patient and informed consent was obtained.  Cardiac catheterization performed electively.  Local anesthetic given. Right femoral artery access. Right femoral vein  access. Right heart catheterization. The procedure was performed utilizing  a catheter. Left coronary artery angiography. The vessel was injected  utilizing a catheter. Right coronary artery angiography. The vessel was  injected utilizing a catheter. Sonosite - Diagnostic. RADIATION EXPOSURE:  7.1 min.  CONTRAST GIVEN: Omnipaque 14 ml.  MEDICATIONS GIVEN: Fentanyl, 25 mcg, IV.  CORONARY VESSELS: The coronary circulation is right dominant.  LM:   --  LM: Normal.  LAD:   --  LAD: Angiography showed minor luminal irregularities with no  flow limiting lesions.  CX:   --  Circumflex: Normal.  RCA:   --  RCA: Angiography showed minor luminal irregularities with no  flow limiting lesions.  COMPLICATIONS: There were no complications.  DIAGNOSTIC RECOMMENDATIONS: The patient should continue with the present  medications.  Prepared and signed by  Jaxon Solano M.D.  Signed 06/13/2019 14:01:49      Study Date: 6/4/2019  PROCEDURE: Transthoracic echocardiogram with 2-D, M-Mode  and complete spectral and color flow Doppler.  INDICATION: Nonrheumatic aortic (valve) stenosis (I35.0)  ------------------------------------------------------------------------  MEASUREMENTS: (See below)  ------------------------------------------------------------------------  OBSERVATIONS:  Mitral Valve: There is mitral annular calcification with  thickening of the  mitral valve leaflets. Moderate-severe  mitral regurgitation.  Aortic Root: Aortic Root: 3 cm.  Ascending Aorta: 3.1 cm.  LVOT diameter: 2.1 cm.  The aortic root and proximal ascending thoracic aorta are  normal in size.  There is effacement of the sinotubular  junction.  Aortic Valve: The aortic valve is severely calcified.  The  number of cusps and morphology is not well seen. Peak  transaortic valve gradient [id number] mm Hg, mean  transaortic valve gradient equals 19 mm Hg, estimated  aortic valve area equals 0.7 sqcm (by continuity equation),  the DVI= 0.21, consistent with severe aortic stenosis.  The  LV stroke volume index= 32ml/m2/beat.  This is low  gradient/low stroke volume/severe aortic stenosis.   No  aortic valve regurgitation seen. Peak left ventricular  outflow tract gradient equals 1 mm Hg, mean gradient is  equal to 1 mm Hg, LVOT velocity time integral equals 15 cm.  Left Atrium: Severely dilated left atrium.  LA volume index  = 69 cc/m2.  Left Ventricle: There is global hypokinesis with regional  variation.  The inferolateral wall is the most hypokinetic.   The LVEF= 35-40%. Normal left ventricular internal  dimensions and wall thicknesses.  There is an apical  pseudotendon seen.   There is pseudonormal filling pattern.   The left atrial pressure is elevated.  E/E'= 30.  Right Heart: Normal right atrium.  The right atrium= 20cm2.  Normal right ventricular size and function.  Minimal  tricuspid regurgitation. Normal pulmonic valve. Minimal  pulmonic regurgitation.  Pericardium/Pleura There is no pericardial effusion.  Hemodynamic: The IVC is dilated with minimal variation with  sniff suggestive of elevated right atrial pressure.  Estimated right ventricular systolic pressure equals 67 mm  Hg, assuming right atrial pressure equals 15 mm Hg,  consistent with severe pulmonary hypertension.  ------------------------------------------------------------------------  CONCLUSIONS:  1.There is mitral annular calcification with thickening of  the  mitral valve leaflets. Moderate-severe mitral  regurgitation.  2. The aortic valve is severely calcified.  The number of  cusps and morphology is not well seen. Peak transaortic  valve gradient equals 38 mm Hg, mean transaortic valve  gradient equals 19 mm Hg, estimated aortic valve area  equals 0.7 sqcm (by continuity equation), the DVI= 0.21,  consistent with severe aortic stenosis.  The LV stroke  volume index= 32ml/m2/beat.  This is low gradient/low  stroke volume/severe aortic stenosis.   No aortic valve  regurgitation seen.  3. Aortic Root: 3 cm.  Ascending Aorta: 3.1 cm.  LVOT diameter: 2.1 cm.  The aortic root and proximal ascending thoracic aorta are  normal in size.  There is effacement of the sinotubular  junction.  4. Severely dilated left atrium.  LA volume index = 69  cc/m2.  5. Normal left ventricular internal dimensions and wall  thicknesses.  There is an apical pseudotendon seen.  6. There is global hypokinesis with regional variation.  The inferolateral wall is the most hypokinetic.  The LVEF=  35-40%.  7. Normal right ventricular size and function.  8. The IVC is dilated with minimal variation with sniff  suggestive of elevated right atrial pressure.  9.Estimated right ventricular systolic pressure equals 67  mm Hg, assuming right atrial pressure equals 15 mm Hg,  consistent with severe pulmonary hypertension.  10.  Minimal tricuspid regurgitation.  11.  There is no pericardial effusion.  *** Compared with prior report from 5/31/2017, the left  ventricular function has declined,  the calculated aortic  stenosis is now severe, there is worse mitral regurgitation  and now severe pulmonary hypertension.  ------------------------------------------------------------------------  PROCEDURE DESCRIPTION: Transthoracic echocardiogram with  2-D, M-Mode and complete spectral and color flow Doppler.  ------------------------------------------------------------------------  ECHOCARDIOGRAPHIC EXAMINATION:  AORTIC ROOT:  Aortic Root (Leaflet): 3.0 cm  Aortic Root Index: 0.9  Aortic Tubular: 3.1 cm  LEFT ATRIUM:  AP Dimensions: 4.7 cm  LA Volume Index: 69.00 cc/sqm  LA Volume: 107.6 cc  LEFT VENTRICLE:  LVIDd: 5.3 cm  LVIDs: 4.3 cm  Fraction Short: 19 %  IVS: 1.08  ILWT: 0.9 cm  RWT: 0.33  LV Mass: 198.0 gm  LV Mass Index: 128 gm/sqm      TTE 7/25:  ------------------------------------------------------------------------  PROCEDURE: Transthoracic echocardiogram with 2-D, M-Mode  and complete spectral and colorflow Doppler.  INDICATION: Presence of prosthetic heart valve (Z95.2)  ------------------------------------------------------------------------  CONCLUSIONS:  1. Moderate-severe(3+)  mitral regurgitation.  2. The patient is s/p #26mm Evolut Pro TAVR.  Peak/mean  gradients= 8/3mmHg with a DVI= 0.73. The DEE by the  continuity equation equals 2.5 sqcm. There is a mild  paravalvular regurgitation jet originating from the  anterior aspect of theTAVR.  There is no intravalvular  regurgitation seen.  3. Severely dilated left atrium.  LA volume index = 80  cc/m2.  4. Normal left ventricular internal dimensions and wall  thicknesses.  5. There is global hypokinesis with minor regional  variation.  The LVEF= 30-35%.  6. Normal right ventricular size and function.  7. There is a trivial pericardial effusion.  8. Bilateral pleural effusions.  ------------------------------------------------------------------------

## 2019-07-29 ENCOUNTER — RX RENEWAL (OUTPATIENT)
Age: 84
End: 2019-07-29

## 2019-07-29 ENCOUNTER — MEDICATION RENEWAL (OUTPATIENT)
Age: 84
End: 2019-07-29

## 2019-07-29 NOTE — DISCUSSION/SUMMARY
[Home] : patient was discharged to home [FreeTextEntry1] :  Spoke with patient's daughter,Avsi, s/p hospitalization. As per pt's daughter, pt has f/u visit appoint with surgeon tomorrow, 7/30/19.patient's daughter advised to keep visit appoint with Dr Samuel on 9/19/19

## 2019-07-29 NOTE — DISCUSSION/SUMMARY
[Home] : patient was discharged to home [FreeTextEntry1] :  Spoke with patient's daughter,Avis, s/p hospitalization. As per pt's daughter, pt has f/u visit appoint with surgeon tomorrow, 7/30/19.patient's daughter advised to keep visit appoint with Dr Samuel on 9/19/19

## 2019-07-30 ENCOUNTER — NON-APPOINTMENT (OUTPATIENT)
Age: 84
End: 2019-07-30

## 2019-07-30 ENCOUNTER — APPOINTMENT (OUTPATIENT)
Dept: CARDIOTHORACIC SURGERY | Facility: CLINIC | Age: 84
End: 2019-07-30
Payer: MEDICARE

## 2019-07-30 VITALS
DIASTOLIC BLOOD PRESSURE: 65 MMHG | HEART RATE: 70 BPM | TEMPERATURE: 98 F | HEIGHT: 62 IN | BODY MASS INDEX: 20.61 KG/M2 | OXYGEN SATURATION: 96 % | RESPIRATION RATE: 13 BRPM | WEIGHT: 112 LBS | SYSTOLIC BLOOD PRESSURE: 118 MMHG

## 2019-07-30 PROCEDURE — 99214 OFFICE O/P EST MOD 30 MIN: CPT

## 2019-07-30 RX ORDER — METOPROLOL TARTRATE 25 MG/1
25 TABLET, FILM COATED ORAL
Qty: 180 | Refills: 3 | Status: COMPLETED | COMMUNITY
Start: 2017-06-14 | End: 2019-07-30

## 2019-07-30 RX ORDER — ACETAMINOPHEN 500 MG/1
500 TABLET, COATED ORAL
Qty: 100 | Refills: 0 | Status: COMPLETED | COMMUNITY
Start: 2019-02-15 | End: 2019-07-30

## 2019-07-30 RX ORDER — MULTIVITAMIN
TABLET ORAL
Refills: 0 | Status: COMPLETED | COMMUNITY
Start: 2018-10-30 | End: 2019-07-30

## 2019-08-02 ENCOUNTER — APPOINTMENT (OUTPATIENT)
Dept: PULMONOLOGY | Facility: CLINIC | Age: 84
End: 2019-08-02
Payer: MEDICARE

## 2019-08-02 VITALS
HEIGHT: 62 IN | RESPIRATION RATE: 16 BRPM | OXYGEN SATURATION: 5 % | BODY MASS INDEX: 20.43 KG/M2 | SYSTOLIC BLOOD PRESSURE: 110 MMHG | WEIGHT: 111 LBS | HEART RATE: 60 BPM | TEMPERATURE: 97 F | DIASTOLIC BLOOD PRESSURE: 66 MMHG

## 2019-08-02 PROCEDURE — 99204 OFFICE O/P NEW MOD 45 MIN: CPT

## 2019-08-02 NOTE — ASSESSMENT
[FreeTextEntry1] : 89yo female being seen in f/u for hospitalization at [facility] 7/11-7/16 for multifocal pneumonia with entero/rhinovirus infection.  She is also s/p TAVR on 7/24.  PMH atrial fibrillation (on Eliquis), AS, HTN, HLD, L MCA CVA (2017).  \par \par A review of the most recent CXR shows improvement in patient's prior multifocal pneumonia.  Notable was an enlarged heart and a small L pleural effusion.  Explained to patient that this would likely resolve on its own.  Should patient experience increased dyspnea, she was instructed to call us.  Cautioned safe swallowing techniques.  No further f/u needed at this time.

## 2019-08-02 NOTE — HISTORY OF PRESENT ILLNESS
[FreeTextEntry1] : 89yo female being seen in f/u for hospitalization at [facility] 7/11-7/16 for multifocal pneumonia with entero/rhinovirus infection.  She is also s/p TAVR on 7/24.  PMH atrial fibrillation (on Eliquis), AS, HTN, HLD, L MCA CVA (2017).  \par \par Day before admission on 7/11, patient reports having had a cough with chills and body aches.  CXR had revealed multifocal pneumonia.  Was treated inpatient with ceftriaxone and azithromycin.  Last x-ray was on 7/26 prior to valvular surgery, showing improvement in pneumonia and a small L pleural effusion.\par \par Patient has no complaints today, except some discomfort by her groin from the procedure site.  Denies cough, dyspnea, chest tightness and chest pain.  Does not report dysphagia, but notes that some foods (i.e. breads, potatoes) sometimes get stuck in her throat, therefore she takes smaller bites.  She is a lifelong non smoker.  Has had at least two other pneumonias requiring hospitalization in the past few years.

## 2019-08-02 NOTE — REASON FOR VISIT
[Initial Evaluation] : an initial evaluation [Family Member] : family member [FreeTextEntry2] : hospital f/u

## 2019-08-02 NOTE — PHYSICAL EXAM
[Normal Appearance] : normal appearance [Well Groomed] : well groomed [Normal Conjunctiva] : the conjunctiva exhibited no abnormalities [General Appearance - In No Acute Distress] : no acute distress [Normal Oropharynx] : normal oropharynx [Neck Appearance] : the appearance of the neck was normal [Heart Rate And Rhythm] : heart rate and rhythm were normal [Heart Sounds] : normal S1 and S2 [Arterial Pulses Normal] : the arterial pulses were normal [Edema] : no peripheral edema present [Respiration, Rhythm And Depth] : normal respiratory rhythm and effort [Exaggerated Use Of Accessory Muscles For Inspiration] : no accessory muscle use [Nail Clubbing] : no clubbing of the fingernails [Abnormal Walk] : normal gait [Cyanosis, Localized] : no localized cyanosis [Skin Lesions] : no skin lesions [] : no rash [No Focal Deficits] : no focal deficits [Affect] : the affect was normal [Mood] : the mood was normal [Murmurs] : no murmurs present [FreeTextEntry1] : 1/6 systolic ejection murmur aortic area

## 2019-08-05 RX ORDER — METOPROLOL TARTRATE 25 MG/1
25 TABLET, FILM COATED ORAL TWICE DAILY
Qty: 120 | Refills: 0 | Status: DISCONTINUED | COMMUNITY
Start: 2019-07-30 | End: 2019-08-05

## 2019-08-07 ENCOUNTER — APPOINTMENT (OUTPATIENT)
Dept: CARE COORDINATION | Facility: HOME HEALTH | Age: 84
End: 2019-08-07
Payer: MEDICARE

## 2019-08-07 PROCEDURE — 99024 POSTOP FOLLOW-UP VISIT: CPT

## 2019-08-24 VITALS — HEART RATE: 55 BPM | SYSTOLIC BLOOD PRESSURE: 100 MMHG | DIASTOLIC BLOOD PRESSURE: 60 MMHG | OXYGEN SATURATION: 94 %

## 2019-08-24 NOTE — ASSESSMENT
[FreeTextEntry1] : 89 y/o female with pmhx of Afib on Eliquis, left MCA CVA in 2017, \par symptomatic severe aortic stenosis \par s/p TAVR via right femoral 7/24 \par

## 2019-08-24 NOTE — REVIEW OF SYSTEMS
[Feeling Tired] : feeling tired [Limb Swelling] : limb swelling [Negative] : Heme/Lymph [de-identified] : B/L ankle swelling

## 2019-08-24 NOTE — PHYSICAL EXAM
[Sclera] : the sclera and conjunctiva were normal [Auscultation Breath Sounds / Voice Sounds] : lungs were clear to auscultation bilaterally [Heart Rate And Rhythm] : heart rate was normal and rhythm regular [Heart Sounds] : normal S1 and S2 [1+] : left 1+ [FreeTextEntry1] : L groin- no bruit, trace LE edema

## 2019-08-24 NOTE — HISTORY OF PRESENT ILLNESS
[FreeTextEntry1] : FOLLOW YOUR HEART HOME VISIT-ticketea\par Home Visit made Chris KESSLER ] . A  patient of Dr Marita mera,  S/P  TAVR on 7/24. \par \par \par

## 2019-09-05 ENCOUNTER — APPOINTMENT (OUTPATIENT)
Dept: CARDIOTHORACIC SURGERY | Facility: CLINIC | Age: 84
End: 2019-09-05
Payer: MEDICARE

## 2019-09-05 ENCOUNTER — APPOINTMENT (OUTPATIENT)
Dept: CV DIAGNOSITCS | Facility: HOSPITAL | Age: 84
End: 2019-09-05

## 2019-09-05 ENCOUNTER — OUTPATIENT (OUTPATIENT)
Dept: OUTPATIENT SERVICES | Facility: HOSPITAL | Age: 84
LOS: 1 days | End: 2019-09-05
Payer: MEDICARE

## 2019-09-05 ENCOUNTER — NON-APPOINTMENT (OUTPATIENT)
Age: 84
End: 2019-09-05

## 2019-09-05 VITALS
DIASTOLIC BLOOD PRESSURE: 59 MMHG | OXYGEN SATURATION: 95 % | SYSTOLIC BLOOD PRESSURE: 135 MMHG | HEART RATE: 60 BPM | RESPIRATION RATE: 18 BRPM

## 2019-09-05 DIAGNOSIS — Z98.890 OTHER SPECIFIED POSTPROCEDURAL STATES: Chronic | ICD-10-CM

## 2019-09-05 DIAGNOSIS — Z90.722 ACQUIRED ABSENCE OF OVARIES, BILATERAL: Chronic | ICD-10-CM

## 2019-09-05 DIAGNOSIS — I35.0 NONRHEUMATIC AORTIC (VALVE) STENOSIS: ICD-10-CM

## 2019-09-05 PROCEDURE — 93306 TTE W/DOPPLER COMPLETE: CPT

## 2019-09-05 PROCEDURE — 99215 OFFICE O/P EST HI 40 MIN: CPT

## 2019-09-05 PROCEDURE — 93000 ELECTROCARDIOGRAM COMPLETE: CPT

## 2019-09-05 PROCEDURE — 93306 TTE W/DOPPLER COMPLETE: CPT | Mod: 26

## 2019-09-05 NOTE — REVIEW OF SYSTEMS
[Fever] : no fever [Chills] : no chills [Feeling Poorly] : not feeling poorly [Feeling Tired] : feeling tired [Negative] : Endocrine

## 2019-09-05 NOTE — PHYSICAL EXAM
[PERRL With Normal Accommodation] : pupils were equal in size, round, and reactive to light [Sclera] : the sclera and conjunctiva were normal [Extraocular Movements] : extraocular movements were intact [Neck Appearance] : the appearance of the neck was normal [Jugular Venous Distention Increased] : there was no jugular-venous distention [Respiration, Rhythm And Depth] : normal respiratory rhythm and effort [Exaggerated Use Of Accessory Muscles For Inspiration] : no accessory muscle use [Auscultation Breath Sounds / Voice Sounds] : lungs were clear to auscultation bilaterally [Apical Impulse] : the apical impulse was normal [Heart Sounds] : normal S1 and S2 [Heart Rate And Rhythm] : heart rate was normal and rhythm regular [Heart Sounds Gallop] : no gallops [Heart Sounds Pericardial Friction Rub] : no pericardial rub [Murmurs] : no murmurs [Examination Of The Chest] : the chest was normal in appearance [Arterial Pulses Carotid] : carotid pulses were normal with no bruits [Arterial Pulses Femoral] : femoral pulses were normal without bruits [Full Pulse] : the pedal pulses are present [Bowel Sounds] : normal bowel sounds [___ +] : bilateral [unfilled]+ pitting edema to the ankles [Abdomen Soft] : soft [Abdomen Tenderness] : non-tender [Abnormal Walk] : normal gait [Abdomen Mass (___ Cm)] : no abdominal mass palpated [Musculoskeletal - Swelling] : no joint swelling seen [Nail Clubbing] : no clubbing  or cyanosis of the fingernails [Skin Color & Pigmentation] : normal skin color and pigmentation [Motor Tone] : muscle strength and tone were normal [Skin Turgor] : normal skin turgor [] : no rash [Sensation] : the sensory exam was normal to light touch and pinprick [No Focal Deficits] : no focal deficits [Motor Exam] : the motor exam was normal [Oriented To Time, Place, And Person] : oriented to person, place, and time [Affect] : the affect was normal [Impaired Insight] : insight and judgment were intact

## 2019-09-05 NOTE — ASSESSMENT
[FreeTextEntry1] : Mrs. Bethea continues to recover from her TAVR. I explained that it could take up to three months to truly start feeling more energetic, and the way to help was to increase her daily activity and monitor her nutritional intake. She only rarely has pedal edema, and her lungs were clear, so I dropped her Lasix down to 20 mg po daily. She was sent for routine follow up labs. She will make an appointment to see Dr. Lewis within two weeks. Her next follow up with us will be in one year, though I explained we will see her sooner if the need be.

## 2019-09-05 NOTE — HISTORY OF PRESENT ILLNESS
[Dyslipidemia] : Dyslipidemia [Hypertension] : Hypertension [Cerebrovascular Disease] : Cerebrovascular Disease [Prior CVA] : with prior CVA  [Remote (>= 2 wks)] : in remote past, greater than 2 wks ago [Heart Failure within 2 Weeks] : Heart Failure in last 2 weeks [Class I] : Class I [Prior Heart Failure] : Prior Heart Failure [Arrhythmia] : Arrhythmia [A fib / A flutter] : A fib / A flutter [Paroxysmal] : Paroxysmal [FreeTextEntry1] : CELINA KESSLER 89 year year old F, presenting today for her  30-day follow up after her TAVR procedure on 7/24/2019 with a 26 Core Valve via the Transfemoral approach .her  past medical history includes: Bladder Ca, Monoclonal Gammopathy, HTN, HLD, CVA and pAFib.\par \par Today she presents with her daughter, stating her breathing is feeling better, but she still feels fatigued. Her daughter reports that she has been walking farther. She doesn't have the best appetite, but she is trying. She also reports episodes of dizziness when she changes position\par \par 5 meter Gait:     11.2/ 10.33/10.1       Seconds\par Current NYHA Class: I\par

## 2019-09-09 ENCOUNTER — APPOINTMENT (OUTPATIENT)
Dept: CARDIOLOGY | Facility: CLINIC | Age: 84
End: 2019-09-09

## 2019-09-10 ENCOUNTER — RX RENEWAL (OUTPATIENT)
Age: 84
End: 2019-09-10

## 2019-09-17 ENCOUNTER — APPOINTMENT (OUTPATIENT)
Dept: CARDIOLOGY | Facility: CLINIC | Age: 84
End: 2019-09-17
Payer: MEDICARE

## 2019-09-17 ENCOUNTER — NON-APPOINTMENT (OUTPATIENT)
Age: 84
End: 2019-09-17

## 2019-09-17 VITALS
WEIGHT: 113 LBS | HEIGHT: 62 IN | DIASTOLIC BLOOD PRESSURE: 86 MMHG | BODY MASS INDEX: 20.8 KG/M2 | SYSTOLIC BLOOD PRESSURE: 145 MMHG | RESPIRATION RATE: 16 BRPM | HEART RATE: 60 BPM

## 2019-09-17 PROCEDURE — 99214 OFFICE O/P EST MOD 30 MIN: CPT

## 2019-09-17 PROCEDURE — 93000 ELECTROCARDIOGRAM COMPLETE: CPT

## 2019-09-19 ENCOUNTER — APPOINTMENT (OUTPATIENT)
Dept: INTERNAL MEDICINE | Facility: CLINIC | Age: 84
End: 2019-09-19
Payer: MEDICARE

## 2019-09-19 VITALS
HEIGHT: 62 IN | DIASTOLIC BLOOD PRESSURE: 50 MMHG | OXYGEN SATURATION: 96 % | WEIGHT: 113 LBS | SYSTOLIC BLOOD PRESSURE: 110 MMHG | BODY MASS INDEX: 20.8 KG/M2 | HEART RATE: 59 BPM | TEMPERATURE: 97.7 F

## 2019-09-19 DIAGNOSIS — Z13.31 ENCOUNTER FOR SCREENING FOR DEPRESSION: ICD-10-CM

## 2019-09-19 DIAGNOSIS — I35.0 NONRHEUMATIC AORTIC (VALVE) STENOSIS: ICD-10-CM

## 2019-09-19 DIAGNOSIS — J90 PLEURAL EFFUSION, NOT ELSEWHERE CLASSIFIED: ICD-10-CM

## 2019-09-19 DIAGNOSIS — Z86.79 PERSONAL HISTORY OF OTHER DISEASES OF THE CIRCULATORY SYSTEM: ICD-10-CM

## 2019-09-19 DIAGNOSIS — J18.9 PNEUMONIA, UNSPECIFIED ORGANISM: ICD-10-CM

## 2019-09-19 DIAGNOSIS — R13.10 DYSPHAGIA, UNSPECIFIED: ICD-10-CM

## 2019-09-19 DIAGNOSIS — Z00.00 ENCOUNTER FOR GENERAL ADULT MEDICAL EXAMINATION W/OUT ABNORMAL FINDINGS: ICD-10-CM

## 2019-09-19 DIAGNOSIS — N39.41 URGE INCONTINENCE: ICD-10-CM

## 2019-09-19 PROCEDURE — G0439: CPT

## 2019-09-19 PROCEDURE — 99214 OFFICE O/P EST MOD 30 MIN: CPT | Mod: 25

## 2019-09-19 PROCEDURE — 36415 COLL VENOUS BLD VENIPUNCTURE: CPT

## 2019-09-19 PROCEDURE — G0444 DEPRESSION SCREEN ANNUAL: CPT | Mod: 59

## 2019-09-19 RX ORDER — ASPIRIN 81 MG
81 TABLET, DELAYED RELEASE (ENTERIC COATED) ORAL
Refills: 0 | Status: ACTIVE | COMMUNITY

## 2019-09-22 PROBLEM — Z86.79 HISTORY OF CARDIAC MURMUR: Status: RESOLVED | Noted: 2019-09-17 | Resolved: 2019-09-22

## 2019-09-22 PROBLEM — Z86.79 HISTORY OF AORTIC VALVE STENOSIS: Status: RESOLVED | Noted: 2018-09-17 | Resolved: 2019-09-22

## 2019-09-22 PROBLEM — J18.9 MULTIFOCAL PNEUMONIA: Status: RESOLVED | Noted: 2019-08-02 | Resolved: 2019-09-22

## 2019-09-22 PROBLEM — J90 BILATERAL PLEURAL EFFUSION: Status: RESOLVED | Noted: 2018-05-24 | Resolved: 2019-09-22

## 2019-09-22 RX ORDER — BIOTIN 10 MG
10000 TABLET ORAL
Refills: 0 | Status: ACTIVE | COMMUNITY

## 2019-09-22 RX ORDER — MULTIVITAMIN
CAPSULE ORAL
Refills: 0 | Status: ACTIVE | COMMUNITY

## 2019-09-22 NOTE — COUNSELING
[Fall prevention counseling provided] : Fall prevention counseling provided [No throw rugs] : No throw rugs [Adequate lighting] : Adequate lighting [Use recommended devices] : Use recommended devices

## 2019-09-22 NOTE — HISTORY OF PRESENT ILLNESS
[FreeTextEntry1] : Annual wellness visit\par Hypertension\par Hyperlipidemia\par Fatigue\par Depression\par Status post TAVR\par Atrial fibrillation\par Chronic anticoagulation\par  [de-identified] : She is status post TAVR  on 2019.   The procedure was uncomplicated. This was done several weeks  after she was admitted to the hospital for community acquired pneumonia. Since the procedure she states she continues to feel fatigued. She walks and tires easily.   She feels she is slowly getting better but not back to her baseline a year ago. She had her family staying with her initially but now she is on her own. She does her own shopping cooking dressing showering despite her dupytrens.. She walks to the grocery store.  She has grab bars  in the shower.   She has a home alert monitor.   She uses a walker outside the home.   She has not fallen.   If she gets out of bed too quickly she can feel a bit dizzy . She has no chest pain or edema or palpitations.  Her bowels are fine. She has had no pain like her prior diverticulitis. She complains of feeling solid food getting stuck when she swallows. Her appetite is fair.  She drinks one Boost a day.  She admittedly is depressed in the morning .  She worries about one of her children. One of her friends  another friend had a stroke. She speaks to a psychiatrist. She feels she might need a hearing aid. Her vision is fine. She has  a new cardiologist Dr. Amaury Lewis who she recently saw. Her bowels are fine. She still has urge urinary incontinence\par She states she had a flu vaccine about 2 weeks ago locally

## 2019-09-22 NOTE — ASSESSMENT
[FreeTextEntry1] : Apart from general frailty her exam is unremarkable. She is not orthostatic. There is no evidence of congestive heart failure. The etiology of her fatigue is unclear but we'll check metabolic parameters including thyroid functions iron levels. Recent CBC was normal. There  may also be a degree of  depression from her recent medical problems and loss of friends and worried about her daughter. She was advised to followup with her psychiatrist.\par We discussed the Shingrix vaccine and she was advised to obtain\par Advanced directives were reviewed and the patient has them  in place\par With her dysphagia she will stop alendronate.She was referred for a follow up bone density.  Regarding her dysphagia she was advised to go for a barium swallow \par She requests a home physical therapy for deconditioning and this will be arranged\par She will continue with daily weights. She will followup with cardiology for a history of Tav, CHF and  her paroxysmal atrial fibrillation

## 2019-09-22 NOTE — PHYSICAL EXAM
[No Acute Distress] : no acute distress [Well Nourished] : well nourished [Well Developed] : well developed [Well-Appearing] : well-appearing [Normal Voice/Communication] : normal voice/communication [Normal Sclera/Conjunctiva] : normal sclera/conjunctiva [PERRL] : pupils equal round and reactive to light [EOMI] : extraocular movements intact [Normal Outer Ear/Nose] : the outer ears and nose were normal in appearance [Normal Oropharynx] : the oropharynx was normal [Normal TMs] : both tympanic membranes were normal [No JVD] : no jugular venous distention [Supple] : supple [No Lymphadenopathy] : no lymphadenopathy [Thyroid Normal, No Nodules] : the thyroid was normal and there were no nodules present [No Respiratory Distress] : no respiratory distress  [Clear to Auscultation] : lungs were clear to auscultation bilaterally [No Accessory Muscle Use] : no accessory muscle use [Normal Percussion] : the chest was normal to percussion [Normal Rate] : normal rate  [Regular Rhythm] : with a regular rhythm [Normal S1, S2] : normal S1 and S2 [No Carotid Bruits] : no carotid bruits [Pedal Pulses Present] : the pedal pulses are present [No Edema] : there was no peripheral edema [Soft] : abdomen soft [Non Tender] : non-tender [Non-distended] : non-distended [No HSM] : no HSM [Normal Bowel Sounds] : normal bowel sounds [Normal Supraclavicular Nodes] : no supraclavicular lymphadenopathy [Normal Axillary Nodes] : no axillary lymphadenopathy [Normal Posterior Cervical Nodes] : no posterior cervical lymphadenopathy [Normal Anterior Cervical Nodes] : no anterior cervical lymphadenopathy [Normal Inguinal Nodes] : no inguinal lymphadenopathy [No CVA Tenderness] : no CVA  tenderness [No Spinal Tenderness] : no spinal tenderness [Kyphosis] : kyphosis [Scoliosis] : scoliosis [No Joint Swelling] : no joint swelling [Grossly Normal Strength/Tone] : grossly normal strength/tone [No Rash] : no rash [Normal Gait] : normal gait [Coordination Grossly Intact] : coordination grossly intact [No Focal Deficits] : no focal deficits [Deep Tendon Reflexes (DTR)] : deep tendon reflexes were 2+ and symmetric [Speech Grossly Normal] : speech grossly normal [Memory Grossly Normal] : memory grossly normal [Normal Affect] : the affect was normal [Alert and Oriented x3] : oriented to person, place, and time [Normal Mood] : the mood was normal [Normal Insight/Judgement] : insight and judgment were intact [Normal Appearance] : normal in appearance [No Masses] : no palpable masses [No Nipple Discharge] : no nipple discharge [No Axillary Lymphadenopathy] : no axillary lymphadenopathy [No Murmur] : no murmur heard [No Abdominal Bruit] : a ~M bruit was not heard ~T in the abdomen [No Extremity Clubbing/Cyanosis] : no extremity clubbing/cyanosis [No Palpable Aorta] : no palpable aorta [de-identified] : thin, frail [de-identified] : Dupuytren's contractures both hands

## 2019-09-22 NOTE — HEALTH RISK ASSESSMENT
[Good] : ~his/her~ current health as good [Fair] :  ~his/her~ mood as fair [Any fall with injury in past year] : Patient reported fall with injury in the past year [0] : 1) Little interest or pleasure doing things: Not at all (0) [None] : None [Alone] : lives alone [Retired] : retired [College] : College [] :  [Feels Safe at Home] : Feels safe at home [Fully functional (bathing, dressing, toileting, transferring, walking, feeding)] : Fully functional (bathing, dressing, toileting, transferring, walking, feeding) [Independent] : managing finances [Some assistance needed] : housekeeping [Reports changes in hearing] : Reports changes in hearing [Reports changes in vision] : Reports changes in vision [Smoke Detector] : smoke detector [Sunscreen] : uses sunscreen [Discussed at today's visit] : Advance Directives Discussed at today's visit [3] : 2) Feeling down, depressed, or hopeless for nearly every day (3) [Carbon Monoxide Detector] : carbon monoxide detector [Reviewed no changes] : Reviewed no changes [Designated Healthcare Proxy] : Designated healthcare proxy [Name: ___] : Health Care Proxy's Name: [unfilled]  [Relationship: ___] : Relationship: [unfilled] [1 or 2 (0 pts)] : 1 or 2 (0 points) [Never (0 pts)] : Never (0 points) [No] : In the past 12 months have you used drugs other than those required for medical reasons? No [# Of Children ___] : has [unfilled] children [Seat Belt] :  uses seat belt [] : No [de-identified] : walking [de-identified] : watches salt and cholesterol [de-identified] : rib fracture [[id number]] : 3 [Change in mental status noted] : No change in mental status noted [Language] : denies difficulty with language [Learning/Retaining New Information] : denies difficulty learning/retaining new information [Behavior] : denies difficulty with behavior [Handling Complex Tasks] : denies difficulty handling complex tasks [Reasoning] : denies difficulty with reasoning [Spatial Ability and Orientation] : denies difficulty with spatial ability and orientation [Sexually Active] : not sexually active [High Risk Behavior] : no high risk behavior [Reports changes in dental health] : Reports no changes in dental health [Guns at Home] : no guns at home [MammogramComments] : patient defers [BoneDensityDate] : 2/2017 [BoneDensityComments] : osteoporosis [de-identified] : not driving [de-identified] : decreased due to age [de-identified] : reading glASSES [AdvancecareDate] : 09/2019

## 2019-09-23 ENCOUNTER — INPATIENT (INPATIENT)
Facility: HOSPITAL | Age: 84
LOS: 4 days | Discharge: HOME | DRG: 242 | End: 2019-09-28
Attending: HOSPITALIST | Admitting: HOSPITALIST
Payer: MEDICARE

## 2019-09-23 VITALS
WEIGHT: 110.89 LBS | HEIGHT: 62 IN | DIASTOLIC BLOOD PRESSURE: 82 MMHG | HEART RATE: 64 BPM | OXYGEN SATURATION: 96 % | RESPIRATION RATE: 18 BRPM | SYSTOLIC BLOOD PRESSURE: 156 MMHG | TEMPERATURE: 98 F

## 2019-09-23 DIAGNOSIS — Z98.890 OTHER SPECIFIED POSTPROCEDURAL STATES: Chronic | ICD-10-CM

## 2019-09-23 DIAGNOSIS — R06.09 OTHER FORMS OF DYSPNEA: ICD-10-CM

## 2019-09-23 DIAGNOSIS — F32.9 MAJOR DEPRESSIVE DISORDER, SINGLE EPISODE, UNSPECIFIED: ICD-10-CM

## 2019-09-23 DIAGNOSIS — I10 ESSENTIAL (PRIMARY) HYPERTENSION: ICD-10-CM

## 2019-09-23 DIAGNOSIS — I48.0 PAROXYSMAL ATRIAL FIBRILLATION: ICD-10-CM

## 2019-09-23 DIAGNOSIS — E78.5 HYPERLIPIDEMIA, UNSPECIFIED: ICD-10-CM

## 2019-09-23 DIAGNOSIS — Z90.722 ACQUIRED ABSENCE OF OVARIES, BILATERAL: Chronic | ICD-10-CM

## 2019-09-23 LAB
ALBUMIN SERPL ELPH-MCNC: 4.2 G/DL — SIGNIFICANT CHANGE UP (ref 3.3–5)
ALP SERPL-CCNC: 82 U/L — SIGNIFICANT CHANGE UP (ref 40–120)
ALT FLD-CCNC: 60 U/L — HIGH (ref 10–45)
ANION GAP SERPL CALC-SCNC: 10 MMOL/L — SIGNIFICANT CHANGE UP (ref 5–17)
APTT BLD: 35.7 SEC — SIGNIFICANT CHANGE UP (ref 27.5–36.3)
AST SERPL-CCNC: 43 U/L — HIGH (ref 10–40)
BASOPHILS # BLD AUTO: 0 K/UL — SIGNIFICANT CHANGE UP (ref 0–0.2)
BASOPHILS NFR BLD AUTO: 0.6 % — SIGNIFICANT CHANGE UP (ref 0–2)
BILIRUB SERPL-MCNC: 0.4 MG/DL — SIGNIFICANT CHANGE UP (ref 0.2–1.2)
BUN SERPL-MCNC: 23 MG/DL — SIGNIFICANT CHANGE UP (ref 7–23)
CALCIUM SERPL-MCNC: 9.5 MG/DL — SIGNIFICANT CHANGE UP (ref 8.4–10.5)
CHLORIDE SERPL-SCNC: 105 MMOL/L — SIGNIFICANT CHANGE UP (ref 96–108)
CO2 SERPL-SCNC: 25 MMOL/L — SIGNIFICANT CHANGE UP (ref 22–31)
CREAT SERPL-MCNC: 0.8 MG/DL — SIGNIFICANT CHANGE UP (ref 0.5–1.3)
EOSINOPHIL # BLD AUTO: 0.1 K/UL — SIGNIFICANT CHANGE UP (ref 0–0.5)
EOSINOPHIL NFR BLD AUTO: 1.7 % — SIGNIFICANT CHANGE UP (ref 0–6)
GAS PNL BLDV: SIGNIFICANT CHANGE UP
GLUCOSE SERPL-MCNC: 88 MG/DL — SIGNIFICANT CHANGE UP (ref 70–99)
HCT VFR BLD CALC: 38.1 % — SIGNIFICANT CHANGE UP (ref 34.5–45)
HGB BLD-MCNC: 12.4 G/DL — SIGNIFICANT CHANGE UP (ref 11.5–15.5)
INR BLD: 1.29 RATIO — HIGH (ref 0.88–1.16)
LYMPHOCYTES # BLD AUTO: 0.8 K/UL — LOW (ref 1–3.3)
LYMPHOCYTES # BLD AUTO: 13.5 % — SIGNIFICANT CHANGE UP (ref 13–44)
MAGNESIUM SERPL-MCNC: 2 MG/DL — SIGNIFICANT CHANGE UP (ref 1.6–2.6)
MCHC RBC-ENTMCNC: 31.2 PG — SIGNIFICANT CHANGE UP (ref 27–34)
MCHC RBC-ENTMCNC: 32.7 GM/DL — SIGNIFICANT CHANGE UP (ref 32–36)
MCV RBC AUTO: 95.4 FL — SIGNIFICANT CHANGE UP (ref 80–100)
MONOCYTES # BLD AUTO: 0.7 K/UL — SIGNIFICANT CHANGE UP (ref 0–0.9)
MONOCYTES NFR BLD AUTO: 11.6 % — SIGNIFICANT CHANGE UP (ref 2–14)
NEUTROPHILS # BLD AUTO: 4.4 K/UL — SIGNIFICANT CHANGE UP (ref 1.8–7.4)
NEUTROPHILS NFR BLD AUTO: 72.6 % — SIGNIFICANT CHANGE UP (ref 43–77)
NT-PROBNP SERPL-SCNC: 3033 PG/ML — HIGH (ref 0–300)
PHOSPHATE SERPL-MCNC: 3 MG/DL — SIGNIFICANT CHANGE UP (ref 2.5–4.5)
PLATELET # BLD AUTO: 149 K/UL — LOW (ref 150–400)
POTASSIUM SERPL-MCNC: 4.1 MMOL/L — SIGNIFICANT CHANGE UP (ref 3.5–5.3)
POTASSIUM SERPL-SCNC: 4.1 MMOL/L — SIGNIFICANT CHANGE UP (ref 3.5–5.3)
PROT SERPL-MCNC: 6.9 G/DL — SIGNIFICANT CHANGE UP (ref 6–8.3)
PROTHROM AB SERPL-ACNC: 14.8 SEC — HIGH (ref 10–12.9)
RBC # BLD: 3.99 M/UL — SIGNIFICANT CHANGE UP (ref 3.8–5.2)
RBC # FLD: 13.3 % — SIGNIFICANT CHANGE UP (ref 10.3–14.5)
SODIUM SERPL-SCNC: 140 MMOL/L — SIGNIFICANT CHANGE UP (ref 135–145)
TROPONIN T, HIGH SENSITIVITY RESULT: 39 NG/L — SIGNIFICANT CHANGE UP (ref 0–51)
TROPONIN T, HIGH SENSITIVITY RESULT: 43 NG/L — SIGNIFICANT CHANGE UP (ref 0–51)
WBC # BLD: 6.1 K/UL — SIGNIFICANT CHANGE UP (ref 3.8–10.5)
WBC # FLD AUTO: 6.1 K/UL — SIGNIFICANT CHANGE UP (ref 3.8–10.5)

## 2019-09-23 PROCEDURE — 93308 TTE F-UP OR LMTD: CPT | Mod: 26

## 2019-09-23 PROCEDURE — 71046 X-RAY EXAM CHEST 2 VIEWS: CPT | Mod: 26

## 2019-09-23 PROCEDURE — 99285 EMERGENCY DEPT VISIT HI MDM: CPT

## 2019-09-23 PROCEDURE — 99223 1ST HOSP IP/OBS HIGH 75: CPT

## 2019-09-23 RX ORDER — AMIODARONE HYDROCHLORIDE 400 MG/1
200 TABLET ORAL DAILY
Refills: 0 | Status: DISCONTINUED | OUTPATIENT
Start: 2019-09-23 | End: 2019-09-28

## 2019-09-23 RX ORDER — ACETAMINOPHEN 500 MG
650 TABLET ORAL EVERY 6 HOURS
Refills: 0 | Status: DISCONTINUED | OUTPATIENT
Start: 2019-09-23 | End: 2019-09-28

## 2019-09-23 RX ORDER — APIXABAN 2.5 MG/1
2.5 TABLET, FILM COATED ORAL EVERY 12 HOURS
Refills: 0 | Status: DISCONTINUED | OUTPATIENT
Start: 2019-09-23 | End: 2019-09-26

## 2019-09-23 RX ORDER — ATORVASTATIN CALCIUM 80 MG/1
40 TABLET, FILM COATED ORAL AT BEDTIME
Refills: 0 | Status: DISCONTINUED | OUTPATIENT
Start: 2019-09-23 | End: 2019-09-28

## 2019-09-23 RX ORDER — FUROSEMIDE 40 MG
40 TABLET ORAL DAILY
Refills: 0 | Status: DISCONTINUED | OUTPATIENT
Start: 2019-09-23 | End: 2019-09-25

## 2019-09-23 RX ORDER — ASPIRIN/CALCIUM CARB/MAGNESIUM 324 MG
81 TABLET ORAL DAILY
Refills: 0 | Status: DISCONTINUED | OUTPATIENT
Start: 2019-09-23 | End: 2019-09-28

## 2019-09-23 RX ORDER — LOSARTAN POTASSIUM 100 MG/1
25 TABLET, FILM COATED ORAL DAILY
Refills: 0 | Status: DISCONTINUED | OUTPATIENT
Start: 2019-09-23 | End: 2019-09-24

## 2019-09-23 RX ORDER — DOCUSATE SODIUM 100 MG
100 CAPSULE ORAL THREE TIMES A DAY
Refills: 0 | Status: DISCONTINUED | OUTPATIENT
Start: 2019-09-23 | End: 2019-09-28

## 2019-09-23 RX ORDER — ALENDRONATE SODIUM 70 MG/1
1 TABLET ORAL
Qty: 0 | Refills: 0 | DISCHARGE

## 2019-09-23 RX ORDER — TRAZODONE HCL 50 MG
50 TABLET ORAL AT BEDTIME
Refills: 0 | Status: DISCONTINUED | OUTPATIENT
Start: 2019-09-23 | End: 2019-09-28

## 2019-09-23 RX ORDER — FUROSEMIDE 40 MG
20 TABLET ORAL ONCE
Refills: 0 | Status: COMPLETED | OUTPATIENT
Start: 2019-09-23 | End: 2019-09-23

## 2019-09-23 RX ORDER — INFLUENZA VIRUS VACCINE 15; 15; 15; 15 UG/.5ML; UG/.5ML; UG/.5ML; UG/.5ML
0.5 SUSPENSION INTRAMUSCULAR ONCE
Refills: 0 | Status: DISCONTINUED | OUTPATIENT
Start: 2019-09-23 | End: 2019-09-28

## 2019-09-23 RX ORDER — SERTRALINE 25 MG/1
150 TABLET, FILM COATED ORAL DAILY
Refills: 0 | Status: DISCONTINUED | OUTPATIENT
Start: 2019-09-23 | End: 2019-09-28

## 2019-09-23 RX ORDER — METOPROLOL TARTRATE 50 MG
25 TABLET ORAL
Refills: 0 | Status: DISCONTINUED | OUTPATIENT
Start: 2019-09-23 | End: 2019-09-24

## 2019-09-23 RX ADMIN — APIXABAN 2.5 MILLIGRAM(S): 2.5 TABLET, FILM COATED ORAL at 22:12

## 2019-09-23 RX ADMIN — Medication 50 MILLIGRAM(S): at 22:12

## 2019-09-23 RX ADMIN — Medication 20 MILLIGRAM(S): at 16:54

## 2019-09-23 RX ADMIN — ATORVASTATIN CALCIUM 40 MILLIGRAM(S): 80 TABLET, FILM COATED ORAL at 22:12

## 2019-09-23 RX ADMIN — Medication 25 MILLIGRAM(S): at 22:12

## 2019-09-23 RX ADMIN — Medication 100 MILLIGRAM(S): at 22:12

## 2019-09-23 NOTE — H&P ADULT - NSICDXPASTSURGICALHX_GEN_ALL_CORE_FT
PAST SURGICAL HISTORY:  H/O shoulder surgery arthroscopic  10+ yrs ago  right    History of bilateral oophorectomy     S/P cystoscopy TURBT x 2

## 2019-09-23 NOTE — H&P ADULT - HISTORY OF PRESENT ILLNESS
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  Patient reports progressive dyspnea on exertion and increased fatigue for the past several months. She underwent TAVR in July and symptoms mildly improved. However over the past couple weeks she reports significant worsening of her dyspnea. She is now short of breath after walking several feet. On day priro to admission , she reports having to sleep in the chair , she normally use two pillows. She reports intermittent palpitations. She has no chest pain. No cough, minimal leg swelling. She reports no blood in stool or dark stools. No recent illness , no fever or chills. She reports her lasix dose was recently decreased to 20 mg.

## 2019-09-23 NOTE — ED PROVIDER NOTE - PHYSICAL EXAMINATION
Attending Jemma Mijares: Gen: NAD, heent: atrauamtic, eomi, perrla, mmm, op pink, uvula midline, neck; nttp, no nuchal rigidity, chest: nttp, no crepitus, cv: rrr, +murmurs, lungs:decreased at bases, abd: soft, nontender, nondistended, no peritoneal signs, +BS, no guarding, ext: wwp, neg homans, skin: no rash, neuro: awake and alert, following commands, speech clear, sensation and strength intact, no focal deficits

## 2019-09-23 NOTE — ED PROVIDER NOTE - ATTENDING CONTRIBUTION TO CARE
Attending MD Jemma Mijares:   I personally have seen and examined this patient.  Physician assistant note reviewed and agree on plan of care and except where noted.  See HPI, PE, and MDM for details.

## 2019-09-23 NOTE — ED PROVIDER NOTE - RAPID ASSESSMENT
89 year old F with pmhx bladder cancer, CVA, aortic stenosis, a-fib, mitral valve regurgitation presents to ED c/o intermittent palpitations since last night. Also endorses DAWKINS when talking a few steps. Endorses cold feet. Denies CP, abd pain, NV, fevers, chills, cough. Pt on Eliquis. Lives at home by herself, does not have a home health aide, walks with a walker when not at home. 89 year old F with pmhx bladder cancer, CVA, aortic stenosis, a-fib on eliquis , mitral valve regurgitation, s/p Aortic valve replacement presents to ED c/o intermittent palpitations since last night. Also endorses DAWKINS when talking a few steps. Endorses cold feet. Denies CP, abd pain, NV, fevers, chills, cough. Pt on Eliquis. Lives at home by herself, does not have a home health aide, walks with a walker when not at home.

## 2019-09-23 NOTE — ED PROVIDER NOTE - PROGRESS NOTE DETAILS
Family at bedside reports pt with PMH CHF, recently decreased lasix from 40 to 20mg daily. - Jaclyn Landry PA-C Dr. Joshua bruno. - Jaclyn Landry PA-C Spoke with Dr. Lewis, requesting admission to Dr. Rasmussen. Admission to be changed.   - Jaclyn Landry PA-C Spoke with Dr. Lewis, requesting admission to Dr. Rasmussen. Admission to be changed.  D/w Dr. Rasmussen. - Jaclyn Landry PA-C

## 2019-09-23 NOTE — H&P ADULT - PROBLEM SELECTOR PLAN 1
bnp elevated , mild effusion on CXR , does not appear to be hypervolemic on exam , no evidence of coronary ischemia , will monitor on tele and obtain echocardiogram to re-evaluated valves   - Telemetry   - TTE   - strict ins and outs   - daily weights   - Cardiology consult - to be arranged and followed up by day team  -  will increase lasix back to  40

## 2019-09-23 NOTE — H&P ADULT - ASSESSMENT
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

## 2019-09-23 NOTE — H&P ADULT - NSHPLABSRESULTS_GEN_ALL_CORE
Labs personally reviewed:                          12.4   6.1   )-----------( 149      ( 23 Sep 2019 14:39 )             38.1     09-23    140  |  105  |  23  ----------------------------<  88  4.1   |  25  |  0.80    Ca    9.5      23 Sep 2019 14:39  Phos  3.0     09-23  Mg     2.0     09-23    TPro  6.9  /  Alb  4.2  /  TBili  0.4  /  DBili  x   /  AST  43<H>  /  ALT  60<H>  /  AlkPhos  82  09-23        LIVER FUNCTIONS - ( 23 Sep 2019 14:39 )  Alb: 4.2 g/dL / Pro: 6.9 g/dL / ALK PHOS: 82 U/L / ALT: 60 U/L / AST: 43 U/L / GGT: x           PT/INR - ( 23 Sep 2019 14:39 )   PT: 14.8 sec;   INR: 1.29 ratio         PTT - ( 23 Sep 2019 14:39 )  PTT:35.7 sec    CAPILLARY BLOOD GLUCOSE          Imaging:  CXR personally reviewed: Bibasilar atelectasis and small bilateral pleural effusion    EKG personally reviewed: Sinus rhythm 61 bpm 1st degree av block , LBBB ,  concordance in V6 not present  on prior ekg

## 2019-09-23 NOTE — ED PROVIDER NOTE - OBJECTIVE STATEMENT
89yo F with PMH bladder CA, CVA, a-fib on eliquis, mitral valve regurgitation, AS s/p TAVR (7/2019) presenting to ED c/o intermittent palpitations since last night with associated DAWKINS and upper left back bob. Reports DAWKINS when talking a few steps. Pt reports palpitations and back pain have resolved. Pt also reports dizziness when going from lying to standing, not new, told PCP and was told to do leg exercises prior to standing. Also reports cold feet. Called PCP/cardiologist today and was directed to ED. Denies any fever/chills, CP, cough, abd pain, n/v/d, LE pain/swelling.     PCP Amaury Lewis 89yo F with PMH bladder CA, CVA, a-fib on eliquis, mitral valve regurgitation, AS s/p TAVR (7/2019) presenting to ED c/o intermittent palpitations since last night with associated DAWKINS and upper left back pain. Reports DAWKINS when talking a few steps. Pt reports palpitations and back pain have resolved. Pt also reports dizziness when going from lying to standing, not new, told PCP and was told to do leg exercises prior to standing. Also reports cold feet. Called PCP/cardiologist today and was directed to ED. Denies any fever/chills, CP, cough, abd pain, n/v/d, LE pain/swelling, numbness/tingling, MASSEY.     PCP Amaury Lewis 89yo F with PMH bladder CA, CVA, a-fib on eliquis, mitral valve regurgitation, AS s/p TAVR (7/2019) presenting to ED c/o intermittent palpitations since last night with associated DAWKINS and upper left back pain. Reports DAWKINS when talking a few steps. Pt reports palpitations and back pain have resolved. Pt also reports dizziness when going from lying to standing, not new, told PCP and was told to do leg exercises prior to standing. Also reports cold feet. Called PCP/cardiologist today and was directed to ED. Denies any fever/chills, CP, cough, SOB at rest, abd pain, n/v/d, LE pain/swelling, numbness/tingling, MASSEY.     PCP Amaury Lewis

## 2019-09-23 NOTE — ED PROVIDER NOTE - CLINICAL SUMMARY MEDICAL DECISION MAKING FREE TEXT BOX
Attending Jemma Mijares: 89 y/o female with mutliple mecial issue presenting with weakness, and SOB. while in the ed pt saturating 92%. pocus shows diminished EF, pleural effusions and small amount of b lines. ocncern for possible CHF exacberation. pt placed on monitor. afebrile. less likely infectious etiology. pt placed on monitor. less likely PE as pt on anticoagulation. will obtain labs, cxr, bnp and likely admit

## 2019-09-23 NOTE — ED PROVIDER NOTE - PSH
H/O shoulder surgery  arthroscopic  10+ yrs ago  right  History of bilateral oophorectomy    S/P cystoscopy  TURBT x 2

## 2019-09-23 NOTE — ED ADULT NURSE REASSESSMENT NOTE - NS ED NURSE REASSESS COMMENT FT1
Report received from MUKUND Caro. Upon reassessment pt resting quietly on stretcher in no current distress, denying any pain at this time. Pt aware of admission, clicked ready to move and awaiting an admitting bed at this time. Cardiac monitor remains in place showing NSR to the 60's. Pt remains on 2L of O2 sating 95% in no respiratory distress, airway patent and pt speaking in full complete sentences, breathing at a rate of 19. Family remains at bedside at this time, pt provided with kosher meal. Call bell within reach, bed remains locked and in lowest position.

## 2019-09-23 NOTE — H&P ADULT - NSHPPHYSICALEXAM_GEN_ALL_CORE
Vital Signs Last 24 Hrs  T(C): 36.8 (23 Sep 2019 19:36), Max: 37.3 (23 Sep 2019 16:35)  T(F): 98.3 (23 Sep 2019 19:36), Max: 99.2 (23 Sep 2019 16:35)  HR: 64 (23 Sep 2019 19:36) (62 - 67)  BP: 126/65 (23 Sep 2019 19:36) (114/68 - 156/82)  BP(mean): --  RR: 19 (23 Sep 2019 19:36) (18 - 19)  SpO2: 96% (23 Sep 2019 19:36) (95% - 98%) Vital Signs Last 24 Hrs  T(C): 36.8 (23 Sep 2019 19:36), Max: 37.3 (23 Sep 2019 16:35)  T(F): 98.3 (23 Sep 2019 19:36), Max: 99.2 (23 Sep 2019 16:35)  HR: 64 (23 Sep 2019 19:36) (62 - 67)  BP: 126/65 (23 Sep 2019 19:36) (114/68 - 156/82)  BP(mean): --  RR: 19 (23 Sep 2019 19:36) (18 - 19)  SpO2: 96% (23 Sep 2019 19:36) (95% - 98%)    GENERAL: No acute distress, well-developed, on NC breathing regular non labored    HEAD:  Atraumatic, Normocephalic  ENT: EOMI, PERRLA, conjunctiva and sclera clear,  moist mucosa no pharyngeal erythema or exudates   NECK: supple , no JVD   CHEST/LUNG: Clear to auscultation bilaterally; No wheeze, equal breath sounds bilaterally   BACK: No spinal tenderness,  No CVA tenderness   HEART: Regular rate and rhythm; No murmurs, rubs, or gallops  ABDOMEN: Soft, Nontender, Nondistended; Bowel sounds present  EXTREMITIES:  No clubbing, cyanosis, or edema, distal ext warm and well perfused , 2+ pulses throughout   MSK: No joint swelling or effusions, ROM intact   PSYCH: Normal behavior/affect  NEUROLOGY: AAOx3, non-focal, cranial nerves intact  SKIN: Normal color, No rashes or lesions

## 2019-09-23 NOTE — ED ADULT NURSE REASSESSMENT NOTE - NS ED NURSE REASSESS COMMENT FT1
patient is resting comfortably in bed in no acute distress. denies pain at this time. NSR on monitor. US at the bedside. call bell in reach. will continue to monitor.

## 2019-09-23 NOTE — H&P ADULT - NSHPSOCIALHISTORY_GEN_ALL_CORE
Social History:    Marital Status:  (   )    (   ) Single    (   )    (x  )   Occupation: retired  Lives with: (x  ) alone  (  ) children   (  ) spouse   (  ) parents  (  ) other    Substance Use (street drugs): ( x ) never used  (  ) other:  Tobacco Usage:  (   x) never smoked   (   ) former smoker   (   ) current smoker  (     ) pack year  (        ) last cigarette date  Alcohol Usage: no etoh use

## 2019-09-23 NOTE — H&P ADULT - NSHPREVIEWOFSYSTEMS_GEN_ALL_CORE
CONSTITUTIONAL: + weakness, no  fevers or chills  EYES/ENT: No visual changes;  No dysphagia  NECK: No pain or stiffness  RESPIRATORY: No cough, wheezing, hemoptysis; + shortness of breath  CARDIOVASCULAR: No chest pain + palpitations; No lower extremity edema  EXTREMITIES: no le edema, cyanosis, clubbing  GASTROINTESTINAL: No abdominal or epigastric pain. No nausea, vomiting, or hematemesis; No diarrhea or constipation. No melena or hematochezia.  BACK: No back pain  GENITOURINARY: No dysuria, frequency or hematuria  NEUROLOGICAL: No numbness or weakness  MSK: no joint swelling or pain  SKIN: No itching, burning, rashes, or lesions   PSYCH: no agitation  All other review of systems is negative unless indicated above.

## 2019-09-23 NOTE — H&P ADULT - NSICDXPASTMEDICALHX_GEN_ALL_CORE_FT
PAST MEDICAL HISTORY:  Bladder cancer TURBT x 2 in the past    Cerebrovascular accident (CVA) left MCA  2017  aphasia    HLD (hyperlipidemia)     HTN (hypertension)     Paroxysmal atrial fibrillation on Eliquis    Pneumonia discharged 7/16/19    Severe aortic stenosis     Severe mitral valve regurgitation

## 2019-09-23 NOTE — ED ADULT NURSE REASSESSMENT NOTE - NS ED NURSE REASSESS COMMENT FT1
patient is resting comfortably in bed in no acute distress. denies pain at this time. NSR on monitor. waiting for bed, call bell in reach. patient aware of plan of care

## 2019-09-23 NOTE — ED PROVIDER NOTE - PMH
Bladder cancer  TURBT x 2 in the past  Cerebrovascular accident (CVA)  left MCA  2017  aphasia  HLD (hyperlipidemia)    HTN (hypertension)    Paroxysmal atrial fibrillation  on Eliquis  Pneumonia  discharged 7/16/19  Severe aortic stenosis    Severe mitral valve regurgitation

## 2019-09-23 NOTE — ED ADULT NURSE NOTE - OBJECTIVE STATEMENT
89 yo female with PMH stroke, presents to the ED from home c/o palpitations since last night. patient is AAOx3. lung sounds CTA bilaterally. cap refill <3sec. patient states palpitations are intermittent associated with dizziness when changing positions and SOB on exertion. patient skin intact. patient denies chest pain, fevers, chills, abdominal pain, swelling, numbness or tingling, back pain, cough, urinary symptoms. NSR on monitor. EKG performed. will continue to monitor. call bell in reach. family at the bedside. 89 yo female with PMH bladder CA, CVA, a-fib on eliquis, mitral valve regurgitation, AS s/p TAVR (7/2019), presents to the ED from home c/o palpitations since last night. patient is AAOx3. lung sounds CTA bilaterally. cap refill <3sec. patient states palpitations are intermittent associated with dizziness when changing positions and SOB on exertion. patient skin intact. patient denies chest pain, fevers, chills, abdominal pain, swelling, numbness or tingling, back pain, cough, urinary symptoms. NSR on monitor. EKG performed. will continue to monitor. call bell in reach. family at the bedside.

## 2019-09-24 DIAGNOSIS — R53.81 OTHER MALAISE: ICD-10-CM

## 2019-09-24 DIAGNOSIS — R74.0 NONSPECIFIC ELEVATION OF LEVELS OF TRANSAMINASE AND LACTIC ACID DEHYDROGENASE [LDH]: ICD-10-CM

## 2019-09-24 LAB
25(OH)D3 SERPL-MCNC: 40.1 NG/ML
ALBUMIN SERPL ELPH-MCNC: 4.6 G/DL
ALP BLD-CCNC: 84 U/L
ALT SERPL-CCNC: 69 U/L
ANION GAP SERPL CALC-SCNC: 12 MMOL/L
APPEARANCE: CLEAR
AST SERPL-CCNC: 46 U/L
BACTERIA: NEGATIVE
BILIRUB SERPL-MCNC: 0.4 MG/DL
BILIRUBIN URINE: NEGATIVE
BLOOD URINE: NEGATIVE
BUN SERPL-MCNC: 27 MG/DL
CALCIUM SERPL-MCNC: 9.7 MG/DL
CHLORIDE SERPL-SCNC: 102 MMOL/L
CHOLEST SERPL-MCNC: 183 MG/DL
CHOLEST/HDLC SERPL: 2.2 RATIO
CO2 SERPL-SCNC: 27 MMOL/L
COLOR: YELLOW
CREAT SERPL-MCNC: 0.92 MG/DL
ESTIMATED AVERAGE GLUCOSE: 105 MG/DL
GLUCOSE QUALITATIVE U: NEGATIVE
GLUCOSE SERPL-MCNC: 89 MG/DL
HBA1C MFR BLD HPLC: 5.3 %
HDLC SERPL-MCNC: 82 MG/DL
HYALINE CASTS: 0 /LPF
IRON SATN MFR SERPL: 18 %
IRON SERPL-MCNC: 56 UG/DL
KETONES URINE: NEGATIVE
LDLC SERPL CALC-MCNC: 91 MG/DL
LEUKOCYTE ESTERASE URINE: NEGATIVE
MAGNESIUM SERPL-MCNC: 2.1 MG/DL
MICROSCOPIC-UA: NORMAL
NITRITE URINE: NEGATIVE
PH URINE: 5.5
POTASSIUM SERPL-SCNC: 4 MMOL/L
PROT SERPL-MCNC: 7 G/DL
PROTEIN URINE: NEGATIVE
RED BLOOD CELLS URINE: 3 /HPF
SODIUM SERPL-SCNC: 141 MMOL/L
SPECIFIC GRAVITY URINE: 1.02
SQUAMOUS EPITHELIAL CELLS: 2 /HPF
T4 FREE SERPL-MCNC: 1.4 NG/DL
TIBC SERPL-MCNC: 317 UG/DL
TRIGL SERPL-MCNC: 49 MG/DL
TSH SERPL-ACNC: 1.33 UIU/ML
UIBC SERPL-MCNC: 261 UG/DL
UROBILINOGEN URINE: NORMAL
WHITE BLOOD CELLS URINE: 3 /HPF

## 2019-09-24 PROCEDURE — 99223 1ST HOSP IP/OBS HIGH 75: CPT | Mod: GC

## 2019-09-24 PROCEDURE — 99233 SBSQ HOSP IP/OBS HIGH 50: CPT

## 2019-09-24 RX ORDER — DOCUSATE SODIUM 100 MG/1
100 CAPSULE, LIQUID FILLED ORAL TWICE DAILY
Refills: 0 | Status: COMPLETED | COMMUNITY
Start: 2017-09-26 | End: 2019-09-24

## 2019-09-24 RX ORDER — METOPROLOL TARTRATE 50 MG
25 TABLET ORAL
Refills: 0 | Status: COMPLETED | OUTPATIENT
Start: 2019-09-24 | End: 2019-09-25

## 2019-09-24 RX ORDER — LOSARTAN POTASSIUM 100 MG/1
25 TABLET, FILM COATED ORAL ONCE
Refills: 0 | Status: COMPLETED | OUTPATIENT
Start: 2019-09-24 | End: 2019-09-24

## 2019-09-24 RX ORDER — METOPROLOL TARTRATE 50 MG
50 TABLET ORAL DAILY
Refills: 0 | Status: DISCONTINUED | OUTPATIENT
Start: 2019-09-25 | End: 2019-09-26

## 2019-09-24 RX ORDER — LOSARTAN POTASSIUM 100 MG/1
50 TABLET, FILM COATED ORAL DAILY
Refills: 0 | Status: DISCONTINUED | OUTPATIENT
Start: 2019-09-25 | End: 2019-09-25

## 2019-09-24 RX ADMIN — Medication 81 MILLIGRAM(S): at 14:43

## 2019-09-24 RX ADMIN — Medication 100 MILLIGRAM(S): at 06:32

## 2019-09-24 RX ADMIN — LOSARTAN POTASSIUM 25 MILLIGRAM(S): 100 TABLET, FILM COATED ORAL at 14:45

## 2019-09-24 RX ADMIN — Medication 25 MILLIGRAM(S): at 17:39

## 2019-09-24 RX ADMIN — APIXABAN 2.5 MILLIGRAM(S): 2.5 TABLET, FILM COATED ORAL at 17:39

## 2019-09-24 RX ADMIN — Medication 1 TABLET(S): at 14:41

## 2019-09-24 RX ADMIN — APIXABAN 2.5 MILLIGRAM(S): 2.5 TABLET, FILM COATED ORAL at 06:31

## 2019-09-24 RX ADMIN — ATORVASTATIN CALCIUM 40 MILLIGRAM(S): 80 TABLET, FILM COATED ORAL at 22:04

## 2019-09-24 RX ADMIN — Medication 40 MILLIGRAM(S): at 06:31

## 2019-09-24 RX ADMIN — AMIODARONE HYDROCHLORIDE 200 MILLIGRAM(S): 400 TABLET ORAL at 06:32

## 2019-09-24 RX ADMIN — Medication 100 MILLIGRAM(S): at 22:04

## 2019-09-24 RX ADMIN — SERTRALINE 150 MILLIGRAM(S): 25 TABLET, FILM COATED ORAL at 14:41

## 2019-09-24 RX ADMIN — Medication 50 MILLIGRAM(S): at 22:04

## 2019-09-24 NOTE — PROGRESS NOTE ADULT - ASSESSMENT
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

## 2019-09-24 NOTE — CONSULT NOTE ADULT - ASSESSMENT
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     On exam, patient appears euvolemic w/o evidence of JVD or RICHARD, does have mild bibasilar crackles. Labs unremarkable except for elevate BNP at 3000 (down from 7000 last admission). Presentation most consistent w/ mild ADHF due to decreased dose of lasix as outpatient.    She has a nonischemic cardiomyopathy and her echo is also concerning for restrictive pathology, with some suggestion of cardiac amyloid.      Jan Mccullough MD  Cardiology Fellow   [phone], M-F 7:30A-5P    All Cardiology service information can be found on [email or web address], password: [email or web address] 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     On exam, patient appears euvolemic w/o evidence of JVD or RICHARD, does have mild bibasilar crackles. Labs unremarkable except for elevate BNP at 3000 (down from 7000 last admission). Presentation most consistent w/ mild ADHF due to decreased dose of lasix as outpatient.    She has a nonischemic cardiomyopathy and her echo is also concerning for restrictive pathology, with some suggestion of cardiac amyloid.     Problem/Plan - 1:  ·  Problem: Acute on chronic systolic congestive heart failure.  Plan: Essentially euvolemic on exam.  - Continue with lasix 40mg PO daily.  - Increase losartan to 50mg daily.  - Switch metoprolol to succinate 50mg daily.  - Given restrictive physiology on echocardiogram and abnormal EKG, check for cardiac amyloid - order serum free light chains/immunofixation and NM pyrophosphate scan for amyloid.  - Strict I/O, daily standing weights, low salt diet.     Problem/Plan - 2:  ·  Problem: Atrial fibrillation.  Plan: Rate controlled.  - C/w metoprolol, amiodarone 200mg daily  - C/w apixaban 2.5mg BID     Problem/Plan - 3:  ·  Problem: Essential Hypertension.  Plan:   - C/w metoprolol, increase losartan to 50mg daily     Problem/Plan - 4:  ·  Problem: Hyperlipidemia.  Plan: c/w atorvastatin 40mg daily.      Jan Mccullough MD  Cardiology Fellow   [phone], M-F 7:30A-5P    All Cardiology service information can be found on [email or web address], password: [email or web address].

## 2019-09-24 NOTE — CONSULT NOTE ADULT - SUBJECTIVE AND OBJECTIVE BOX
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 Patient notes she had progressive SOB prior to her TAVR. After the valve, she noticed mild relief but nothing significant. Over the past couple of weeks she has noticed worsening SOB and fatigue once again. She denies any CP, dizziness, RICHARD, orthopnea or PND. The day prior to admission, she had palpitations after walking to the bathroom and called her cardiologist who suggested she come in. Of note, she had her lasix dose decreased from 40mg to 20mg 2 weeks prior because she was having frequent urination.    Allergies  Sulfur (Fever)    Intolerances  codeine (Nausea)  	    MEDICATIONS:  amiodarone    Tablet 200 milliGRAM(s) Oral daily  apixaban 2.5 milliGRAM(s) Oral every 12 hours  aspirin enteric coated 81 milliGRAM(s) Oral daily  furosemide    Tablet 40 milliGRAM(s) Oral daily  losartan 25 milliGRAM(s) Oral daily  metoprolol tartrate 25 milliGRAM(s) Oral two times a day  acetaminophen   Tablet .. 650 milliGRAM(s) Oral every 6 hours PRN  sertraline 150 milliGRAM(s) Oral daily  traZODone 50 milliGRAM(s) Oral at bedtime PRN  docusate sodium 100 milliGRAM(s) Oral three times a day  atorvastatin 40 milliGRAM(s) Oral at bedtime  influenza   Vaccine 0.5 milliLiter(s) IntraMuscular once  multivitamin 1 Tablet(s) Oral daily      PAST MEDICAL & SURGICAL HISTORY:  Pneumonia: discharged 7/16/19  Bladder cancer: TURBT x 2 in the past  Cerebrovascular accident (CVA): left [facility]  2017  aphasia  Severe mitral valve regurgitation  Severe aortic stenosis  Paroxysmal atrial fibrillation: on Eliquis  HLD (hyperlipidemia)  HTN (hypertension)  History of bilateral oophorectomy  S/P cystoscopy: TURBT x 2  H/O shoulder surgery: arthroscopic  10+ yrs ago  right      FAMILY HISTORY:  No pertinent family history in first degree relatives      SOCIAL HISTORY:    lives alone, no health aide but has family who comes often      REVIEW OF SYSTEMS:    CONSTITUTIONAL: No weakness, fevers or chills  EYES/ENT: No visual changes;  No dysphagia  RESPIRATORY: No cough, wheezing, hemoptysis; No shortness of breath  CARDIOVASCULAR: No chest pain or palpitations; No lower extremity edema  GASTROINTESTINAL: No abdominal or epigastric pain. No nausea, vomiting, or hematemesis  GENITOURINARY: No dysuria, frequency or hematuria  NEUROLOGICAL: No numbness or weakness  SKIN: No itching, burning, rashes, or lesions  HEME: No bleeding or bruising  MSK: No joint pains or muscle pains  All other review of systems is negative unless indicated above.    PHYSICAL EXAM:  T(C): 36.7 (09-24-19 @ 05:19), Max: 37.3 (09-23-19 @ 16:35)  HR: 56 (09-24-19 @ 05:19) (56 - 67)  BP: 128/61 (09-24-19 @ 05:19) (114/68 - 156/82)  RR: 18 (09-24-19 @ 05:19) (18 - 19)  SpO2: 97% (09-24-19 @ 05:19) (95% - 100%)  Wt(kg): --  I&O's Summary      Appearance: Normal	  HEENT:   Normal oral mucosa  Cardiovascular: Normal S1 S2, No JVD, No murmurs, No edema  Respiratory: Lungs clear to auscultation	  Psychiatry: A & O x 3, Mood & affect appropriate  Gastrointestinal:  Soft, Non-tender, + BS	  Skin: No rashes, No ecchymoses, No cyanosis	  Neurologic: Non-focal  Extremities: Normal range of motion, No clubbing, cyanosis      LABS:	 	    CBC Full  -  ( 23 Sep 2019 14:39 )  WBC Count : 6.1 K/uL  Hemoglobin : 12.4 g/dL  Hematocrit : 38.1 %  Platelet Count - Automated : 149 K/uL  Mean Cell Volume : 95.4 fl  Mean Cell Hemoglobin : 31.2 pg  Mean Cell Hemoglobin Concentration : 32.7 gm/dL  Auto Neutrophil # : 4.4 K/uL  Auto Lymphocyte # : 0.8 K/uL  Auto Monocyte # : 0.7 K/uL  Auto Eosinophil # : 0.1 K/uL  Auto Basophil # : 0.0 K/uL  Auto Neutrophil % : 72.6 %  Auto Lymphocyte % : 13.5 %  Auto Monocyte % : 11.6 %  Auto Eosinophil % : 1.7 %  Auto Basophil % : 0.6 %    09-23    140  |  105  |  23  ----------------------------<  88  4.1   |  25  |  0.80    Ca    9.5      23 Sep 2019 14:39  Phos  3.0     09-23  Mg     2.0     09-23    TPro  6.9  /  Alb  4.2  /  TBili  0.4  /  DBili  x   /  AST  43<H>  /  ALT  60<H>  /  AlkPhos  82  09-23      proBNP: Serum Pro-Brain Natriuretic Peptide: 3033 pg/mL (09-23 @ 14:39)    CARDIAC MARKERS:   	    ECG:     RADIOLOGY:    PREVIOUS DIAGNOSTIC TESTING:    [ ] Echocardiogram:  [ ]  Catheterization:  [ ] Stress Test:  	  	  ASSESSMENT/PLAN: 	      Jan Mccullough MD  Cardiology Fellow   [phone], M-F 7:30A-5P    All Cardiology service information can be found on [email or web address], password: [email or web address]. 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 Patient notes she had progressive SOB prior to her TAVR. After the valve, she noticed mild relief but nothing significant. Over the past couple of weeks she has noticed worsening SOB and fatigue once again. She denies any CP, dizziness, RICHARD, orthopnea or PND. The day prior to admission, she had palpitations after walking to the bathroom and called her cardiologist who suggested she come in. Of note, she had her lasix dose decreased from 40mg to 20mg 2 weeks prior because she was having frequent urination.    Allergies  Sulfur (Fever)    Intolerances  codeine (Nausea)  	    MEDICATIONS:  amiodarone    Tablet 200 milliGRAM(s) Oral daily  apixaban 2.5 milliGRAM(s) Oral every 12 hours  aspirin enteric coated 81 milliGRAM(s) Oral daily  furosemide    Tablet 40 milliGRAM(s) Oral daily  losartan 25 milliGRAM(s) Oral daily  metoprolol tartrate 25 milliGRAM(s) Oral two times a day  acetaminophen   Tablet .. 650 milliGRAM(s) Oral every 6 hours PRN  sertraline 150 milliGRAM(s) Oral daily  traZODone 50 milliGRAM(s) Oral at bedtime PRN  docusate sodium 100 milliGRAM(s) Oral three times a day  atorvastatin 40 milliGRAM(s) Oral at bedtime  influenza   Vaccine 0.5 milliLiter(s) IntraMuscular once  multivitamin 1 Tablet(s) Oral daily      PAST MEDICAL & SURGICAL HISTORY:  Pneumonia: discharged 7/16/19  Bladder cancer: TURBT x 2 in the past  Cerebrovascular accident (CVA): left [facility]  2017  aphasia  Severe mitral valve regurgitation  Severe aortic stenosis  Paroxysmal atrial fibrillation: on Eliquis  HLD (hyperlipidemia)  HTN (hypertension)  History of bilateral oophorectomy  S/P cystoscopy: TURBT x 2  H/O shoulder surgery: arthroscopic  10+ yrs ago  right      FAMILY HISTORY:  No pertinent family history in first degree relatives      SOCIAL HISTORY:    lives alone, no health aide but has family who comes often      REVIEW OF SYSTEMS:    CONSTITUTIONAL: No weakness, fevers or chills  EYES/ENT: No visual changes;  No dysphagia  RESPIRATORY: No cough, wheezing, hemoptysis; +shortness of breath  CARDIOVASCULAR: No chest pain, +palpitations; No lower extremity edema  GASTROINTESTINAL: No abdominal or epigastric pain. No nausea, vomiting, or hematemesis  GENITOURINARY: No dysuria, frequency or hematuria  NEUROLOGICAL: No numbness or weakness  SKIN: No itching, burning, rashes, or lesions  HEME: No bleeding or bruising  MSK: No joint pains or muscle pains  All other review of systems is negative unless indicated above.    PHYSICAL EXAM:  T(C): 36.7 (09-24-19 @ 05:19), Max: 37.3 (09-23-19 @ 16:35)  HR: 56 (09-24-19 @ 05:19) (56 - 67)  BP: 128/61 (09-24-19 @ 05:19) (114/68 - 156/82)  RR: 18 (09-24-19 @ 05:19) (18 - 19)  SpO2: 97% (09-24-19 @ 05:19) (95% - 100%)  Wt(kg): --  I&O's Summary      Appearance: Normal	  HEENT:   Normal oral mucosa  Cardiovascular: Normal S1 S2, JVD at clavicle, No murmurs, No edema  Respiratory: Mild bibasilar crackles  Psychiatry: A & O x 3, Mood & affect appropriate  Gastrointestinal:  Soft, Non-tender, + BS	  Skin: No rashes, No ecchymoses, No cyanosis	  Neurologic: Non-focal  Extremities: Normal range of motion, No clubbing, cyanosis      LABS:	 	    CBC Full  -  ( 23 Sep 2019 14:39 )  WBC Count : 6.1 K/uL  Hemoglobin : 12.4 g/dL  Hematocrit : 38.1 %  Platelet Count - Automated : 149 K/uL  Mean Cell Volume : 95.4 fl  Mean Cell Hemoglobin : 31.2 pg  Mean Cell Hemoglobin Concentration : 32.7 gm/dL  Auto Neutrophil # : 4.4 K/uL  Auto Lymphocyte # : 0.8 K/uL  Auto Monocyte # : 0.7 K/uL  Auto Eosinophil # : 0.1 K/uL  Auto Basophil # : 0.0 K/uL  Auto Neutrophil % : 72.6 %  Auto Lymphocyte % : 13.5 %  Auto Monocyte % : 11.6 %  Auto Eosinophil % : 1.7 %  Auto Basophil % : 0.6 %    09-23    140  |  105  |  23  ----------------------------<  88  4.1   |  25  |  0.80    Ca    9.5      23 Sep 2019 14:39  Phos  3.0     09-23  Mg     2.0     09-23    TPro  6.9  /  Alb  4.2  /  TBili  0.4  /  DBili  x   /  AST  43<H>  /  ALT  60<H>  /  AlkPhos  82  09-23      proBNP: Serum Pro-Brain Natriuretic Peptide: 3033 pg/mL (09-23 @ 14:39)    CARDIAC MARKERS: hs trop 35 -> 43   	    ECG: NSR, HR ~75, LAD, LBBB, 1st deg AVB, inflat q waves    RADIOLOGY: CXR w/ small bilateral pleural effusions    PREVIOUS DIAGNOSTIC TESTING:    Echo 9/5/19  Mitral Valve: Mitral annular calcification. Calcified  mitral leaflets. Moderate mitral regurgitation.  The dP/dt of the MR-jet is approximately 533 mmHg/sec,  which correlates with the visual impression of moderate  left ventricular systolic dysfunction.  Aortic Valve/Aorta: Transcatheter aortic valve replacement.  Peak velocity in the LVOT 0.7 m/s.  Peak velocity in the aorta 1.2 m/s.  Measured LVOTd 1.9 cm.  Aortic valve area by continuity 1.7 cm2.  Mild paravalvular aortic regurgitation.  Normal aortic root size.  Left Atrium: Severely dilated left atrium.  LA volume index  = 71 cc/m2.  Left Ventricle: Moderate left ventricular enlargement.  Estimated ejection fraction 40-45%. Akinesis of the  inferior and inferolateral walls.  Left atrial pressure is severely increased.  Right Heart: Right atrial enlargement. Normal right  ventricular size and function. Normal tricuspid valve.  Minimal tricuspid regurgitation. Normal pulmonic valve.  Minimal pulmonic regurgitation.  Pericardium/Pleura: Normal pericardium with no pericardial  effusion.  Hemodynamic: Right atrial pressure is mildly increased.  Mild-moderate pulmonary hypertension. Estimated PASP 50  mmHg.    Summary:  Moderate left ventricular enlargement.  Estimated ejection fraction 40-45%; regional abnormalities  as described.  Left atrial pressure is severely increased.  Mild-moderate pulmonary hypertension.    Cath 6/13/19  CORONARY VESSELS: The coronary circulation is right dominant.  LM:   --  LM: Normal.  LAD:   --  LAD: Angiography showed minor luminal irregularities with no  flow limiting lesions.  CX:   --  Circumflex: Normal.  RCA:   --  RCA: Angiography showed minor luminal irregularities with no  flow limiting lesions.  COMPLICATIONS: There were no complications.  DIAGNOSTIC RECOMMENDATIONS: The patient should continue with the present  medications. 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 Patient notes she had progressive SOB prior to her TAVR. After the valve, she noticed mild relief but nothing significant. Over the past couple of weeks she has noticed worsening SOB and fatigue once again. She denies any CP, dizziness, RICHARD, orthopnea or PND. The day prior to admission, she had palpitations after walking to the bathroom and called her cardiologist who suggested she come in. Of note, she had her lasix dose decreased from 40mg to 20mg 2 weeks prior because she was having frequent urination.    Cardiologist: Dr. Amaury Lewis.    Allergies  Sulfur (Fever)    Intolerances  codeine (Nausea)  	    MEDICATIONS:  amiodarone    Tablet 200 milliGRAM(s) Oral daily  apixaban 2.5 milliGRAM(s) Oral every 12 hours  aspirin enteric coated 81 milliGRAM(s) Oral daily  furosemide    Tablet 40 milliGRAM(s) Oral daily  losartan 25 milliGRAM(s) Oral daily  metoprolol tartrate 25 milliGRAM(s) Oral two times a day  acetaminophen   Tablet .. 650 milliGRAM(s) Oral every 6 hours PRN  sertraline 150 milliGRAM(s) Oral daily  traZODone 50 milliGRAM(s) Oral at bedtime PRN  docusate sodium 100 milliGRAM(s) Oral three times a day  atorvastatin 40 milliGRAM(s) Oral at bedtime  influenza   Vaccine 0.5 milliLiter(s) IntraMuscular once  multivitamin 1 Tablet(s) Oral daily      PAST MEDICAL & SURGICAL HISTORY:  Pneumonia: discharged 7/16/19  Bladder cancer: TURBT x 2 in the past  Cerebrovascular accident (CVA): left MCA  2017  aphasia  Severe mitral valve regurgitation  Severe aortic stenosis  Paroxysmal atrial fibrillation: on Eliquis  HLD (hyperlipidemia)  HTN (hypertension)  History of bilateral oophorectomy  S/P cystoscopy: TURBT x 2  H/O shoulder surgery: arthroscopic  10+ yrs ago  right      FAMILY HISTORY:  No pertinent family history in first degree relatives      SOCIAL HISTORY:    lives alone, no health aide but has family who comes often      REVIEW OF SYSTEMS:    CONSTITUTIONAL: No weakness, fevers or chills  EYES/ENT: No visual changes;  No dysphagia  RESPIRATORY: No cough, wheezing, hemoptysis; +shortness of breath  CARDIOVASCULAR: No chest pain, +palpitations; No lower extremity edema  GASTROINTESTINAL: No abdominal or epigastric pain. No nausea, vomiting, or hematemesis  GENITOURINARY: No dysuria, frequency or hematuria  NEUROLOGICAL: No numbness or weakness  SKIN: No itching, burning, rashes, or lesions  HEME: No bleeding or bruising  MSK: No joint pains or muscle pains  All other review of systems is negative unless indicated above.    PHYSICAL EXAM:  T(C): 36.7 (09-24-19 @ 05:19), Max: 37.3 (09-23-19 @ 16:35)  HR: 56 (09-24-19 @ 05:19) (56 - 67)  BP: 128/61 (09-24-19 @ 05:19) (114/68 - 156/82)  RR: 18 (09-24-19 @ 05:19) (18 - 19)  SpO2: 97% (09-24-19 @ 05:19) (95% - 100%)  Wt(kg): --  I&O's Summary      Appearance: Normal	  HEENT:   Normal oral mucosa  Cardiovascular: Normal S1 S2, JVD at clavicle, No murmurs, No edema  Respiratory: Mild bibasilar crackles  Psychiatry: A & O x 3, Mood & affect appropriate  Gastrointestinal:  Soft, Non-tender, + BS	  Skin: No rashes, No ecchymoses, No cyanosis	  Neurologic: Non-focal  Extremities: Normal range of motion, No clubbing, cyanosis      LABS:	 	    CBC Full  -  ( 23 Sep 2019 14:39 )  WBC Count : 6.1 K/uL  Hemoglobin : 12.4 g/dL  Hematocrit : 38.1 %  Platelet Count - Automated : 149 K/uL  Mean Cell Volume : 95.4 fl  Mean Cell Hemoglobin : 31.2 pg  Mean Cell Hemoglobin Concentration : 32.7 gm/dL  Auto Neutrophil # : 4.4 K/uL  Auto Lymphocyte # : 0.8 K/uL  Auto Monocyte # : 0.7 K/uL  Auto Eosinophil # : 0.1 K/uL  Auto Basophil # : 0.0 K/uL  Auto Neutrophil % : 72.6 %  Auto Lymphocyte % : 13.5 %  Auto Monocyte % : 11.6 %  Auto Eosinophil % : 1.7 %  Auto Basophil % : 0.6 %    09-23    140  |  105  |  23  ----------------------------<  88  4.1   |  25  |  0.80    Ca    9.5      23 Sep 2019 14:39  Phos  3.0     09-23  Mg     2.0     09-23    TPro  6.9  /  Alb  4.2  /  TBili  0.4  /  DBili  x   /  AST  43<H>  /  ALT  60<H>  /  AlkPhos  82  09-23      proBNP: Serum Pro-Brain Natriuretic Peptide: 3033 pg/mL (09-23 @ 14:39)    CARDIAC MARKERS: hs trop 35 -> 43   	    ECG: NSR, HR ~75, LAD, LBBB, 1st deg AVB, inflat q waves    RADIOLOGY: CXR w/ small bilateral pleural effusions    PREVIOUS DIAGNOSTIC TESTING:    Echo 9/5/19  Mitral Valve: Mitral annular calcification. Calcified  mitral leaflets. Moderate mitral regurgitation.  The dP/dt of the MR-jet is approximately 533 mmHg/sec,  which correlates with the visual impression of moderate  left ventricular systolic dysfunction.  Aortic Valve/Aorta: Transcatheter aortic valve replacement.  Peak velocity in the LVOT 0.7 m/s.  Peak velocity in the aorta 1.2 m/s.  Measured LVOTd 1.9 cm.  Aortic valve area by continuity 1.7 cm2.  Mild paravalvular aortic regurgitation.  Normal aortic root size.  Left Atrium: Severely dilated left atrium.  LA volume index  = 71 cc/m2.  Left Ventricle: Moderate left ventricular enlargement.  Estimated ejection fraction 40-45%. Akinesis of the  inferior and inferolateral walls.  Left atrial pressure is severely increased.  Right Heart: Right atrial enlargement. Normal right  ventricular size and function. Normal tricuspid valve.  Minimal tricuspid regurgitation. Normal pulmonic valve.  Minimal pulmonic regurgitation.  Pericardium/Pleura: Normal pericardium with no pericardial  effusion.  Hemodynamic: Right atrial pressure is mildly increased.  Mild-moderate pulmonary hypertension. Estimated PASP 50  mmHg.    Summary:  Moderate left ventricular enlargement.  Estimated ejection fraction 40-45%; regional abnormalities  as described.  Left atrial pressure is severely increased.  Mild-moderate pulmonary hypertension.    Cath 6/13/19  CORONARY VESSELS: The coronary circulation is right dominant.  LM:   --  LM: Normal.  LAD:   --  LAD: Angiography showed minor luminal irregularities with no  flow limiting lesions.  CX:   --  Circumflex: Normal.  RCA:   --  RCA: Angiography showed minor luminal irregularities with no  flow limiting lesions.  COMPLICATIONS: There were no complications.  DIAGNOSTIC RECOMMENDATIONS: The patient should continue with the present  medications.

## 2019-09-24 NOTE — PROGRESS NOTE ADULT - SUBJECTIVE AND OBJECTIVE BOX
Patient is a 89y old  Female who presents with a chief complaint of palpitations and SOB x 1 day (24 Sep 2019 09:21)      INTERVAL HPI/OVERNIGHT EVENTS:      Other form of dyspnea  No pertinent family history in first degree relatives  No pertinent family history in first degree relatives  Handoff  MEWS Score  Pneumonia  Bladder cancer  Cerebrovascular accident (CVA)  Severe mitral valve regurgitation  Severe aortic stenosis  Aortic valve stenosis, etiology of cardiac valve disease unspecified  Paroxysmal atrial fibrillation  HLD (hyperlipidemia)  HTN (hypertension)  Dyspnea on exertion  HLD (hyperlipidemia)  Depression  HTN (hypertension)  Paroxysmal atrial fibrillation  Dyspnea on exertion  History of bilateral oophorectomy  S/P cystoscopy  Status post bladder repair  H/O shoulder surgery  PALPITATIONS TODAY SOB  58      Review of Systems: 12 point review of systems otherwise negative  ( - )fevers/chills  ( - ) dyspnea  ( - ) cough  ( - ) chest pain  ( - ) palpatations  ( - ) dizziness/lightheadedness  ( - ) nausea/vomiting  ( - ) abd pain  ( - ) diarrhea  ( - ) melena  ( - ) hematochezia  ( - ) dysuria  ( - ) hematuria  ( - ) leg swelling  ( -) calf tenderness  ( - ) motor weakness  ( - ) extremity numbness  ( - ) back pain  ( + ) tolerating POs  ( + ) BM    MEDICATIONS  (STANDING):  amiodarone    Tablet 200 milliGRAM(s) Oral daily  apixaban 2.5 milliGRAM(s) Oral every 12 hours  aspirin enteric coated 81 milliGRAM(s) Oral daily  atorvastatin 40 milliGRAM(s) Oral at bedtime  docusate sodium 100 milliGRAM(s) Oral three times a day  furosemide    Tablet 40 milliGRAM(s) Oral daily  influenza   Vaccine 0.5 milliLiter(s) IntraMuscular once  losartan 25 milliGRAM(s) Oral once  metoprolol tartrate 25 milliGRAM(s) Oral two times a day  multivitamin 1 Tablet(s) Oral daily  sertraline 150 milliGRAM(s) Oral daily    MEDICATIONS  (PRN):  acetaminophen   Tablet .. 650 milliGRAM(s) Oral every 6 hours PRN Mild Pain (1 - 3)  traZODone 50 milliGRAM(s) Oral at bedtime PRN insomnia      Allergies    Sulfur (Fever)    Intolerances    codeine (Nausea)        Vital Signs Last 24 Hrs  T(C): 36.7 (24 Sep 2019 05:19), Max: 37.3 (23 Sep 2019 16:35)  T(F): 98.1 (24 Sep 2019 05:19), Max: 99.2 (23 Sep 2019 16:35)  HR: 56 (24 Sep 2019 05:19) (56 - 67)  BP: 128/61 (24 Sep 2019 05:19) (114/68 - 154/71)  BP(mean): --  RR: 18 (24 Sep 2019 05:19) (18 - 19)  SpO2: 97% (24 Sep 2019 05:19) (95% - 100%)  CAPILLARY BLOOD GLUCOSE            Physical Exam:    Daily Height in cm: 157.48 (23 Sep 2019 22:15)    Daily   General:  NAD, non toxic  HEENT:  Nonicteric, PERRLA  CV:  RRR, + systolic murmur  Lungs:  CTA B/L, no wheezes, rales, rhonchi  Abdomen:  Soft, non-tender, no distended, positive BS  Extremities:  no edema  Skin:  Warm and dry, no rashes  :  No taylor  Neuro:  AAOx3, non-focal  No Restraints    LABS:                        12.4   6.1   )-----------( 149      ( 23 Sep 2019 14:39 )             38.1     09-23    140  |  105  |  23  ----------------------------<  88  4.1   |  25  |  0.80    Ca    9.5      23 Sep 2019 14:39  Phos  3.0     09-23  Mg     2.0     09-23    TPro  6.9  /  Alb  4.2  /  TBili  0.4  /  DBili  x   /  AST  43<H>  /  ALT  60<H>  /  AlkPhos  82  09-23    PT/INR - ( 23 Sep 2019 14:39 )   PT: 14.8 sec;   INR: 1.29 ratio         PTT - ( 23 Sep 2019 14:39 )  PTT:35.7 sec        RADIOLOGY & ADDITIONAL TESTS:    ---------------------------------------------------------------------------  I personally reviewed: [  ]EKG   [  ]CXR    [  ] CT    [  ]Other  ---------------------------------------------------------------------------  PLEASE CHECK WHEN PRESENT:     [  ]Heart Failure     [  ] Acute     [  ] Acute on Chronic     [  ] Chronic  -------------------------------------------------------------------     [  ]Diastolic [HFpEF]     [  ]Systolic [HFrEF]     [  ]Combined [HFpEF & HFrEF]     [  ]Other:  -------------------------------------------------------------------  [  ]BACILIO     [  ]ATN     [  ]Reneal Medullary Necrosis     [  ]Renal Cortical Necrosis     [  ]Other Pathological Lesions:    [  ]CKD 1  [  ]CKD 2  [  ]CKD 3  [  ]CKD 4  [  ]CKD 5  [  ]Other  -------------------------------------------------------------------  [  ]Other/Unspecified:    --------------------------------------------------------------------    Abdominal Nutritional Status  [  ]Malnutrition: See Nutrition Note  [  ]Cachexia  [  ]Other:   [  ]Supplement Ordered:  [  ]Morbid Obesity (BMI >=40] Patient is a 89y old  Female who presents with a chief complaint of palpitations and SOB x 1 day (24 Sep 2019 09:21)      INTERVAL HPI/OVERNIGHT EVENTS: JET no CP or SOB now.      Other form of dyspnea  No pertinent family history in first degree relatives  No pertinent family history in first degree relatives  Handoff  MEWS Score  Pneumonia  Bladder cancer  Cerebrovascular accident (CVA)  Severe mitral valve regurgitation  Severe aortic stenosis  Aortic valve stenosis, etiology of cardiac valve disease unspecified  Paroxysmal atrial fibrillation  HLD (hyperlipidemia)  HTN (hypertension)  Dyspnea on exertion  HLD (hyperlipidemia)  Depression  HTN (hypertension)  Paroxysmal atrial fibrillation  Dyspnea on exertion  History of bilateral oophorectomy  S/P cystoscopy  Status post bladder repair  H/O shoulder surgery  PALPITATIONS TODAY SOB  58      Review of Systems: 12 point review of systems otherwise negative  ( - )fevers/chills  ( - ) dyspnea  ( - ) cough  ( - ) chest pain  ( - ) palpatations  ( - ) dizziness/lightheadedness  ( - ) nausea/vomiting  ( - ) abd pain  ( - ) diarrhea  ( - ) melena  ( - ) hematochezia  ( - ) dysuria  ( - ) hematuria  ( - ) leg swelling  ( -) calf tenderness  ( - ) motor weakness  ( - ) extremity numbness  ( - ) back pain  ( + ) tolerating POs  ( + ) BM    MEDICATIONS  (STANDING):  amiodarone    Tablet 200 milliGRAM(s) Oral daily  apixaban 2.5 milliGRAM(s) Oral every 12 hours  aspirin enteric coated 81 milliGRAM(s) Oral daily  atorvastatin 40 milliGRAM(s) Oral at bedtime  docusate sodium 100 milliGRAM(s) Oral three times a day  furosemide    Tablet 40 milliGRAM(s) Oral daily  influenza   Vaccine 0.5 milliLiter(s) IntraMuscular once  losartan 25 milliGRAM(s) Oral once  metoprolol tartrate 25 milliGRAM(s) Oral two times a day  multivitamin 1 Tablet(s) Oral daily  sertraline 150 milliGRAM(s) Oral daily    MEDICATIONS  (PRN):  acetaminophen   Tablet .. 650 milliGRAM(s) Oral every 6 hours PRN Mild Pain (1 - 3)  traZODone 50 milliGRAM(s) Oral at bedtime PRN insomnia      Allergies    Sulfur (Fever)    Intolerances    codeine (Nausea)        Vital Signs Last 24 Hrs  T(C): 36.7 (24 Sep 2019 05:19), Max: 37.3 (23 Sep 2019 16:35)  T(F): 98.1 (24 Sep 2019 05:19), Max: 99.2 (23 Sep 2019 16:35)  HR: 56 (24 Sep 2019 05:19) (56 - 67)  BP: 128/61 (24 Sep 2019 05:19) (114/68 - 154/71)  BP(mean): --  RR: 18 (24 Sep 2019 05:19) (18 - 19)  SpO2: 97% (24 Sep 2019 05:19) (95% - 100%)  CAPILLARY BLOOD GLUCOSE            Physical Exam:    Daily Height in cm: 157.48 (23 Sep 2019 22:15)    Daily   General:  NAD, non toxic  HEENT:  Nonicteric, PERRLA  CV:  RRR, + systolic murmur  Lungs:  CTA B/L, no wheezes, rales, rhonchi  Abdomen:  Soft, non-tender, no distended, positive BS  Extremities:  no edema  Skin:  Warm and dry, no rashes  :  No taylor  Neuro:  AAOx3, non-focal  No Restraints    LABS:                        12.4   6.1   )-----------( 149      ( 23 Sep 2019 14:39 )             38.1     09-23    140  |  105  |  23  ----------------------------<  88  4.1   |  25  |  0.80    Ca    9.5      23 Sep 2019 14:39  Phos  3.0     09-23  Mg     2.0     09-23    TPro  6.9  /  Alb  4.2  /  TBili  0.4  /  DBili  x   /  AST  43<H>  /  ALT  60<H>  /  AlkPhos  82  09-23    PT/INR - ( 23 Sep 2019 14:39 )   PT: 14.8 sec;   INR: 1.29 ratio         PTT - ( 23 Sep 2019 14:39 )  PTT:35.7 sec        RADIOLOGY & ADDITIONAL TESTS:    ---------------------------------------------------------------------------  I personally reviewed: [  ]EKG   [  ]CXR    [  ] CT    [  ]Other  ---------------------------------------------------------------------------  PLEASE CHECK WHEN PRESENT:     [  ]Heart Failure     [  ] Acute     [  ] Acute on Chronic     [  ] Chronic  -------------------------------------------------------------------     [  ]Diastolic [HFpEF]     [  ]Systolic [HFrEF]     [  ]Combined [HFpEF & HFrEF]     [  ]Other:  -------------------------------------------------------------------  [  ]BACILIO     [  ]ATN     [  ]Reneal Medullary Necrosis     [  ]Renal Cortical Necrosis     [  ]Other Pathological Lesions:    [  ]CKD 1  [  ]CKD 2  [  ]CKD 3  [  ]CKD 4  [  ]CKD 5  [  ]Other  -------------------------------------------------------------------  [  ]Other/Unspecified:    --------------------------------------------------------------------    Abdominal Nutritional Status  [  ]Malnutrition: See Nutrition Note  [  ]Cachexia  [  ]Other:   [  ]Supplement Ordered:  [  ]Morbid Obesity (BMI >=40]

## 2019-09-25 LAB
INTERPRETATION SERPL IFE-IMP: SIGNIFICANT CHANGE UP
KAPPA LC SER QL IFE: 2.92 MG/DL — HIGH (ref 0.33–1.94)
KAPPA/LAMBDA FREE LIGHT CHAIN RATIO, SERUM: 1.38 RATIO — SIGNIFICANT CHANGE UP (ref 0.26–1.65)
LAMBDA LC SER QL IFE: 2.12 MG/DL — SIGNIFICANT CHANGE UP (ref 0.57–2.63)

## 2019-09-25 PROCEDURE — 78803 RP LOCLZJ TUM SPECT 1 AREA: CPT | Mod: 26

## 2019-09-25 PROCEDURE — 99233 SBSQ HOSP IP/OBS HIGH 50: CPT

## 2019-09-25 PROCEDURE — 78499 UNLISTED CV PX DX NUC MED: CPT | Mod: 26

## 2019-09-25 RX ORDER — LOSARTAN POTASSIUM 100 MG/1
100 TABLET, FILM COATED ORAL DAILY
Refills: 0 | Status: DISCONTINUED | OUTPATIENT
Start: 2019-09-25 | End: 2019-09-28

## 2019-09-25 RX ORDER — BUMETANIDE 0.25 MG/ML
1 INJECTION INTRAMUSCULAR; INTRAVENOUS DAILY
Refills: 0 | Status: DISCONTINUED | OUTPATIENT
Start: 2019-09-25 | End: 2019-09-28

## 2019-09-25 RX ADMIN — AMIODARONE HYDROCHLORIDE 200 MILLIGRAM(S): 400 TABLET ORAL at 06:17

## 2019-09-25 RX ADMIN — Medication 81 MILLIGRAM(S): at 11:07

## 2019-09-25 RX ADMIN — BUMETANIDE 1 MILLIGRAM(S): 0.25 INJECTION INTRAMUSCULAR; INTRAVENOUS at 17:05

## 2019-09-25 RX ADMIN — APIXABAN 2.5 MILLIGRAM(S): 2.5 TABLET, FILM COATED ORAL at 17:10

## 2019-09-25 RX ADMIN — APIXABAN 2.5 MILLIGRAM(S): 2.5 TABLET, FILM COATED ORAL at 06:17

## 2019-09-25 RX ADMIN — SERTRALINE 150 MILLIGRAM(S): 25 TABLET, FILM COATED ORAL at 11:08

## 2019-09-25 RX ADMIN — Medication 1 TABLET(S): at 11:07

## 2019-09-25 RX ADMIN — Medication 100 MILLIGRAM(S): at 21:41

## 2019-09-25 RX ADMIN — Medication 100 MILLIGRAM(S): at 17:05

## 2019-09-25 RX ADMIN — Medication 50 MILLIGRAM(S): at 21:42

## 2019-09-25 RX ADMIN — Medication 50 MILLIGRAM(S): at 06:17

## 2019-09-25 RX ADMIN — LOSARTAN POTASSIUM 50 MILLIGRAM(S): 100 TABLET, FILM COATED ORAL at 06:24

## 2019-09-25 RX ADMIN — Medication 40 MILLIGRAM(S): at 06:17

## 2019-09-25 RX ADMIN — Medication 25 MILLIGRAM(S): at 06:17

## 2019-09-25 RX ADMIN — Medication 100 MILLIGRAM(S): at 06:17

## 2019-09-25 RX ADMIN — LOSARTAN POTASSIUM 100 MILLIGRAM(S): 100 TABLET, FILM COATED ORAL at 17:05

## 2019-09-25 RX ADMIN — ATORVASTATIN CALCIUM 40 MILLIGRAM(S): 80 TABLET, FILM COATED ORAL at 21:41

## 2019-09-25 NOTE — PROGRESS NOTE ADULT - SUBJECTIVE AND OBJECTIVE BOX
Antonio Cervantes M.D. Pager Number [phone]    Patient is a 89y old  Female who presents with a chief complaint of palpitations and SOB x 1 day (25 Sep 2019 14:20)      SUBJECTIVE / OVERNIGHT EVENTS:  Pt seen and examined at bedside. No acute events overnight. Continues to endorse sob at rest, however, does states she does not need O2.   Pt denies cp, palpitations, abd pain, N/V, fever, chills.    ROS:  All other review of systems negative    Allergies    Sulfur (Fever)    Intolerances    codeine (Nausea)      MEDICATIONS  (STANDING):  amiodarone    Tablet 200 milliGRAM(s) Oral daily  apixaban 2.5 milliGRAM(s) Oral every 12 hours  aspirin enteric coated 81 milliGRAM(s) Oral daily  atorvastatin 40 milliGRAM(s) Oral at bedtime  buMETAnide 1 milliGRAM(s) Oral daily  docusate sodium 100 milliGRAM(s) Oral three times a day  influenza   Vaccine 0.5 milliLiter(s) IntraMuscular once  losartan 100 milliGRAM(s) Oral daily  metoprolol succinate ER 50 milliGRAM(s) Oral daily  multivitamin 1 Tablet(s) Oral daily  sertraline 150 milliGRAM(s) Oral daily    MEDICATIONS  (PRN):  acetaminophen   Tablet .. 650 milliGRAM(s) Oral every 6 hours PRN Mild Pain (1 - 3)  traZODone 50 milliGRAM(s) Oral at bedtime PRN insomnia      Vital Signs Last 24 Hrs  T(C): 36.6 (25 Sep 2019 11:56), Max: 36.8 (24 Sep 2019 15:30)  T(F): 97.8 (25 Sep 2019 11:56), Max: 98.2 (24 Sep 2019 15:30)  HR: 58 (25 Sep 2019 11:56) (58 - 62)  BP: 147/73 (25 Sep 2019 11:56) (138/74 - 151/77)  BP(mean): --  RR: 18 (25 Sep 2019 11:56) (16 - 18)  SpO2: 95% (25 Sep 2019 11:56) (94% - 95%)  CAPILLARY BLOOD GLUCOSE        I&O's Summary    25 Sep 2019 07:01  -  25 Sep 2019 15:02  --------------------------------------------------------  IN: 180 mL / OUT: 0 mL / NET: 180 mL        PHYSICAL EXAM:  GENERAL: NAD, Elderly female  HEAD:  Atraumatic, Normocephalic  EYES: EOMI, PERRLA, conjunctiva and sclera clear  NECK: Supple, No JVD  CHEST/LUNG: Clear to auscultation bilaterally; No wheeze  HEART: Regular rate and rhythm; + systolic murmur, No rubs, or gallops  ABDOMEN: Soft, Nontender, Nondistended; Bowel sounds present  EXTREMITIES:  2+ Peripheral Pulses, No clubbing, cyanosis, or edema  NEUROLOGY: AAOx3, non-focal  PSYCH: calm  SKIN: No rashes or lesions    LABS:                    RADIOLOGY & ADDITIONAL TESTS:    Imaging Personally Reviewed:    Consultant(s) Notes Reviewed:      Care Discussed with Consultants/Other Providers:    Case Discussed with Family:    Goals of Care:

## 2019-09-25 NOTE — PROGRESS NOTE ADULT - SUBJECTIVE AND OBJECTIVE BOX
No events overnight. Continues to feel SOB.    MEDICATIONS:  amiodarone    Tablet 200 milliGRAM(s) Oral daily  apixaban 2.5 milliGRAM(s) Oral every 12 hours  aspirin enteric coated 81 milliGRAM(s) Oral daily  furosemide    Tablet 40 milliGRAM(s) Oral daily  losartan 50 milliGRAM(s) Oral daily  metoprolol succinate ER 50 milliGRAM(s) Oral daily        acetaminophen   Tablet .. 650 milliGRAM(s) Oral every 6 hours PRN  sertraline 150 milliGRAM(s) Oral daily  traZODone 50 milliGRAM(s) Oral at bedtime PRN    docusate sodium 100 milliGRAM(s) Oral three times a day    atorvastatin 40 milliGRAM(s) Oral at bedtime    influenza   Vaccine 0.5 milliLiter(s) IntraMuscular once  multivitamin 1 Tablet(s) Oral daily      REVIEW OF SYSTEMS:    CONSTITUTIONAL: No weakness, fevers or chills  EYES/ENT: No visual changes;  No dysphagia  RESPIRATORY: No cough, wheezing, hemoptysis; +shortness of breath  CARDIOVASCULAR: No chest pain or palpitations; No lower extremity edema  GASTROINTESTINAL: No abdominal or epigastric pain. No nausea, vomiting, or hematemesis  GENITOURINARY: No dysuria, frequency or hematuria  NEUROLOGICAL: No numbness or weakness  SKIN: No itching, burning, rashes, or lesions   HEME: No bleeding or bruising  MSK: No joint pains or muscle pains  All other review of systems is negative unless indicated above.    PHYSICAL EXAM:  T(C): 36.6 (09-25-19 @ 11:56), Max: 36.8 (09-24-19 @ 14:38)  HR: 58 (09-25-19 @ 11:56) (58 - 70)  BP: 147/73 (09-25-19 @ 11:56) (138/74 - 151/77)  RR: 18 (09-25-19 @ 11:56) (16 - 20)  SpO2: 95% (09-25-19 @ 11:56) (92% - 95%)  Wt(kg): --  I&O's Summary    25 Sep 2019 07:01  -  25 Sep 2019 14:20  --------------------------------------------------------  IN: 180 mL / OUT: 0 mL / NET: 180 mL        Appearance: Normal	  HEENT:   Normal oral mucosa  Cardiovascular: Normal S1 S2, No JVD, No murmurs, No edema  Respiratory: Lungs clear to auscultation	  Psychiatry: A & O x 3, Mood & affect appropriate  Gastrointestinal:  Soft, Non-tender, + BS	  Skin: No rashes, No ecchymoses, No cyanosis	  Neurologic: Non-focal  Extremities: Normal range of motion, No clubbing, cyanosis        LABS:	 	    CBC Full  -  ( 23 Sep 2019 14:39 )  WBC Count : 6.1 K/uL  Hemoglobin : 12.4 g/dL  Hematocrit : 38.1 %  Platelet Count - Automated : 149 K/uL  Mean Cell Volume : 95.4 fl  Mean Cell Hemoglobin : 31.2 pg  Mean Cell Hemoglobin Concentration : 32.7 gm/dL  Auto Neutrophil # : 4.4 K/uL  Auto Lymphocyte # : 0.8 K/uL  Auto Monocyte # : 0.7 K/uL  Auto Eosinophil # : 0.1 K/uL  Auto Basophil # : 0.0 K/uL  Auto Neutrophil % : 72.6 %  Auto Lymphocyte % : 13.5 %  Auto Monocyte % : 11.6 %  Auto Eosinophil % : 1.7 %  Auto Basophil % : 0.6 %    09-23    140  |  105  |  23  ----------------------------<  88  4.1   |  25  |  0.80    Ca    9.5      23 Sep 2019 14:39  Phos  3.0     09-23  Mg     2.0     09-23    TPro  6.9  /  Alb  4.2  /  TBili  0.4  /  DBili  x   /  AST  43<H>  /  ALT  60<H>  /  AlkPhos  82  09-23      proBNP: Serum Pro-Brain Natriuretic Peptide: 3033 pg/mL (09-23-19 @ 14:39)    TELEMETRY: 	  NSR, 1st deg AVB, 50-55

## 2019-09-25 NOTE — PROGRESS NOTE ADULT - ASSESSMENT
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    She has a nonischemic cardiomyopathy and her echo is also concerning for restrictive pathology, with some suggestion of cardiac amyloid, pending workup.     Problem/Plan - 1:  ·  Problem: Acute on chronic systolic congestive heart failure.  Plan: Essentially euvolemic on exam.  - D/c lasix, start bumex 1mg PO daily.  - Increase losartan to 100mg daily.  - c/w metop succinate 50mg daily.  - Given restrictive physiology on echocardiogram and abnormal EKG, check for cardiac amyloid - pending serum free light chains/immunofixation and NM pyrophosphate scan for amyloid.  - Strict I/O, daily standing weights, low salt diet.     Problem/Plan - 2:  ·  Problem: Atrial fibrillation.  Plan: Rate controlled.  - C/w metoprolol, amiodarone 200mg daily  - C/w apixaban 2.5mg BID     Problem/Plan - 3:  ·  Problem: Essential Hypertension.  Plan:   - C/w metoprolol, losartan     Problem/Plan - 4:  ·  Problem: Hyperlipidemia.  Plan: c/w atorvastatin 40mg daily.      Jan Mccullough MD  Cardiology Fellow   [phone], M-F 7:30A-5P    All Cardiology service information can be found on [email or web address], password: [email or web address].

## 2019-09-26 LAB
ALBUMIN SERPL ELPH-MCNC: 3.8 G/DL — SIGNIFICANT CHANGE UP (ref 3.3–5)
ALP SERPL-CCNC: 63 U/L — SIGNIFICANT CHANGE UP (ref 40–120)
ALT FLD-CCNC: 54 U/L — HIGH (ref 10–45)
ANION GAP SERPL CALC-SCNC: 12 MMOL/L — SIGNIFICANT CHANGE UP (ref 5–17)
AST SERPL-CCNC: 33 U/L — SIGNIFICANT CHANGE UP (ref 10–40)
BASOPHILS # BLD AUTO: 0.04 K/UL — SIGNIFICANT CHANGE UP (ref 0–0.2)
BASOPHILS NFR BLD AUTO: 0.5 % — SIGNIFICANT CHANGE UP (ref 0–2)
BILIRUB SERPL-MCNC: 0.5 MG/DL — SIGNIFICANT CHANGE UP (ref 0.2–1.2)
BLD GP AB SCN SERPL QL: NEGATIVE — SIGNIFICANT CHANGE UP
BUN SERPL-MCNC: 22 MG/DL — SIGNIFICANT CHANGE UP (ref 7–23)
CALCIUM SERPL-MCNC: 9.2 MG/DL — SIGNIFICANT CHANGE UP (ref 8.4–10.5)
CHLORIDE SERPL-SCNC: 102 MMOL/L — SIGNIFICANT CHANGE UP (ref 96–108)
CO2 SERPL-SCNC: 27 MMOL/L — SIGNIFICANT CHANGE UP (ref 22–31)
CREAT SERPL-MCNC: 0.95 MG/DL — SIGNIFICANT CHANGE UP (ref 0.5–1.3)
EOSINOPHIL # BLD AUTO: 0.15 K/UL — SIGNIFICANT CHANGE UP (ref 0–0.5)
EOSINOPHIL NFR BLD AUTO: 1.7 % — SIGNIFICANT CHANGE UP (ref 0–6)
GLUCOSE SERPL-MCNC: 102 MG/DL — HIGH (ref 70–99)
HCT VFR BLD CALC: 36.7 % — SIGNIFICANT CHANGE UP (ref 34.5–45)
HGB BLD-MCNC: 11.9 G/DL — SIGNIFICANT CHANGE UP (ref 11.5–15.5)
IMM GRANULOCYTES NFR BLD AUTO: 0.2 % — SIGNIFICANT CHANGE UP (ref 0–1.5)
LYMPHOCYTES # BLD AUTO: 0.97 K/UL — LOW (ref 1–3.3)
LYMPHOCYTES # BLD AUTO: 11.2 % — LOW (ref 13–44)
MCHC RBC-ENTMCNC: 30.1 PG — SIGNIFICANT CHANGE UP (ref 27–34)
MCHC RBC-ENTMCNC: 32.4 GM/DL — SIGNIFICANT CHANGE UP (ref 32–36)
MCV RBC AUTO: 92.9 FL — SIGNIFICANT CHANGE UP (ref 80–100)
MONOCYTES # BLD AUTO: 1.32 K/UL — HIGH (ref 0–0.9)
MONOCYTES NFR BLD AUTO: 15.3 % — HIGH (ref 2–14)
NEUTROPHILS # BLD AUTO: 6.13 K/UL — SIGNIFICANT CHANGE UP (ref 1.8–7.4)
NEUTROPHILS NFR BLD AUTO: 71.1 % — SIGNIFICANT CHANGE UP (ref 43–77)
PLATELET # BLD AUTO: 157 K/UL — SIGNIFICANT CHANGE UP (ref 150–400)
POTASSIUM SERPL-MCNC: 3.3 MMOL/L — LOW (ref 3.5–5.3)
POTASSIUM SERPL-SCNC: 3.3 MMOL/L — LOW (ref 3.5–5.3)
PROT SERPL-MCNC: 6.7 G/DL — SIGNIFICANT CHANGE UP (ref 6–8.3)
RBC # BLD: 3.95 M/UL — SIGNIFICANT CHANGE UP (ref 3.8–5.2)
RBC # FLD: 14.9 % — HIGH (ref 10.3–14.5)
RH IG SCN BLD-IMP: POSITIVE — SIGNIFICANT CHANGE UP
SODIUM SERPL-SCNC: 141 MMOL/L — SIGNIFICANT CHANGE UP (ref 135–145)
WBC # BLD: 8.63 K/UL — SIGNIFICANT CHANGE UP (ref 3.8–10.5)
WBC # FLD AUTO: 8.63 K/UL — SIGNIFICANT CHANGE UP (ref 3.8–10.5)

## 2019-09-26 PROCEDURE — 99222 1ST HOSP IP/OBS MODERATE 55: CPT

## 2019-09-26 PROCEDURE — 93306 TTE W/DOPPLER COMPLETE: CPT | Mod: 26

## 2019-09-26 PROCEDURE — 99233 SBSQ HOSP IP/OBS HIGH 50: CPT

## 2019-09-26 RX ORDER — METOPROLOL TARTRATE 50 MG
25 TABLET ORAL DAILY
Refills: 0 | Status: DISCONTINUED | OUTPATIENT
Start: 2019-09-26 | End: 2019-09-28

## 2019-09-26 RX ORDER — POTASSIUM CHLORIDE 20 MEQ
40 PACKET (EA) ORAL ONCE
Refills: 0 | Status: COMPLETED | OUTPATIENT
Start: 2019-09-26 | End: 2019-09-26

## 2019-09-26 RX ADMIN — Medication 100 MILLIGRAM(S): at 22:27

## 2019-09-26 RX ADMIN — APIXABAN 2.5 MILLIGRAM(S): 2.5 TABLET, FILM COATED ORAL at 05:33

## 2019-09-26 RX ADMIN — LOSARTAN POTASSIUM 100 MILLIGRAM(S): 100 TABLET, FILM COATED ORAL at 05:33

## 2019-09-26 RX ADMIN — Medication 50 MILLIGRAM(S): at 22:27

## 2019-09-26 RX ADMIN — Medication 40 MILLIEQUIVALENT(S): at 19:00

## 2019-09-26 RX ADMIN — AMIODARONE HYDROCHLORIDE 200 MILLIGRAM(S): 400 TABLET ORAL at 05:33

## 2019-09-26 RX ADMIN — Medication 81 MILLIGRAM(S): at 13:08

## 2019-09-26 RX ADMIN — ATORVASTATIN CALCIUM 40 MILLIGRAM(S): 80 TABLET, FILM COATED ORAL at 22:27

## 2019-09-26 RX ADMIN — BUMETANIDE 1 MILLIGRAM(S): 0.25 INJECTION INTRAMUSCULAR; INTRAVENOUS at 05:33

## 2019-09-26 RX ADMIN — SERTRALINE 150 MILLIGRAM(S): 25 TABLET, FILM COATED ORAL at 13:08

## 2019-09-26 RX ADMIN — Medication 50 MILLIGRAM(S): at 05:33

## 2019-09-26 RX ADMIN — Medication 100 MILLIGRAM(S): at 05:33

## 2019-09-26 RX ADMIN — Medication 1 TABLET(S): at 13:08

## 2019-09-26 NOTE — PROGRESS NOTE ADULT - SUBJECTIVE AND OBJECTIVE BOX
Antonio Cervantes M.D. Pager Number [phone]    Patient is a 89y old  Female who presents with a chief complaint of palpitations and SOB x 1 day (25 Sep 2019 15:02)      SUBJECTIVE / OVERNIGHT EVENTS:  Pt seen and examined at bedside. No acute events overnight.  Pt denies cp, palpitations, sob, abd pain, N/V, fever, chills.    ROS:  All other review of systems negative    Allergies    Sulfur (Fever)    Intolerances    codeine (Nausea)      MEDICATIONS  (STANDING):  amiodarone    Tablet 200 milliGRAM(s) Oral daily  apixaban 2.5 milliGRAM(s) Oral every 12 hours  aspirin enteric coated 81 milliGRAM(s) Oral daily  atorvastatin 40 milliGRAM(s) Oral at bedtime  buMETAnide 1 milliGRAM(s) Oral daily  docusate sodium 100 milliGRAM(s) Oral three times a day  influenza   Vaccine 0.5 milliLiter(s) IntraMuscular once  losartan 100 milliGRAM(s) Oral daily  metoprolol succinate ER 25 milliGRAM(s) Oral daily  multivitamin 1 Tablet(s) Oral daily  sertraline 150 milliGRAM(s) Oral daily    MEDICATIONS  (PRN):  acetaminophen   Tablet .. 650 milliGRAM(s) Oral every 6 hours PRN Mild Pain (1 - 3)  traZODone 50 milliGRAM(s) Oral at bedtime PRN insomnia      Vital Signs Last 24 Hrs  T(C): 36.3 (26 Sep 2019 12:10), Max: 36.9 (25 Sep 2019 20:41)  T(F): 97.4 (26 Sep 2019 12:10), Max: 98.5 (25 Sep 2019 20:41)  HR: 60 (26 Sep 2019 12:10) (55 - 61)  BP: 118/58 (26 Sep 2019 12:10) (115/61 - 145/71)  BP(mean): --  RR: 18 (26 Sep 2019 12:10) (18 - 18)  SpO2: 95% (26 Sep 2019 12:10) (92% - 95%)  CAPILLARY BLOOD GLUCOSE        I&O's Summary    25 Sep 2019 07:01  -  26 Sep 2019 07:00  --------------------------------------------------------  IN: 480 mL / OUT: 0 mL / NET: 480 mL    26 Sep 2019 07:01  -  26 Sep 2019 13:44  --------------------------------------------------------  IN: 180 mL / OUT: 0 mL / NET: 180 mL        PHYSICAL EXAM:  GENERAL: NAD, Elderly female  CHEST/LUNG: Clear to auscultation bilaterally; No wheeze  HEART: Regular rate and rhythm; + systolic murmur, No rubs, or gallops  ABDOMEN: Soft, Nontender, Nondistended; Bowel sounds present  EXTREMITIES:  2+ Peripheral Pulses, No clubbing, cyanosis, or edema  NEUROLOGY: AAOx3, non-focal  PSYCH: calm  SKIN: No rashes or lesions    LABS:                        11.9   8.63  )-----------( 157      ( 26 Sep 2019 07:48 )             36.7     09-26    141  |  102  |  22  ----------------------------<  102<H>  3.3<L>   |  27  |  0.95    Ca    9.2      26 Sep 2019 06:11    TPro  6.7  /  Alb  3.8  /  TBili  0.5  /  DBili  x   /  AST  33  /  ALT  54<H>  /  AlkPhos  63  09-26              RADIOLOGY & ADDITIONAL TESTS:    Imaging Personally Reviewed:    Consultant(s) Notes Reviewed:      Care Discussed with Consultants/Other Providers:    Case Discussed with Family:    Goals of Care:

## 2019-09-26 NOTE — CDI QUERY NOTE - [id number]_GEN_ALL_CORE_HH
9/26/19 eGFR Non  52  9/23/19 eGFR Non  [place] 64   7/26/19 eGFR Non- 62    This patient had a history of heart failure and HTN. Review of lab data indicates decreased eGFR for periods > 3 months. Please specify the medical diagnosis associated with these findings. Please  document if this is;    CKD stage 1   CKD stage 2  CKD stage 3  Other ( please specify condition)  Clinically insignificant

## 2019-09-26 NOTE — CONSULT NOTE ADULT - SUBJECTIVE AND OBJECTIVE BOX
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  Patient has had progressive DAWKINS and increased fatigue over the past several months. Her symptoms initially mildly improved s/p TAVR this past July, however they have significantly worsened over the past couple of weeks. She reports worsening DAWKINS (can only walk several feet before getting SOB), orthopnea, palpitations. Denies any CP. Minimal LE swelling.       PMH:   Pneumonia  Bladder cancer  Cerebrovascular accident (CVA)  Severe mitral valve regurgitation  Severe aortic stenosis  Aortic valve stenosis, etiology of cardiac valve disease unspecified  Paroxysmal atrial fibrillation  HLD (hyperlipidemia)  HTN (hypertension)      PSH:   History of bilateral oophorectomy  S/P cystoscopy  Status post bladder repair  H/O shoulder surgery      Medications:   acetaminophen   Tablet .. 650 milliGRAM(s) Oral every 6 hours PRN  amiodarone    Tablet 200 milliGRAM(s) Oral daily  apixaban 2.5 milliGRAM(s) Oral every 12 hours  aspirin enteric coated 81 milliGRAM(s) Oral daily  atorvastatin 40 milliGRAM(s) Oral at bedtime  buMETAnide 1 milliGRAM(s) Oral daily  docusate sodium 100 milliGRAM(s) Oral three times a day  influenza   Vaccine 0.5 milliLiter(s) IntraMuscular once  losartan 100 milliGRAM(s) Oral daily  metoprolol succinate ER 25 milliGRAM(s) Oral daily  multivitamin 1 Tablet(s) Oral daily  sertraline 150 milliGRAM(s) Oral daily  traZODone 50 milliGRAM(s) Oral at bedtime PRN      Allergies:  codeine (Nausea)  Sulfur (Fever)      FAMILY HISTORY:  No pertinent family history in first degree relatives      Social History:  Smoking History:  Alcohol Use:  Drug Use:    Review of Systems:  REVIEW OF SYSTEMS:  CONSTITUTIONAL: No weakness, fevers or chills  EYES/ENT: No visual changes;  No dysphagia  NECK: No pain or stiffness  RESPIRATORY: No cough, wheezing, hemoptysis; No shortness of breath  CARDIOVASCULAR: No chest pain or palpitations; No lower extremity edema  GASTROINTESTINAL: No abdominal or epigastric pain. No nausea, vomiting, or hematemesis; No diarrhea or constipation. No melena or hematochezia.  BACK: No back pain  GENITOURINARY: No dysuria, frequency or hematuria  NEUROLOGICAL: No numbness or weakness  SKIN: No itching, burning, rashes, or lesions   All other review of systems is negative unless indicated above.    Physical Exam:  T(F): 97.4 (09-26), Max: 98.5 (09-25)  HR: 60 (09-26) (55 - 61)  BP: 118/58 (09-26) (115/61 - 145/71)  RR: 18 (09-26)  SpO2: 95% (09-26)  GENERAL: No acute distress, well-developed  HEAD:  Atraumatic, Normocephalic  ENT: EOMI, PERRLA, conjunctiva and sclera clear, Neck supple, No JVD, moist mucosa  CHEST/LUNG: Clear to auscultation bilaterally; No wheeze, equal breath sounds bilaterally   BACK: No spinal tenderness  HEART: Regular rate and rhythm; No murmurs, rubs, or gallops  ABDOMEN: Soft, Nontender, Nondistended; Bowel sounds present  EXTREMITIES:  No clubbing, cyanosis, or edema  PSYCH: Nl behavior, nl affect  NEUROLOGY: AAOx3, non-focal, cranial nerves intact  SKIN: Normal color, No rashes or lesions  LINES:    Cardiovascular Diagnostic Testing:    ECG: Personally reviewed    Echo:  TTE Study Date: 9/26/2019  ------------------------------------------------------------------------  Dimensions:    Normal Values:  LA:     5.4    2.0 - 4.0 cm  Ao:     3.1    2.0 - 3.8 cm  SEPTUM: 0.8    0.6 - 1.2 cm  PWT:    0.9    0.6 - 1.1 cm  LVIDd:  5.4    3.0 - 5.6 cm  LVIDs:  4.5    1.8 - 4.0 cm  Derived variables:  LVMI: 112 g/m2  RWT: 0.33  Fractional short: 17 %  EF (Visual Estimate): 35-40 %  Doppler Peak Velocity (m/sec): AoV=0.7  ------------------------------------------------------------------------  Observations:  Mitral Valve: Mitral annular calcification and calcified  mitral leaflets. Moderate mitral regurgitation. Mean  transmitral valve gradient equals 1 mm Hg.  Aortic Valve/Aorta: Transcatheter aortic valve replacement.  Peak transaortic valve gradient equals 2 mm Hg, mean  transaortic valve gradient equals 1 mm Hg, which is  probably normal in the presence of a transcatheter aortic  valve replacement. Minimal aortic regurgitation. Peak left  ventricular outflow tract gradient equals 1 mm Hg, mean  gradient is equal to 1 mm Hg, LVOT velocity time integral  equals 11 cm.  Aortic Root: 3.1 cm.  LVOT diameter: 1.9 cm.  Left Atrium: Severely dilated left atrium.  LA volume index  = 52 cc/m2.  Left Ventricle: Moderate segmental left ventricular  systolic dysfunction. The inferior and inferolateral walls  are akinetic. The anteroseptum is hypokinetic. Moderate  left ventricular enlargement. Eccentric left ventricular  hypertrophy (dilated left ventricle with normal relative  wall thickness).  Right Heart: Normal right atrium. Normal right ventricular  size and systolic function. Normal tricuspid valve. Mild  tricuspid regurgitation. Normal pulmonic valve.  Pericardium/Pleura: Normal pericardium with no pericardial  effusion.  Hemodynamic: Estimated right atrial pressure is 8 mm Hg.  Estimated right ventricular systolic pressure equals 37 mm  Hg, assuming right atrial pressure equals 8 mm Hg,  consistent with borderline pulmonary hypertension.  ------------------------------------------------------------------------  Conclusions:  1. Mitral annular calcification and calcified mitral  leaflets. Moderate mitral regurgitation.  2. Transcatheter aortic valve replacement. Minimal aortic  regurgitation.  3.Moderate left ventricular enlargement. Eccentric left  ventricular hypertrophy (dilated left ventricle with normal  relative wall thickness).  4. Moderate segmental left ventricular systolic  dysfunction. The inferior and inferolateral walls are  akinetic. The anteroseptum is hypokinetic.  5. Normal right ventricular size and systolic function.  *** Compared with echocardiogram of 9/5/2019, results are  similar on today's study.  ------------------------------------------------------------------------  Confirmed on  9/26/2019 - 13:37:52 by Osmani Crawford M.D.  ------------------------------------------------------------------------    Stress Testing:    Cath:      Interpretation of Telemetry:    Imaging:    Labs: Personally reviewed                        11.9   8.63  )-----------( 157      ( 26 Sep 2019 07:48 )             36.7     09-26    141  |  102  |  22  ----------------------------<  102<H>  3.3<L>   |  27  |  0.95    Ca    9.2      26 Sep 2019 06:11    TPro  6.7  /  Alb  3.8  /  TBili  0.5  /  DBili  x   /  AST  33  /  ALT  54<H>  /  AlkPhos  63  09-26          Serum Pro-Brain Natriuretic Peptide: 3033 pg/mL (09-23 @ 14:39) Patient seen and evaluated at bedside    Chief Complaint:    HPI:  Patient  is a 89 y/o woman with a PMHx of HTN, HLD, Afib on Eliquis HFrEF (EF 35-40% on TTE 6/2019), mod-sev MR and sev AS s/p TAVR (7/2019), and 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  Patient has had progressive DAWKINS and increased fatigue over the past several months. Her symptoms initially mildly improved s/p TAVR this past July, however they have significantly worsened over the past couple of weeks. She reports worsening DAWKINS (can only walk several feet before getting SOB), orthopnea, palpitations. Denies any CP. Minimal LE swelling.     EP consulted for CRT-P Eval.     PMH:   Pneumonia  Bladder cancer  Cerebrovascular accident (CVA)  Severe mitral valve regurgitation  Severe aortic stenosis  Aortic valve stenosis, etiology of cardiac valve disease unspecified  Paroxysmal atrial fibrillation  HLD (hyperlipidemia)  HTN (hypertension)      PSH:   History of bilateral oophorectomy  S/P cystoscopy  Status post bladder repair  H/O shoulder surgery      Medications:   acetaminophen   Tablet .. 650 milliGRAM(s) Oral every 6 hours PRN  amiodarone    Tablet 200 milliGRAM(s) Oral daily  apixaban 2.5 milliGRAM(s) Oral every 12 hours  aspirin enteric coated 81 milliGRAM(s) Oral daily  atorvastatin 40 milliGRAM(s) Oral at bedtime  buMETAnide 1 milliGRAM(s) Oral daily  docusate sodium 100 milliGRAM(s) Oral three times a day  influenza   Vaccine 0.5 milliLiter(s) IntraMuscular once  losartan 100 milliGRAM(s) Oral daily  metoprolol succinate ER 25 milliGRAM(s) Oral daily  multivitamin 1 Tablet(s) Oral daily  sertraline 150 milliGRAM(s) Oral daily  traZODone 50 milliGRAM(s) Oral at bedtime PRN      Allergies:  codeine (Nausea)  Sulfur (Fever)      FAMILY HISTORY:  No pertinent family history in first degree relatives      Social History:  Smoking History: Denies  Alcohol Use: Denies  Drug Use: Denies    CONSTITUTIONAL: No weakness, fevers or chills  EYES/ENT: No visual changes;  No dysphagia  RESPIRATORY: No cough, wheezing, hemoptysis; +shortness of breath  CARDIOVASCULAR: No chest pain or palpitations; No lower extremity edema  GASTROINTESTINAL: No abdominal or epigastric pain. No nausea, vomiting, or hematemesis  GENITOURINARY: No dysuria, frequency or hematuria  NEUROLOGICAL: No numbness or weakness  SKIN: No itching, burning, rashes, or lesions   HEME: No bleeding or bruising  MSK: No joint pains or muscle pains  All other review of systems is negative unless indicated above.      Physical Exam:  T(F): 97.4 (09-26), Max: 98.5 (09-25)  HR: 60 (09-26) (55 - 61)  BP: 118/58 (09-26) (115/61 - 145/71)  RR: 18 (09-26)  SpO2: 95% (09-26)  HEENT:   Normal oral mucosa  Cardiovascular: Normal S1 S2, No JVD, No murmurs, No edema  Respiratory: Lungs clear to auscultation	  Psychiatry: A & O x 3, Mood & affect appropriate  Gastrointestinal:  Soft, Non-tender, + BS	  Skin: No rashes, No ecchymoses, No cyanosis	  Neurologic: Non-focal  Extremities: Normal range of motion, No clubbing, cyanosis      Cardiovascular Diagnostic Testing:    ECG: Personally reviewed  NSR, 1st degre AV block, LBBB ()    Echo:  TTE Study Date: 9/26/2019  ------------------------------------------------------------------------  Dimensions:    Normal Values:  LA:     5.4    2.0 - 4.0 cm  Ao:     3.1    2.0 - 3.8 cm  SEPTUM: 0.8    0.6 - 1.2 cm  PWT:    0.9    0.6 - 1.1 cm  LVIDd:  5.4    3.0 - 5.6 cm  LVIDs:  4.5    1.8 - 4.0 cm  Derived variables:  LVMI: 112 g/m2  RWT: 0.33  Fractional short: 17 %  EF (Visual Estimate): 35-40 %  Doppler Peak Velocity (m/sec): AoV=0.7  ------------------------------------------------------------------------  Observations:  Mitral Valve: Mitral annular calcification and calcified  mitral leaflets. Moderate mitral regurgitation. Mean  transmitral valve gradient equals 1 mm Hg.  Aortic Valve/Aorta: Transcatheter aortic valve replacement.  Peak transaortic valve gradient equals 2 mm Hg, mean  transaortic valve gradient equals 1 mm Hg, which is  probably normal in the presence of a transcatheter aortic  valve replacement. Minimal aortic regurgitation. Peak left  ventricular outflow tract gradient equals 1 mm Hg, mean  gradient is equal to 1 mm Hg, LVOT velocity time integral  equals 11 cm.  Aortic Root: 3.1 cm.  LVOT diameter: 1.9 cm.  Left Atrium: Severely dilated left atrium.  LA volume index  = 52 cc/m2.  Left Ventricle: Moderate segmental left ventricular  systolic dysfunction. The inferior and inferolateral walls  are akinetic. The anteroseptum is hypokinetic. Moderate  left ventricular enlargement. Eccentric left ventricular  hypertrophy (dilated left ventricle with normal relative  wall thickness).  Right Heart: Normal right atrium. Normal right ventricular  size and systolic function. Normal tricuspid valve. Mild  tricuspid regurgitation. Normal pulmonic valve.  Pericardium/Pleura: Normal pericardium with no pericardial  effusion.  Hemodynamic: Estimated right atrial pressure is 8 mm Hg.  Estimated right ventricular systolic pressure equals 37 mm  Hg, assuming right atrial pressure equals 8 mm Hg,  consistent with borderline pulmonary hypertension.  ------------------------------------------------------------------------  Conclusions:  1. Mitral annular calcification and calcified mitral  leaflets. Moderate mitral regurgitation.  2. Transcatheter aortic valve replacement. Minimal aortic  regurgitation.  3.Moderate left ventricular enlargement. Eccentric left  ventricular hypertrophy (dilated left ventricle with normal  relative wall thickness).  4. Moderate segmental left ventricular systolic  dysfunction. The inferior and inferolateral walls are  akinetic. The anteroseptum is hypokinetic.  5. Normal right ventricular size and systolic function.  *** Compared with echocardiogram of 9/5/2019, results are  similar on today's study.  ------------------------------------------------------------------------  Confirmed on  9/26/2019 - 13:37:52 by Osmani Crawford M.D.  ------------------------------------------------------------------------    Stress Testing:    Cath:      Interpretation of Telemetry:    Imaging:    Labs: Personally reviewed                        11.9   8.63  )-----------( 157      ( 26 Sep 2019 07:48 )             36.7     09-26    141  |  102  |  22  ----------------------------<  102<H>  3.3<L>   |  27  |  0.95    Ca    9.2      26 Sep 2019 06:11    TPro  6.7  /  Alb  3.8  /  TBili  0.5  /  DBili  x   /  AST  33  /  ALT  54<H>  /  AlkPhos  63  09-26          Serum Pro-Brain Natriuretic Peptide: 3033 pg/mL (09-23 @ 14:39)

## 2019-09-26 NOTE — CONSULT NOTE ADULT - ASSESSMENT
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 She has a nonischemic cardiomyopathy and her echo is also concerning for restrictive pathology, with some suggestion of cardiac amyloid, pending workup. EP consulted for CRT-P eval. Patient and daugher are amenable to CRT-P      #Plan  -NPO at midnight  -Hold tonight and tomorrow morning Eliquis  -Will plan for CRT-P tomorrow    Mickey Chen MD  Cardiology Fellow  Call or Text: [phone]  All cardiology service information can be found 24/7 on [email or web address], password: [email or web address]

## 2019-09-26 NOTE — CONSULT NOTE ADULT - ATTENDING COMMENTS
I personally interviewed and examined the patient. She has progressive dyspnea and has chronic systolic dysfunction (LVEF 35%) with typical LBBB and first degree AV block. QRS duration is 144 but there is notching in V6 in keeping with typical LBBB.     The echo does show some dyssynchrony of septal motion. Given the above findings, there is a fair possibility that CRT pacing might improve LV function and prevent heart failure hospitalization. In view of her age, she is not a candidate for a defibrillator and CRT pacing along should suffice for improving quality of life. We will hold tomorrow am dose of apixaban and plan for a CRT-P tomorrow.
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     Review of pertinent studies reveals:  TTE 9/5/19: LV 5.6 cm, EF 40-45%, no significant LVH, restrictive diastology with elevated E/A ratio and e' velocities ~ 5m/s, normal appearing TAVR, regional WMAs, PASP 50 mmHg    EKG: SR with long 1st degree AVB (262 ms), LBBB, LAD    Major issues by problem:  # Acute on chronic HF with underlying borderline EF. Review of TTE reveals restrictive diastology and she has a history of LF/LG AS which raises suspicion for cardiac amyloid.   # LBBB, no indications for CRT given EF > 35%  # pAF, in sinus rhythm    The plan for today is as follows:  - obtain serum light chains, immunofixation, and technetium pyrophosphate scan to evaluate for possible cardiac amyloid  - continue lasix 40 mg PO daily, appears euvolemic  - increase losartan to 50 mg daily, continue metoprolol but switch to succinate formulation  - continue anticoagulation    Please feel free to call me with any questions: [phone]

## 2019-09-27 ENCOUNTER — TRANSCRIPTION ENCOUNTER (OUTPATIENT)
Age: 84
End: 2019-09-27

## 2019-09-27 LAB
ALBUMIN SERPL ELPH-MCNC: 3.9 G/DL — SIGNIFICANT CHANGE UP (ref 3.3–5)
ALP SERPL-CCNC: 68 U/L — SIGNIFICANT CHANGE UP (ref 40–120)
ALT FLD-CCNC: 54 U/L — HIGH (ref 10–45)
ANION GAP SERPL CALC-SCNC: 9 MMOL/L — SIGNIFICANT CHANGE UP (ref 5–17)
AST SERPL-CCNC: 31 U/L — SIGNIFICANT CHANGE UP (ref 10–40)
BILIRUB SERPL-MCNC: 0.4 MG/DL — SIGNIFICANT CHANGE UP (ref 0.2–1.2)
BUN SERPL-MCNC: 30 MG/DL — HIGH (ref 7–23)
CALCIUM SERPL-MCNC: 9.5 MG/DL — SIGNIFICANT CHANGE UP (ref 8.4–10.5)
CHLORIDE SERPL-SCNC: 105 MMOL/L — SIGNIFICANT CHANGE UP (ref 96–108)
CO2 SERPL-SCNC: 28 MMOL/L — SIGNIFICANT CHANGE UP (ref 22–31)
CREAT SERPL-MCNC: 0.94 MG/DL — SIGNIFICANT CHANGE UP (ref 0.5–1.3)
GLUCOSE SERPL-MCNC: 97 MG/DL — SIGNIFICANT CHANGE UP (ref 70–99)
MAGNESIUM SERPL-MCNC: 1.9 MG/DL — SIGNIFICANT CHANGE UP (ref 1.6–2.6)
POTASSIUM SERPL-MCNC: 3.7 MMOL/L — SIGNIFICANT CHANGE UP (ref 3.5–5.3)
POTASSIUM SERPL-SCNC: 3.7 MMOL/L — SIGNIFICANT CHANGE UP (ref 3.5–5.3)
PROT SERPL-MCNC: 6.4 G/DL — SIGNIFICANT CHANGE UP (ref 6–8.3)
SODIUM SERPL-SCNC: 142 MMOL/L — SIGNIFICANT CHANGE UP (ref 135–145)

## 2019-09-27 PROCEDURE — 93010 ELECTROCARDIOGRAM REPORT: CPT

## 2019-09-27 PROCEDURE — 93010 ELECTROCARDIOGRAM REPORT: CPT | Mod: 76

## 2019-09-27 PROCEDURE — 99233 SBSQ HOSP IP/OBS HIGH 50: CPT

## 2019-09-27 PROCEDURE — 71045 X-RAY EXAM CHEST 1 VIEW: CPT | Mod: 26

## 2019-09-27 PROCEDURE — 33225 L VENTRIC PACING LEAD ADD-ON: CPT

## 2019-09-27 PROCEDURE — 33208 INSRT HEART PM ATRIAL & VENT: CPT | Mod: KX

## 2019-09-27 PROCEDURE — 99233 SBSQ HOSP IP/OBS HIGH 50: CPT | Mod: GC

## 2019-09-27 RX ORDER — APIXABAN 2.5 MG/1
2.5 TABLET, FILM COATED ORAL EVERY 12 HOURS
Refills: 0 | Status: DISCONTINUED | OUTPATIENT
Start: 2019-09-27 | End: 2019-09-28

## 2019-09-27 RX ADMIN — Medication 1 TABLET(S): at 17:18

## 2019-09-27 RX ADMIN — Medication 81 MILLIGRAM(S): at 17:16

## 2019-09-27 RX ADMIN — Medication 100 MILLIGRAM(S): at 05:51

## 2019-09-27 RX ADMIN — SERTRALINE 150 MILLIGRAM(S): 25 TABLET, FILM COATED ORAL at 17:16

## 2019-09-27 RX ADMIN — Medication 650 MILLIGRAM(S): at 20:50

## 2019-09-27 RX ADMIN — Medication 650 MILLIGRAM(S): at 20:19

## 2019-09-27 RX ADMIN — APIXABAN 2.5 MILLIGRAM(S): 2.5 TABLET, FILM COATED ORAL at 17:20

## 2019-09-27 RX ADMIN — AMIODARONE HYDROCHLORIDE 200 MILLIGRAM(S): 400 TABLET ORAL at 05:51

## 2019-09-27 RX ADMIN — ATORVASTATIN CALCIUM 40 MILLIGRAM(S): 80 TABLET, FILM COATED ORAL at 23:01

## 2019-09-27 RX ADMIN — Medication 50 MILLIGRAM(S): at 23:01

## 2019-09-27 RX ADMIN — LOSARTAN POTASSIUM 100 MILLIGRAM(S): 100 TABLET, FILM COATED ORAL at 05:52

## 2019-09-27 RX ADMIN — Medication 100 MILLIGRAM(S): at 23:01

## 2019-09-27 RX ADMIN — Medication 25 MILLIGRAM(S): at 17:17

## 2019-09-27 RX ADMIN — BUMETANIDE 1 MILLIGRAM(S): 0.25 INJECTION INTRAMUSCULAR; INTRAVENOUS at 05:52

## 2019-09-27 NOTE — DISCHARGE NOTE PROVIDER - CARE PROVIDER_API CALL
Amaury Lewis (MD)  Cardiology; Cardiovascular Disease; Internal Medicine  [address]  [place] [address]  Phone: [phone]  Fax: [phone]  Follow Up Time: Amaury Lewis (MD)  Cardiology; Cardiovascular Disease; Internal Medicine  [address]  [place] [address]  Phone: [phone]  Fax: [phone]  Follow Up Time:     Morro Quiros)  Cardiac Electrophysiology; Cardiovascular Disease; Internal Medicine  [address] [place] [address]  Phone: [phone]  Fax: [phone]  Follow Up Time:

## 2019-09-27 NOTE — PROGRESS NOTE ADULT - SUBJECTIVE AND OBJECTIVE BOX
Walked patient yesterday, HR only increased to 70 and she was very SOB. Doing well this morning.    MEDICATIONS:  amiodarone    Tablet 200 milliGRAM(s) Oral daily  aspirin enteric coated 81 milliGRAM(s) Oral daily  buMETAnide 1 milliGRAM(s) Oral daily  losartan 100 milliGRAM(s) Oral daily  metoprolol succinate ER 25 milliGRAM(s) Oral daily        acetaminophen   Tablet .. 650 milliGRAM(s) Oral every 6 hours PRN  sertraline 150 milliGRAM(s) Oral daily  traZODone 50 milliGRAM(s) Oral at bedtime PRN    docusate sodium 100 milliGRAM(s) Oral three times a day    atorvastatin 40 milliGRAM(s) Oral at bedtime    influenza   Vaccine 0.5 milliLiter(s) IntraMuscular once  multivitamin 1 Tablet(s) Oral daily      REVIEW OF SYSTEMS:    CONSTITUTIONAL: No weakness, fevers or chills  EYES/ENT: No visual changes;  No dysphagia  RESPIRATORY: No cough, wheezing, hemoptysis; +shortness of breath  CARDIOVASCULAR: No chest pain or palpitations; No lower extremity edema  GASTROINTESTINAL: No abdominal or epigastric pain. No nausea, vomiting, or hematemesis  GENITOURINARY: No dysuria, frequency or hematuria  NEUROLOGICAL: No numbness or weakness  SKIN: No itching, burning, rashes, or lesions   HEME: No bleeding or bruising  MSK: No joint pains or muscle pains  All other review of systems is negative unless indicated above.    PHYSICAL EXAM:  T(C): 36.4 (09-27-19 @ 05:05), Max: 36.6 (09-26-19 @ 22:23)  HR: 56 (09-27-19 @ 05:05) (56 - 69)  BP: 120/67 (09-27-19 @ 05:05) (120/67 - 122/58)  RR: 18 (09-27-19 @ 05:05) (18 - 18)  SpO2: 95% (09-27-19 @ 05:05) (95% - 95%)  Wt(kg): --  I&O's Summary    26 Sep 2019 07:01  -  27 Sep 2019 07:00  --------------------------------------------------------  IN: 300 mL / OUT: 0 mL / NET: 300 mL        Appearance: Normal	  HEENT:   Normal oral mucosa  Cardiovascular: Normal S1 S2, No JVD, No murmurs, No edema  Respiratory: Lungs clear to auscultation	  Psychiatry: A & O x 3, Mood & affect appropriate  Gastrointestinal:  Soft, Non-tender, + BS	  Skin: No rashes, No ecchymoses, No cyanosis	  Neurologic: Non-focal  Extremities: Normal range of motion, No clubbing, cyanosis        LABS:	 	    CBC Full  -  ( 26 Sep 2019 07:48 )  WBC Count : 8.63 K/uL  Hemoglobin : 11.9 g/dL  Hematocrit : 36.7 %  Platelet Count - Automated : 157 K/uL  Mean Cell Volume : 92.9 fl  Mean Cell Hemoglobin : 30.1 pg  Mean Cell Hemoglobin Concentration : 32.4 gm/dL  Auto Neutrophil # : 6.13 K/uL  Auto Lymphocyte # : 0.97 K/uL  Auto Monocyte # : 1.32 K/uL  Auto Eosinophil # : 0.15 K/uL  Auto Basophil # : 0.04 K/uL  Auto Neutrophil % : 71.1 %  Auto Lymphocyte % : 11.2 %  Auto Monocyte % : 15.3 %  Auto Eosinophil % : 1.7 %  Auto Basophil % : 0.5 %    09-27    142  |  105  |  30<H>  ----------------------------<  97  3.7   |  28  |  0.94  09-26    141  |  102  |  22  ----------------------------<  102<H>  3.3<L>   |  27  |  0.95    Ca    9.5      27 Sep 2019 06:52  Ca    9.2      26 Sep 2019 06:11  Mg     1.9     09-27    TPro  6.4  /  Alb  3.9  /  TBili  0.4  /  DBili  x   /  AST  31  /  ALT  54<H>  /  AlkPhos  68  09-27  TPro  6.7  /  Alb  3.8  /  TBili  0.5  /  DBili  x   /  AST  33  /  ALT  54<H>  /  AlkPhos  63  09-26      proBNP: Serum Pro-Brain Natriuretic Peptide: 3033 pg/mL (09-23-19 @ 14:39)

## 2019-09-27 NOTE — PROGRESS NOTE ADULT - SUBJECTIVE AND OBJECTIVE BOX
Antonio Cervantes M.D. Pager Number [phone]    Patient is a 89y old  Female who presents with a chief complaint of palpitations and SOB x 1 day (27 Sep 2019 12:22)      SUBJECTIVE / OVERNIGHT EVENTS:  Pt seen and examined at bedside. No acute events overnight. Pt states significant improvement in symptoms of sob.   Pt denies cp, palpitations, sob, abd pain, N/V, fever, chills.    ROS:  All other review of systems negative    Allergies    Sulfur (Fever)    Intolerances    codeine (Nausea)      MEDICATIONS  (STANDING):  amiodarone    Tablet 200 milliGRAM(s) Oral daily  apixaban 2.5 milliGRAM(s) Oral every 12 hours  aspirin enteric coated 81 milliGRAM(s) Oral daily  atorvastatin 40 milliGRAM(s) Oral at bedtime  buMETAnide 1 milliGRAM(s) Oral daily  docusate sodium 100 milliGRAM(s) Oral three times a day  influenza   Vaccine 0.5 milliLiter(s) IntraMuscular once  losartan 100 milliGRAM(s) Oral daily  metoprolol succinate ER 25 milliGRAM(s) Oral daily  multivitamin 1 Tablet(s) Oral daily  sertraline 150 milliGRAM(s) Oral daily    MEDICATIONS  (PRN):  acetaminophen   Tablet .. 650 milliGRAM(s) Oral every 6 hours PRN Mild Pain (1 - 3)  traZODone 50 milliGRAM(s) Oral at bedtime PRN insomnia      Vital Signs Last 24 Hrs  T(C): 36.4 (27 Sep 2019 05:05), Max: 36.6 (26 Sep 2019 22:23)  T(F): 97.5 (27 Sep 2019 05:05), Max: 97.9 (26 Sep 2019 22:23)  HR: 56 (27 Sep 2019 05:05) (56 - 69)  BP: 120/67 (27 Sep 2019 05:05) (120/67 - 122/58)  BP(mean): --  RR: 18 (27 Sep 2019 05:05) (18 - 18)  SpO2: 95% (27 Sep 2019 05:05) (95% - 95%)  CAPILLARY BLOOD GLUCOSE        I&O's Summary    26 Sep 2019 07:01  -  27 Sep 2019 07:00  --------------------------------------------------------  IN: 300 mL / OUT: 0 mL / NET: 300 mL        PHYSICAL EXAM:  GENERAL: NAD, Elderly female  CHEST/LUNG: Clear to auscultation bilaterally; No wheeze  HEART: Regular rate and rhythm; + systolic murmur, No rubs, or gallops  ABDOMEN: Soft, Nontender, Nondistended; Bowel sounds present  EXTREMITIES:  2+ Peripheral Pulses, No clubbing, cyanosis, or edema  NEUROLOGY: AAOx3, non-focal  PSYCH: calm  SKIN: No rashes or lesions    LABS:                        11.9   8.63  )-----------( 157      ( 26 Sep 2019 07:48 )             36.7     09-27    142  |  105  |  30<H>  ----------------------------<  97  3.7   |  28  |  0.94    Ca    9.5      27 Sep 2019 06:52  Mg     1.9     09-27    TPro  6.4  /  Alb  3.9  /  TBili  0.4  /  DBili  x   /  AST  31  /  ALT  54<H>  /  AlkPhos  68  09-27              RADIOLOGY & ADDITIONAL TESTS:    Imaging Personally Reviewed:    Consultant(s) Notes Reviewed:      Care Discussed with Consultants/Other Providers:    Case Discussed with Family:    Goals of Care:

## 2019-09-27 NOTE — DISCHARGE NOTE PROVIDER - CARE PROVIDERS DIRECT ADDRESSES
,[email or web address]

## 2019-09-27 NOTE — PROGRESS NOTE ADULT - SUBJECTIVE AND OBJECTIVE BOX
Patient seen and examined at bedside.    Overnight Events: No acute events overnight. Patient resting comfortably this AM. No complaints. Plan for CRT-P today.     Review Of Systems: No chest pain, shortness of breath, or palpitations            Medications:  acetaminophen   Tablet .. 650 milliGRAM(s) Oral every 6 hours PRN  amiodarone    Tablet 200 milliGRAM(s) Oral daily  aspirin enteric coated 81 milliGRAM(s) Oral daily  atorvastatin 40 milliGRAM(s) Oral at bedtime  buMETAnide 1 milliGRAM(s) Oral daily  docusate sodium 100 milliGRAM(s) Oral three times a day  influenza   Vaccine 0.5 milliLiter(s) IntraMuscular once  losartan 100 milliGRAM(s) Oral daily  metoprolol succinate ER 25 milliGRAM(s) Oral daily  multivitamin 1 Tablet(s) Oral daily  sertraline 150 milliGRAM(s) Oral daily  traZODone 50 milliGRAM(s) Oral at bedtime PRN      PAST MEDICAL & SURGICAL HISTORY:  Pneumonia: discharged 7/16/19  Bladder cancer: TURBT x 2 in the past  Cerebrovascular accident (CVA): left MCA  2017  aphasia  Severe mitral valve regurgitation  Severe aortic stenosis  Paroxysmal atrial fibrillation: on Eliquis  HLD (hyperlipidemia)  HTN (hypertension)  History of bilateral oophorectomy  S/P cystoscopy: TURBT x 2  H/O shoulder surgery: arthroscopic  10+ yrs ago  right      Vitals:  T(F): 97.5 (09-27), Max: 97.9 (09-26)  HR: 56 (09-27) (56 - 69)  BP: 120/67 (09-27) (118/58 - 122/58)  RR: 18 (09-27)  SpO2: 95% (09-27)  I&O's Summary    26 Sep 2019 07:01  -  27 Sep 2019 07:00  --------------------------------------------------------  IN: 300 mL / OUT: 0 mL / NET: 300 mL        Physical Exam:  Appearance: No acute distress; well appearing  Eyes: PERRL, EOMI, pink conjunctiva  HENT: Normal oral mucosa  Cardiovascular: RRR, S1, S2, no murmurs, rubs, or gallops; no edema; no JVD  Respiratory: Clear to auscultation bilaterally  Gastrointestinal: soft, non-tender, non-distended with normal bowel sounds  Musculoskeletal: No clubbing; no joint deformity   Neurologic: Non-focal  Lymphatic: No lymphadenopathy  Psychiatry: AAOx3, mood & affect appropriate  Skin: No rashes, ecchymoses, or cyanosis                          11.9   8.63  )-----------( 157      ( 26 Sep 2019 07:48 )             36.7     09-27    142  |  105  |  30<H>  ----------------------------<  97  3.7   |  28  |  0.94    Ca    9.5      27 Sep 2019 06:52  Mg     1.9     09-27    TPro  6.4  /  Alb  3.9  /  TBili  0.4  /  DBili  x   /  AST  31  /  ALT  54<H>  /  AlkPhos  68  09-27    Serum Pro-Brain Natriuretic Peptide: 3033 pg/mL (09-23 @ 14:39)    New ECG(s): Personally reviewed  NSR, 1st degree AV block, LBBB ()    Echo:  TTE Study Date: 9/26/2019  ------------------------------------------------------------------------  Dimensions:    Normal Values:  LA:     5.4    2.0 - 4.0 cm  Ao:     3.1    2.0 - 3.8 cm  SEPTUM: 0.8    0.6 - 1.2 cm  PWT:    0.9    0.6 - 1.1 cm  LVIDd:  5.4    3.0 - 5.6 cm  LVIDs:  4.5    1.8 - 4.0 cm  Derived variables:  LVMI: 112 g/m2  RWT: 0.33  Fractional short: 17 %  EF (Visual Estimate): 35-40 %  Doppler Peak Velocity (m/sec): AoV=0.7  ------------------------------------------------------------------------  Observations:  Mitral Valve: Mitral annular calcification and calcified  mitral leaflets. Moderate mitral regurgitation. Mean  transmitral valve gradient equals 1 mm Hg.  Aortic Valve/Aorta: Transcatheter aortic valve replacement.  Peak transaortic valve gradient equals 2 mm Hg, mean  transaortic valve gradient equals 1 mm Hg, which is  probably normal in the presence of a transcatheter aortic  valve replacement. Minimal aortic regurgitation. Peak left  ventricular outflow tract gradient equals 1 mm Hg, mean  gradient is equal to 1 mm Hg, LVOT velocity time integral  equals 11 cm.  Aortic Root: 3.1 cm.  LVOT diameter: 1.9 cm.  Left Atrium: Severely dilated left atrium.  LA volume index  = 52 cc/m2.  Left Ventricle: Moderate segmental left ventricular  systolic dysfunction. The inferior and inferolateral walls  are akinetic. The anteroseptum is hypokinetic. Moderate  left ventricular enlargement. Eccentric left ventricular  hypertrophy (dilated left ventricle with normal relative  wall thickness).  Right Heart: Normal right atrium. Normal right ventricular  size and systolic function. Normal tricuspid valve. Mild  tricuspid regurgitation. Normal pulmonic valve.  Pericardium/Pleura: Normal pericardium with no pericardial  effusion.  Hemodynamic: Estimated right atrial pressure is 8 mm Hg.  Estimated right ventricular systolic pressure equals 37 mm  Hg, assuming right atrial pressure equals 8 mm Hg,  consistent with borderline pulmonary hypertension.  ------------------------------------------------------------------------  Conclusions:  1. Mitral annular calcification and calcified mitral  leaflets. Moderate mitral regurgitation.  2. Transcatheter aortic valve replacement. Minimal aortic  regurgitation.  3.Moderate left ventricular enlargement. Eccentric left  ventricular hypertrophy (dilated left ventricle with normal  relative wall thickness).  4. Moderate segmental left ventricular systolic  dysfunction. The inferior and inferolateral walls are  akinetic. The anteroseptum is hypokinetic.  5. Normal right ventricular size and systolic function.  *** Compared with echocardiogram of 9/5/2019, results are  similar on today's study.  ------------------------------------------------------------------------  Confirmed on  9/26/2019 - 13:37:52 by Osmani Crawford M.D.    Interpretation of Telemetry: Sinus rhtyhm w/ Madi

## 2019-09-27 NOTE — DISCHARGE NOTE PROVIDER - NSDCFUSCHEDAPPT_GEN_ALL_CORE_FT
CELINA KESSLER ; 10/08/2019 ; NPP Cardio Electro [address] CELINA Lugo ; 10/23/2019 ; NPP Cardio [address]  CELINA KESSLER ; 11/07/2019 ; NPP Urology [address] [facility]  CELINA KESSLER ; 11/07/2019 ; P Urology [address] [place] [address] CELINA KESSLER ; 10/08/2019 ; NPP Cardio Electro [address] CELINA Lugo ; 10/23/2019 ; NPP Cardio [address] [facility]  CELINA KESSLER ; 11/07/2019 ; NPP Urology [address] [facility]  CELINA KESSLER ; 11/07/2019 ; P Urology [address] [place] [address] CELINA KESSLER ; 10/08/2019 ; NPP Cardio Electro [address] CELINA Lugo ; 10/23/2019 ; NPP Cardio [address] [facility]  CELINA KESSLER ; 11/07/2019 ; NPP Urology [address] [facility]  CELINA KESSLER ; 11/07/2019 ; P Urology [address] [place] [address]

## 2019-09-27 NOTE — PROGRESS NOTE ADULT - ASSESSMENT
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 She has a nonischemic cardiomyopathy and her echo was concerning for restrictive pathology, however, work up for amyloid was negative. She continues to complain of SOB and her symptoms are out of proportion to her degree of heart failure/volume status. She appears to have low chronotropic response to exercise and dyssynchrony on her echo, pending CRT-P today.     Problem/Plan - 1:  ·  Problem: Acute on chronic systolic congestive heart failure.  Plan: Euvolemic on exam.  - c/w bumex 1mg PO daily  - c/w losartan 100mg daily  - c/w metop succinate 50mg daily.  - Strict I/O, daily standing weights, low salt diet.     Problem/Plan - 2:  ·  Problem: Atrial fibrillation.  Plan: Rate controlled.  - C/w metoprolol, amiodarone 200mg daily  - C/w apixaban 2.5mg BID     Problem/Plan - 3:  ·  Problem: Essential Hypertension.  Plan:   - C/w metoprolol, losartan     Problem/Plan - 4:  ·  Problem: Hyperlipidemia.  Plan: c/w atorvastatin 40mg daily.     Problem/Plan - 5:  ·  Problem: LBBB, 1st deg AVB, dyssynchrony of septal motion on echo  Plan: CRT-P today.    Okay to discharge from HF standpoint after placement of PPM. Patient will f/u with her outpatient cardiologist.      Jan Mccullough MD  Cardiology Fellow   [phone], M-F 7:30A-5P    All Cardiology service information can be found on [email or web address], password: [email or web address].

## 2019-09-27 NOTE — DISCHARGE NOTE PROVIDER - PROVIDER TOKENS
PROVIDER:[TOKEN:[[id number]]] PROVIDER:[TOKEN:[[id number]]],PROVIDER:[TOKEN:[[id number]]]

## 2019-09-27 NOTE — DISCHARGE NOTE PROVIDER - HOSPITAL COURSE
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 now euvolemic. She has a nonischemic cardiomyopathy and her echo was concerning for restrictive pathology, however, work up for amyloid was negative. She continues to complain of SOB and her symptoms are out of proportion to her degree of heart failure/volume status. She appears to have low chronotropic response to exercise and dyssynchrony on her echo, s/p  CRT-P on 9/27/19. 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 now euvolemic. She has a nonischemic cardiomyopathy and her echo was concerning for restrictive pathology, however, work up for amyloid was negative. She continues to complain of SOB and her symptoms are out of proportion to her degree of heart failure/volume status. She appears to have low chronotropic response to exercise and dyssynchrony on her echo, s/p  CRT-P on 9/27/19. .- Eliquis resumed 9/27/18 ( hx of Afib and CVA) . Continued  w/ home Amiodarone and metoprolol for hx of AFib . Discharged home with EPS follow up .

## 2019-09-27 NOTE — DISCHARGE NOTE PROVIDER - NSDCCPCAREPLAN_GEN_ALL_CORE_FT
PRINCIPAL DISCHARGE DIAGNOSIS  Diagnosis: Systolic CHF, acute on chronic  Assessment and Plan of Treatment: Weigh yourself daily.  If you gain 3lbs in 3 days, or 5lbs in a week call your Health Care Provider.  Do not eat or drink foods containing more than 2000mg of salt (sodium) in your diet every day.  Call your Health Care Provider if you have any swelling or increased swelling in your feet, ankles, and/or stomach.  Take all of your medication as directed.  If you become dizzy call your Health Care Provider.        SECONDARY DISCHARGE DIAGNOSES  Diagnosis: LBBB (left bundle branch block)  Assessment and Plan of Treatment: you had CRT placed    Diagnosis: HTN (hypertension)  Assessment and Plan of Treatment: continue losaratn and metoprolol    Diagnosis: Paroxysmal atrial fibrillation  Assessment and Plan of Treatment: continue eliquis , amiodarone and metoprolol

## 2019-09-27 NOTE — DISCHARGE NOTE PROVIDER - NSDCFUADDINST_GEN_ALL_CORE_FT
Appointment given for follow up in EP Clinic on 10/8/19 at 9:45 am    if fever or swelling/redness/discharge from wound, you should call EP clinic at [phone]

## 2019-09-27 NOTE — PROGRESS NOTE ADULT - ASSESSMENT
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 She has a nonischemic cardiomyopathy and her echo is also concerning for restrictive pathology, with some suggestion of cardiac amyloid, pending workup. EP consulted for CRT-P eval. Patient and daughter are amenable to CRT-P.      #Plan  -Plan for CRT-P today  -Hold AM meredith Bronson NPO      Mickey Chen MD  Cardiology Fellow  Call or Text: [phone]  All cardiology service information can be found 24/7 on [email or web address], password: [email or web address]

## 2019-09-28 ENCOUNTER — TRANSCRIPTION ENCOUNTER (OUTPATIENT)
Age: 84
End: 2019-09-28

## 2019-09-28 VITALS
OXYGEN SATURATION: 93 % | RESPIRATION RATE: 18 BRPM | SYSTOLIC BLOOD PRESSURE: 103 MMHG | TEMPERATURE: 98 F | DIASTOLIC BLOOD PRESSURE: 53 MMHG | HEART RATE: 58 BPM

## 2019-09-28 LAB
ANION GAP SERPL CALC-SCNC: 10 MMOL/L — SIGNIFICANT CHANGE UP (ref 5–17)
BUN SERPL-MCNC: 29 MG/DL — HIGH (ref 7–23)
CALCIUM SERPL-MCNC: 9.6 MG/DL — SIGNIFICANT CHANGE UP (ref 8.4–10.5)
CHLORIDE SERPL-SCNC: 100 MMOL/L — SIGNIFICANT CHANGE UP (ref 96–108)
CO2 SERPL-SCNC: 28 MMOL/L — SIGNIFICANT CHANGE UP (ref 22–31)
CREAT SERPL-MCNC: 0.86 MG/DL — SIGNIFICANT CHANGE UP (ref 0.5–1.3)
GLUCOSE SERPL-MCNC: 94 MG/DL — SIGNIFICANT CHANGE UP (ref 70–99)
HCT VFR BLD CALC: 38.3 % — SIGNIFICANT CHANGE UP (ref 34.5–45)
HGB BLD-MCNC: 12 G/DL — SIGNIFICANT CHANGE UP (ref 11.5–15.5)
MCHC RBC-ENTMCNC: 29.9 PG — SIGNIFICANT CHANGE UP (ref 27–34)
MCHC RBC-ENTMCNC: 31.4 GM/DL — LOW (ref 32–36)
MCV RBC AUTO: 95.1 FL — SIGNIFICANT CHANGE UP (ref 80–100)
PLATELET # BLD AUTO: 141 K/UL — LOW (ref 150–400)
POTASSIUM SERPL-MCNC: 3.4 MMOL/L — LOW (ref 3.5–5.3)
POTASSIUM SERPL-SCNC: 3.4 MMOL/L — LOW (ref 3.5–5.3)
RBC # BLD: 4.03 M/UL — SIGNIFICANT CHANGE UP (ref 3.8–5.2)
RBC # FLD: 13 % — SIGNIFICANT CHANGE UP (ref 10.3–14.5)
SODIUM SERPL-SCNC: 138 MMOL/L — SIGNIFICANT CHANGE UP (ref 135–145)
WBC # BLD: 8.2 K/UL — SIGNIFICANT CHANGE UP (ref 3.8–10.5)
WBC # FLD AUTO: 8.2 K/UL — SIGNIFICANT CHANGE UP (ref 3.8–10.5)

## 2019-09-28 PROCEDURE — 78499 UNLISTED CV PX DX NUC MED: CPT

## 2019-09-28 PROCEDURE — 71046 X-RAY EXAM CHEST 2 VIEWS: CPT

## 2019-09-28 PROCEDURE — 33208 INSRT HEART PM ATRIAL & VENT: CPT

## 2019-09-28 PROCEDURE — 82803 BLOOD GASES ANY COMBINATION: CPT

## 2019-09-28 PROCEDURE — 85014 HEMATOCRIT: CPT

## 2019-09-28 PROCEDURE — 99232 SBSQ HOSP IP/OBS MODERATE 35: CPT

## 2019-09-28 PROCEDURE — 83605 ASSAY OF LACTIC ACID: CPT

## 2019-09-28 PROCEDURE — 86334 IMMUNOFIX E-PHORESIS SERUM: CPT

## 2019-09-28 PROCEDURE — 80048 BASIC METABOLIC PNL TOTAL CA: CPT

## 2019-09-28 PROCEDURE — 84100 ASSAY OF PHOSPHORUS: CPT

## 2019-09-28 PROCEDURE — C1898: CPT

## 2019-09-28 PROCEDURE — 82330 ASSAY OF CALCIUM: CPT

## 2019-09-28 PROCEDURE — 84132 ASSAY OF SERUM POTASSIUM: CPT

## 2019-09-28 PROCEDURE — 78803 RP LOCLZJ TUM SPECT 1 AREA: CPT

## 2019-09-28 PROCEDURE — A9538: CPT

## 2019-09-28 PROCEDURE — C1769: CPT

## 2019-09-28 PROCEDURE — 85610 PROTHROMBIN TIME: CPT

## 2019-09-28 PROCEDURE — 99239 HOSP IP/OBS DSCHRG MGMT >30: CPT

## 2019-09-28 PROCEDURE — C1887: CPT

## 2019-09-28 PROCEDURE — 93308 TTE F-UP OR LMTD: CPT

## 2019-09-28 PROCEDURE — 71045 X-RAY EXAM CHEST 1 VIEW: CPT

## 2019-09-28 PROCEDURE — 93010 ELECTROCARDIOGRAM REPORT: CPT

## 2019-09-28 PROCEDURE — 85027 COMPLETE CBC AUTOMATED: CPT

## 2019-09-28 PROCEDURE — 78800 RP LOCLZJ TUM 1 AREA 1 D IMG: CPT

## 2019-09-28 PROCEDURE — 83735 ASSAY OF MAGNESIUM: CPT

## 2019-09-28 PROCEDURE — 71046 X-RAY EXAM CHEST 2 VIEWS: CPT | Mod: 26

## 2019-09-28 PROCEDURE — 96374 THER/PROPH/DIAG INJ IV PUSH: CPT

## 2019-09-28 PROCEDURE — C1894: CPT

## 2019-09-28 PROCEDURE — 93005 ELECTROCARDIOGRAM TRACING: CPT

## 2019-09-28 PROCEDURE — 85730 THROMBOPLASTIN TIME PARTIAL: CPT

## 2019-09-28 PROCEDURE — 86901 BLOOD TYPING SEROLOGIC RH(D): CPT

## 2019-09-28 PROCEDURE — 82435 ASSAY OF BLOOD CHLORIDE: CPT

## 2019-09-28 PROCEDURE — 33225 L VENTRIC PACING LEAD ADD-ON: CPT

## 2019-09-28 PROCEDURE — 86900 BLOOD TYPING SEROLOGIC ABO: CPT

## 2019-09-28 PROCEDURE — 99285 EMERGENCY DEPT VISIT HI MDM: CPT | Mod: 25

## 2019-09-28 PROCEDURE — 80053 COMPREHEN METABOLIC PANEL: CPT

## 2019-09-28 PROCEDURE — 83521 IG LIGHT CHAINS FREE EACH: CPT

## 2019-09-28 PROCEDURE — 86850 RBC ANTIBODY SCREEN: CPT

## 2019-09-28 PROCEDURE — C2621: CPT

## 2019-09-28 PROCEDURE — 83880 ASSAY OF NATRIURETIC PEPTIDE: CPT

## 2019-09-28 PROCEDURE — 82947 ASSAY GLUCOSE BLOOD QUANT: CPT

## 2019-09-28 PROCEDURE — C1730: CPT

## 2019-09-28 PROCEDURE — 93306 TTE W/DOPPLER COMPLETE: CPT

## 2019-09-28 PROCEDURE — 84295 ASSAY OF SERUM SODIUM: CPT

## 2019-09-28 PROCEDURE — 84484 ASSAY OF TROPONIN QUANT: CPT

## 2019-09-28 RX ORDER — ACETAMINOPHEN 500 MG
2 TABLET ORAL
Qty: 0 | Refills: 0 | DISCHARGE
Start: 2019-09-28

## 2019-09-28 RX ORDER — BUMETANIDE 0.25 MG/ML
1 INJECTION INTRAMUSCULAR; INTRAVENOUS
Qty: 30 | Refills: 0
Start: 2019-09-28 | End: 2019-10-27

## 2019-09-28 RX ORDER — METOPROLOL TARTRATE 50 MG
1 TABLET ORAL
Qty: 30 | Refills: 0
Start: 2019-09-28 | End: 2019-10-27

## 2019-09-28 RX ORDER — METOPROLOL TARTRATE 50 MG
1 TABLET ORAL
Qty: 0 | Refills: 0 | DISCHARGE

## 2019-09-28 RX ORDER — POTASSIUM CHLORIDE 20 MEQ
40 PACKET (EA) ORAL ONCE
Refills: 0 | Status: COMPLETED | OUTPATIENT
Start: 2019-09-28 | End: 2019-09-28

## 2019-09-28 RX ADMIN — Medication 1 TABLET(S): at 12:38

## 2019-09-28 RX ADMIN — Medication 650 MILLIGRAM(S): at 02:33

## 2019-09-28 RX ADMIN — Medication 81 MILLIGRAM(S): at 12:38

## 2019-09-28 RX ADMIN — Medication 100 MILLIGRAM(S): at 12:42

## 2019-09-28 RX ADMIN — Medication 25 MILLIGRAM(S): at 12:38

## 2019-09-28 RX ADMIN — APIXABAN 2.5 MILLIGRAM(S): 2.5 TABLET, FILM COATED ORAL at 05:29

## 2019-09-28 RX ADMIN — APIXABAN 2.5 MILLIGRAM(S): 2.5 TABLET, FILM COATED ORAL at 18:19

## 2019-09-28 RX ADMIN — Medication 650 MILLIGRAM(S): at 03:05

## 2019-09-28 RX ADMIN — LOSARTAN POTASSIUM 100 MILLIGRAM(S): 100 TABLET, FILM COATED ORAL at 05:29

## 2019-09-28 RX ADMIN — AMIODARONE HYDROCHLORIDE 200 MILLIGRAM(S): 400 TABLET ORAL at 05:29

## 2019-09-28 RX ADMIN — SERTRALINE 150 MILLIGRAM(S): 25 TABLET, FILM COATED ORAL at 12:39

## 2019-09-28 RX ADMIN — Medication 40 MILLIEQUIVALENT(S): at 12:38

## 2019-09-28 RX ADMIN — BUMETANIDE 1 MILLIGRAM(S): 0.25 INJECTION INTRAMUSCULAR; INTRAVENOUS at 05:29

## 2019-09-28 RX ADMIN — Medication 100 MILLIGRAM(S): at 05:30

## 2019-09-28 NOTE — PROGRESS NOTE ADULT - PROVIDER SPECIALTY LIST ADULT
Electrophysiology
Electrophysiology
Heart Failure
Heart Failure
Hospitalist
Internal Medicine

## 2019-09-28 NOTE — PROGRESS NOTE ADULT - PROBLEM SELECTOR PLAN 2
-Chronic elevation  -Unclear etiology; congestive hepatology vs. amylod?  -Continue to monitor.
-Chronic elevation, however, trending downward  -Unclear etiology  -Continue to monitor as patient on Amiodarone. .
-Chronic elevation, however, trending downward  -Unclear etiology  -Continue to monitor.
43/60 AST/ALT downtrending could be congestive hepatology vs. amylod?  Continue to monitor.
-Chronic elevation, however, trending downward  -Unclear etiology  -Continue to monitor.

## 2019-09-28 NOTE — PROGRESS NOTE ADULT - SUBJECTIVE AND OBJECTIVE BOX
Patient is a 89y old  Female who presents with a chief complaint of palpitations and SOB x 1 day (27 Sep 2019 12:22)      SUBJECTIVE / OVERNIGHT EVENTS:  Pt seen and examined at bedside. No acute events overnight.     ROS:  All other review of systems negative    Allergies    Sulfur (Fever)    Intolerances    codeine (Nausea)      T(C): 36.6 (09-28-19 @ 12:23), Max: 36.6 (09-28-19 @ 12:23)  HR: 58 (09-28-19 @ 12:23) (50 - 58)  BP: 103/53 (09-28-19 @ 12:23) (103/53 - 133/72)  RR: 18 (09-28-19 @ 12:23) (16 - 18)  SpO2: 93% (09-28-19 @ 12:23) (93% - 95%)      MEDICATIONS  (STANDING):  amiodarone    Tablet 200 milliGRAM(s) Oral daily  apixaban 2.5 milliGRAM(s) Oral every 12 hours  aspirin enteric coated 81 milliGRAM(s) Oral daily  atorvastatin 40 milliGRAM(s) Oral at bedtime  buMETAnide 1 milliGRAM(s) Oral daily  docusate sodium 100 milliGRAM(s) Oral three times a day  influenza   Vaccine 0.5 milliLiter(s) IntraMuscular once  losartan 100 milliGRAM(s) Oral daily  metoprolol succinate ER 25 milliGRAM(s) Oral daily  multivitamin 1 Tablet(s) Oral daily  sertraline 150 milliGRAM(s) Oral daily    MEDICATIONS  (PRN):  acetaminophen   Tablet .. 650 milliGRAM(s) Oral every 6 hours PRN Mild Pain (1 - 3)  traZODone 50 milliGRAM(s) Oral at bedtime PRN insomnia        PHYSICAL EXAM:  GENERAL: NAD, Elderly female  CHEST/LUNG: Clear to auscultation bilaterally; No wheeze  HEART: Regular rate and rhythm; + systolic murmur, No rubs, or gallops  ABDOMEN: Soft, Nontender, Nondistended; Bowel sounds present  EXTREMITIES:  2+ Peripheral Pulses, No clubbing, cyanosis, or edema  NEUROLOGY: AAOx3, non-focal  PSYCH: calm  SKIN: No rashes or lesions                          12.0   8.2   )-----------( 141      ( 28 Sep 2019 06:50 )             38.3           LIVER FUNCTIONS - ( 27 Sep 2019 06:52 )  Alb: 3.9 g/dL / Pro: 6.4 g/dL / ALK PHOS: 68 U/L / ALT: 54 U/L / AST: 31 U/L / GGT: x             138|100|29<94  3.4|28|0.86  9.6,1.9,--  09-28 @ 06:50      CAPILLARY BLOOD GLUCOSE                RADIOLOGY & ADDITIONAL TESTS:    Imaging Personally Reviewed:    Consultant(s) Notes Reviewed:      Care Discussed with Consultants/Other Providers:    Case Discussed with Family:    Goals of Care:

## 2019-09-28 NOTE — PROGRESS NOTE ADULT - PROBLEM SELECTOR PLAN 6
- atorvastatin 40mg PO daily  - ASA 81mg PO daily
- atorvastatin 40mg PO daily  - ASA 81mg PO daily    Keep mg >2  K> 4   Dash diet  Dispo: Pending NM scan

## 2019-09-28 NOTE — PROGRESS NOTE ADULT - PROBLEM SELECTOR PLAN 4
-Chronic  -Continue Losartan 100mg daily  -metoprolol ER 50mg PO daily
- valsartan - therapeutic equivalent Losartan 50mg PO daily  - metoprolol ER 50mg PO daily
-Chronic  -Continue Losartan 100mg daily  -metoprolol ER 25mg PO daily

## 2019-09-28 NOTE — DISCHARGE NOTE NURSING/CASE MANAGEMENT/SOCIAL WORK - PATIENT PORTAL LINK FT
You can access the FollowMyHealth Patient Portal offered by [facility] by registering at the following website: [email or web address]. By joining [facility]’s FollowMyHealth portal, you will also be able to view your health information using other applications (apps) compatible with our system.

## 2019-09-28 NOTE — PROGRESS NOTE ADULT - PROBLEM SELECTOR PLAN 1
-Concern for worsening valvular dx vs. restrictive CHF  -HF recs appreciated  -Start Bumex 1mg PO Daily  -Follow up with cardiac Amyloidosis work up  -Continue strict I/Os  -daily weights
-Concern for worsening valvular dx vs. restrictive CHF  -HF recs appreciated  -Follow up with stress test  -Continue Bumex 1mg PO Daily  -Cardiac Amyloidosis work up negative   -Continue strict I/Os  -daily weights
-Concern for worsening valvular dx vs. restrictive CHF  -s/p CRT-P placement, patient tolerated procedure very well.   -EP recs appreciated, d/c planning today.   -Continue Bumex 1mg PO Daily  -Cardiac Amyloidosis work up negative
Suspect worsening valvular dx vs. restrictive CHF  - Telemetry   - TTE   - strict ins and outs   - daily weights   - Heart failure following recs NM scan- ordered  -  Lasix back to 40mg PO daily
-Concern for worsening valvular dx vs. restrictive CHF  -HF recs appreciated  -EP recs appreciated  -Plan for CRT-P placement today. Follow up post procedure recs  -Continue Bumex 1mg PO Daily  -Cardiac Amyloidosis work up negative   -Continue strict I/Os  -daily weights

## 2019-09-28 NOTE — PROGRESS NOTE ADULT - REASON FOR ADMISSION
palpitations and SOB x 1 day

## 2019-09-28 NOTE — PROGRESS NOTE ADULT - PROBLEM SELECTOR PLAN 3
- continue Eliquis 2.5mg PO BID  - amiodarone 200mg PO daily  - metoprolol ER 50mg PO daily
- amiodarone 200mg PO daily  - metoprolol ER 25mg PO daily  - Eliquis resumed 09/27.
- continue Eliquis 2.5mg PO BID  - amiodarone 200mg PO daily  - metoprolol ER 25mg PO daily
- continue Eliquis 2.5mg PO BID  - amiodarone 200mg PO daily  - metoprolol ER 50mg PO daily
- amiodarone 200mg PO daily  - metoprolol ER 25mg PO daily  - Eliquis on hold for CRT placement. Follow up EP regarding restarting

## 2019-09-28 NOTE — PROGRESS NOTE ADULT - ASSESSMENT
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 She has a nonischemic cardiomyopathy and her echo is also   concerning for restrictive pathology, however, work up for amyloid was negative. She continued  to complain of SOB and her symptoms are out of proportion to her degree of heart failure/volume status. She appeared  to have low chronotropic response to exercise and dyssynchrony on her echo. EP consulted for CRT-P eval 9/26/19.   #Status post CRT-P  - Post procedure Cxray reveals no pneumothorax. PA/LAT pending.   - Continue with Tylenol prn for pain   - Eliquis resumed 9/27/18 ( hx of Afib and CVA)   - Continue w/ home Amiodarone and metoprolol for hx of AFib with continued outpatient monitoring of LFTs, TSH, routine opthalmology f/u and PFTs. 	  - Device to be checked and paired by MDT rep this am   -Post PPM teaching reviewed with patient who verbalized understanding. Booklet and pamphlet with further Instructions, and temporary ID card provided to patient.   -Appointment given for follow up in EP Clinic on 10/8/19 at 9:45 am   -Patient advised that if fever or swelling/redness/discharge from wound, she should call EP clinic at [phone]. 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 She has a nonischemic cardiomyopathy and her echo is also   concerning for restrictive pathology, however, work up for amyloid was negative. She continued  to complain of SOB and her symptoms are out of proportion to her degree of heart failure/volume status. She appeared  to have low chronotropic response to exercise and dyssynchrony on her echo. EP consulted for CRT-P eval 9/26/19.   #Status post CRT-P  - Post procedure Cxray reveals no pneumothorax. PA/LAT pending.   - Continue with Tylenol prn for pain   - Eliquis resumed 9/27/18 ( hx of Afib and CVA)   - Continue w/ home Amiodarone and metoprolol for hx of AFib with continued outpatient monitoring of LFTs, TSH, routine opthalmology f/u and PFTs. 	  - Device to be checked and paired by MDT rep this am   -Post PPM teaching reviewed with patient who verbalized understanding. Booklet and pamphlet with further Instructions, and temporary ID card provided to patient.   -Appointment given for follow up in EP Clinic on 10/8/19 at 9:45 am   -Patient advised that if fever or swelling/redness/discharge from wound, she should call EP clinic at [phone].    [phone]       Addendum:  PA/LAT chest xray reviewed by Dr Quiros; no pneumothorax, leads in good position. Patient cleared for discharge w/ follow up as advised. Discussed with covering Medicine NP.

## 2019-09-28 NOTE — PROGRESS NOTE ADULT - SUBJECTIVE AND OBJECTIVE BOX
INTERVAL HPI/OVERNIGHT EVENTS: Patient s/p BiV PPM  ( [employer]) 9/27/19. She endorses soreness at incision site. She denies cardiac related chest pain/sob/dizziness/palpitations.     MEDICATIONS  (STANDING):  amiodarone    Tablet 200 milliGRAM(s) Oral daily  apixaban 2.5 milliGRAM(s) Oral every 12 hours  aspirin enteric coated 81 milliGRAM(s) Oral daily  atorvastatin 40 milliGRAM(s) Oral at bedtime  buMETAnide 1 milliGRAM(s) Oral daily  docusate sodium 100 milliGRAM(s) Oral three times a day  influenza   Vaccine 0.5 milliLiter(s) IntraMuscular once  losartan 100 milliGRAM(s) Oral daily  metoprolol succinate ER 25 milliGRAM(s) Oral daily  multivitamin 1 Tablet(s) Oral daily  sertraline 150 milliGRAM(s) Oral daily    MEDICATIONS  (PRN):  acetaminophen   Tablet .. 650 milliGRAM(s) Oral every 6 hours PRN Mild Pain (1 - 3)  traZODone 50 milliGRAM(s) Oral at bedtime PRN insomnia      Allergies    Sulfur (Fever)    Intolerances    codeine (Nausea)    ROS:  General: Pt denies fever/chills  Cardiovascular: denies chest pain/palpitations/dizziness   Respiratory and Thorax: denies SOB  Gastrointestinal: denies abdominal pain/diarrhea/constipation/bloody stool  Genitourinary: denies dysuria/hematuria   Hematologic: denies abnormal bleeding    Vital Signs Last 24 Hrs  T(C): 36.5 (28 Sep 2019 04:30), Max: 36.7 (27 Sep 2019 21:10)  T(F): 97.7 (28 Sep 2019 04:30), Max: 98 (27 Sep 2019 21:10)  HR: 50 (28 Sep 2019 05:25) (50 - 59)  BP: 133/72 (28 Sep 2019 05:25) (100/60 - 140/72)  BP(mean): --  RR: 18 (28 Sep 2019 05:25) (16 - 18)  SpO2: 95% (28 Sep 2019 05:25) (93% - 97%)    Physical Exam:  Constitutional: In no  acute distress  Neurological: Alert & Oriented x 3,  SOSA   Respiratory: CTA B/L, No wheezing/crackles/rhonchi  Cardiovascular: (+) S1 & S2  Gastrointestinal: soft, NT, nondistended, (+) BS  Extremities: +2 bilateral radial and DP pulses. No pedal edema.   Skin:  Left anterior chest wall surgical incision site with steri-strips in place; no hematoma/active external bleed/erythema. Mild ecchymosis.       LABS:                        12.0   8.2   )-----------( 141      ( 28 Sep 2019 06:50 )             38.3     09-28    138  |  100  |  29<H>  ----------------------------<  94  3.4<L>   |  28  |  0.86    Ca    9.6      28 Sep 2019 06:50  Mg     1.9     09-28    TPro  6.4  /  Alb  3.9  /  TBili  0.4  /  DBili  x   /  AST  31  /  ALT  54<H>  /  AlkPhos  68  09-27      Thyroid Stimulating Hormone, Serum: 1.99 uIU/mL (06.13.19 @ 16:56)        RADIOLOGY & ADDITIONAL TESTS:     TELE:  < from: Xray Chest 1 View- PORTABLE-Urgent (09.27.19 @ 14:21) >  EXAM:  XR CHEST PORTABLE URGENT 1V                            PROCEDURE DATE:  09/27/2019            INTERPRETATION:  CLINICAL INFORMATION: Pacemaker placement.    EXAM: Frontal radiograph of the chest.    COMPARISON: Chest radiograph from 9/23/2019    FINDINGS:    Left-sided chest wall pacemaker.  Cardiac size cannot be determined on   this AP projection. The patient is status post TAVR.    The lungs are clear. No pneumothorax or pleural effusions.    Degenerative changes of the bilateral shoulders.    IMPRESSION: Left-sided chest wall pacemaker with intact leads. No   pneumothorax.    JACQUELINE REMY M.D., RADIOLOGY RESIDENT  This document has been electronically signed.  RAFI DUNLAP M.D., ATTENDING RADIOLOGIST  This document has been electronically signed. Sep 27 2019  4:55PM                TELE: , As/ 50's-60's  EKG 9/27: As/ 56 bpm

## 2019-09-28 NOTE — PROGRESS NOTE ADULT - PROBLEM SELECTOR PLAN 5
- Sertraline 150mg PO daily  - Trazadone 50mg PO QHS PRN

## 2019-09-28 NOTE — PROGRESS NOTE ADULT - ATTENDING COMMENTS
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 She appears euvolemic and amyloid workup was negative. She perhaps could have chronotropic incompetence (significant conduction disease on EKG) with slow HR in hospital and on prior Holter.     Review of pertinent studies reveals:  TTE 9/5/19: LV 5.6 cm, EF 40-45%, no significant LVH, restrictive diastology with elevated E/A ratio and e' velocities ~ 5m/s, normal appearing TAVR, regional WMAs, PASP 50 mmHg    EKG: SR with long 1st degree AVB (262 ms), LBBB, LAD    Holter 8/2019: SR/SB with 6% pAF, average HR 57 bpm with max heart rate 91 bpm    PYP scan and light chains: negative    Major issues by problem:  # Acute on chronic HF with underlying borderline EF. Review of TTE reveals restrictive diastology and she has a history of LF/LG AS which raises suspicion for cardiac amyloid however workup negative  # LBBB, no indications for CRT given EF > 35%  # pAF, in sinus rhythm    The plan for today is as follows:  - obtain exercise treadmill test to assess for chronotropic competence, if unable to augment HR would consult EP for CRT-P placement   - switch lasix to bumex 1 mg daily  - increase losartan to 100 mg daily, continue metoprolol but decrease to 25 mg daily  - continue anticoagulation    Please feel free to call me with any questions: [phone]
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 She appears euvolemic and amyloid workup was negative. She appears to have chronotropic incompetence with baseline significant conduction disease and HR 57->70 when ambulating and limited by dyspnea as well as LBBB with EF 35% so would likely benefit from CRT-P which is scheduled for today.     Review of pertinent studies reveals:  TTE 9/5/19: LV 5.6 cm, EF 35-40%, moderate MR, no significant LVH, restrictive diastology with elevated E/A ratio and e' velocities ~ 5m/s, normal appearing TAVR, regional WMAs, PASP 50 mmHg    EKG: SR with long 1st degree AVB (262 ms), LBBB, LAD    Holter 8/2019: SR/SB with 6% pAF, average HR 57 bpm with max heart rate 91 bpm    PYP scan and light chains: negative    Major issues by problem:  # Acute on chronic HF with mildly reduced EF of 35-40%. Review of TTE reveals restrictive diastology and she has a history of LF/LG AS which raises suspicion for cardiac amyloid however workup negative  # LBBB  # Chronotropic incompetence  # pAF, in sinus rhythm    The plan for today is as follows:  - EP consulted, plan for CRT-P today  - continue losartan 100 mg daily and metoprolol succinate 25 mg daily  - continue bumex 1 mg daily  - continue anticoagulation    Medically optimized for discharge tomorrow after CRT-P. She should continue to follow up with Dr. Lewis in clinic.     Please feel free to call me with any questions: [phone]
She received a CRT - Pacemaker yesterday. The P wave that was >1.5 at implant has diminished to 0.5. Otherwise, device function is normal and CXR shows adequate lead positions. The ECG shows excellent QRS fusion.    The left pectoral wound is sore but without hematoma.     She can be discharged home and I will review in clinic in a week.
Discussed pt's care and plan with Pt as well as her daughter, Amando Nieto ([phone]) over the phone.  Pt is HDS with place for CRT-P placement today. Will monitor pt overnight. Follow up with CXR in the am and with EP recs. If pt continues to remain stable without any concern or complications, plan for discharge home.   Pt will be picked up by her other daughter, Kayli Fernando ([phone]).
Junie Guardado DO  [facility]  [phone]

## 2019-10-01 PROCEDURE — 83036 HEMOGLOBIN GLYCOSYLATED A1C: CPT

## 2019-10-01 PROCEDURE — 76000 FLUOROSCOPY <1 HR PHYS/QHP: CPT

## 2019-10-01 PROCEDURE — 85610 PROTHROMBIN TIME: CPT

## 2019-10-01 PROCEDURE — C1889: CPT

## 2019-10-01 PROCEDURE — 86901 BLOOD TYPING SEROLOGIC RH(D): CPT

## 2019-10-01 PROCEDURE — L8699: CPT

## 2019-10-01 PROCEDURE — 80048 BASIC METABOLIC PNL TOTAL CA: CPT

## 2019-10-01 PROCEDURE — 80053 COMPREHEN METABOLIC PANEL: CPT

## 2019-10-01 PROCEDURE — 82962 GLUCOSE BLOOD TEST: CPT

## 2019-10-01 PROCEDURE — 93005 ELECTROCARDIOGRAM TRACING: CPT

## 2019-10-01 PROCEDURE — 71045 X-RAY EXAM CHEST 1 VIEW: CPT

## 2019-10-01 PROCEDURE — 86900 BLOOD TYPING SEROLOGIC ABO: CPT

## 2019-10-01 PROCEDURE — 85730 THROMBOPLASTIN TIME PARTIAL: CPT

## 2019-10-01 PROCEDURE — 84100 ASSAY OF PHOSPHORUS: CPT

## 2019-10-01 PROCEDURE — 82550 ASSAY OF CK (CPK): CPT

## 2019-10-01 PROCEDURE — 71046 X-RAY EXAM CHEST 2 VIEWS: CPT

## 2019-10-01 PROCEDURE — G0463: CPT

## 2019-10-01 PROCEDURE — 83735 ASSAY OF MAGNESIUM: CPT

## 2019-10-01 PROCEDURE — 86923 COMPATIBILITY TEST ELECTRIC: CPT

## 2019-10-01 PROCEDURE — 87640 STAPH A DNA AMP PROBE: CPT

## 2019-10-01 PROCEDURE — C1887: CPT

## 2019-10-01 PROCEDURE — 85027 COMPLETE CBC AUTOMATED: CPT

## 2019-10-01 PROCEDURE — 84484 ASSAY OF TROPONIN QUANT: CPT

## 2019-10-01 PROCEDURE — C1726: CPT

## 2019-10-01 PROCEDURE — 87641 MR-STAPH DNA AMP PROBE: CPT

## 2019-10-01 PROCEDURE — 93306 TTE W/DOPPLER COMPLETE: CPT

## 2019-10-01 PROCEDURE — 86850 RBC ANTIBODY SCREEN: CPT

## 2019-10-01 PROCEDURE — C1894: CPT

## 2019-10-01 PROCEDURE — C1769: CPT

## 2019-10-01 PROCEDURE — 82553 CREATINE MB FRACTION: CPT

## 2019-10-01 PROCEDURE — 93880 EXTRACRANIAL BILAT STUDY: CPT

## 2019-10-01 PROCEDURE — C1760: CPT

## 2019-10-04 ENCOUNTER — INBOUND DOCUMENT (OUTPATIENT)
Age: 84
End: 2019-10-04

## 2019-10-08 ENCOUNTER — APPOINTMENT (OUTPATIENT)
Dept: ELECTROPHYSIOLOGY | Facility: CLINIC | Age: 84
End: 2019-10-08
Payer: MEDICARE

## 2019-10-08 VITALS
BODY MASS INDEX: 19.88 KG/M2 | DIASTOLIC BLOOD PRESSURE: 74 MMHG | HEIGHT: 62 IN | SYSTOLIC BLOOD PRESSURE: 130 MMHG | OXYGEN SATURATION: 97 % | WEIGHT: 108 LBS | HEART RATE: 62 BPM

## 2019-10-08 PROCEDURE — 99024 POSTOP FOLLOW-UP VISIT: CPT

## 2019-10-08 PROCEDURE — 93281 PM DEVICE PROGR EVAL MULTI: CPT

## 2019-10-08 RX ORDER — FUROSEMIDE 20 MG/1
20 TABLET ORAL DAILY
Qty: 30 | Refills: 2 | Status: DISCONTINUED | COMMUNITY
Start: 2017-06-15 | End: 2019-10-08

## 2019-10-08 RX ORDER — AMOXICILLIN 500 MG/1
500 TABLET, FILM COATED ORAL
Qty: 20 | Refills: 1 | Status: DISCONTINUED | COMMUNITY
Start: 2019-09-19 | End: 2019-10-08

## 2019-10-08 RX ORDER — METOPROLOL TARTRATE 25 MG/1
25 TABLET, FILM COATED ORAL TWICE DAILY
Qty: 60 | Refills: 3 | Status: DISCONTINUED | COMMUNITY
Start: 2019-07-30 | End: 2019-10-08

## 2019-10-08 RX ORDER — AMOXICILLIN AND CLAVULANATE POTASSIUM 875; 125 MG/1; MG/1
875-125 TABLET, COATED ORAL
Qty: 20 | Refills: 0 | Status: DISCONTINUED | COMMUNITY
Start: 2019-10-04 | End: 2019-10-08

## 2019-10-23 ENCOUNTER — APPOINTMENT (OUTPATIENT)
Dept: CARDIOLOGY | Facility: CLINIC | Age: 84
End: 2019-10-23
Payer: MEDICARE

## 2019-10-23 ENCOUNTER — NON-APPOINTMENT (OUTPATIENT)
Age: 84
End: 2019-10-23

## 2019-10-23 VITALS
BODY MASS INDEX: 20.24 KG/M2 | WEIGHT: 110 LBS | SYSTOLIC BLOOD PRESSURE: 130 MMHG | RESPIRATION RATE: 16 BRPM | HEIGHT: 62 IN | DIASTOLIC BLOOD PRESSURE: 82 MMHG | HEART RATE: 81 BPM

## 2019-10-23 DIAGNOSIS — Z87.09 PERSONAL HISTORY OF OTHER DISEASES OF THE RESPIRATORY SYSTEM: ICD-10-CM

## 2019-10-23 PROCEDURE — 99214 OFFICE O/P EST MOD 30 MIN: CPT

## 2019-10-24 ENCOUNTER — RX RENEWAL (OUTPATIENT)
Age: 84
End: 2019-10-24

## 2019-10-29 DIAGNOSIS — M47.812 SPONDYLOSIS W/OUT MYELOPATHY OR RADICULOPATHY, CERVICAL REGION: ICD-10-CM

## 2019-11-06 ENCOUNTER — RX RENEWAL (OUTPATIENT)
Age: 84
End: 2019-11-06

## 2019-11-07 ENCOUNTER — OUTPATIENT (OUTPATIENT)
Dept: OUTPATIENT SERVICES | Facility: HOSPITAL | Age: 84
LOS: 1 days | End: 2019-11-07
Payer: MEDICARE

## 2019-11-07 ENCOUNTER — APPOINTMENT (OUTPATIENT)
Dept: UROLOGY | Facility: CLINIC | Age: 84
End: 2019-11-07
Payer: MEDICARE

## 2019-11-07 VITALS — RESPIRATION RATE: 16 BRPM | HEART RATE: 71 BPM | DIASTOLIC BLOOD PRESSURE: 68 MMHG | SYSTOLIC BLOOD PRESSURE: 149 MMHG

## 2019-11-07 DIAGNOSIS — R41.3 OTHER AMNESIA: ICD-10-CM

## 2019-11-07 DIAGNOSIS — Z90.722 ACQUIRED ABSENCE OF OVARIES, BILATERAL: Chronic | ICD-10-CM

## 2019-11-07 DIAGNOSIS — R39.9 UNSPECIFIED SYMPTOMS AND SIGNS INVOLVING THE GENITOURINARY SYSTEM: ICD-10-CM

## 2019-11-07 DIAGNOSIS — Z98.890 OTHER SPECIFIED POSTPROCEDURAL STATES: Chronic | ICD-10-CM

## 2019-11-07 DIAGNOSIS — R35.0 FREQUENCY OF MICTURITION: ICD-10-CM

## 2019-11-07 PROCEDURE — 52000 CYSTOURETHROSCOPY: CPT

## 2019-11-07 NOTE — HISTORY OF PRESENT ILLNESS
[FreeTextEntry1] : Mrs Bethea is a 89 year old female followed for past bladder tumors. The patient had bladder tumors removed in November of 1994 and August of 1999. She has been followed with surveillance cystoscopies with no sign recurrence. no urinary symptoms of recurrent tumor.\par 10/30/2018: The patient returned today and reported that she wakes up 1-2 times during the night to urinate. Denies dysuria and hematuria. The patient had a stroke just over a year ago. The patient is currently taking Eliquis. She does not take Aspirin. The patient was supposed to have a cystoscopy, but has not done it yet. The patient is allergic to Codeine and sulfur.\par \par 11/7/2019: The patient returned today for a cystoscopy. Patient has a pacemaker.

## 2019-11-07 NOTE — PHYSICAL EXAM
[General Appearance - Well Developed] : well developed [General Appearance - Well Nourished] : well nourished [Well Groomed] : well groomed [Normal Appearance] : normal appearance [General Appearance - In No Acute Distress] : no acute distress [External Female Genitalia] : normal external genitalia [Skin Color & Pigmentation] : normal skin color and pigmentation [Edema] : no peripheral edema [] : no respiratory distress [Exaggerated Use Of Accessory Muscles For Inspiration] : no accessory muscle use [Oriented To Time, Place, And Person] : oriented to person, place, and time [Affect] : the affect was normal [Mood] : the mood was normal [Not Anxious] : not anxious [Normal Station and Gait] : the gait and station were normal for the patient's age [No Focal Deficits] : no focal deficits [FreeTextEntry1] : hand  5/5 right, 4/5 left., impaired memory

## 2019-11-07 NOTE — ASSESSMENT
[FreeTextEntry1] : Mrs Bethea is a 89 year old female followed for past bladder tumors. The patient had bladder tumors removed in November of 1994 and August of 1999. She has been followed with surveillance cystoscopies with no sign recurrence. no urinary symptoms of recurrent tumor.\par 10/30/2018: The patient returned today and reported that she wakes up 1-2 times during the night to urinate. Denies dysuria and hematuria. The patient had a stroke just over a year ago. The patient is currently taking Eliquis. She does not take Aspirin. The patient was supposed to have a cystoscopy, but has not done it yet. The patient is allergic to Codeine and sulfur.\par \par 11/7/2019: The patient returned today for a cystoscopy. Patient has a pacemaker. \par \par Cystoscopy findings: the introitus of the vagina was small, the urethral meatus was not visible from the introitus, upon palpation it retracted to the anterior wall. Patient then placed in stirrups. I could not see the opening and could not palpate with finger. Therefore, I have placed her in dorsal anatomy position which made procedure easier to do. normal pale pink mucosa, dorsal anatomy position, Bladder neck is normal in appearance in retroflexed position, trigone is intact, uretal orifices are slits bilaterally, 3+ trabeculation, on patients right inferior lateral wall wide mouth diverticulum also in the anterior mid portion of the bladder, no bladder stones, no bladder tumors, no inflammatory no patches or inflammatory polyps on the bladder neck or urethra strictures stones or tumors. \par \par She should follow up in 1 year for a cystoscopy.

## 2019-11-07 NOTE — REVIEW OF SYSTEMS
[Eyesight Problems] : eyesight problems [Constipation] : constipation [Memory Lapses Or Loss] : memory loss [Feeling Poorly] : not feeling poorly [Chest Pain] : no chest pain

## 2019-11-10 LAB
APPEARANCE: CLEAR
BACTERIA UR CULT: NORMAL
BACTERIA: NEGATIVE
BILIRUBIN URINE: NEGATIVE
BLOOD URINE: NEGATIVE
COLOR: NORMAL
GLUCOSE QUALITATIVE U: NEGATIVE
HYALINE CASTS: 1 /LPF
KETONES URINE: NEGATIVE
LEUKOCYTE ESTERASE URINE: NEGATIVE
MICROSCOPIC-UA: NORMAL
NITRITE URINE: NEGATIVE
PH URINE: 6.5
PROTEIN URINE: NEGATIVE
RED BLOOD CELLS URINE: 1 /HPF
SPECIFIC GRAVITY URINE: 1.01
SQUAMOUS EPITHELIAL CELLS: 1 /HPF
URINE CYTOLOGY: NORMAL
UROBILINOGEN URINE: NORMAL
WHITE BLOOD CELLS URINE: 0 /HPF

## 2019-11-11 DIAGNOSIS — C67.9 MALIGNANT NEOPLASM OF BLADDER, UNSPECIFIED: ICD-10-CM

## 2019-11-11 DIAGNOSIS — R39.9 UNSPECIFIED SYMPTOMS AND SIGNS INVOLVING THE GENITOURINARY SYSTEM: ICD-10-CM

## 2019-11-11 DIAGNOSIS — R41.3 OTHER AMNESIA: ICD-10-CM

## 2019-11-13 ENCOUNTER — APPOINTMENT (OUTPATIENT)
Dept: CARDIOLOGY | Facility: CLINIC | Age: 84
End: 2019-11-13
Payer: MEDICARE

## 2019-11-13 DIAGNOSIS — I44.7 LEFT BUNDLE-BRANCH BLOCK, UNSPECIFIED: ICD-10-CM

## 2019-11-13 DIAGNOSIS — Z95.0 PRESENCE OF CARDIAC PACEMAKER: ICD-10-CM

## 2019-11-13 PROCEDURE — 93224 XTRNL ECG REC UP TO 48 HRS: CPT

## 2019-11-15 ENCOUNTER — CLINICAL ADVICE (OUTPATIENT)
Age: 84
End: 2019-11-15

## 2019-11-15 ENCOUNTER — APPOINTMENT (OUTPATIENT)
Dept: ELECTROPHYSIOLOGY | Facility: CLINIC | Age: 84
End: 2019-11-15
Payer: MEDICARE

## 2019-11-15 VITALS
DIASTOLIC BLOOD PRESSURE: 69 MMHG | HEART RATE: 85 BPM | WEIGHT: 110 LBS | SYSTOLIC BLOOD PRESSURE: 113 MMHG | HEIGHT: 62 IN | BODY MASS INDEX: 20.24 KG/M2 | OXYGEN SATURATION: 98 %

## 2019-11-15 PROCEDURE — 93281 PM DEVICE PROGR EVAL MULTI: CPT

## 2019-11-15 RX ORDER — ATORVASTATIN CALCIUM 40 MG/1
40 TABLET, FILM COATED ORAL
Refills: 0 | Status: DISCONTINUED | COMMUNITY
Start: 2019-07-30 | End: 2019-11-15

## 2019-11-18 ENCOUNTER — APPOINTMENT (OUTPATIENT)
Dept: CARDIOLOGY | Facility: CLINIC | Age: 84
End: 2019-11-18
Payer: MEDICARE

## 2019-11-18 ENCOUNTER — NON-APPOINTMENT (OUTPATIENT)
Age: 84
End: 2019-11-18

## 2019-11-18 VITALS
WEIGHT: 113 LBS | SYSTOLIC BLOOD PRESSURE: 129 MMHG | BODY MASS INDEX: 20.8 KG/M2 | HEART RATE: 92 BPM | HEIGHT: 62 IN | DIASTOLIC BLOOD PRESSURE: 78 MMHG | RESPIRATION RATE: 16 BRPM

## 2019-11-18 DIAGNOSIS — Z95.3 PRESENCE OF XENOGENIC HEART VALVE: ICD-10-CM

## 2019-11-18 LAB
ALBUMIN SERPL ELPH-MCNC: 4.5 G/DL
ALP BLD-CCNC: 80 U/L
ALT SERPL-CCNC: 46 U/L
ANION GAP SERPL CALC-SCNC: 16 MMOL/L
AST SERPL-CCNC: 38 U/L
BASOPHILS # BLD AUTO: 0 K/UL
BASOPHILS NFR BLD AUTO: 0.5 %
BILIRUB SERPL-MCNC: 0.4 MG/DL
BUN SERPL-MCNC: 30 MG/DL
CALCIUM SERPL-MCNC: 9.7 MG/DL
CHLORIDE SERPL-SCNC: 103 MMOL/L
CO2 SERPL-SCNC: 25 MMOL/L
CREAT SERPL-MCNC: 0.99 MG/DL
EOSINOPHIL # BLD AUTO: 0.2 K/UL
EOSINOPHIL NFR BLD AUTO: 2.4 %
GLUCOSE SERPL-MCNC: 70 MG/DL
HCT VFR BLD CALC: 37.6 %
HGB BLD-MCNC: 12.1 G/DL
LYMPHOCYTES # BLD AUTO: 1 K/UL
LYMPHOCYTES NFR BLD AUTO: 14.3 %
MAN DIFF?: NO
MCHC RBC-ENTMCNC: 30.8 PG
MCHC RBC-ENTMCNC: 32.1 GM/DL
MCV RBC AUTO: 95.7 FL
MONOCYTES # BLD AUTO: 0.9 K/UL
MONOCYTES NFR BLD AUTO: 13.4 %
NEUTROPHILS # BLD AUTO: 4.9 K/UL
NEUTROPHILS NFR BLD AUTO: 69.4 %
PLATELET # BLD AUTO: 152 K/UL
POTASSIUM SERPL-SCNC: 4.1 MMOL/L
PROT SERPL-MCNC: 6.8 G/DL
RBC # BLD: 3.93 M/UL
RBC # FLD: 13.3 %
SODIUM SERPL-SCNC: 144 MMOL/L
WBC # FLD AUTO: 7.1 K/UL

## 2019-11-18 PROCEDURE — 93000 ELECTROCARDIOGRAM COMPLETE: CPT

## 2019-11-18 PROCEDURE — 99214 OFFICE O/P EST MOD 30 MIN: CPT

## 2019-11-19 ENCOUNTER — APPOINTMENT (OUTPATIENT)
Dept: ELECTROPHYSIOLOGY | Facility: CLINIC | Age: 84
End: 2019-11-19
Payer: MEDICARE

## 2019-11-19 PROCEDURE — 93281 PM DEVICE PROGR EVAL MULTI: CPT

## 2019-11-24 PROBLEM — I44.7 LEFT BUNDLE BRANCH BLOCK: Status: ACTIVE | Noted: 2017-06-14

## 2019-11-24 PROBLEM — Z95.0 S/P PLACEMENT OF CARDIAC PACEMAKER: Status: ACTIVE | Noted: 2019-10-23

## 2019-11-25 ENCOUNTER — NON-APPOINTMENT (OUTPATIENT)
Age: 84
End: 2019-11-25

## 2019-11-29 ENCOUNTER — NON-APPOINTMENT (OUTPATIENT)
Age: 84
End: 2019-11-29

## 2019-11-29 ENCOUNTER — APPOINTMENT (OUTPATIENT)
Dept: ELECTROPHYSIOLOGY | Facility: CLINIC | Age: 84
End: 2019-11-29
Payer: MEDICARE

## 2019-11-29 VITALS
HEART RATE: 99 BPM | SYSTOLIC BLOOD PRESSURE: 118 MMHG | DIASTOLIC BLOOD PRESSURE: 86 MMHG | WEIGHT: 113 LBS | BODY MASS INDEX: 20.8 KG/M2 | HEIGHT: 62 IN | OXYGEN SATURATION: 98 %

## 2019-11-29 PROCEDURE — 99024 POSTOP FOLLOW-UP VISIT: CPT

## 2019-11-29 PROCEDURE — 93000 ELECTROCARDIOGRAM COMPLETE: CPT

## 2019-11-29 NOTE — DISCUSSION/SUMMARY
[FreeTextEntry1] : She has developed persistent AF with heart rates in  at rest and which is causing loss of BiV pacing. In the first instance, we will increase her amiodarone to once daily and see if this restores sinus rhythm. If not, one option is to proceed to AV vishal ablation to allow the CRT pacemaker to control her heart rates with both regularization and rate control. \par \par She will return to see me in two weeks to monitor her AF.

## 2019-11-29 NOTE — HISTORY OF PRESENT ILLNESS
[FreeTextEntry1] : THis 89 year old lady with systolic dysfunction (LVEF 35%), prior TAVR and LBBB underwent placement of a CRT pacemaker last month. She had some phrenic nerve stimulation that we has mostly resolved with reprogramming outputs. Her main problem at present is the onset of persistent AF with loss of biventricular pacing. She is fatigued and short of breath.\par \par She is currently on amiodarone 200mg three days a week. She takes apixaban bid for stroke prophylaxis.

## 2019-11-29 NOTE — PHYSICAL EXAM
[General Appearance - Well Developed] : well developed [Normal Appearance] : normal appearance [Well Groomed] : well groomed [No Deformities] : no deformities [General Appearance - Well Nourished] : well nourished [General Appearance - In No Acute Distress] : no acute distress [Normal Conjunctiva] : the conjunctiva exhibited no abnormalities [Eyelids - No Xanthelasma] : the eyelids demonstrated no xanthelasmas [Normal Oral Mucosa] : normal oral mucosa [No Oral Pallor] : no oral pallor [No Oral Cyanosis] : no oral cyanosis [Normal Jugular Venous V Waves Present] : normal jugular venous V waves present [Normal Jugular Venous A Waves Present] : normal jugular venous A waves present [No Jugular Venous Owens A Waves] : no jugular venous owens A waves [Respiration, Rhythm And Depth] : normal respiratory rhythm and effort [Exaggerated Use Of Accessory Muscles For Inspiration] : no accessory muscle use [Auscultation Breath Sounds / Voice Sounds] : lungs were clear to auscultation bilaterally [FreeTextEntry1] : Atrial fibrillation with heart rate of  bpm [Abdomen Soft] : soft [Abdomen Tenderness] : non-tender [Abdomen Mass (___ Cm)] : no abdominal mass palpated [Nail Clubbing] : no clubbing of the fingernails [Cyanosis, Localized] : no localized cyanosis [Petechial Hemorrhages (___cm)] : no petechial hemorrhages [Skin Color & Pigmentation] : normal skin color and pigmentation [] : no rash [No Venous Stasis] : no venous stasis [Skin Lesions] : no skin lesions [No Skin Ulcers] : no skin ulcer [No Xanthoma] : no  xanthoma was observed

## 2019-12-04 ENCOUNTER — LABORATORY RESULT (OUTPATIENT)
Age: 84
End: 2019-12-04

## 2019-12-18 ENCOUNTER — MEDICATION RENEWAL (OUTPATIENT)
Age: 84
End: 2019-12-18

## 2019-12-18 ENCOUNTER — APPOINTMENT (OUTPATIENT)
Dept: CARDIOLOGY | Facility: CLINIC | Age: 84
End: 2019-12-18

## 2019-12-19 ENCOUNTER — CLINICAL ADVICE (OUTPATIENT)
Age: 84
End: 2019-12-19

## 2019-12-19 ENCOUNTER — NON-APPOINTMENT (OUTPATIENT)
Age: 84
End: 2019-12-19

## 2019-12-19 ENCOUNTER — APPOINTMENT (OUTPATIENT)
Dept: CARDIOLOGY | Facility: CLINIC | Age: 84
End: 2019-12-19
Payer: MEDICARE

## 2019-12-19 VITALS
TEMPERATURE: 98 F | HEIGHT: 62 IN | BODY MASS INDEX: 21.53 KG/M2 | OXYGEN SATURATION: 97 % | DIASTOLIC BLOOD PRESSURE: 68 MMHG | WEIGHT: 117 LBS | SYSTOLIC BLOOD PRESSURE: 106 MMHG | RESPIRATION RATE: 15 BRPM | HEART RATE: 110 BPM

## 2019-12-19 PROCEDURE — 93000 ELECTROCARDIOGRAM COMPLETE: CPT

## 2019-12-19 PROCEDURE — 99215 OFFICE O/P EST HI 40 MIN: CPT

## 2019-12-19 RX ORDER — POTASSIUM CHLORIDE 750 MG/1
10 TABLET, FILM COATED, EXTENDED RELEASE ORAL
Qty: 90 | Refills: 0 | Status: DISCONTINUED | COMMUNITY
Start: 2019-12-13 | End: 2019-12-19

## 2019-12-19 RX ORDER — VALSARTAN 80 MG/1
80 TABLET, COATED ORAL
Qty: 90 | Refills: 3 | Status: DISCONTINUED | COMMUNITY
Start: 2018-05-24 | End: 2019-12-19

## 2019-12-20 ENCOUNTER — APPOINTMENT (OUTPATIENT)
Dept: ELECTROPHYSIOLOGY | Facility: CLINIC | Age: 84
End: 2019-12-20
Payer: MEDICARE

## 2019-12-20 ENCOUNTER — NON-APPOINTMENT (OUTPATIENT)
Age: 84
End: 2019-12-20

## 2019-12-20 VITALS
DIASTOLIC BLOOD PRESSURE: 60 MMHG | OXYGEN SATURATION: 98 % | SYSTOLIC BLOOD PRESSURE: 92 MMHG | HEART RATE: 114 BPM | HEIGHT: 62 IN

## 2019-12-20 PROCEDURE — 93000 ELECTROCARDIOGRAM COMPLETE: CPT

## 2019-12-20 PROCEDURE — 99024 POSTOP FOLLOW-UP VISIT: CPT

## 2019-12-20 NOTE — REASON FOR VISIT
[FreeTextEntry1] : Follow up for CHF, CRT-pacemaker, systolic LV dyfunction with LVEF of 15% and persistent atrial fibrillation\par \par \par Primary Cardiologist: Jay Lisker, MD

## 2019-12-20 NOTE — DISCUSSION/SUMMARY
[FreeTextEntry1] : She remains in persistent atrial fibrillation with inadequate rate control such that biventricular pacing frequency is compromised to well below the 90% that is required to confer any benefit. Given that her original indication for pacing was for LBBB and systolic heart failure, it is important that we reinstate a higher percentage of BiV pacing and not just from V sensed responses. \par \par We discussed various therapeutic options. While the use of amiodarone was justified for maintenance of sinus rhythm, the risk versus benefit of its use for rate control alone is not in her favor. Hence, my suggestion was to proceed to AV vishal ablation such that we can discontinue amiodarone and concentrate on uptitrating heart failure medications. \par \par The patient and daughter were reluctant to go this route and I spoke with Dr. Lisker who will attempt a period of rate control with medications prior to consideration of AV vishal ablation. I will wait to hear of her progress before further actions.

## 2019-12-20 NOTE — DISCUSSION/SUMMARY
[FreeTextEntry1] : She is a 89-year-old with a history of cardiomyopathy (nonobstructive coronary disease), paroxysmal atrial fibrillation and worsening systolic heart failure symptoms.  I have suggested that we be more aggressive with her heart failure regimen and I have substituted Entresto 24/26 for valsartan 80.\par She is to continue her current dose of Bumex.\par Consideration for adding Aldactone will be made in the future.  She was instructed to discontinue her potassium supplement currently.\par I have asked Dr. Quiros to see if she would be a candidate for digoxin therapy, possibly in addition to metoprolol and/or amiodarone.\par This would allow for biventricular pacing and also allow her some of the inotropic benefits of digoxin.  We will have to use a low dose in this elderly woman and I would monitor closely.\par She wishes to follow-up with me in 1 to 2 weeks, blood work will be done in 7 to 10 days.\par

## 2019-12-20 NOTE — PHYSICAL EXAM
[General Appearance - Well Developed] : well developed [Normal Conjunctiva] : the conjunctiva exhibited no abnormalities [General Appearance - Well Nourished] : well nourished [Normal Oral Mucosa] : normal oral mucosa [No Oral Pallor] : no oral pallor [Normal Jugular Venous A Waves Present] : normal jugular venous A waves present [Normal Jugular Venous V Waves Present] : normal jugular venous V waves present [] : no respiratory distress [Respiration, Rhythm And Depth] : normal respiratory rhythm and effort [No Jugular Venous Owens A Waves] : no jugular venous owens A waves [Bowel Sounds] : normal bowel sounds [Abnormal Walk] : normal gait [Abdomen Soft] : soft [FreeTextEntry1] : Uses a walker [Nail Clubbing] : no clubbing of the fingernails [Skin Turgor] : normal skin turgor [Cyanosis, Localized] : no localized cyanosis [Affect] : the affect was normal [Oriented To Time, Place, And Person] : oriented to person, place, and time [Mood] : the mood was normal [No Anxiety] : not feeling anxious

## 2019-12-20 NOTE — HISTORY OF PRESENT ILLNESS
[FreeTextEntry1] : She was seen on an urgent basis today after her daughter contacted me personally.\par We reviewed her recent cardiac history and notes that she has worsening orthopnea and exertional dyspnea.  She denies any chest pains, lightheadedness, dizziness or syncope.  She is taking her medications as directed without any bleeding or bruising issues.\par She has had difficulty tolerating potassium supplementation and is concerned about taking it.

## 2019-12-20 NOTE — HISTORY OF PRESENT ILLNESS
[FreeTextEntry1] : THis 89 year old lady with systolic dysfunction (LVEF 35%), prior TAVR and LBBB underwent placement of a CRT pacemaker. She had some phrenic nerve stimulation that we has mostly resolved with reprogramming outputs. Her main problem at present is the onset of persistent AF with loss of biventricular pacing. She is fatigued and short of breath. We had increased amiodarone to once daily from three times weekly to see if that would convert her back to SR. She takes apixaban bid for stroke prophylaxis.\par \par She returns for follow up and remains short of breath with exertion and has less than 60% biventricular pacing due to fast heart rate in AF.

## 2019-12-20 NOTE — REVIEW OF SYSTEMS
[Fever] : no fever [Chills] : no chills [Headache] : no headache [Feeling Fatigued] : feeling fatigued [Dizziness] : dizziness [Negative] : Endocrine

## 2019-12-20 NOTE — PHYSICAL EXAM
[General Appearance - Well Developed] : well developed [Normal Appearance] : normal appearance [General Appearance - Well Nourished] : well nourished [Well Groomed] : well groomed [No Deformities] : no deformities [General Appearance - In No Acute Distress] : no acute distress [Eyelids - No Xanthelasma] : the eyelids demonstrated no xanthelasmas [Normal Conjunctiva] : the conjunctiva exhibited no abnormalities [No Oral Pallor] : no oral pallor [Normal Oral Mucosa] : normal oral mucosa [Normal Jugular Venous V Waves Present] : normal jugular venous V waves present [Normal Jugular Venous A Waves Present] : normal jugular venous A waves present [No Oral Cyanosis] : no oral cyanosis [No Jugular Venous Owens A Waves] : no jugular venous owens A waves [Respiration, Rhythm And Depth] : normal respiratory rhythm and effort [Auscultation Breath Sounds / Voice Sounds] : lungs were clear to auscultation bilaterally [FreeTextEntry1] : Atrial fibrillation with heart rate of  bpm [Exaggerated Use Of Accessory Muscles For Inspiration] : no accessory muscle use [Abdomen Tenderness] : non-tender [Abdomen Soft] : soft [Abdomen Mass (___ Cm)] : no abdominal mass palpated [Cyanosis, Localized] : no localized cyanosis [Petechial Hemorrhages (___cm)] : no petechial hemorrhages [Nail Clubbing] : no clubbing of the fingernails [Skin Color & Pigmentation] : normal skin color and pigmentation [Skin Lesions] : no skin lesions [No Venous Stasis] : no venous stasis [] : no rash [No Skin Ulcers] : no skin ulcer [No Xanthoma] : no  xanthoma was observed

## 2019-12-22 ENCOUNTER — RX RENEWAL (OUTPATIENT)
Age: 84
End: 2019-12-22

## 2019-12-27 LAB
ANION GAP SERPL CALC-SCNC: 14 MMOL/L
BUN SERPL-MCNC: 30 MG/DL
CALCIUM SERPL-MCNC: 9.3 MG/DL
CHLORIDE SERPL-SCNC: 102 MMOL/L
CO2 SERPL-SCNC: 26 MMOL/L
CREAT SERPL-MCNC: 0.98 MG/DL
GLUCOSE SERPL-MCNC: 91 MG/DL
NT-PROBNP SERPL-MCNC: 1363 PG/ML
POTASSIUM SERPL-SCNC: 3.7 MMOL/L
SODIUM SERPL-SCNC: 142 MMOL/L

## 2019-12-30 ENCOUNTER — APPOINTMENT (OUTPATIENT)
Dept: CARDIOLOGY | Facility: CLINIC | Age: 84
End: 2019-12-30

## 2019-12-31 ENCOUNTER — NON-APPOINTMENT (OUTPATIENT)
Age: 84
End: 2019-12-31

## 2019-12-31 ENCOUNTER — APPOINTMENT (OUTPATIENT)
Dept: CARDIOLOGY | Facility: CLINIC | Age: 84
End: 2019-12-31
Payer: MEDICARE

## 2019-12-31 VITALS
WEIGHT: 115 LBS | HEART RATE: 86 BPM | OXYGEN SATURATION: 94 % | HEIGHT: 62 IN | DIASTOLIC BLOOD PRESSURE: 62 MMHG | SYSTOLIC BLOOD PRESSURE: 98 MMHG | BODY MASS INDEX: 21.16 KG/M2

## 2019-12-31 PROCEDURE — 93000 ELECTROCARDIOGRAM COMPLETE: CPT

## 2019-12-31 PROCEDURE — 99214 OFFICE O/P EST MOD 30 MIN: CPT

## 2019-12-31 NOTE — PHYSICAL EXAM
[General Appearance - Well Developed] : well developed [General Appearance - Well Nourished] : well nourished [Normal Conjunctiva] : the conjunctiva exhibited no abnormalities [Normal Oral Mucosa] : normal oral mucosa [No Oral Pallor] : no oral pallor [Normal Jugular Venous A Waves Present] : normal jugular venous A waves present [No Jugular Venous Owens A Waves] : no jugular venous owens A waves [Normal Jugular Venous V Waves Present] : normal jugular venous V waves present [Respiration, Rhythm And Depth] : normal respiratory rhythm and effort [] : no respiratory distress [Bowel Sounds] : normal bowel sounds [Abdomen Soft] : soft [Abnormal Walk] : normal gait [FreeTextEntry1] : Uses a walker [Cyanosis, Localized] : no localized cyanosis [Nail Clubbing] : no clubbing of the fingernails [Skin Turgor] : normal skin turgor [Oriented To Time, Place, And Person] : oriented to person, place, and time [Affect] : the affect was normal [Mood] : the mood was normal [No Anxiety] : not feeling anxious

## 2019-12-31 NOTE — DISCUSSION/SUMMARY
[FreeTextEntry1] : She is a 89-year-old with a history of cardiomyopathy (nonobstructive coronary disease), paroxysmal atrial fibrillation and worsening systolic heart failure symptoms. \par HF seems to be improving. Now Biv pacing\par Continue meds as is. \par Labs in one week.\par Continue daily  weights.\par Continue digoxin. will check labs.\par

## 2019-12-31 NOTE — HISTORY OF PRESENT ILLNESS
[FreeTextEntry1] : She is feeling better and has been able to walk around the house with less difficulty.\par Taking all meds as directed.\par No orthopnea.\par Still taking Bumex.\par

## 2019-12-31 NOTE — REVIEW OF SYSTEMS
[Fever] : no fever [Headache] : no headache [Chills] : no chills [Feeling Fatigued] : feeling fatigued [Dizziness] : dizziness [Negative] : Heme/Lymph

## 2020-01-10 ENCOUNTER — APPOINTMENT (OUTPATIENT)
Dept: ELECTROPHYSIOLOGY | Facility: CLINIC | Age: 85
End: 2020-01-10
Payer: MEDICARE

## 2020-01-10 VITALS
OXYGEN SATURATION: 96 % | HEART RATE: 85 BPM | HEIGHT: 62 IN | DIASTOLIC BLOOD PRESSURE: 62 MMHG | WEIGHT: 115 LBS | SYSTOLIC BLOOD PRESSURE: 131 MMHG | BODY MASS INDEX: 21.16 KG/M2

## 2020-01-10 PROCEDURE — 93281 PM DEVICE PROGR EVAL MULTI: CPT

## 2020-01-14 ENCOUNTER — NON-APPOINTMENT (OUTPATIENT)
Age: 85
End: 2020-01-14

## 2020-01-14 ENCOUNTER — APPOINTMENT (OUTPATIENT)
Dept: CARDIOLOGY | Facility: CLINIC | Age: 85
End: 2020-01-14
Payer: MEDICARE

## 2020-01-14 VITALS
OXYGEN SATURATION: 97 % | BODY MASS INDEX: 20.43 KG/M2 | DIASTOLIC BLOOD PRESSURE: 70 MMHG | SYSTOLIC BLOOD PRESSURE: 120 MMHG | WEIGHT: 111 LBS | HEIGHT: 62 IN | HEART RATE: 80 BPM

## 2020-01-14 PROCEDURE — 93000 ELECTROCARDIOGRAM COMPLETE: CPT

## 2020-01-14 PROCEDURE — 99214 OFFICE O/P EST MOD 30 MIN: CPT

## 2020-01-14 NOTE — DISCUSSION/SUMMARY
[FreeTextEntry1] : She is a 89-year-old with a history of cardiomyopathy (nonobstructive coronary disease), paroxysmal atrial fibrillation and worsening systolic heart failure symptoms. \par AF: Reduces risk of stroke with Eliquis. HR control with Digoxin.\par HF seems to be improving. Now Biv pacing more.\par Continue meds as is. \par Continue daily  weights. Call if weight increases.\par Continue digoxin. will check labs (digoxin taken today at 10 am today - hours ago).\par changes dependant on labs.

## 2020-01-14 NOTE — PHYSICAL EXAM
[General Appearance - Well Nourished] : well nourished [General Appearance - Well Developed] : well developed [Normal Conjunctiva] : the conjunctiva exhibited no abnormalities [No Oral Pallor] : no oral pallor [Normal Oral Mucosa] : normal oral mucosa [No Jugular Venous Owens A Waves] : no jugular venous owens A waves [Normal Jugular Venous A Waves Present] : normal jugular venous A waves present [Normal Jugular Venous V Waves Present] : normal jugular venous V waves present [Respiration, Rhythm And Depth] : normal respiratory rhythm and effort [] : no respiratory distress [Bowel Sounds] : normal bowel sounds [Abdomen Soft] : soft [Abnormal Walk] : normal gait [FreeTextEntry1] : Uses a walker [Nail Clubbing] : no clubbing of the fingernails [Cyanosis, Localized] : no localized cyanosis [Skin Turgor] : normal skin turgor [Oriented To Time, Place, And Person] : oriented to person, place, and time [Affect] : the affect was normal [Mood] : the mood was normal [No Anxiety] : not feeling anxious

## 2020-01-14 NOTE — HISTORY OF PRESENT ILLNESS
[FreeTextEntry1] : Her sleeping has improved. Fewer palpitations.\par Her exertional activity seems better. \par \par She is feeling better and has been able to walk around the house with less difficulty.\par Taking all meds as directed.\par No orthopnea.\par Still taking Bumex qod.\par

## 2020-01-15 LAB
ALBUMIN SERPL ELPH-MCNC: 4 G/DL
ALP BLD-CCNC: 88 U/L
ALT SERPL-CCNC: 25 U/L
ANION GAP SERPL CALC-SCNC: 15 MMOL/L
AST SERPL-CCNC: 23 U/L
BILIRUB SERPL-MCNC: 0.6 MG/DL
BUN SERPL-MCNC: 23 MG/DL
CALCIUM SERPL-MCNC: 9.5 MG/DL
CHLORIDE SERPL-SCNC: 102 MMOL/L
CO2 SERPL-SCNC: 26 MMOL/L
CREAT SERPL-MCNC: 0.78 MG/DL
DIGOXIN SERPL-MCNC: 1.8 NG/ML
GLUCOSE SERPL-MCNC: 79 MG/DL
NT-PROBNP SERPL-MCNC: 1191 PG/ML
POTASSIUM SERPL-SCNC: 4.5 MMOL/L
PROT SERPL-MCNC: 6.4 G/DL
SODIUM SERPL-SCNC: 143 MMOL/L

## 2020-02-21 ENCOUNTER — APPOINTMENT (OUTPATIENT)
Dept: CARDIOLOGY | Facility: CLINIC | Age: 85
End: 2020-02-21
Payer: MEDICARE

## 2020-02-21 ENCOUNTER — NON-APPOINTMENT (OUTPATIENT)
Age: 85
End: 2020-02-21

## 2020-02-21 VITALS
DIASTOLIC BLOOD PRESSURE: 78 MMHG | WEIGHT: 109 LBS | BODY MASS INDEX: 19.94 KG/M2 | HEART RATE: 84 BPM | OXYGEN SATURATION: 98 % | SYSTOLIC BLOOD PRESSURE: 120 MMHG

## 2020-02-21 PROCEDURE — 93000 ELECTROCARDIOGRAM COMPLETE: CPT

## 2020-02-21 PROCEDURE — 99214 OFFICE O/P EST MOD 30 MIN: CPT

## 2020-02-21 NOTE — REVIEW OF SYSTEMS
[Dizziness] : dizziness [Feeling Fatigued] : feeling fatigued [Negative] : Heme/Lymph [Fever] : no fever [Headache] : no headache [Chills] : no chills

## 2020-02-21 NOTE — HISTORY OF PRESENT ILLNESS
[FreeTextEntry1] : Her sleeping is interrupted by a strong heart beating when she is on her side. Able to lay flat without difficulty.\par Her exertional activity seems better. More speed. No falls.  \par She is feeling better and has been able to walk around the house with less difficulty.\par Taking all meds as directed. No orthopnea.\par Still taking Bumex qod.\par

## 2020-02-21 NOTE — PHYSICAL EXAM
[General Appearance - Well Developed] : well developed [General Appearance - Well Nourished] : well nourished [Normal Conjunctiva] : the conjunctiva exhibited no abnormalities [Normal Jugular Venous A Waves Present] : normal jugular venous A waves present [No Oral Pallor] : no oral pallor [Normal Oral Mucosa] : normal oral mucosa [Normal Jugular Venous V Waves Present] : normal jugular venous V waves present [No Jugular Venous Owens A Waves] : no jugular venous owens A waves [Respiration, Rhythm And Depth] : normal respiratory rhythm and effort [] : no respiratory distress [Bowel Sounds] : normal bowel sounds [Abdomen Soft] : soft [Abnormal Walk] : normal gait [Cyanosis, Localized] : no localized cyanosis [Nail Clubbing] : no clubbing of the fingernails [Skin Turgor] : normal skin turgor [Oriented To Time, Place, And Person] : oriented to person, place, and time [Mood] : the mood was normal [Affect] : the affect was normal [No Anxiety] : not feeling anxious [FreeTextEntry1] : Uses a walker

## 2020-02-21 NOTE — DISCUSSION/SUMMARY
[FreeTextEntry1] : She is a 89-year-old with a history of cardiomyopathy (nonobstructive coronary disease), paroxysmal atrial fibrillation and much improved systolic heart failure symptoms. \par AF: Reduces risk of stroke with Eliquis. HR control with Digoxin. Reviewed interrogation chart of HR. Dont fall.  Now Biv pacing 100%\par Continue meds as is. \par Continue digoxin.  \par Routine followup in 2 months. sooner if symptoms arise. Plan reviewed with Kayli, her daughter.

## 2020-02-24 NOTE — PRE-ANESTHESIA EVALUATION ADULT - NS MD HP INPLANTS MED DEV
Pt walked into clinic with her father  C/o chest pain, center of chest, non radiating,   No sob, no cough or wheeze  No one around her ill  C/o intermittent symptoms past week  States she has been sleeping well, denies any stress    Discussed with Dr Spencer, agreed to work into schedule today    None

## 2020-02-27 ENCOUNTER — RX RENEWAL (OUTPATIENT)
Age: 85
End: 2020-02-27

## 2020-03-19 ENCOUNTER — RX RENEWAL (OUTPATIENT)
Age: 85
End: 2020-03-19

## 2020-03-30 ENCOUNTER — RX RENEWAL (OUTPATIENT)
Age: 85
End: 2020-03-30

## 2020-04-10 ENCOUNTER — APPOINTMENT (OUTPATIENT)
Dept: ELECTROPHYSIOLOGY | Facility: CLINIC | Age: 85
End: 2020-04-10

## 2020-04-24 ENCOUNTER — APPOINTMENT (OUTPATIENT)
Dept: CARDIOLOGY | Facility: CLINIC | Age: 85
End: 2020-04-24

## 2020-04-29 ENCOUNTER — APPOINTMENT (OUTPATIENT)
Dept: INTERNAL MEDICINE | Facility: CLINIC | Age: 85
End: 2020-04-29

## 2020-05-14 PROBLEM — I35.0 SEVERE AORTIC STENOSIS: Status: RESOLVED | Noted: 2019-06-03 | Resolved: 2020-05-14

## 2020-05-24 ENCOUNTER — RX RENEWAL (OUTPATIENT)
Age: 85
End: 2020-05-24

## 2020-05-28 ENCOUNTER — APPOINTMENT (OUTPATIENT)
Dept: CARDIOLOGY | Facility: CLINIC | Age: 85
End: 2020-05-28
Payer: MEDICARE

## 2020-05-28 PROCEDURE — G2012 BRIEF CHECK IN BY MD/QHP: CPT

## 2020-05-28 RX ORDER — DIGOXIN 125 UG/1
125 TABLET ORAL
Qty: 90 | Refills: 2 | Status: DISCONTINUED | COMMUNITY
Start: 2020-03-19 | End: 2020-05-28

## 2020-05-28 NOTE — HISTORY OF PRESENT ILLNESS
[FreeTextEntry1] : TELEPHONE VISIT\par \par TeleHealth Telephone Encounter:\par  \par Initiated by:\par _x_Patient Call\par x__Patient Election for TeleHealth visit\par  \par Consented for TeleHealth visit\par Patient Location:  Home\par Physician Location:\par x__Office([address]; [address] [facility] [address] [address], [address], [place], [place], [address])\par __Home\par  \par Narrative:\par  \par Assessment:\par She continues to have\par Wt~110 \par She is a 89-year-old with a history of cardiomyopathy (nonobstructive coronary disease), paroxysmal atrial fibrillation and much improved systolic heart failure symptoms. \par AF: Reduces risk of stroke with Eliquis. HR control with Digoxin. Dont fall. \par Continue meds as is. \par Continue digoxin. \par Routine followup in 2 months. sooner if symptoms arise. Plan reviewed with Kayli, her daughter. \par  \par Plan:\par Refilled all meds. \par Labs \par Care plan reviewed with her daughter\par \par Follow-up: 2 months.\par  \par  \par Duration of Encounter:  _10__ minutes, at least 50% of which was spent in direct counseling and coordination of care\par \par  \par

## 2020-06-22 ENCOUNTER — APPOINTMENT (OUTPATIENT)
Dept: INTERNAL MEDICINE | Facility: CLINIC | Age: 85
End: 2020-06-22
Payer: MEDICARE

## 2020-06-22 PROCEDURE — 99442: CPT | Mod: 95

## 2020-07-09 ENCOUNTER — NON-APPOINTMENT (OUTPATIENT)
Age: 85
End: 2020-07-09

## 2020-07-09 ENCOUNTER — APPOINTMENT (OUTPATIENT)
Dept: CARDIOLOGY | Facility: CLINIC | Age: 85
End: 2020-07-09
Payer: MEDICARE

## 2020-07-09 VITALS
OXYGEN SATURATION: 99 % | HEART RATE: 85 BPM | WEIGHT: 108 LBS | DIASTOLIC BLOOD PRESSURE: 60 MMHG | SYSTOLIC BLOOD PRESSURE: 102 MMHG | BODY MASS INDEX: 19.75 KG/M2

## 2020-07-09 PROCEDURE — 93281 PM DEVICE PROGR EVAL MULTI: CPT

## 2020-07-09 PROCEDURE — 99215 OFFICE O/P EST HI 40 MIN: CPT

## 2020-07-09 NOTE — REVIEW OF SYSTEMS
[Feeling Fatigued] : feeling fatigued [Dizziness] : dizziness [Negative] : Endocrine [Fever] : no fever [Headache] : no headache [Chills] : no chills

## 2020-07-09 NOTE — PHYSICAL EXAM
[General Appearance - Well Nourished] : well nourished [Normal Conjunctiva] : the conjunctiva exhibited no abnormalities [General Appearance - Well Developed] : well developed [Normal Oral Mucosa] : normal oral mucosa [No Oral Pallor] : no oral pallor [No Jugular Venous Owens A Waves] : no jugular venous owens A waves [Normal Jugular Venous V Waves Present] : normal jugular venous V waves present [Normal Jugular Venous A Waves Present] : normal jugular venous A waves present [] : no respiratory distress [Respiration, Rhythm And Depth] : normal respiratory rhythm and effort [Abdomen Soft] : soft [Bowel Sounds] : normal bowel sounds [Abnormal Walk] : normal gait [Nail Clubbing] : no clubbing of the fingernails [Cyanosis, Localized] : no localized cyanosis [Skin Turgor] : normal skin turgor [Affect] : the affect was normal [Oriented To Time, Place, And Person] : oriented to person, place, and time [No Anxiety] : not feeling anxious [Mood] : the mood was normal [FreeTextEntry1] : Uses a walker

## 2020-07-09 NOTE — DISCUSSION/SUMMARY
[FreeTextEntry1] : She is a 89-year-old with a history of cardiomyopathy (nonobstructive coronary disease), paroxysmal atrial fibrillation and much improved systolic heart failure symptoms.  Fatigue does appear to be a concern and I reviewed her lab work from last month and there are no significant abnormalities including a normal hemoglobin.\par By V pacemaker interrogated and shows consistent bi- V pacing, no further tachycardia and no sign of OptiVol decompensated heart failure either.\par I encouraged her to keep her self well-hydrated and to walk around as much as possible.\par In the future we may have to reduce her diuretic somewhat.

## 2020-07-09 NOTE — HISTORY OF PRESENT ILLNESS
[FreeTextEntry1] : She is accompanied by her daughter and has been living in [facility].\par She has been somewhat more fatigued as she wakes up around noon and then feels tired in the afternoon requiring a nap.  She reports keeping water near her to remind her to drink but does not always finish her water.\par She denies any orthopnea, PND or leg edema.\par She is taking all of her medications as directed including Bumex qod.\par No falls or bleeding.

## 2020-07-10 ENCOUNTER — APPOINTMENT (OUTPATIENT)
Dept: ELECTROPHYSIOLOGY | Facility: CLINIC | Age: 85
End: 2020-07-10
Payer: MEDICARE

## 2020-07-10 PROCEDURE — 93294 REM INTERROG EVL PM/LDLS PM: CPT

## 2020-07-10 PROCEDURE — 93296 REM INTERROG EVL PM/IDS: CPT

## 2020-07-28 NOTE — DISCHARGE NOTE NURSING/CASE MANAGEMENT/SOCIAL WORK - NSDCFUADDAPPT_GEN_ALL_CORE_FT
L/m 7/28/2020 r/s 8/4/2020
Please follow up with your primary care doctor and the [facility] team in 1 week for f/u.  Please follow up with your interventional cardiologist on 7/24/19 for TAVR procedure.

## 2020-07-31 NOTE — ED ADULT NURSE NOTE - PSYCHOSOCIAL WDL
"  Subjective:       Patient ID:  Jalyn Aaron is a 69 y.o. female who presents for   Chief Complaint   Patient presents with    Skin Check     ubse    Lesion     scalp, right mid back      History of Present Illness: The patient presents for follow up of skin check.    The patient was last seen on: 5/27/19 for cryosurgery to actinic keratoses which have resolved.     Other skin complaints:   Patient complains of lesion(s)  Location: scalp  Duration: 2 months  Symptoms: scaling  Relieving factors/Previous treatments: none    This is a high risk patient here to check for the development of new lesions. Pt recalls using "a cream" to treat a skin cancer and scar from years ago notes marked right upper back. After examining medical record, no pathology reports support skin cancer diagnosis.             Review of Systems   Skin: Positive for daily sunscreen use and activity-related sunscreen use. Negative for itching, rash, dry skin and recent sunburn.   Hematologic/Lymphatic: Bruises/bleeds easily.        Objective:    Physical Exam   Constitutional: She appears well-developed and well-nourished. No distress.   Neurological: She is alert and oriented to person, place, and time. She is not disoriented.   Psychiatric: She has a normal mood and affect.   Skin:   Areas Examined (abnormalities noted in diagram):   Scalp / Hair Palpated and Inspected  Head / Face Inspection Performed  Neck Inspection Performed  Chest / Axilla Inspection Performed  Back Inspection Performed  RUE Inspected  LUE Inspection Performed  Nails and Digits Inspection Performed                       Diagram Legend     Erythematous scaling macule/papule c/w actinic keratosis       Vascular papule c/w angioma      Pigmented verrucoid papule/plaque c/w seborrheic keratosis      Yellow umbilicated papule c/w sebaceous hyperplasia      Irregularly shaped tan macule c/w lentigo     1-2 mm smooth white papules consistent with Milia      Movable " subcutaneous cyst with punctum c/w epidermal inclusion cyst      Subcutaneous movable cyst c/w pilar cyst      Firm pink to brown papule c/w dermatofibroma      Pedunculated fleshy papule(s) c/w skin tag(s)      Evenly pigmented macule c/w junctional nevus     Mildly variegated pigmented, slightly irregular-bordered macule c/w mildly atypical nevus      Flesh colored to evenly pigmented papule c/w intradermal nevus       Pink pearly papule/plaque c/w basal cell carcinoma      Erythematous hyperkeratotic cursted plaque c/w SCC      Surgical scar with no sign of skin cancer recurrence      Open and closed comedones      Inflammatory papules and pustules      Verrucoid papule consistent consistent with wart     Erythematous eczematous patches and plaques     Dystrophic onycholytic nail with subungual debris c/w onychomycosis     Umbilicated papule    Erythematous-base heme-crusted tan verrucoid plaque consistent with inflamed seborrheic keratosis     Erythematous Silvery Scaling Plaque c/w Psoriasis     See annotation      Assessment / Plan:        Benign lichenoid keratosis - back  + itchy x 1 month  Cryosurgery procedure note:    Verbal consent from the patient is obtained including, but not limited to, risk of hypopigmentation/hyperpigmentation, scar, recurrence of lesion. Liquid nitrogen cryosurgery is applied to 1 lesions to produce a freeze injury. The patient is aware that blisters may form and is instructed on wound care with gentle cleansing and use of vaseline ointment to keep moist until healed. The patient is supplied a handout on cryosurgery and is instructed to call if lesions do not completely resolve.      SK (seborrheic keratosis)  These are benign inherited growths without a malignant potential. Reassurance given to patient. No treatment is necessary.       Lentigo   - stable and chronic      History of nonmelanoma skin cancer  Area(s) of previous NMSC evaluated with no signs of recurrence.    Upper  body skin examination performed today including at least 6 points as noted in physical examination. No lesions suspicious for malignancy noted.      Milia   - stable and chronic               Follow up in about 1 year (around 7/31/2021).     Alert and oriented x 3, normal mood and affect, no apparent risk to self or others.

## 2020-08-05 ENCOUNTER — RX RENEWAL (OUTPATIENT)
Age: 85
End: 2020-08-05

## 2020-10-09 ENCOUNTER — APPOINTMENT (OUTPATIENT)
Dept: ELECTROPHYSIOLOGY | Facility: CLINIC | Age: 85
End: 2020-10-09
Payer: MEDICARE

## 2020-10-09 PROCEDURE — 93296 REM INTERROG EVL PM/IDS: CPT

## 2020-10-09 PROCEDURE — 93294 REM INTERROG EVL PM/LDLS PM: CPT

## 2020-10-28 ENCOUNTER — RX RENEWAL (OUTPATIENT)
Age: 85
End: 2020-10-28

## 2020-11-27 ENCOUNTER — RX RENEWAL (OUTPATIENT)
Age: 85
End: 2020-11-27

## 2021-01-08 ENCOUNTER — APPOINTMENT (OUTPATIENT)
Dept: ELECTROPHYSIOLOGY | Facility: CLINIC | Age: 86
End: 2021-01-08
Payer: MEDICARE

## 2021-01-08 PROCEDURE — 93296 REM INTERROG EVL PM/IDS: CPT

## 2021-01-08 PROCEDURE — 93294 REM INTERROG EVL PM/LDLS PM: CPT

## 2021-01-19 ENCOUNTER — EMERGENCY (EMERGENCY)
Facility: HOSPITAL | Age: 86
LOS: 1 days | Discharge: HOME | End: 2021-01-19
Attending: EMERGENCY MEDICINE
Payer: MEDICARE

## 2021-01-19 VITALS
WEIGHT: 113.98 LBS | SYSTOLIC BLOOD PRESSURE: 116 MMHG | TEMPERATURE: 98 F | RESPIRATION RATE: 18 BRPM | HEART RATE: 94 BPM | DIASTOLIC BLOOD PRESSURE: 74 MMHG | HEIGHT: 62 IN | OXYGEN SATURATION: 96 %

## 2021-01-19 VITALS — TEMPERATURE: 98 F

## 2021-01-19 DIAGNOSIS — Z90.722 ACQUIRED ABSENCE OF OVARIES, BILATERAL: Chronic | ICD-10-CM

## 2021-01-19 DIAGNOSIS — Z98.890 OTHER SPECIFIED POSTPROCEDURAL STATES: Chronic | ICD-10-CM

## 2021-01-19 PROCEDURE — 72131 CT LUMBAR SPINE W/O DYE: CPT | Mod: 26,MA

## 2021-01-19 PROCEDURE — 72128 CT CHEST SPINE W/O DYE: CPT | Mod: 26,MA

## 2021-01-19 PROCEDURE — 99284 EMERGENCY DEPT VISIT MOD MDM: CPT | Mod: 25

## 2021-01-19 PROCEDURE — 74176 CT ABD & PELVIS W/O CONTRAST: CPT | Mod: 26,MA

## 2021-01-19 PROCEDURE — 71250 CT THORAX DX C-: CPT

## 2021-01-19 PROCEDURE — 99284 EMERGENCY DEPT VISIT MOD MDM: CPT

## 2021-01-19 PROCEDURE — 71250 CT THORAX DX C-: CPT | Mod: 26,MA

## 2021-01-19 PROCEDURE — 74176 CT ABD & PELVIS W/O CONTRAST: CPT

## 2021-01-19 RX ORDER — LIDOCAINE 4 G/100G
1 CREAM TOPICAL ONCE
Refills: 0 | Status: COMPLETED | OUTPATIENT
Start: 2021-01-19 | End: 2021-01-19

## 2021-01-19 RX ORDER — ACETAMINOPHEN 500 MG
975 TABLET ORAL ONCE
Refills: 0 | Status: COMPLETED | OUTPATIENT
Start: 2021-01-19 | End: 2021-01-19

## 2021-01-19 RX ADMIN — LIDOCAINE 1 PATCH: 4 CREAM TOPICAL at 18:06

## 2021-01-19 RX ADMIN — Medication 975 MILLIGRAM(S): at 18:06

## 2021-01-19 NOTE — ED PROVIDER NOTE - PATIENT PORTAL LINK FT
You can access the FollowMyHealth Patient Portal offered by [facility] by registering at the following website: [email or web address]. By joining [employer]’s FollowMyHealth portal, you will also be able to view your health information using other applications (apps) compatible with our system.

## 2021-01-19 NOTE — ED PROVIDER NOTE - OBJECTIVE STATEMENT
89F w/ PMHx of bladder ca, CVA, MR, AS, pAfib, HLD, HTN p/w back pain after a mechanical fall at 11:30am.  She lost her balance and fell backward.  Denies head trauma, LOC, amnestic episodes, vomiting.  On blood thinners.  Was able to get back up, go to kitchen, and make tea and toast.  When she sat back down, started experiencing low back pain.  Denies any CP, SOB, n/v, urinary sxs.

## 2021-01-19 NOTE — ED PROVIDER NOTE - NSFOLLOWUPINSTRUCTIONS_ED_ALL_ED_FT
You were seen for low back pain after a fall.    You have a left 8th and 9th rib fracture.  Use the incentive spirometer (the blue device) we gave you 10 times an hour for every hour you're awake.    Take tylenol 975mg every 6-8 hours as needed for pain.  You can also buy lidocaine patches (Salonpass) at any pharmacy over the counter.    If you have any concerning symptoms, seek immediate medical attention.

## 2021-01-19 NOTE — ED PROVIDER NOTE - NS ED ROS FT
General: denies fever, chills, weight loss/weight gain.  HENT: denies nasal congestion, sore throat, rhinorrhea, ear pain.  Eyes: denies visual changes, blurred vision, eye discharge, eye redness.  Neck: denies neck pain, neck swelling.  CV: denies chest pain, palpitations.  Resp: denies difficulty breathing, cough.  Abdominal: denies nausea, vomiting, diarrhea, abdominal pain, blood in stool, dark stool.  MSK: mid and low back pain.  Neuro: denies headaches, numbness, tingling, dizziness, lightheadedness.  Skin: denies rashes, cuts, bruises.  Hematologic: denies unexplained bruises.

## 2021-01-19 NOTE — ED PROVIDER NOTE - PROGRESS NOTE DETAILS
Saul PGY2 - Reassessed pt., who is feeling improved.  Good respiratory effort.  Will give IS and instruct pt. to use it regularly.  All other questions and concerns have been addressed.  PT. will be dc/d.

## 2021-01-19 NOTE — ED ADULT NURSE NOTE - NSIMPLEMENTINTERV_GEN_ALL_ED
Implemented All Fall with Harm Risk Interventions:  [place] to call system. Call bell, personal items and telephone within reach. Instruct patient to call for assistance. Room bathroom lighting operational. Non-slip footwear when patient is off stretcher. Physically safe environment: no spills, clutter or unnecessary equipment. Stretcher in lowest position, wheels locked, appropriate side rails in place. Provide visual cue, wrist band, yellow gown, etc. Monitor gait and stability. Monitor for mental status changes and reorient to person, place, and time. Review medications for side effects contributing to fall risk. Reinforce activity limits and safety measures with patient and family. Provide visual clues: red socks.

## 2021-01-19 NOTE — ED PROVIDER NOTE - ATTENDING CONTRIBUTION TO CARE
pt is a 89 y/o female with mechanical fall on to her buttock with back pain, no head injury, no loc or neck pain but significant rib pain l posterior region, no midline tend, neuro intact, analgesia ordered, range her arms and legs fully, no head injury.

## 2021-01-19 NOTE — ED PROVIDER NOTE - CARE PLAN
Principal Discharge DX:	Low back pain  Secondary Diagnosis:	Fall at home   Principal Discharge DX:	Low back pain  Secondary Diagnosis:	Fall at home  Secondary Diagnosis:	Rib fractures

## 2021-01-19 NOTE — ED ADULT NURSE NOTE - OBJECTIVE STATEMENT
Pt is a 89 Y A&O x3 F with hx of HTN, HLD, atrial fibrillation on Eliquis presenting to ED via EMS from home with c/o fall. Pt reports she was getting out of bed, "lost my balance" fell on buttocks. Pt denies dizziness, chest pain, SOB prior to fall Pt reports she was able to get up immediately after fall, denies LOC or hitting her head. Pt endorses back pain s/p fall. Pt states she lives at home alone, feels safe. Pt ambulates with walker when leaving house. Pt denies headache, blurry vision, chest pain, SOB, N+V, fever, chills. Pt arrives to ED breathing unlabored on RA. Speech is clear. Abd soft, non-tender, non-distended. Pt able to move all extremities with strong strength. + peripheral pulses. Safety and comfort maintained. Pt is a 89 Y A&O x3 F with hx of HTN, HLD, atrial fibrillation on Eliquis presenting to ED via EMS from home with c/o fall. Pt reports she was getting out of bed, "lost my balance" fell on buttocks. Pt denies dizziness, chest pain, SOB prior to fall Pt reports she was able to get up immediately after fall, denies LOC or hitting her head. Pt endorses back pain s/p fall. Pt states she lives at home alone, feels safe. Pt ambulates with walker when leaving house. Pt denies headache, blurry vision, chest pain, SOB, N+V, fever, chills. Pt arrives to ED breathing unlabored on RA. Speech is clear. Abd soft, non-tender, non-distended. + pain to L side of back on palpation. Pt able to move all extremities with strong strength. + peripheral pulses. Safety and comfort maintained.

## 2021-01-19 NOTE — ED PROVIDER NOTE - PHYSICAL EXAMINATION
GENERAL: Patient awake alert NAD.  HEENT: NC/AT, Moist mucous membranes.  LUNGS: CTAB, no wheezes or crackles.  CARDIAC: RRR, no m/r/g.  ABDOMEN: Soft, NT, ND, No rebound, guarding.  EXT: No edema. No calf tenderness. CV 2+DP/PT bilaterally.   MSK: No spinal tenderness, no vertebral step-offs, thoracic and lumbar paraspinal tenderness, no deformities.  NEURO: A&Ox3. Moving all extremities.  SKIN: Warm and dry. No rash.  PSYCH: Normal affect.

## 2021-01-19 NOTE — ED ADULT NURSE REASSESSMENT NOTE - NS ED NURSE REASSESS COMMENT FT1
Pt. given and taught how to use incentive spirometer. Pt. return demonstrated use of incentive spirometer.

## 2021-01-20 ENCOUNTER — NON-APPOINTMENT (OUTPATIENT)
Age: 86
End: 2021-01-20

## 2021-01-21 ENCOUNTER — APPOINTMENT (OUTPATIENT)
Dept: INTERNAL MEDICINE | Facility: CLINIC | Age: 86
End: 2021-01-21
Payer: MEDICARE

## 2021-01-21 ENCOUNTER — NON-APPOINTMENT (OUTPATIENT)
Age: 86
End: 2021-01-21

## 2021-01-21 PROCEDURE — 99442: CPT | Mod: 95

## 2021-01-22 DIAGNOSIS — S22.42XA MULTIPLE FRACTURES OF RIBS, LEFT SIDE, INITIAL ENCOUNTER FOR CLOSED FRACTURE: ICD-10-CM

## 2021-01-27 ENCOUNTER — NON-APPOINTMENT (OUTPATIENT)
Age: 86
End: 2021-01-27

## 2021-02-04 ENCOUNTER — RX RENEWAL (OUTPATIENT)
Age: 86
End: 2021-02-04

## 2021-02-10 NOTE — PHYSICAL THERAPY INITIAL EVALUATION ADULT - SIT-TO-STAND BALANCE
good balance Inflammation Suggestive Of Cancer Camouflage Histology Text: There was a dense lymphocytic infiltrate which prevented adequate histologic evaluation of adjacent structures.

## 2021-03-05 DIAGNOSIS — Z23 ENCOUNTER FOR IMMUNIZATION: ICD-10-CM

## 2021-03-24 ENCOUNTER — LABORATORY RESULT (OUTPATIENT)
Age: 86
End: 2021-03-24

## 2021-04-08 ENCOUNTER — TRANSCRIPTION ENCOUNTER (OUTPATIENT)
Age: 86
End: 2021-04-08

## 2021-04-09 ENCOUNTER — NON-APPOINTMENT (OUTPATIENT)
Age: 86
End: 2021-04-09

## 2021-04-09 ENCOUNTER — APPOINTMENT (OUTPATIENT)
Dept: ELECTROPHYSIOLOGY | Facility: CLINIC | Age: 86
End: 2021-04-09
Payer: MEDICARE

## 2021-04-09 PROCEDURE — 93294 REM INTERROG EVL PM/LDLS PM: CPT

## 2021-04-09 PROCEDURE — 93296 REM INTERROG EVL PM/IDS: CPT

## 2021-04-10 ENCOUNTER — TRANSCRIPTION ENCOUNTER (OUTPATIENT)
Age: 86
End: 2021-04-10

## 2021-04-11 ENCOUNTER — TRANSCRIPTION ENCOUNTER (OUTPATIENT)
Age: 86
End: 2021-04-11

## 2021-04-12 ENCOUNTER — NON-APPOINTMENT (OUTPATIENT)
Age: 86
End: 2021-04-12

## 2021-04-17 DIAGNOSIS — R29.6 REPEATED FALLS: ICD-10-CM

## 2021-04-17 DIAGNOSIS — R93.2 ABNORMAL FINDINGS ON DIAGNOSTIC IMAGING OF LIVER AND BILIARY TRACT: ICD-10-CM

## 2021-04-23 ENCOUNTER — RX RENEWAL (OUTPATIENT)
Age: 86
End: 2021-04-23

## 2021-04-26 ENCOUNTER — NON-APPOINTMENT (OUTPATIENT)
Age: 86
End: 2021-04-26

## 2021-04-26 ENCOUNTER — RX RENEWAL (OUTPATIENT)
Age: 86
End: 2021-04-26

## 2021-04-26 ENCOUNTER — APPOINTMENT (OUTPATIENT)
Dept: INTERNAL MEDICINE | Facility: CLINIC | Age: 86
End: 2021-04-26
Payer: MEDICARE

## 2021-04-26 VITALS
HEART RATE: 76 BPM | OXYGEN SATURATION: 97 % | SYSTOLIC BLOOD PRESSURE: 102 MMHG | HEIGHT: 62 IN | DIASTOLIC BLOOD PRESSURE: 62 MMHG | TEMPERATURE: 97.34 F | BODY MASS INDEX: 21.16 KG/M2 | WEIGHT: 115 LBS

## 2021-04-26 DIAGNOSIS — E78.5 HYPERLIPIDEMIA, UNSPECIFIED: ICD-10-CM

## 2021-04-26 DIAGNOSIS — C67.9 MALIGNANT NEOPLASM OF BLADDER, UNSPECIFIED: ICD-10-CM

## 2021-04-26 DIAGNOSIS — Z78.9 OTHER SPECIFIED HEALTH STATUS: ICD-10-CM

## 2021-04-26 PROCEDURE — 99215 OFFICE O/P EST HI 40 MIN: CPT | Mod: 25

## 2021-04-26 PROCEDURE — 36415 COLL VENOUS BLD VENIPUNCTURE: CPT

## 2021-04-26 PROCEDURE — 93000 ELECTROCARDIOGRAM COMPLETE: CPT | Mod: 59

## 2021-04-26 RX ORDER — DOCUSATE SODIUM 100 MG/1
100 CAPSULE ORAL 3 TIMES DAILY
Qty: 100 | Refills: 1 | Status: DISCONTINUED | COMMUNITY
Start: 2021-01-22 | End: 2021-04-26

## 2021-04-26 RX ORDER — AMIODARONE HYDROCHLORIDE 200 MG/1
200 TABLET ORAL
Qty: 90 | Refills: 3 | Status: DISCONTINUED | COMMUNITY
Start: 2017-06-14 | End: 2021-04-26

## 2021-04-26 RX ORDER — TRAMADOL HYDROCHLORIDE 50 MG/1
50 TABLET, COATED ORAL 3 TIMES DAILY
Qty: 90 | Refills: 0 | Status: DISCONTINUED | COMMUNITY
Start: 2021-01-22 | End: 2021-04-26

## 2021-04-27 PROBLEM — Z78.9 DECREASED ACTIVITIES OF DAILY LIVING (ADL): Status: ACTIVE | Noted: 2021-04-17

## 2021-04-27 NOTE — PHYSICAL EXAM
[No Acute Distress] : no acute distress [Well Nourished] : well nourished [Well Developed] : well developed [Well-Appearing] : well-appearing [Normal Voice/Communication] : normal voice/communication [Normal Sclera/Conjunctiva] : normal sclera/conjunctiva [PERRL] : pupils equal round and reactive to light [EOMI] : extraocular movements intact [Normal Outer Ear/Nose] : the outer ears and nose were normal in appearance [Normal Oropharynx] : the oropharynx was normal [Normal TMs] : both tympanic membranes were normal [No JVD] : no jugular venous distention [Supple] : supple [No Lymphadenopathy] : no lymphadenopathy [Thyroid Normal, No Nodules] : the thyroid was normal and there were no nodules present [No Respiratory Distress] : no respiratory distress  [Clear to Auscultation] : lungs were clear to auscultation bilaterally [No Accessory Muscle Use] : no accessory muscle use [Normal Percussion] : the chest was normal to percussion [Normal Rate] : normal rate  [Regular Rhythm] : with a regular rhythm [Normal S1, S2] : normal S1 and S2 [No Murmur] : no murmur heard [No Carotid Bruits] : no carotid bruits [No Abdominal Bruit] : a ~M bruit was not heard ~T in the abdomen [Pedal Pulses Present] : the pedal pulses are present [No Edema] : there was no peripheral edema [No Extremity Clubbing/Cyanosis] : no extremity clubbing/cyanosis [No Palpable Aorta] : no palpable aorta [Normal Appearance] : normal in appearance [No Nipple Discharge] : no nipple discharge [No Axillary Lymphadenopathy] : no axillary lymphadenopathy [Soft] : abdomen soft [Non Tender] : non-tender [Non-distended] : non-distended [No Masses] : no abdominal mass palpated [No HSM] : no HSM [Normal Bowel Sounds] : normal bowel sounds [Normal Supraclavicular Nodes] : no supraclavicular lymphadenopathy [Normal Axillary Nodes] : no axillary lymphadenopathy [Normal Posterior Cervical Nodes] : no posterior cervical lymphadenopathy [Normal Anterior Cervical Nodes] : no anterior cervical lymphadenopathy [Normal Inguinal Nodes] : no inguinal lymphadenopathy [No CVA Tenderness] : no CVA  tenderness [No Spinal Tenderness] : no spinal tenderness [Kyphosis] : kyphosis [Scoliosis] : scoliosis [No Joint Swelling] : no joint swelling [Grossly Normal Strength/Tone] : grossly normal strength/tone [No Rash] : no rash [Coordination Grossly Intact] : coordination grossly intact [No Focal Deficits] : no focal deficits [Deep Tendon Reflexes (DTR)] : deep tendon reflexes were 2+ and symmetric [Speech Grossly Normal] : speech grossly normal [Memory Grossly Normal] : memory grossly normal [Normal Affect] : the affect was normal [Alert and Oriented x3] : oriented to person, place, and time [Normal Mood] : the mood was normal [Normal Insight/Judgement] : insight and judgment were intact [de-identified] : thin, frail [de-identified] : Dupuytren's contractures both hands [de-identified] : Walking with a walker

## 2021-04-27 NOTE — ADDENDUM
[FreeTextEntry1] : 5:30 PM the daughter calls to report  patient is not taking Colace or amiodarone.  All other medications correct

## 2021-04-27 NOTE — HISTORY OF PRESENT ILLNESS
[Family Member] : family member [de-identified] : She is here with her daughter Avis.\par She is complaining of feeling tired and having ongoing dyspnea on exertion. She states she walks slower and is walking with a walker. There have been no recurrent falls over the past several months since the fall in January.  She has received  PT and OT at home which is continuing. She has an aide 5 hours twice a week who helps her shower does cooking cleaning laundry and food shopping. Patient states she dresses and undresses herself. She is eating well . She administers her own medications but once in a while she is forgetful about it. We tried to review the medication list today but I'm not certain that we have an accurate list and she was given a handout concerning her current meds and she'll compare at home with her daughter. She has a home alert monitor.  She complains of dyspnea walking a block without chest pain.   She has palpitations a lot without near syncope .  She gets her pacemaker checked. She was seen  in urgent care as she felt she wasn't urinating enough and was sent to urology where  she reports had a negative urine culture and renal sonogram and was just told to drink more and with that urinating is fine. She is moving her bowels well [FreeTextEntry1] : Dyspnea on exertion\par Fatigue\par Atrial fibrillation\par Chronic heart failure\par History of fall

## 2021-04-27 NOTE — ASSESSMENT
[FreeTextEntry1] : Patient's exam is nonfocal. Her blood pressure is fine her lungs are clear her cardiac exam is regular. She has no signs of congestive heart failure on exam. Her EKG shows paced rhythm.  I am not sure the etiology of her dyspnea.Will check CBC electrolytes as well as BNP. She will follow up with cardiology. We discussed safety issues in the home and importance of fall prevention.   She will continue to have help at home. Her daughter was given a medication list to correlate with meds at home. With her fatigue I advised she stop the trazodone and see how she does. Will also consider increasing sertraline for depression. Thyroid function tests were sent. Patient does not want to consider going to assisted living presently. I advised the daughter that someone call in the morning and night to remind her about her medication\par She was advised to get the Shingrix vaccine at a local pharmacy\par She no longer wants to go for cystoscopes at age 89.\par She has completed 3 years of alendronate for osteoporosis\par She will be seen again in 6 months

## 2021-05-07 LAB
25(OH)D3 SERPL-MCNC: 69 NG/ML
ALBUMIN SERPL ELPH-MCNC: 4.5 G/DL
ALP BLD-CCNC: 69 U/L
ALT SERPL-CCNC: 21 U/L
ANION GAP SERPL CALC-SCNC: 11 MMOL/L
AST SERPL-CCNC: 22 U/L
BASOPHILS # BLD AUTO: 0.03 K/UL
BASOPHILS NFR BLD AUTO: 0.4 %
BILIRUB SERPL-MCNC: 0.4 MG/DL
BUN SERPL-MCNC: 23 MG/DL
CALCIUM SERPL-MCNC: 9.8 MG/DL
CHLORIDE SERPL-SCNC: 104 MMOL/L
CHOLEST SERPL-MCNC: 166 MG/DL
CO2 SERPL-SCNC: 26 MMOL/L
CREAT SERPL-MCNC: 0.81 MG/DL
EOSINOPHIL # BLD AUTO: 0.18 K/UL
EOSINOPHIL NFR BLD AUTO: 2.6 %
GLUCOSE SERPL-MCNC: 91 MG/DL
HCT VFR BLD CALC: 40.6 %
HDLC SERPL-MCNC: 69 MG/DL
HGB BLD-MCNC: 13.1 G/DL
IMM GRANULOCYTES NFR BLD AUTO: 0.3 %
LDLC SERPL CALC-MCNC: 85 MG/DL
LYMPHOCYTES # BLD AUTO: 0.87 K/UL
LYMPHOCYTES NFR BLD AUTO: 12.4 %
MAN DIFF?: NORMAL
MCHC RBC-ENTMCNC: 30.9 PG
MCHC RBC-ENTMCNC: 32.3 GM/DL
MCV RBC AUTO: 95.8 FL
MONOCYTES # BLD AUTO: 0.95 K/UL
MONOCYTES NFR BLD AUTO: 13.5 %
NEUTROPHILS # BLD AUTO: 4.97 K/UL
NEUTROPHILS NFR BLD AUTO: 70.8 %
NONHDLC SERPL-MCNC: 97 MG/DL
NT-PROBNP SERPL-MCNC: 1963 PG/ML
PLATELET # BLD AUTO: 171 K/UL
POTASSIUM SERPL-SCNC: 3.9 MMOL/L
PROT SERPL-MCNC: 7 G/DL
RBC # BLD: 4.24 M/UL
RBC # FLD: 14.2 %
SODIUM SERPL-SCNC: 142 MMOL/L
T4 FREE SERPL-MCNC: 1.1 NG/DL
TRIGL SERPL-MCNC: 61 MG/DL
TSH SERPL-ACNC: 2.03 UIU/ML
WBC # FLD AUTO: 7.02 K/UL

## 2021-06-03 ENCOUNTER — NON-APPOINTMENT (OUTPATIENT)
Age: 86
End: 2021-06-03

## 2021-06-03 ENCOUNTER — APPOINTMENT (OUTPATIENT)
Dept: CARDIOLOGY | Facility: CLINIC | Age: 86
End: 2021-06-03
Payer: MEDICARE

## 2021-06-03 VITALS
HEIGHT: 62 IN | WEIGHT: 117.4 LBS | DIASTOLIC BLOOD PRESSURE: 70 MMHG | HEART RATE: 73 BPM | SYSTOLIC BLOOD PRESSURE: 120 MMHG | BODY MASS INDEX: 21.6 KG/M2 | OXYGEN SATURATION: 97 %

## 2021-06-03 PROCEDURE — 99214 OFFICE O/P EST MOD 30 MIN: CPT

## 2021-06-03 PROCEDURE — 93000 ELECTROCARDIOGRAM COMPLETE: CPT

## 2021-06-04 NOTE — DISCUSSION/SUMMARY
[FreeTextEntry1] : She is a 89-year-old with a history of cardiomyopathy (nonobstructive coronary disease), paroxysmal atrial fibrillation and stable systolic heart failure symptoms.  \par By V pacemaker interrogated and shows consistent bi- V pacing, no further tachycardia and no sign of OptiVol decompensated heart failure either.\par Her increasing proBNP and exertional dyspnea may be related to slight increase in her heart failure symptoms.  I have suggested that she increase the frequency of Bumex to 5 times per week.\par No other changes to her medical regimen given her borderline blood pressures in the past.\par I encouraged her to keep herself as physically active as possible.\par Continue NOAC for stroke risk reduction given her paroxysmal atrial fibrillation history.\par Routine follow-up in 6 months, or sooner if symptoms arise.  Echocardiogram at next visit.

## 2021-06-04 NOTE — PHYSICAL EXAM
[General Appearance - Well Developed] : well developed [General Appearance - Well Nourished] : well nourished [Normal Conjunctiva] : the conjunctiva exhibited no abnormalities [Normal Oral Mucosa] : normal oral mucosa [No Oral Pallor] : no oral pallor [Normal Jugular Venous A Waves Present] : normal jugular venous A waves present [Normal Jugular Venous V Waves Present] : normal jugular venous V waves present [No Jugular Venous Owens A Waves] : no jugular venous owens A waves [] : no respiratory distress [Respiration, Rhythm And Depth] : normal respiratory rhythm and effort [Bowel Sounds] : normal bowel sounds [Abdomen Soft] : soft [Abnormal Walk] : normal gait [Nail Clubbing] : no clubbing of the fingernails [Cyanosis, Localized] : no localized cyanosis [Skin Turgor] : normal skin turgor [Oriented To Time, Place, And Person] : oriented to person, place, and time [Affect] : the affect was normal [Mood] : the mood was normal [No Anxiety] : not feeling anxious [FreeTextEntry1] : Uses a walker

## 2021-06-14 ENCOUNTER — RX RENEWAL (OUTPATIENT)
Age: 86
End: 2021-06-14

## 2021-07-08 ENCOUNTER — APPOINTMENT (OUTPATIENT)
Dept: INTERNAL MEDICINE | Facility: CLINIC | Age: 86
End: 2021-07-08
Payer: MEDICARE

## 2021-07-08 VITALS
WEIGHT: 110 LBS | SYSTOLIC BLOOD PRESSURE: 108 MMHG | BODY MASS INDEX: 20.24 KG/M2 | HEART RATE: 84 BPM | OXYGEN SATURATION: 96 % | DIASTOLIC BLOOD PRESSURE: 68 MMHG | TEMPERATURE: 96.62 F | HEIGHT: 62 IN

## 2021-07-08 DIAGNOSIS — Z79.01 LONG TERM (CURRENT) USE OF ANTICOAGULANTS: ICD-10-CM

## 2021-07-08 DIAGNOSIS — R10.2 PELVIC AND PERINEAL PAIN: ICD-10-CM

## 2021-07-08 DIAGNOSIS — M47.816 SPONDYLOSIS W/OUT MYELOPATHY OR RADICULOPATHY, LUMBAR REGION: ICD-10-CM

## 2021-07-08 DIAGNOSIS — I50.21 ACUTE SYSTOLIC (CONGESTIVE) HEART FAILURE: ICD-10-CM

## 2021-07-08 DIAGNOSIS — R06.00 DYSPNEA, UNSPECIFIED: ICD-10-CM

## 2021-07-08 PROCEDURE — 99214 OFFICE O/P EST MOD 30 MIN: CPT | Mod: 25

## 2021-07-08 PROCEDURE — 36415 COLL VENOUS BLD VENIPUNCTURE: CPT

## 2021-07-08 NOTE — ASSESSMENT
[FreeTextEntry1] : Patient with known lumbar spondylosis seen on MRI of her lumbosacral spine in 2019. She now appears to have an acute exacerbation. She is on chronic anticoagulation so cannot use NSAIDs. She may use a Lidoderm patch for 12 hours a day.   She'll continue to take 2 ES  Tylenol twice a day and may use tramadol one tablet every 6 hours as needed. We'll arrange for physical therapy at home. Discussed with patient should her pain not respond may need  consider repeat MRI and pain management.\par Regarding her fatigue we will check a CBC SMAC and TFTS. We'll also do a vitamin D level with a history of osteoporosis. Lipids will be checked due to hyperlipidemia.She does not exhibited signs of congestive heart failure presently\par Emotional support given regarding her feeling of social isolation loneliness. She does not want to consider going to assisted living.\par Reviewed with daughter Avis via telephone

## 2021-07-08 NOTE — PHYSICAL EXAM
[No Acute Distress] : no acute distress [Well Nourished] : well nourished [Well Developed] : well developed [Well-Appearing] : well-appearing [Normal Voice/Communication] : normal voice/communication [Normal Sclera/Conjunctiva] : normal sclera/conjunctiva [PERRL] : pupils equal round and reactive to light [EOMI] : extraocular movements intact [Normal Outer Ear/Nose] : the outer ears and nose were normal in appearance [Normal Oropharynx] : the oropharynx was normal [No JVD] : no jugular venous distention [No Lymphadenopathy] : no lymphadenopathy [Supple] : supple [Thyroid Normal, No Nodules] : the thyroid was normal and there were no nodules present [No Respiratory Distress] : no respiratory distress  [No Accessory Muscle Use] : no accessory muscle use [Clear to Auscultation] : lungs were clear to auscultation bilaterally [Normal Rate] : normal rate  [Regular Rhythm] : with a regular rhythm [Normal S1, S2] : normal S1 and S2 [No Murmur] : no murmur heard [No Carotid Bruits] : no carotid bruits [No Abdominal Bruit] : a ~M bruit was not heard ~T in the abdomen [No Varicosities] : no varicosities [Pedal Pulses Present] : the pedal pulses are present [No Edema] : there was no peripheral edema [No Palpable Aorta] : no palpable aorta [No Extremity Clubbing/Cyanosis] : no extremity clubbing/cyanosis [Soft] : abdomen soft [Non Tender] : non-tender [Non-distended] : non-distended [No Masses] : no abdominal mass palpated [No HSM] : no HSM [Normal Bowel Sounds] : normal bowel sounds [Normal Supraclavicular Nodes] : no supraclavicular lymphadenopathy [Normal Axillary Nodes] : no axillary lymphadenopathy [Normal Posterior Cervical Nodes] : no posterior cervical lymphadenopathy [Normal Anterior Cervical Nodes] : no anterior cervical lymphadenopathy [Normal Inguinal Nodes] : no inguinal lymphadenopathy [No CVA Tenderness] : no CVA  tenderness [No Joint Swelling] : no joint swelling [Grossly Normal Strength/Tone] : grossly normal strength/tone [No Rash] : no rash [No Skin Lesions] : no skin lesions [Coordination Grossly Intact] : coordination grossly intact [No Focal Deficits] : no focal deficits [Normal Gait] : normal gait [Deep Tendon Reflexes (DTR)] : deep tendon reflexes were 2+ and symmetric [Speech Grossly Normal] : speech grossly normal [Memory Grossly Normal] : memory grossly normal [Normal Affect] : the affect was normal [Alert and Oriented x3] : oriented to person, place, and time [Normal Mood] : the mood was normal [Normal Insight/Judgement] : insight and judgment were intact [de-identified] : Not wearing makeup as she usually would [de-identified] : Minimal lower lumbar spinal tenderness [de-identified] : No pain straight leg raising

## 2021-07-08 NOTE — HISTORY OF PRESENT ILLNESS
[FreeTextEntry8] : She is here she is having low back pain for the past week. The pain travels down to both posterior thighs. It is present all the time and is making it hard for her to sleep at night. She can feel it more when she is walking which she does with a walker. She is taking occasional Tylenol. There was no trauma  or falls. She has no numbness or weakness of her legs.\par She complains of some fatigue. She complains of some social isolation due to her living alone and her children being busy  and her friends dying. She has a helper several hour several times a week.   She dresses  and showers herself and makes her own meals.  She has been trying to walk outside in the  the heat and humidity and has some dyspnea but no chest pain

## 2021-07-09 ENCOUNTER — NON-APPOINTMENT (OUTPATIENT)
Age: 86
End: 2021-07-09

## 2021-07-09 ENCOUNTER — APPOINTMENT (OUTPATIENT)
Dept: ELECTROPHYSIOLOGY | Facility: CLINIC | Age: 86
End: 2021-07-09
Payer: MEDICARE

## 2021-07-09 LAB
25(OH)D3 SERPL-MCNC: 71.7 NG/ML
ALBUMIN SERPL ELPH-MCNC: 4.5 G/DL
ALP BLD-CCNC: 63 U/L
ALT SERPL-CCNC: 21 U/L
ANION GAP SERPL CALC-SCNC: 13 MMOL/L
AST SERPL-CCNC: 19 U/L
BASOPHILS # BLD AUTO: 0.03 K/UL
BASOPHILS NFR BLD AUTO: 0.4 %
BILIRUB SERPL-MCNC: 0.5 MG/DL
BUN SERPL-MCNC: 24 MG/DL
CALCIUM SERPL-MCNC: 9.8 MG/DL
CHLORIDE SERPL-SCNC: 98 MMOL/L
CHOLEST SERPL-MCNC: 162 MG/DL
CO2 SERPL-SCNC: 24 MMOL/L
CREAT SERPL-MCNC: 0.88 MG/DL
EOSINOPHIL # BLD AUTO: 0.14 K/UL
EOSINOPHIL NFR BLD AUTO: 1.8 %
GLUCOSE SERPL-MCNC: 92 MG/DL
HCT VFR BLD CALC: 38.7 %
HDLC SERPL-MCNC: 63 MG/DL
HGB BLD-MCNC: 12.7 G/DL
IMM GRANULOCYTES NFR BLD AUTO: 0.3 %
LDLC SERPL CALC-MCNC: 86 MG/DL
LYMPHOCYTES # BLD AUTO: 0.79 K/UL
LYMPHOCYTES NFR BLD AUTO: 10.1 %
MAN DIFF?: NORMAL
MCHC RBC-ENTMCNC: 30.5 PG
MCHC RBC-ENTMCNC: 32.8 GM/DL
MCV RBC AUTO: 92.8 FL
MONOCYTES # BLD AUTO: 0.92 K/UL
MONOCYTES NFR BLD AUTO: 11.7 %
NEUTROPHILS # BLD AUTO: 5.96 K/UL
NEUTROPHILS NFR BLD AUTO: 75.7 %
NONHDLC SERPL-MCNC: 99 MG/DL
PLATELET # BLD AUTO: 196 K/UL
POTASSIUM SERPL-SCNC: 4.5 MMOL/L
PROT SERPL-MCNC: 6.8 G/DL
RBC # BLD: 4.17 M/UL
RBC # FLD: 14.5 %
SODIUM SERPL-SCNC: 136 MMOL/L
T4 FREE SERPL-MCNC: 1.2 NG/DL
TRIGL SERPL-MCNC: 68 MG/DL
TSH SERPL-ACNC: 2.25 UIU/ML
WBC # FLD AUTO: 7.86 K/UL

## 2021-07-09 PROCEDURE — 93294 REM INTERROG EVL PM/LDLS PM: CPT

## 2021-07-09 PROCEDURE — 93296 REM INTERROG EVL PM/IDS: CPT

## 2021-07-12 ENCOUNTER — RX RENEWAL (OUTPATIENT)
Age: 86
End: 2021-07-12

## 2021-07-12 ENCOUNTER — TRANSCRIPTION ENCOUNTER (OUTPATIENT)
Age: 86
End: 2021-07-12

## 2021-07-14 ENCOUNTER — EMERGENCY (EMERGENCY)
Facility: HOSPITAL | Age: 86
LOS: 1 days | Discharge: HOME | End: 2021-07-14
Attending: EMERGENCY MEDICINE
Payer: MEDICARE

## 2021-07-14 ENCOUNTER — NON-APPOINTMENT (OUTPATIENT)
Age: 86
End: 2021-07-14

## 2021-07-14 VITALS
OXYGEN SATURATION: 94 % | TEMPERATURE: 98 F | SYSTOLIC BLOOD PRESSURE: 117 MMHG | RESPIRATION RATE: 15 BRPM | HEART RATE: 70 BPM | DIASTOLIC BLOOD PRESSURE: 70 MMHG

## 2021-07-14 VITALS
DIASTOLIC BLOOD PRESSURE: 82 MMHG | HEIGHT: 62 IN | SYSTOLIC BLOOD PRESSURE: 137 MMHG | HEART RATE: 93 BPM | RESPIRATION RATE: 17 BRPM | TEMPERATURE: 98 F | WEIGHT: 125 LBS

## 2021-07-14 DIAGNOSIS — Z90.722 ACQUIRED ABSENCE OF OVARIES, BILATERAL: Chronic | ICD-10-CM

## 2021-07-14 DIAGNOSIS — Z98.890 OTHER SPECIFIED POSTPROCEDURAL STATES: Chronic | ICD-10-CM

## 2021-07-14 LAB
ALBUMIN SERPL ELPH-MCNC: 4.2 G/DL — SIGNIFICANT CHANGE UP (ref 3.3–5)
ALP SERPL-CCNC: 57 U/L — SIGNIFICANT CHANGE UP (ref 40–120)
ALT FLD-CCNC: 18 U/L — SIGNIFICANT CHANGE UP (ref 10–45)
ANION GAP SERPL CALC-SCNC: 15 MMOL/L — SIGNIFICANT CHANGE UP (ref 5–17)
APPEARANCE UR: CLEAR — SIGNIFICANT CHANGE UP
AST SERPL-CCNC: 17 U/L — SIGNIFICANT CHANGE UP (ref 10–40)
BACTERIA # UR AUTO: NEGATIVE — SIGNIFICANT CHANGE UP
BASOPHILS # BLD AUTO: 0.04 K/UL — SIGNIFICANT CHANGE UP (ref 0–0.2)
BASOPHILS NFR BLD AUTO: 0.4 % — SIGNIFICANT CHANGE UP (ref 0–2)
BILIRUB SERPL-MCNC: 0.4 MG/DL — SIGNIFICANT CHANGE UP (ref 0.2–1.2)
BILIRUB UR-MCNC: NEGATIVE — SIGNIFICANT CHANGE UP
BUN SERPL-MCNC: 23 MG/DL — SIGNIFICANT CHANGE UP (ref 7–23)
CALCIUM SERPL-MCNC: 9.7 MG/DL — SIGNIFICANT CHANGE UP (ref 8.4–10.5)
CHLORIDE SERPL-SCNC: 98 MMOL/L — SIGNIFICANT CHANGE UP (ref 96–108)
CO2 SERPL-SCNC: 25 MMOL/L — SIGNIFICANT CHANGE UP (ref 22–31)
COLOR SPEC: SIGNIFICANT CHANGE UP
CREAT SERPL-MCNC: 0.78 MG/DL — SIGNIFICANT CHANGE UP (ref 0.5–1.3)
DIFF PNL FLD: NEGATIVE — SIGNIFICANT CHANGE UP
EOSINOPHIL # BLD AUTO: 0.11 K/UL — SIGNIFICANT CHANGE UP (ref 0–0.5)
EOSINOPHIL NFR BLD AUTO: 1 % — SIGNIFICANT CHANGE UP (ref 0–6)
EPI CELLS # UR: 0 /HPF — SIGNIFICANT CHANGE UP
GLUCOSE SERPL-MCNC: 102 MG/DL — HIGH (ref 70–99)
GLUCOSE UR QL: NEGATIVE — SIGNIFICANT CHANGE UP
HCT VFR BLD CALC: 37.5 % — SIGNIFICANT CHANGE UP (ref 34.5–45)
HGB BLD-MCNC: 12.3 G/DL — SIGNIFICANT CHANGE UP (ref 11.5–15.5)
HYALINE CASTS # UR AUTO: 0 /LPF — SIGNIFICANT CHANGE UP (ref 0–2)
IMM GRANULOCYTES NFR BLD AUTO: 0.7 % — SIGNIFICANT CHANGE UP (ref 0–1.5)
KETONES UR-MCNC: SIGNIFICANT CHANGE UP
LEUKOCYTE ESTERASE UR-ACNC: NEGATIVE — SIGNIFICANT CHANGE UP
LYMPHOCYTES # BLD AUTO: 0.6 K/UL — LOW (ref 1–3.3)
LYMPHOCYTES # BLD AUTO: 5.7 % — LOW (ref 13–44)
MCHC RBC-ENTMCNC: 29.9 PG — SIGNIFICANT CHANGE UP (ref 27–34)
MCHC RBC-ENTMCNC: 32.8 GM/DL — SIGNIFICANT CHANGE UP (ref 32–36)
MCV RBC AUTO: 91.2 FL — SIGNIFICANT CHANGE UP (ref 80–100)
MONOCYTES # BLD AUTO: 0.97 K/UL — HIGH (ref 0–0.9)
MONOCYTES NFR BLD AUTO: 9.2 % — SIGNIFICANT CHANGE UP (ref 2–14)
NEUTROPHILS # BLD AUTO: 8.79 K/UL — HIGH (ref 1.8–7.4)
NEUTROPHILS NFR BLD AUTO: 83 % — HIGH (ref 43–77)
NITRITE UR-MCNC: NEGATIVE — SIGNIFICANT CHANGE UP
NRBC # BLD: 0 /100 WBCS — SIGNIFICANT CHANGE UP (ref 0–0)
PH UR: 6 — SIGNIFICANT CHANGE UP (ref 5–8)
PLATELET # BLD AUTO: 170 K/UL — SIGNIFICANT CHANGE UP (ref 150–400)
POTASSIUM SERPL-MCNC: 3.7 MMOL/L — SIGNIFICANT CHANGE UP (ref 3.5–5.3)
POTASSIUM SERPL-SCNC: 3.7 MMOL/L — SIGNIFICANT CHANGE UP (ref 3.5–5.3)
PROT SERPL-MCNC: 6.9 G/DL — SIGNIFICANT CHANGE UP (ref 6–8.3)
PROT UR-MCNC: SIGNIFICANT CHANGE UP
RBC # BLD: 4.11 M/UL — SIGNIFICANT CHANGE UP (ref 3.8–5.2)
RBC # FLD: 14 % — SIGNIFICANT CHANGE UP (ref 10.3–14.5)
RBC CASTS # UR COMP ASSIST: 1 /HPF — SIGNIFICANT CHANGE UP (ref 0–4)
SARS-COV-2 RNA SPEC QL NAA+PROBE: SIGNIFICANT CHANGE UP
SODIUM SERPL-SCNC: 138 MMOL/L — SIGNIFICANT CHANGE UP (ref 135–145)
SP GR SPEC: 1.04 — HIGH (ref 1.01–1.02)
UROBILINOGEN FLD QL: NEGATIVE — SIGNIFICANT CHANGE UP
WBC # BLD: 10.58 K/UL — HIGH (ref 3.8–10.5)
WBC # FLD AUTO: 10.58 K/UL — HIGH (ref 3.8–10.5)
WBC UR QL: 0 /HPF — SIGNIFICANT CHANGE UP (ref 0–5)

## 2021-07-14 PROCEDURE — 71045 X-RAY EXAM CHEST 1 VIEW: CPT

## 2021-07-14 PROCEDURE — 71260 CT THORAX DX C+: CPT | Mod: 26,MG

## 2021-07-14 PROCEDURE — 74177 CT ABD & PELVIS W/CONTRAST: CPT | Mod: 26,MG

## 2021-07-14 PROCEDURE — G1004: CPT

## 2021-07-14 PROCEDURE — U0005: CPT

## 2021-07-14 PROCEDURE — 70450 CT HEAD/BRAIN W/O DYE: CPT

## 2021-07-14 PROCEDURE — 74177 CT ABD & PELVIS W/CONTRAST: CPT

## 2021-07-14 PROCEDURE — U0003: CPT

## 2021-07-14 PROCEDURE — 80053 COMPREHEN METABOLIC PANEL: CPT

## 2021-07-14 PROCEDURE — 71045 X-RAY EXAM CHEST 1 VIEW: CPT | Mod: 26

## 2021-07-14 PROCEDURE — 70450 CT HEAD/BRAIN W/O DYE: CPT | Mod: 26,MG

## 2021-07-14 PROCEDURE — 87086 URINE CULTURE/COLONY COUNT: CPT

## 2021-07-14 PROCEDURE — 72125 CT NECK SPINE W/O DYE: CPT | Mod: 26,MG

## 2021-07-14 PROCEDURE — 72125 CT NECK SPINE W/O DYE: CPT

## 2021-07-14 PROCEDURE — 81001 URINALYSIS AUTO W/SCOPE: CPT

## 2021-07-14 PROCEDURE — 99284 EMERGENCY DEPT VISIT MOD MDM: CPT

## 2021-07-14 PROCEDURE — 85025 COMPLETE CBC W/AUTO DIFF WBC: CPT

## 2021-07-14 PROCEDURE — 99284 EMERGENCY DEPT VISIT MOD MDM: CPT | Mod: 25

## 2021-07-14 PROCEDURE — 93010 ELECTROCARDIOGRAM REPORT: CPT

## 2021-07-14 PROCEDURE — 72131 CT LUMBAR SPINE W/O DYE: CPT | Mod: 26,MG

## 2021-07-14 PROCEDURE — 71260 CT THORAX DX C+: CPT

## 2021-07-14 PROCEDURE — 93005 ELECTROCARDIOGRAM TRACING: CPT

## 2021-07-14 RX ORDER — TRAMADOL HYDROCHLORIDE 50 MG/1
50 TABLET, COATED ORAL EVERY 6 HOURS
Qty: 30 | Refills: 2 | Status: DISCONTINUED | COMMUNITY
Start: 2021-07-08 | End: 2021-07-14

## 2021-07-14 RX ORDER — ACETAMINOPHEN 500 MG
975 TABLET ORAL ONCE
Refills: 0 | Status: COMPLETED | OUTPATIENT
Start: 2021-07-14 | End: 2021-07-14

## 2021-07-14 RX ADMIN — Medication 975 MILLIGRAM(S): at 16:45

## 2021-07-14 NOTE — ED PROVIDER NOTE - OBJECTIVE STATEMENT
89 year old female 89 year old female with remote bladder ca, CVA,  pAfib on eliquis, HLD, HTN presents to ED c/o back pain after fall last night. Pt walks with walker at baseline. States last night she was reaching to turn off lamp and lost her footing and fell backwards, hitting back on bed frame. Pt states she was unable to get up and instead crawled to bed. Throughout the night reports back pain. This am spoke to granddaughter who is a ED physician who called EMS for pt. Pt states she did not pass out.  Pt denies LOC, head injury, dizziness, chest pain, sob, abd pain, n/v/d, urinary symptoms, numbness, weakness

## 2021-07-14 NOTE — ED ADULT NURSE REASSESSMENT NOTE - NS ED NURSE REASSESS COMMENT FT1
Patient was able to ambulate with walker without RN assistance, states she feels better denies back pain while walking. PA notified.

## 2021-07-14 NOTE — ED PROVIDER NOTE - CLINICAL SUMMARY MEDICAL DECISION MAKING FREE TEXT BOX
Attending MD Mendiola: Patient seen with JOSE L Orozco. Patient is a 89 you female with PMH for remote bladder ca, CVA,  pAfib on eliquis, HLD, HTN presents to ED c/o back pain after fall last night.  States she lost balance and fell backwards hit back on bed fram.  No LOC.  Patient denies chest pain, palpitations, SOB, or diaphoresis. Patient denies fever, chills, nausea, vomiting, or diarrhea.  NCAT and neck supple. L lumbar para-spinal area with small area of ecchymosis with overlying ttp. Pelvis is stable.  CTs and labs with no acute findings.  Incidental findings discussed with patient and family.  Patient stable for discharge with outpatient followup.

## 2021-07-14 NOTE — ED ADULT NURSE NOTE - NSIMPLEMENTINTERV_GEN_ALL_ED
Implemented All Fall with Harm Risk Interventions:  [place] to call system. Call bell, personal items and telephone within reach. Instruct patient to call for assistance. Room bathroom lighting operational. Non-slip footwear when patient is off stretcher. Physically safe environment: no spills, clutter or unnecessary equipment. Stretcher in lowest position, wheels locked, appropriate side rails in place. Provide visual cue, wrist band, yellow gown, etc. Monitor gait and stability. Monitor for mental status changes and reorient to person, place, and time. Review medications for side effects contributing to fall risk. Reinforce activity limits and safety measures with patient and family. Provide visual clues: red socks.

## 2021-07-14 NOTE — ED PROVIDER NOTE - PATIENT PORTAL LINK FT
You can access the FollowMyHealth Patient Portal offered by [facility] by registering at the following website: [email or web address]. By joining [facility]’s FollowMyHealth portal, you will also be able to view your health information using other applications (apps) compatible with our system.

## 2021-07-14 NOTE — ED PROVIDER NOTE - PHYSICAL EXAMINATION
CONSTITUTIONAL: Patient is awake, alert and oriented x 3. Patient is well appearing and in no acute distress  HEAD: NCAT  EYES: PERRL b/l, EOMI  NECK: supple, FROM  LUNGS: CTA B/L  HEART: RRR  ABDOMEN: Soft non-distended, mild diffuse ttp, no rebound or guarding  EXTREMITY: no edema or calf tenderness b/l, FROM upper and lower ext b/l. No midline cervical, thoracic or lumbar ttp. L lumbar para-spinal area with small area of ecchymosis with overlying ttp. Pelvis is stable.   SKIN: see "EXTREMITY" otherwise no rash or lesions  NEURO: No focal deficits

## 2021-07-14 NOTE — ED ADULT NURSE REASSESSMENT NOTE - NS ED NURSE REASSESS COMMENT FT1
Pt resting comfortably with , no complaints at this time. RN assisted pt w/ bedpan. Will continue to monitor.

## 2021-07-14 NOTE — ED PROVIDER NOTE - NSFOLLOWUPINSTRUCTIONS_ED_ALL_ED_FT
1. Please stay hydrated and continue all medications as prescribed     2. For pain take Tylenol 1g every 6 hours as needed     3. Follow up with your PCP  24-48 hours to discuss ED visit and for reevaluation     4. Use assistive devices like your walker or cane when ambulating even around the house     5. Return to ED for worsening of symptoms including increased pain, numbness, weakness, recurrent falls and all other concerns.

## 2021-07-14 NOTE — ED ADULT NURSE NOTE - OBJECTIVE STATEMENT
89y old female PMH CVA, HTN, A-fib on [facility] for fall. A&Ox3. Patient states when walking last night she tripped and fell, hit her back on the floor.  She is complaining of lower right side back pain able to get herself off the floor unable to get out of bed this morning. Pt denies head injury, dizziness, chest pain, sob, ha, n/v/d, abdominal pain, f/c, urinary symptoms, hematuria. Patient is well appearing, skin warm, small bruise present on lower right back, PERRL, EOM intact, sensory intact, equal strength b/l in all upper and lower extremities.

## 2021-07-14 NOTE — ED PROVIDER NOTE - ATTENDING CONTRIBUTION TO CARE
Attending MD Mendiola:   I personally have seen and examined this patient.  Physician assistant note reviewed and agree on plan of care and except where noted.

## 2021-07-14 NOTE — ED PROVIDER NOTE - PROGRESS NOTE DETAILS
Pt ambulated successfully with walker in ED with steadiness and without additional assistance. CTs reviewed, no acute findings. Pt w/ old rib and humerus fx and incidental finding of liver and kidney cysts and well as splenic hypodensity which were reviewed with pt family. Pt family on way to pick pt up. Comfortable with pt d/c home and state they have an aid ad plan to increase aid hours. All questions answered with pt as well. Will d/c home   Pam Singh PA-C

## 2021-07-14 NOTE — ED ADULT NURSE NOTE - NS ED NURSE LEVEL OF CONSCIOUSNESS ORIENTATION
Oriented - self; Oriented - place; Oriented - time Special Stains Stage 1 - Results: Base On Clearance Noted Above

## 2021-07-15 LAB
CULTURE RESULTS: SIGNIFICANT CHANGE UP
SPECIMEN SOURCE: SIGNIFICANT CHANGE UP

## 2021-07-21 ENCOUNTER — NON-APPOINTMENT (OUTPATIENT)
Age: 86
End: 2021-07-21

## 2021-08-16 NOTE — H&P ADULT - PROBLEM/PLAN-3
Otolaryngologist Procedure Text (A): After obtaining clear surgical margins the patient was sent to otolaryngology for surgical repair.  The patient understands they will receive post-surgical care and follow-up from the referring physician's office. DISPLAY PLAN FREE TEXT

## 2021-10-01 ENCOUNTER — RX RENEWAL (OUTPATIENT)
Age: 86
End: 2021-10-01

## 2021-10-06 DIAGNOSIS — Z91.81 HISTORY OF FALLING: ICD-10-CM

## 2021-12-02 ENCOUNTER — APPOINTMENT (OUTPATIENT)
Dept: CARDIOLOGY | Facility: CLINIC | Age: 86
End: 2021-12-02
Payer: MEDICARE

## 2021-12-02 ENCOUNTER — NON-APPOINTMENT (OUTPATIENT)
Age: 86
End: 2021-12-02

## 2021-12-02 VITALS
HEART RATE: 82 BPM | WEIGHT: 107 LBS | BODY MASS INDEX: 19.57 KG/M2 | OXYGEN SATURATION: 98 % | SYSTOLIC BLOOD PRESSURE: 140 MMHG | DIASTOLIC BLOOD PRESSURE: 84 MMHG

## 2021-12-02 DIAGNOSIS — R53.83 OTHER FATIGUE: ICD-10-CM

## 2021-12-02 PROCEDURE — 93000 ELECTROCARDIOGRAM COMPLETE: CPT | Mod: 59

## 2021-12-02 PROCEDURE — 93281 PM DEVICE PROGR EVAL MULTI: CPT

## 2021-12-02 PROCEDURE — 99214 OFFICE O/P EST MOD 30 MIN: CPT

## 2021-12-02 PROCEDURE — 93306 TTE W/DOPPLER COMPLETE: CPT

## 2021-12-02 NOTE — DISCUSSION/SUMMARY
[FreeTextEntry1] : She is a 89-year-old with a history of cardiomyopathy (nonobstructive coronary disease), paroxysmal atrial fibrillation and stable systolic heart failure symptoms.  \par By V pacemaker interrogated and shows consistent bi- V pacing, no sign of congestive heart failure on OptiVol since November.  Atrial fibrillation has been persistent.\par Her blood pressure control is excellent on her current regimen.\par I encouraged her to keep herself as physically active as possible.\par Continue NOAC for stroke risk reduction given her paroxysmal atrial fibrillation history.\par Routine follow-up in 6 months, or sooner if symptoms arise.  \par Pacemaker interrogation at each visit.

## 2021-12-02 NOTE — HISTORY OF PRESENT ILLNESS
[FreeTextEntry1] : She is accompanied by her daughter and reports feeling okay overall.  Her biggest concern seems to be related to decreased urine output and a burning sensation that makes her feel like she has to urinate.\par She was recently seen by new internist.\par Recent blood work was reportedly normal.\par Her other big concern is fatigue for which she occasionally takes naps.  She has not reported any inappropriate falling asleep.\par No bleeding or falls.\par \par Echocardiography showed normal left ventricular systolic function with mild to moderate mitral regurgitation and mild pulmonary hypertension.\par The TAVR appears well-seated with no significant regurgitation.  Normal gradients.

## 2021-12-06 ENCOUNTER — APPOINTMENT (OUTPATIENT)
Dept: ELECTROPHYSIOLOGY | Facility: CLINIC | Age: 86
End: 2021-12-06
Payer: MEDICARE

## 2021-12-06 ENCOUNTER — NON-APPOINTMENT (OUTPATIENT)
Age: 86
End: 2021-12-06

## 2021-12-06 PROCEDURE — 93296 REM INTERROG EVL PM/IDS: CPT

## 2021-12-06 PROCEDURE — 93294 REM INTERROG EVL PM/LDLS PM: CPT

## 2022-01-03 ENCOUNTER — RX RENEWAL (OUTPATIENT)
Age: 87
End: 2022-01-03

## 2022-03-07 ENCOUNTER — NON-APPOINTMENT (OUTPATIENT)
Age: 87
End: 2022-03-07

## 2022-03-07 ENCOUNTER — APPOINTMENT (OUTPATIENT)
Dept: ELECTROPHYSIOLOGY | Facility: CLINIC | Age: 87
End: 2022-03-07
Payer: MEDICARE

## 2022-03-07 PROCEDURE — 93294 REM INTERROG EVL PM/LDLS PM: CPT

## 2022-03-07 PROCEDURE — 93296 REM INTERROG EVL PM/IDS: CPT

## 2022-04-11 ENCOUNTER — RX RENEWAL (OUTPATIENT)
Age: 87
End: 2022-04-11

## 2022-05-02 ENCOUNTER — RX RENEWAL (OUTPATIENT)
Age: 87
End: 2022-05-02

## 2022-05-25 NOTE — ED ADULT NURSE NOTE - PMH
Patient Aortic valve stenosis, etiology of cardiac valve disease unspecified    HLD (hyperlipidemia)    HTN (hypertension)    Paroxysmal atrial fibrillation

## 2022-06-01 ENCOUNTER — RX RENEWAL (OUTPATIENT)
Age: 87
End: 2022-06-01

## 2022-06-06 ENCOUNTER — APPOINTMENT (OUTPATIENT)
Dept: ELECTROPHYSIOLOGY | Facility: CLINIC | Age: 87
End: 2022-06-06
Payer: MEDICARE

## 2022-06-06 ENCOUNTER — NON-APPOINTMENT (OUTPATIENT)
Age: 87
End: 2022-06-06

## 2022-06-06 PROCEDURE — 93296 REM INTERROG EVL PM/IDS: CPT

## 2022-06-06 PROCEDURE — 93294 REM INTERROG EVL PM/LDLS PM: CPT

## 2022-06-07 ENCOUNTER — NON-APPOINTMENT (OUTPATIENT)
Age: 87
End: 2022-06-07

## 2022-06-07 ENCOUNTER — APPOINTMENT (OUTPATIENT)
Dept: CARDIOLOGY | Facility: CLINIC | Age: 87
End: 2022-06-07
Payer: MEDICARE

## 2022-06-07 VITALS
HEIGHT: 62 IN | BODY MASS INDEX: 19.14 KG/M2 | OXYGEN SATURATION: 98 % | WEIGHT: 104 LBS | SYSTOLIC BLOOD PRESSURE: 130 MMHG | HEART RATE: 84 BPM | DIASTOLIC BLOOD PRESSURE: 80 MMHG

## 2022-06-07 PROCEDURE — 99214 OFFICE O/P EST MOD 30 MIN: CPT

## 2022-06-07 PROCEDURE — 93000 ELECTROCARDIOGRAM COMPLETE: CPT

## 2022-06-07 NOTE — PHYSICAL EXAM
[General Appearance - Well Developed] : well developed [General Appearance - Well Nourished] : well nourished [Normal Conjunctiva] : the conjunctiva exhibited no abnormalities [Normal Oral Mucosa] : normal oral mucosa [No Oral Pallor] : no oral pallor [Normal Jugular Venous A Waves Present] : normal jugular venous A waves present [Normal Jugular Venous V Waves Present] : normal jugular venous V waves present [No Jugular Venous Owens A Waves] : no jugular venous owens A waves [] : no respiratory distress [Respiration, Rhythm And Depth] : normal respiratory rhythm and effort [Bowel Sounds] : normal bowel sounds [Abdomen Soft] : soft [Abnormal Walk] : normal gait [FreeTextEntry1] : Uses a walker [Nail Clubbing] : no clubbing of the fingernails [Cyanosis, Localized] : no localized cyanosis [Skin Turgor] : normal skin turgor [Oriented To Time, Place, And Person] : oriented to person, place, and time [Mood] : the mood was normal [Affect] : the affect was normal [No Anxiety] : not feeling anxious

## 2022-06-07 NOTE — DISCUSSION/SUMMARY
[FreeTextEntry1] : She is a 89-year-old with a history of cardiomyopathy (nonobstructive coronary disease), paroxysmal atrial fibrillation and stable systolic heart failure symptoms.  \par By V pacemaker interrogated and shows consistent bi- V pacing, no sign of congestive heart failure on OptiVol since November.  Atrial fibrillation has been persistent.\par Her blood pressure control is excellent on her current regimen.\par I encouraged her to keep herself as physically active as possible.\par Continue NOAC for stroke risk reduction given her paroxysmal atrial fibrillation history.\par Routine follow-up in 6 months, or sooner if symptoms arise.  \par Pacemaker interrogation at each visit.

## 2022-06-07 NOTE — HISTORY OF PRESENT ILLNESS
[FreeTextEntry1] : Feeling okay. some fatigue with walking.\par No chest pain or orthopnea.\par Seen by Dr. Madrid. Labs all okay.\par \par Prior:\par She is accompanied by her daughter and reports feeling okay overall.  Her biggest concern seems to be related to decreased urine output and a burning sensation that makes her feel like she has to urinate.\par She was recently seen by new internist.\par Recent blood work was reportedly normal.\par Her other big concern is fatigue for which she occasionally takes naps.  She has not reported any inappropriate falling asleep.\par No bleeding or falls.\par \par Echocardiography showed normal left ventricular systolic function with mild to moderate mitral regurgitation and mild pulmonary hypertension.\par The TAVR appears well-seated with no significant regurgitation.  Normal gradients.

## 2022-06-27 ENCOUNTER — RX RENEWAL (OUTPATIENT)
Age: 87
End: 2022-06-27

## 2022-06-27 RX ORDER — ATORVASTATIN CALCIUM 40 MG/1
40 TABLET, FILM COATED ORAL
Qty: 90 | Refills: 3 | Status: ACTIVE | COMMUNITY
Start: 2017-06-14 | End: 1900-01-01

## 2022-07-10 NOTE — SWALLOW BEDSIDE ASSESSMENT ADULT - ORAL PHASE
Within functional limits ? spillover trace stasis adherent to lateral dental surface/Within functional limits no

## 2022-08-12 ENCOUNTER — INPATIENT (INPATIENT)
Facility: HOSPITAL | Age: 87
LOS: 0 days | Discharge: HOME | DRG: 948 | End: 2022-08-13
Attending: INTERNAL MEDICINE | Admitting: INTERNAL MEDICINE
Payer: COMMERCIAL

## 2022-08-12 VITALS
DIASTOLIC BLOOD PRESSURE: 80 MMHG | SYSTOLIC BLOOD PRESSURE: 121 MMHG | TEMPERATURE: 98 F | WEIGHT: 130.07 LBS | HEART RATE: 84 BPM | OXYGEN SATURATION: 98 % | RESPIRATION RATE: 16 BRPM

## 2022-08-12 DIAGNOSIS — R41.82 ALTERED MENTAL STATUS, UNSPECIFIED: ICD-10-CM

## 2022-08-12 DIAGNOSIS — I50.9 HEART FAILURE, UNSPECIFIED: ICD-10-CM

## 2022-08-12 DIAGNOSIS — Z95.2 PRESENCE OF PROSTHETIC HEART VALVE: Chronic | ICD-10-CM

## 2022-08-12 DIAGNOSIS — Z29.9 ENCOUNTER FOR PROPHYLACTIC MEASURES, UNSPECIFIED: ICD-10-CM

## 2022-08-12 DIAGNOSIS — Z90.722 ACQUIRED ABSENCE OF OVARIES, BILATERAL: Chronic | ICD-10-CM

## 2022-08-12 DIAGNOSIS — Z98.890 OTHER SPECIFIED POSTPROCEDURAL STATES: Chronic | ICD-10-CM

## 2022-08-12 DIAGNOSIS — G93.40 ENCEPHALOPATHY, UNSPECIFIED: ICD-10-CM

## 2022-08-12 DIAGNOSIS — I10 ESSENTIAL (PRIMARY) HYPERTENSION: ICD-10-CM

## 2022-08-12 DIAGNOSIS — Z86.73 PERSONAL HISTORY OF TRANSIENT ISCHEMIC ATTACK (TIA), AND CEREBRAL INFARCTION WITHOUT RESIDUAL DEFICITS: ICD-10-CM

## 2022-08-12 DIAGNOSIS — Z95.0 PRESENCE OF CARDIAC PACEMAKER: Chronic | ICD-10-CM

## 2022-08-12 LAB
ALBUMIN SERPL ELPH-MCNC: 4.1 G/DL — SIGNIFICANT CHANGE UP (ref 3.5–5)
ALP SERPL-CCNC: 70 U/L — SIGNIFICANT CHANGE UP (ref 40–120)
ALT FLD-CCNC: 30 U/L DA — SIGNIFICANT CHANGE UP (ref 10–60)
ANION GAP SERPL CALC-SCNC: 6 MMOL/L — SIGNIFICANT CHANGE UP (ref 5–17)
APPEARANCE UR: CLEAR — SIGNIFICANT CHANGE UP
APTT BLD: 44.7 SEC — HIGH (ref 27.5–35.5)
AST SERPL-CCNC: 18 U/L — SIGNIFICANT CHANGE UP (ref 10–40)
BASOPHILS # BLD AUTO: 0.04 K/UL — SIGNIFICANT CHANGE UP (ref 0–0.2)
BASOPHILS NFR BLD AUTO: 0.5 % — SIGNIFICANT CHANGE UP (ref 0–2)
BILIRUB SERPL-MCNC: 0.6 MG/DL — SIGNIFICANT CHANGE UP (ref 0.2–1.2)
BILIRUB UR-MCNC: NEGATIVE — SIGNIFICANT CHANGE UP
BUN SERPL-MCNC: 27 MG/DL — HIGH (ref 7–18)
CALCIUM SERPL-MCNC: 10 MG/DL — SIGNIFICANT CHANGE UP (ref 8.4–10.5)
CHLORIDE SERPL-SCNC: 103 MMOL/L — SIGNIFICANT CHANGE UP (ref 96–108)
CO2 SERPL-SCNC: 30 MMOL/L — SIGNIFICANT CHANGE UP (ref 22–31)
COLOR SPEC: YELLOW — SIGNIFICANT CHANGE UP
CREAT SERPL-MCNC: 0.87 MG/DL — SIGNIFICANT CHANGE UP (ref 0.5–1.3)
DIFF PNL FLD: NEGATIVE — SIGNIFICANT CHANGE UP
EGFR: 61 ML/MIN/1.73M2 — SIGNIFICANT CHANGE UP
EOSINOPHIL # BLD AUTO: 0.18 K/UL — SIGNIFICANT CHANGE UP (ref 0–0.5)
EOSINOPHIL NFR BLD AUTO: 2.1 % — SIGNIFICANT CHANGE UP (ref 0–6)
GLUCOSE SERPL-MCNC: 89 MG/DL — SIGNIFICANT CHANGE UP (ref 70–99)
GLUCOSE UR QL: NEGATIVE — SIGNIFICANT CHANGE UP
HCT VFR BLD CALC: 42.7 % — SIGNIFICANT CHANGE UP (ref 34.5–45)
HGB BLD-MCNC: 13.7 G/DL — SIGNIFICANT CHANGE UP (ref 11.5–15.5)
IMM GRANULOCYTES NFR BLD AUTO: 0.5 % — SIGNIFICANT CHANGE UP (ref 0–1.5)
INR BLD: 1.39 RATIO — HIGH (ref 0.88–1.16)
KETONES UR-MCNC: NEGATIVE — SIGNIFICANT CHANGE UP
LACTATE SERPL-SCNC: 1.9 MMOL/L — SIGNIFICANT CHANGE UP (ref 0.7–2)
LEUKOCYTE ESTERASE UR-ACNC: NEGATIVE — SIGNIFICANT CHANGE UP
LYMPHOCYTES # BLD AUTO: 1.21 K/UL — SIGNIFICANT CHANGE UP (ref 1–3.3)
LYMPHOCYTES # BLD AUTO: 14.2 % — SIGNIFICANT CHANGE UP (ref 13–44)
MCHC RBC-ENTMCNC: 30.9 PG — SIGNIFICANT CHANGE UP (ref 27–34)
MCHC RBC-ENTMCNC: 32.1 GM/DL — SIGNIFICANT CHANGE UP (ref 32–36)
MCV RBC AUTO: 96.2 FL — SIGNIFICANT CHANGE UP (ref 80–100)
MONOCYTES # BLD AUTO: 0.87 K/UL — SIGNIFICANT CHANGE UP (ref 0–0.9)
MONOCYTES NFR BLD AUTO: 10.2 % — SIGNIFICANT CHANGE UP (ref 2–14)
NEUTROPHILS # BLD AUTO: 6.16 K/UL — SIGNIFICANT CHANGE UP (ref 1.8–7.4)
NEUTROPHILS NFR BLD AUTO: 72.5 % — SIGNIFICANT CHANGE UP (ref 43–77)
NITRITE UR-MCNC: NEGATIVE — SIGNIFICANT CHANGE UP
NRBC # BLD: 0 /100 WBCS — SIGNIFICANT CHANGE UP (ref 0–0)
PH UR: 5 — SIGNIFICANT CHANGE UP (ref 5–8)
PLATELET # BLD AUTO: 173 K/UL — SIGNIFICANT CHANGE UP (ref 150–400)
POTASSIUM SERPL-MCNC: 4.3 MMOL/L — SIGNIFICANT CHANGE UP (ref 3.5–5.3)
POTASSIUM SERPL-SCNC: 4.3 MMOL/L — SIGNIFICANT CHANGE UP (ref 3.5–5.3)
PROT SERPL-MCNC: 7.8 G/DL — SIGNIFICANT CHANGE UP (ref 6–8.3)
PROT UR-MCNC: NEGATIVE — SIGNIFICANT CHANGE UP
PROTHROM AB SERPL-ACNC: 16.6 SEC — HIGH (ref 10.5–13.4)
RBC # BLD: 4.44 M/UL — SIGNIFICANT CHANGE UP (ref 3.8–5.2)
RBC # FLD: 13.7 % — SIGNIFICANT CHANGE UP (ref 10.3–14.5)
SARS-COV-2 RNA SPEC QL NAA+PROBE: SIGNIFICANT CHANGE UP
SODIUM SERPL-SCNC: 139 MMOL/L — SIGNIFICANT CHANGE UP (ref 135–145)
SP GR SPEC: 1.01 — SIGNIFICANT CHANGE UP (ref 1.01–1.02)
TROPONIN I, HIGH SENSITIVITY RESULT: 26.5 NG/L — SIGNIFICANT CHANGE UP
UROBILINOGEN FLD QL: NEGATIVE — SIGNIFICANT CHANGE UP
WBC # BLD: 8.5 K/UL — SIGNIFICANT CHANGE UP (ref 3.8–10.5)
WBC # FLD AUTO: 8.5 K/UL — SIGNIFICANT CHANGE UP (ref 3.8–10.5)

## 2022-08-12 PROCEDURE — 70450 CT HEAD/BRAIN W/O DYE: CPT | Mod: 26,MA

## 2022-08-12 PROCEDURE — 99285 EMERGENCY DEPT VISIT HI MDM: CPT | Mod: GC

## 2022-08-12 PROCEDURE — 71045 X-RAY EXAM CHEST 1 VIEW: CPT | Mod: 26

## 2022-08-12 RX ORDER — ENOXAPARIN SODIUM 100 MG/ML
40 INJECTION SUBCUTANEOUS EVERY 24 HOURS
Refills: 0 | Status: DISCONTINUED | OUTPATIENT
Start: 2022-08-12 | End: 2022-08-13

## 2022-08-12 RX ORDER — ACETAMINOPHEN 500 MG
650 TABLET ORAL EVERY 6 HOURS
Refills: 0 | Status: DISCONTINUED | OUTPATIENT
Start: 2022-08-12 | End: 2022-08-13

## 2022-08-12 RX ORDER — SODIUM CHLORIDE 9 MG/ML
1000 INJECTION INTRAMUSCULAR; INTRAVENOUS; SUBCUTANEOUS ONCE
Refills: 0 | Status: COMPLETED | OUTPATIENT
Start: 2022-08-12 | End: 2022-08-12

## 2022-08-12 RX ADMIN — SODIUM CHLORIDE 1000 MILLILITER(S): 9 INJECTION INTRAMUSCULAR; INTRAVENOUS; SUBCUTANEOUS at 14:57

## 2022-08-12 NOTE — H&P ADULT - PROBLEM SELECTOR PLAN 3
CC:  Christina Love is here today for Leg (usually right thigh sometimes left leg)     Medications: medications verified, no change    Tobacco history: verified  Patient's current [facility] status: Active.    Patient would like communication of their results via:        Cell Phone:   Telephone Information:   Mobile [phone]     Okay to leave a message containing results? Yes      Advance Directives Advance directive not on file in medical record, asked for copy.     Health Maintenance Due   Topic Date Due   • Shingles Vaccine (2 of 3) 11/15/2012   • Influenza Vaccine (1) 09/01/2019   • Medicare Wellness 65+  09/27/2019       Patient is due for the topics as listed above and wishes to proceed with them. Orders placed for Immunization(s) Influenza .                                 C/w home meds   primary team to confirm meds in the AM, as patient does not remember

## 2022-08-12 NOTE — H&P ADULT - NSHPLABSRESULTS_GEN_ALL_CORE
13.7   8.50  )-----------( 173      ( 12 Aug 2022 14:51 )             42.7         139  |  103  |  27<H>  ----------------------------<  89  4.3   |  30  |  0.87    Ca    10.0      12 Aug 2022 14:51    TPro  7.8  /  Alb  4.1  /  TBili  0.6  /  DBili  x   /  AST  18  /  ALT  30  /  AlkPhos  70  08        LIVER FUNCTIONS - ( 12 Aug 2022 14:51 )  Alb: 4.1 g/dL / Pro: 7.8 g/dL / ALK PHOS: 70 U/L / ALT: 30 U/L DA / AST: 18 U/L / GGT: x           PT/INR - ( 12 Aug 2022 14:51 )   PT: 16.6 sec;   INR: 1.39 ratio         PTT - ( 12 Aug 2022 14:51 )  PTT:44.7 sec  Urinalysis Basic - ( 12 Aug 2022 14:51 )    Color: Yellow / Appearance: Clear / S.010 / pH: x  Gluc: x / Ketone: Negative  / Bili: Negative / Urobili: Negative   Blood: x / Protein: Negative / Nitrite: Negative   Leuk Esterase: Negative / RBC: x / WBC x   Sq Epi: x / Non Sq Epi: x / Bacteria: x        Lactate, Blood: 1.9 mmol/L ( @ 14:51)    Blood, Urine: Negative ( @ 14:51)                EKG: ventricular paced    RADIOLOGY STUDIES:    CT H non con 2022  IMPRESSION: Age-appropriate involutional and ischemic gliotic changes. No   hemorrhage. 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    CXR 2022  Lungs grossly clear.    Advanced posttraumatic deformity, degeneration, and chronic subluxation   right shoulder noted.

## 2022-08-12 NOTE — H&P ADULT - NSHPPHYSICALEXAM_GEN_ALL_CORE
T(C): 36.7 (08-12-22 @ 19:18), Max: 36.7 (08-12-22 @ 19:18)  T(F): 98 (08-12-22 @ 19:18), Max: 98 (08-12-22 @ 19:18)  HR: 74 (08-12-22 @ 19:18) (72 - 84)  BP: 131/78 (08-12-22 @ 19:18) (121/80 - 159/82)  RR: 17 (08-12-22 @ 19:18) (16 - 17)  SpO2: 95% (08-12-22 @ 19:18) (94% - 98%)    GENERAL: NAD, lying in bed comfortably, obese  HEAD:  Atraumatic, Normocephalic  EYES: EOMI, PERRLA, conjunctiva and sclera clear  ENT: Moist mucous membranes  NECK: Supple, No JVD  CHEST/LUNG: Clear to auscultation bilaterally; No rales, rhonchi, wheezing, or rubs. Unlabored respirations  HEART: Regular rate and rhythm; No murmurs, rubs, or gallops  ABDOMEN: Bowel sounds present; Soft, Nontender, Nondistended. No hepatomegally  EXTREMITIES:  2+ Peripheral Pulses, brisk capillary refill. No clubbing, cyanosis, or edema  NERVOUS SYSTEM:  Alert & Oriented X2-3, speech clear. No deficits   MSK: FROM all 4 extremities, full and equal strength  SKIN: No rashes or lesions

## 2022-08-12 NOTE — ED ADULT NURSE NOTE - OBJECTIVE STATEMENT
Patient brought in by ambulance for weakness. As per [facility] patient was a little confused for two days. Patient went to  and sent here for abnormal EKG. Denies any chest pains or shortness of breath

## 2022-08-12 NOTE — ED PROVIDER NOTE - CLINICAL SUMMARY MEDICAL DECISION MAKING FREE TEXT BOX
89 yo F presented to the ED by her daughters request that she was confused yesterday. Pt examined at the bedside with her home health aid, pt is A&Ox2-3 needs reorientation, appropriate for situation. Pt in NAD, denies any CP, SOB, n/v/d. vitals are stable. Will do basic blood work to r/o any pos cause of hemorrhage. CXR to r/o possible PNU or URI. UA to r/o UTI and pos cause for ams delirium. Will continue to monitor and reassess.

## 2022-08-12 NOTE — H&P ADULT - PROBLEM SELECTOR PLAN 1
Comes in due to encephalopathy x several hours yesterday no resolved since waking up    #intracranial process: CTH shows no acute changes    #Acute Metabolic encephalopathy:  ruled out on CMP, unremarkable    #Acute infectious encephalopathy: ruled out on CBC, Urinalysis -ve, CXR -ve, HD stable    # Chronic encephalopathy:   f/u TSH.  f/u B12, folate  f/u RPR    #Dementia progression Likely  Hx of possible vascular dementia:  CT shows age appropriate involution ischemic gliotic changes, old MCA stroke  f/u OP for MMSE and Neurocognitive assessment, c/w asa statin

## 2022-08-12 NOTE — H&P ADULT - ASSESSMENT
Patient is a 89 Y o female from home with PMH of HTN, CVA, CHF, PPM, AS, s/p TAVR, Bladder cancer, Walks with walker and is AAO x2-3 at baseline, who comes in with reports of AMS yesterday evening admitted for further work up of encephalopathy

## 2022-08-12 NOTE — H&P ADULT - HISTORY OF PRESENT ILLNESS
Patient is a 89 Y o female from home with PMH of HTN, CVA, CHF, PPM, AS, s/p TAVR, Bladder cancer, Walks with walker and is AAO x2-3 at baseline, who comes in with reports of AMS yesterday evening around "after dinner" felt as though she could not read her watch for a while, and felt confused,  went to bed and felt normal when she got up.    Per chart, patient's daughter had called and found that her mother was confused, prompting the aid to bring her to urgent care where EKG and UCx changes prompted ED visit today.    Patient denies any syncope, LOC, falls, or trauma, recent illness.    In the ED BP 120s/80s HR 84 EKG ventricular paced rhythm  Exam: AAOx2-3 could tell time and year but not month. and very alert and had insight, no confusion no distress  Labs: unremarkable   CTH:  no acute changes  CXR -ve

## 2022-08-12 NOTE — H&P ADULT - PROBLEM SELECTOR PLAN 2
Comes in with brief AMS overnight, resolved by AM    #Dementia progression Likely  Hx of possible vascular dementia:  CT shows age appropriate involution ischemic gliotic changes, old MCA stroke  No new stroke suspected    c/w asa statin

## 2022-08-12 NOTE — ED PROVIDER NOTE - OBJECTIVE STATEMENT
Pt is a 89 yo F with a PMHx of HTN, Stroke, CHF, Pacemaker, TAVR, Bladder CA. Presents to the ED today for altered mental status. Spoke to pts daughter Avis, she states that yesterday she spoke to her mother and she sounded very confused and didn't know that she was even talking to the phone. Her daughter told the aid to take the pt to the urgent care this morning, after an EKG the UC was concerned and they called an ambulance to bring her into [facility] ED. Pt was in NAD, and vitals were stable. Pt was A&Ox2, didn't know what day or month it was.

## 2022-08-12 NOTE — ED PROVIDER NOTE - PHYSICAL EXAMINATION
GENERAL: NAD, cachectic, lying in bed comfortably  HEAD:  Atraumatic, Normocephalic  EYES: EOMI, PERRLA, conjunctiva and sclera clear  ENT: Moist mucous membranes  NECK: Supple, No JVD  CHEST/LUNG: Clear to auscultation bilaterally; No rales, rhonchi, wheezing, or rubs. Unlabored respirations  HEART: Regular rate and rhythm; No murmurs, rubs, or gallops  ABDOMEN: Bowel sounds present; Soft, Nontender, Nondistended. No hepatomegally  EXTREMITIES:  2+ Peripheral Pulses, brisk capillary refill. No clubbing, cyanosis, or edema  NERVOUS SYSTEM:  Alert & Oriented X3, speech clear. No deficits   MSK: FROM all 4 extremities, full and equal strength  SKIN: No rashes or lesions

## 2022-08-12 NOTE — H&P ADULT - NSICDXPASTMEDICALHX_GEN_ALL_CORE_FT
PAST MEDICAL HISTORY:  Aortic stenosis     Cancer of bladder     Chronic CHF     CVA (cerebrovascular accident)     HTN (hypertension)

## 2022-08-12 NOTE — H&P ADULT - ATTENDING COMMENTS
89 yo F with a PMHx of HTN, Stroke, CHF, Pacemaker, TAVR, Bladder CA. Presents to the ED today for altered mental status. Spoke to pts daughter Avis, she states that yesterday she spoke to her mother and she sounded very confused and didn't know that she was even talking to the phone. Her daughter told the aid to take the pt to the urgent care this morning, after an EKG the UC was concerned and they called an ambulance to bring her into [facility] ED. Pt was in NAD, and vitals were stable. Pt was A&Ox2, didn't know what day or month it was.      assessment  --  ams, r/o metabolic encephalopathy, r/o infectious process, r/o worsening dementia h/o HTN, Stroke, CHF, Pacemaker, TAVR, Bladder CA    plan  --  admit to med, cont preadmit home meds, gi and dvt prophylaxis  cbc, bmp, mg, phos, lipids, tsh, bld cx, vit b12, folate, rpr,       physical tx eval

## 2022-08-12 NOTE — ED PROVIDER NOTE - ATTENDING CONTRIBUTION TO CARE
I was physically present for the E/M service provided. I agree with above history, physical, and plan which I have reviewed and edited where appropriate. I was physically present for the key portions of the service provided.    [place]: Pt is a 89 yo F with a PMHx of HTN, Stroke, CHF, Pacemaker, TAVR, Bladder CA. Presents to the ED today for altered mental status. resolved. hx of ppm.    *GEN:   comfortable, in no apparent distress, AOx3  *EYES:   PERRL, extra-occular movements intact  *HEENT:   airway patent, moist mucosal membranes, uvula midline  *CV:   regular rate and rhythm, normal S1/S2, no murmur  *RESP:   clear to auscultation bilaterally, non-labored, speaking in full sentences  *ABD:   soft, non tender, no guarding  *MSK:   no musculoskeletal tenderness, moving all extremity  *SKIN:   dry, intact, no rash  *NEURO:   AOx3, no focal weakness or loss of sensation, GCS 15    a/p: confusion possibly acs vs uti vs tia - labs, ct, admit

## 2022-08-13 ENCOUNTER — TRANSCRIPTION ENCOUNTER (OUTPATIENT)
Age: 87
End: 2022-08-13

## 2022-08-13 VITALS
OXYGEN SATURATION: 95 % | TEMPERATURE: 98 F | RESPIRATION RATE: 17 BRPM | HEART RATE: 76 BPM | SYSTOLIC BLOOD PRESSURE: 123 MMHG | DIASTOLIC BLOOD PRESSURE: 79 MMHG

## 2022-08-13 LAB
ALBUMIN SERPL ELPH-MCNC: 3.7 G/DL — SIGNIFICANT CHANGE UP (ref 3.5–5)
ALP SERPL-CCNC: 53 U/L — SIGNIFICANT CHANGE UP (ref 40–120)
ALT FLD-CCNC: 26 U/L DA — SIGNIFICANT CHANGE UP (ref 10–60)
ANION GAP SERPL CALC-SCNC: 9 MMOL/L — SIGNIFICANT CHANGE UP (ref 5–17)
AST SERPL-CCNC: 15 U/L — SIGNIFICANT CHANGE UP (ref 10–40)
BASOPHILS # BLD AUTO: 0.04 K/UL — SIGNIFICANT CHANGE UP (ref 0–0.2)
BASOPHILS NFR BLD AUTO: 0.5 % — SIGNIFICANT CHANGE UP (ref 0–2)
BILIRUB SERPL-MCNC: 0.6 MG/DL — SIGNIFICANT CHANGE UP (ref 0.2–1.2)
BUN SERPL-MCNC: 26 MG/DL — HIGH (ref 7–18)
CALCIUM SERPL-MCNC: 9.9 MG/DL — SIGNIFICANT CHANGE UP (ref 8.4–10.5)
CHLORIDE SERPL-SCNC: 105 MMOL/L — SIGNIFICANT CHANGE UP (ref 96–108)
CO2 SERPL-SCNC: 27 MMOL/L — SIGNIFICANT CHANGE UP (ref 22–31)
CREAT SERPL-MCNC: 0.94 MG/DL — SIGNIFICANT CHANGE UP (ref 0.5–1.3)
CULTURE RESULTS: SIGNIFICANT CHANGE UP
EGFR: 56 ML/MIN/1.73M2 — LOW
EOSINOPHIL # BLD AUTO: 0.17 K/UL — SIGNIFICANT CHANGE UP (ref 0–0.5)
EOSINOPHIL NFR BLD AUTO: 2.3 % — SIGNIFICANT CHANGE UP (ref 0–6)
FOLATE SERPL-MCNC: >20 NG/ML — SIGNIFICANT CHANGE UP
GLUCOSE SERPL-MCNC: 86 MG/DL — SIGNIFICANT CHANGE UP (ref 70–99)
HCT VFR BLD CALC: 38.6 % — SIGNIFICANT CHANGE UP (ref 34.5–45)
HGB BLD-MCNC: 12.6 G/DL — SIGNIFICANT CHANGE UP (ref 11.5–15.5)
IMM GRANULOCYTES NFR BLD AUTO: 0.4 % — SIGNIFICANT CHANGE UP (ref 0–1.5)
LYMPHOCYTES # BLD AUTO: 0.9 K/UL — LOW (ref 1–3.3)
LYMPHOCYTES # BLD AUTO: 12.1 % — LOW (ref 13–44)
MAGNESIUM SERPL-MCNC: 2.1 MG/DL — SIGNIFICANT CHANGE UP (ref 1.6–2.6)
MCHC RBC-ENTMCNC: 30.9 PG — SIGNIFICANT CHANGE UP (ref 27–34)
MCHC RBC-ENTMCNC: 32.6 GM/DL — SIGNIFICANT CHANGE UP (ref 32–36)
MCV RBC AUTO: 94.6 FL — SIGNIFICANT CHANGE UP (ref 80–100)
MONOCYTES # BLD AUTO: 0.78 K/UL — SIGNIFICANT CHANGE UP (ref 0–0.9)
MONOCYTES NFR BLD AUTO: 10.5 % — SIGNIFICANT CHANGE UP (ref 2–14)
NEUTROPHILS # BLD AUTO: 5.51 K/UL — SIGNIFICANT CHANGE UP (ref 1.8–7.4)
NEUTROPHILS NFR BLD AUTO: 74.2 % — SIGNIFICANT CHANGE UP (ref 43–77)
NRBC # BLD: 0 /100 WBCS — SIGNIFICANT CHANGE UP (ref 0–0)
PHOSPHATE SERPL-MCNC: 2.9 MG/DL — SIGNIFICANT CHANGE UP (ref 2.5–4.5)
PLATELET # BLD AUTO: 154 K/UL — SIGNIFICANT CHANGE UP (ref 150–400)
POTASSIUM SERPL-MCNC: 4.1 MMOL/L — SIGNIFICANT CHANGE UP (ref 3.5–5.3)
POTASSIUM SERPL-SCNC: 4.1 MMOL/L — SIGNIFICANT CHANGE UP (ref 3.5–5.3)
PROT SERPL-MCNC: 6.9 G/DL — SIGNIFICANT CHANGE UP (ref 6–8.3)
RBC # BLD: 4.08 M/UL — SIGNIFICANT CHANGE UP (ref 3.8–5.2)
RBC # FLD: 13.7 % — SIGNIFICANT CHANGE UP (ref 10.3–14.5)
SODIUM SERPL-SCNC: 141 MMOL/L — SIGNIFICANT CHANGE UP (ref 135–145)
SPECIMEN SOURCE: SIGNIFICANT CHANGE UP
T PALLIDUM AB TITR SER: NEGATIVE — SIGNIFICANT CHANGE UP
TSH SERPL-MCNC: 2.04 UU/ML — SIGNIFICANT CHANGE UP (ref 0.34–4.82)
VIT B12 SERPL-MCNC: 922 PG/ML — SIGNIFICANT CHANGE UP (ref 232–1245)
WBC # BLD: 7.43 K/UL — SIGNIFICANT CHANGE UP (ref 3.8–10.5)
WBC # FLD AUTO: 7.43 K/UL — SIGNIFICANT CHANGE UP (ref 3.8–10.5)

## 2022-08-13 PROCEDURE — 85610 PROTHROMBIN TIME: CPT

## 2022-08-13 PROCEDURE — 71045 X-RAY EXAM CHEST 1 VIEW: CPT

## 2022-08-13 PROCEDURE — 82607 VITAMIN B-12: CPT

## 2022-08-13 PROCEDURE — 83605 ASSAY OF LACTIC ACID: CPT

## 2022-08-13 PROCEDURE — 82746 ASSAY OF FOLIC ACID SERUM: CPT

## 2022-08-13 PROCEDURE — 85025 COMPLETE CBC W/AUTO DIFF WBC: CPT

## 2022-08-13 PROCEDURE — 84484 ASSAY OF TROPONIN QUANT: CPT

## 2022-08-13 PROCEDURE — 93005 ELECTROCARDIOGRAM TRACING: CPT

## 2022-08-13 PROCEDURE — 81003 URINALYSIS AUTO W/O SCOPE: CPT

## 2022-08-13 PROCEDURE — 87040 BLOOD CULTURE FOR BACTERIA: CPT

## 2022-08-13 PROCEDURE — 99285 EMERGENCY DEPT VISIT HI MDM: CPT | Mod: 25

## 2022-08-13 PROCEDURE — 87086 URINE CULTURE/COLONY COUNT: CPT

## 2022-08-13 PROCEDURE — 86780 TREPONEMA PALLIDUM: CPT

## 2022-08-13 PROCEDURE — 84443 ASSAY THYROID STIM HORMONE: CPT

## 2022-08-13 PROCEDURE — 36415 COLL VENOUS BLD VENIPUNCTURE: CPT

## 2022-08-13 PROCEDURE — 70450 CT HEAD/BRAIN W/O DYE: CPT | Mod: MA

## 2022-08-13 PROCEDURE — 80053 COMPREHEN METABOLIC PANEL: CPT

## 2022-08-13 PROCEDURE — 85730 THROMBOPLASTIN TIME PARTIAL: CPT

## 2022-08-13 PROCEDURE — 84100 ASSAY OF PHOSPHORUS: CPT

## 2022-08-13 PROCEDURE — 83735 ASSAY OF MAGNESIUM: CPT

## 2022-08-13 PROCEDURE — 87635 SARS-COV-2 COVID-19 AMP PRB: CPT

## 2022-08-13 RX ORDER — APIXABAN 2.5 MG/1
2.5 TABLET, FILM COATED ORAL EVERY 12 HOURS
Refills: 0 | Status: DISCONTINUED | OUTPATIENT
Start: 2022-08-13 | End: 2022-08-13

## 2022-08-13 RX ORDER — SACUBITRIL AND VALSARTAN 24; 26 MG/1; MG/1
1 TABLET, FILM COATED ORAL
Refills: 0 | Status: DISCONTINUED | OUTPATIENT
Start: 2022-08-13 | End: 2022-08-13

## 2022-08-13 RX ORDER — BUMETANIDE 0.25 MG/ML
1 INJECTION INTRAMUSCULAR; INTRAVENOUS
Qty: 0 | Refills: 0 | DISCHARGE
Start: 2022-08-13

## 2022-08-13 RX ORDER — BUMETANIDE 0.25 MG/ML
1 INJECTION INTRAMUSCULAR; INTRAVENOUS DAILY
Refills: 0 | Status: DISCONTINUED | OUTPATIENT
Start: 2022-08-13 | End: 2022-08-13

## 2022-08-13 RX ORDER — ATORVASTATIN CALCIUM 80 MG/1
40 TABLET, FILM COATED ORAL AT BEDTIME
Refills: 0 | Status: DISCONTINUED | OUTPATIENT
Start: 2022-08-13 | End: 2022-08-13

## 2022-08-13 RX ORDER — DIGOXIN 250 MCG
125 TABLET ORAL DAILY
Refills: 0 | Status: DISCONTINUED | OUTPATIENT
Start: 2022-08-13 | End: 2022-08-13

## 2022-08-13 RX ORDER — METOPROLOL TARTRATE 50 MG
12.5 TABLET ORAL
Refills: 0 | Status: DISCONTINUED | OUTPATIENT
Start: 2022-08-13 | End: 2022-08-13

## 2022-08-13 RX ADMIN — ENOXAPARIN SODIUM 40 MILLIGRAM(S): 100 INJECTION SUBCUTANEOUS at 06:47

## 2022-08-13 NOTE — DISCHARGE NOTE NURSING/CASE MANAGEMENT/SOCIAL WORK - PATIENT PORTAL LINK FT
You can access the FollowMyHealth Patient Portal offered by [facility] by registering at the following website: [email or web address]. By joining [facility]’s FollowMyHealth portal, you will also be able to view your health information using other applications (apps) compatible with our system.

## 2022-08-13 NOTE — DISCHARGE NOTE PROVIDER - NSDCMRMEDTOKEN_GEN_ALL_CORE_FT
ALENDRONATE SODIUM  70 MG TABS: 1 tab(s) orally every 7 days  ATORVASTATIN CALCIUM  40 MG TABS: 1 tab(s) orally once a day  bumetanide 1 mg oral tablet: 1 tab(s) orally every other day  DIGOXIN  125 MCG TABS: 1 tab(s) orally once a day  ELIQUIS  2.5 MG TABS: 1 tab(s) orally 2 times a day  ENTRESTO  24-26 MG TABS: 1 tab(s) orally 2 times a day  METOPROLOL SUCC ER 25 MG TAB: TAKE 1 TABLET BY MOUTH EVERY 12 HOURS  MOMETASONE FUROATE  0.1 % SOLN:   SERTRALINE HCL  50 MG TABS: 3 tab(s) orally once a day

## 2022-08-13 NOTE — DISCHARGE NOTE PROVIDER - HOSPITAL COURSE
Patient is a 89 Y o female from home with PMH of HTN, CVA, CHF, PPM, AS, s/p TAVR, Bladder cancer, Walks with walker and is AAO x2-3 at baseline, who comes in with reports of AMS yesterday evening around "after dinner" felt as though she could not read her watch for a while, and felt confused, went to bed and felt normal when she got up.  Per chart, patient's daughter had called and found that her mother was confused, prompting the aid to bring her to urgent care where EKG and UCx changes prompted ED visit today.    Patient denied any syncope, LOC, falls, or trauma, recent illness. She was at her baseline mental status and answering questions appropriately.  In the ED BP 120s/80s HR 84 EKG ventricular paced rhythm  Labs: unremarkable   CT Head showed no acute changes or bleed. 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   Chest xray showed no acute findings.    Given her symptoms resolved she was deemed stable for discharge to home.  Her home health aid was present in the emergency room at time of discharge.

## 2022-08-13 NOTE — DISCHARGE NOTE PROVIDER - NSDCCPCAREPLAN_GEN_ALL_CORE_FT
PRINCIPAL DISCHARGE DIAGNOSIS  Diagnosis: Altered mental status  Assessment and Plan of Treatment: You presented with reports of confusion but on arrival to the hospital your symptoms had resolved.  CT scan of the head showed no acute findings.  Chest xray was clear.  Your bloodwork was normal.  Follow-up with your primary care provider.  Continue all your current medications as previously prescribed. We did not change your medications.

## 2022-08-13 NOTE — PROGRESS NOTE ADULT - SUBJECTIVE AND OBJECTIVE BOX
Patient is a 89y old  Female who presents with a chief complaint of AMS (12 Aug 2022 22:57)    pt seen in ed [ x ], reg med floor [   ], bed [ x ], chair at bedside [   ], awake and responsive [x  ], lethargic [  ],  nad [x  ]      Allergies    No Known Allergies        Vitals    T(F): 98.2 (08-13-22 @ 04:32), Max: 98.2 (08-13-22 @ 04:32)  HR: 71 (08-13-22 @ 04:32) (71 - 84)  BP: 124/77 (08-13-22 @ 04:32) (121/80 - 159/82)  RR: 18 (08-13-22 @ 04:32) (16 - 18)  SpO2: 95% (08-13-22 @ 04:32) (94% - 98%)  Wt(kg): --  CAPILLARY BLOOD GLUCOSE          Labs                          12.6   7.43  )-----------( 154      ( 13 Aug 2022 05:15 )             38.6       08-13    141  |  105  |  26<H>  ----------------------------<  86  4.1   |  27  |  0.94    Ca    9.9      13 Aug 2022 05:15  Phos  2.9     08-13  Mg     2.1     08-13    TPro  6.9  /  Alb  3.7  /  TBili  0.6  /  DBili  x   /  AST  15  /  ALT  26  /  AlkPhos  53  08-13    Urinalysis (08.12.22 @ 14:51)   Glucose Qualitative, Urine: Negative   Blood, Urine: Negative   pH Urine: 5.0   Color: Yellow   Urine Appearance: Clear   Bilirubin: Negative   Ketone - Urine: Negative   Specific Gravity: 1.010   Protein, Urine: Negative   Urobilinogen: Negative   Nitrite: Negative   Leukocyte Esterase Concentration: Negative       Troponin I, High Sensitivity Result: 26.5 ng/L (08-12-22 @ 14:51)          Radiology Results    < from: CT Head No Cont (08.12.22 @ 17:29) >  IMPRESSION: Age-appropriate involutional and ischemic gliotic changes. No   hemorrhage. 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    < end of copied text >    < from: Xray Chest 1 View- PORTABLE-Urgent (08.12.22 @ 15:57) >  Heart likely enlarged.    Left-sided dual-chamber pacemaker and TAVR  aortic valve noted.    Lungs grossly clear.    Advanced posttraumatic deformity, degeneration, and chronic subluxation   right shoulder noted.    IMPRESSION: No acute finding. Other findings as above.    < end of copied text >        Meds    MEDICATIONS  (STANDING):  enoxaparin Injectable 40 milliGRAM(s) SubCutaneous every 24 hours      MEDICATIONS  (PRN):  acetaminophen     Tablet .. 650 milliGRAM(s) Oral every 6 hours PRN Temp greater or equal to 38C (100.4F), Mild Pain (1 - 3)      Physical Exam    Neuro :  no focal deficits  Respiratory: CTA B/L  CV: RRR, S1S2, no murmurs,   Abdominal: Soft, NT, ND +BS,  Extremities: No edema, + peripheral pulses    ASSESSMENT    Altered mental status  metabolic encephalopathy r/o,   infectious process r/o,  r/o worsening dementia   h/o CVA (cerebrovascular accident)  HTN (hypertension)  Cancer of bladder  Aortic stenosis S/P TAVR (transcatheter aortic valve replacement)  Chronic CHF  Pacemaker      PLAN    cbc cmp wnl  ua neg noted above  cxr with No acute finding. Other findings as above.  pt afebrile  ct head  with Age-appropriate involutional and ischemic gliotic changes. No hemorrhage. 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  f/u tsh, bld cx, vit b12, folate, rpr,   phys tx eval   cont current  home meds      Patient is a 89y old  Female who presents with a chief complaint of AMS (12 Aug 2022 22:57)    pt seen in ed [ x ], reg med floor [   ], bed [ x ], chair at bedside [   ], awake and responsive [x  ], lethargic [  ],  nad [x  ]      Allergies    No Known Allergies        Vitals    T(F): 98.2 (08-13-22 @ 04:32), Max: 98.2 (08-13-22 @ 04:32)  HR: 71 (08-13-22 @ 04:32) (71 - 84)  BP: 124/77 (08-13-22 @ 04:32) (121/80 - 159/82)  RR: 18 (08-13-22 @ 04:32) (16 - 18)  SpO2: 95% (08-13-22 @ 04:32) (94% - 98%)  Wt(kg): --  CAPILLARY BLOOD GLUCOSE          Labs                          12.6   7.43  )-----------( 154      ( 13 Aug 2022 05:15 )             38.6       08-13    141  |  105  |  26<H>  ----------------------------<  86  4.1   |  27  |  0.94    Ca    9.9      13 Aug 2022 05:15  Phos  2.9     08-13  Mg     2.1     08-13    TPro  6.9  /  Alb  3.7  /  TBili  0.6  /  DBili  x   /  AST  15  /  ALT  26  /  AlkPhos  53  08-13    Thyroid Stimulating Hormone, Serum (08.13.22 @ 05:20)   Thyroid Stimulating Hormone, Serum: 2.04    Urinalysis (08.12.22 @ 14:51)   Glucose Qualitative, Urine: Negative   Blood, Urine: Negative   pH Urine: 5.0   Color: Yellow   Urine Appearance: Clear   Bilirubin: Negative   Ketone - Urine: Negative   Specific Gravity: 1.010   Protein, Urine: Negative   Urobilinogen: Negative   Nitrite: Negative   Leukocyte Esterase Concentration: Negative       Troponin I, High Sensitivity Result: 26.5 ng/L (08-12-22 @ 14:51)          Radiology Results    < from: CT Head No Cont (08.12.22 @ 17:29) >  IMPRESSION: Age-appropriate involutional and ischemic gliotic changes. No   hemorrhage. 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    < end of copied text >    < from: Xray Chest 1 View- PORTABLE-Urgent (08.12.22 @ 15:57) >  Heart likely enlarged.    Left-sided dual-chamber pacemaker and TAVR  aortic valve noted.    Lungs grossly clear.    Advanced posttraumatic deformity, degeneration, and chronic subluxation   right shoulder noted.    IMPRESSION: No acute finding. Other findings as above.    < end of copied text >        Meds    MEDICATIONS  (STANDING):  enoxaparin Injectable 40 milliGRAM(s) SubCutaneous every 24 hours      MEDICATIONS  (PRN):  acetaminophen     Tablet .. 650 milliGRAM(s) Oral every 6 hours PRN Temp greater or equal to 38C (100.4F), Mild Pain (1 - 3)      Physical Exam    Neuro :  no focal deficits  Respiratory: CTA B/L  CV: RRR, S1S2, no murmurs,   Abdominal: Soft, NT, ND +BS,  Extremities: No edema, + peripheral pulses    ASSESSMENT    Altered mental status  metabolic encephalopathy r/o,   infectious process r/o,  r/o worsening dementia   h/o CVA (cerebrovascular accident)  HTN (hypertension)  Cancer of bladder  Aortic stenosis S/P TAVR (transcatheter aortic valve replacement)  Chronic CHF  Pacemaker      PLAN    cbc cmp wnl  ua neg noted above  cxr with No acute finding. Other findings as above.  pt afebrile  ct head  with Age-appropriate involutional and ischemic gliotic changes. No hemorrhage. 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  tsh wnl noted above   f/u bld cx, vit b12, folate, rpr,   phys tx eval   cont current  home meds   case mgmt eval        Patient is a 89y old  Female who presents with a chief complaint of AMS (12 Aug 2022 22:57)    pt seen in ed [ x ], reg med floor [   ], bed [ x ], chair at bedside [   ], awake and responsive [x  ], lethargic [  ],  nad [x  ]      Allergies    No Known Allergies        Vitals    T(F): 98.2 (08-13-22 @ 04:32), Max: 98.2 (08-13-22 @ 04:32)  HR: 71 (08-13-22 @ 04:32) (71 - 84)  BP: 124/77 (08-13-22 @ 04:32) (121/80 - 159/82)  RR: 18 (08-13-22 @ 04:32) (16 - 18)  SpO2: 95% (08-13-22 @ 04:32) (94% - 98%)  Wt(kg): --  CAPILLARY BLOOD GLUCOSE          Labs                          12.6   7.43  )-----------( 154      ( 13 Aug 2022 05:15 )             38.6       08-13    141  |  105  |  26<H>  ----------------------------<  86  4.1   |  27  |  0.94    Ca    9.9      13 Aug 2022 05:15  Phos  2.9     08-13  Mg     2.1     08-13    TPro  6.9  /  Alb  3.7  /  TBili  0.6  /  DBili  x   /  AST  15  /  ALT  26  /  AlkPhos  53  08-13    Thyroid Stimulating Hormone, Serum (08.13.22 @ 05:20)   Thyroid Stimulating Hormone, Serum: 2.04    Vitamin B12, Serum (08.13.22 @ 05:20)   Vitamin B12, Serum: 922 pg/mL   Folate, Serum (08.13.22 @ 05:20)   Folate, Serum: >20.0 ng/mL    Urinalysis (08.12.22 @ 14:51)   Glucose Qualitative, Urine: Negative   Blood, Urine: Negative   pH Urine: 5.0   Color: Yellow   Urine Appearance: Clear   Bilirubin: Negative   Ketone - Urine: Negative   Specific Gravity: 1.010   Protein, Urine: Negative   Urobilinogen: Negative   Nitrite: Negative   Leukocyte Esterase Concentration: Negative       Troponin I, High Sensitivity Result: 26.5 ng/L (08-12-22 @ 14:51)          Radiology Results    < from: CT Head No Cont (08.12.22 @ 17:29) >  IMPRESSION: Age-appropriate involutional and ischemic gliotic changes. No   hemorrhage. 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    < end of copied text >    < from: Xray Chest 1 View- PORTABLE-Urgent (08.12.22 @ 15:57) >  Heart likely enlarged.    Left-sided dual-chamber pacemaker and TAVR  aortic valve noted.    Lungs grossly clear.    Advanced posttraumatic deformity, degeneration, and chronic subluxation   right shoulder noted.    IMPRESSION: No acute finding. Other findings as above.    < end of copied text >        Meds    MEDICATIONS  (STANDING):  enoxaparin Injectable 40 milliGRAM(s) SubCutaneous every 24 hours      MEDICATIONS  (PRN):  acetaminophen     Tablet .. 650 milliGRAM(s) Oral every 6 hours PRN Temp greater or equal to 38C (100.4F), Mild Pain (1 - 3)      Physical Exam    Neuro :  no focal deficits  Respiratory: CTA B/L  CV: RRR, S1S2, no murmurs,   Abdominal: Soft, NT, ND +BS,  Extremities: No edema, + peripheral pulses    ASSESSMENT    Altered mental status  metabolic encephalopathy r/o,   infectious process r/o,  h/o CVA (cerebrovascular accident)  HTN (hypertension)  Cancer of bladder  Aortic stenosis S/P TAVR (transcatheter aortic valve replacement)  Chronic CHF  Pacemaker      PLAN    pt at baseline  cbc cmp wnl  ua neg noted above  cxr with No acute finding. Other findings as above.  pt afebrile  ct head  with Age-appropriate involutional and ischemic gliotic changes. No hemorrhage. 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  tsh wnl noted above   vit b12, folate, wnl noted above  phys tx eval outpt  cont current  home meds   pt stable for d/c

## 2022-08-13 NOTE — ED ADULT NURSE REASSESSMENT NOTE - NS ED NURSE REASSESS COMMENT FT1
Pt resting comfortably, given food, no distress noted, safety precaution maintained, ed observation continues.

## 2022-08-24 RX ORDER — ALENDRONATE SODIUM 70 MG/1
0 TABLET ORAL
Qty: 0 | Refills: 0 | DISCHARGE

## 2022-08-24 RX ORDER — BUMETANIDE 0.25 MG/ML
0 INJECTION INTRAMUSCULAR; INTRAVENOUS
Qty: 0 | Refills: 0 | DISCHARGE

## 2022-08-24 RX ORDER — SERTRALINE 25 MG/1
0 TABLET, FILM COATED ORAL
Qty: 0 | Refills: 0 | DISCHARGE

## 2022-08-24 RX ORDER — ATORVASTATIN CALCIUM 80 MG/1
0 TABLET, FILM COATED ORAL
Qty: 0 | Refills: 0 | DISCHARGE

## 2022-08-24 RX ORDER — DIGOXIN 250 MCG
1 TABLET ORAL
Qty: 0 | Refills: 0 | DISCHARGE

## 2022-08-24 RX ORDER — SACUBITRIL AND VALSARTAN 24; 26 MG/1; MG/1
0 TABLET, FILM COATED ORAL
Qty: 0 | Refills: 0 | DISCHARGE

## 2022-08-24 RX ORDER — APIXABAN 2.5 MG/1
0 TABLET, FILM COATED ORAL
Qty: 0 | Refills: 0 | DISCHARGE

## 2022-08-24 RX ORDER — DIGOXIN 250 MCG
0 TABLET ORAL
Qty: 0 | Refills: 0 | DISCHARGE

## 2022-08-24 RX ORDER — SACUBITRIL AND VALSARTAN 24; 26 MG/1; MG/1
1 TABLET, FILM COATED ORAL
Qty: 0 | Refills: 0 | DISCHARGE

## 2022-09-07 ENCOUNTER — NON-APPOINTMENT (OUTPATIENT)
Age: 87
End: 2022-09-07

## 2022-09-07 ENCOUNTER — APPOINTMENT (OUTPATIENT)
Dept: ELECTROPHYSIOLOGY | Facility: CLINIC | Age: 87
End: 2022-09-07

## 2022-09-07 PROBLEM — I10 ESSENTIAL (PRIMARY) HYPERTENSION: Chronic | Status: ACTIVE | Noted: 2022-08-12

## 2022-09-07 PROBLEM — I50.9 HEART FAILURE, UNSPECIFIED: Chronic | Status: ACTIVE | Noted: 2022-08-12

## 2022-09-07 PROBLEM — C67.9 MALIGNANT NEOPLASM OF BLADDER, UNSPECIFIED: Chronic | Status: ACTIVE | Noted: 2022-08-12

## 2022-09-07 PROBLEM — I63.9 CEREBRAL INFARCTION, UNSPECIFIED: Chronic | Status: ACTIVE | Noted: 2022-08-12

## 2022-09-07 PROBLEM — I35.0 NONRHEUMATIC AORTIC (VALVE) STENOSIS: Chronic | Status: ACTIVE | Noted: 2022-08-12

## 2022-09-07 PROCEDURE — 93296 REM INTERROG EVL PM/IDS: CPT

## 2022-09-07 PROCEDURE — 93294 REM INTERROG EVL PM/LDLS PM: CPT

## 2022-09-18 ENCOUNTER — RX RENEWAL (OUTPATIENT)
Age: 87
End: 2022-09-18

## 2022-09-19 NOTE — ED ADULT NURSE NOTE - NEURO SENSATION
Pt started having mid sternal chest pain and pain in her lungs approx. 2 hrs ago while driving. Pt is also 34 weeks pregnant, seeing Dr Thomas. G 4 P 1. Denies complications during preganncy.  
sensory intact

## 2022-09-22 NOTE — PHYSICAL THERAPY INITIAL EVALUATION ADULT - ASR EQUIP NEEDS DISCH PT EVAL
INTUBATION    Date/Time: 9/21/2022 9:53 PM  Performed by: Cyndi Guillaume DO  Authorized by: Cyndi Guillaume DO   Consent: The procedure was performed in an emergent situation.  Required items: required blood products, implants, devices, and special equipment available  Patient identity confirmed: arm band  Time out: Immediately prior to procedure a \"time out\" was called to verify the correct patient, procedure, equipment, support staff and site/side marked as required.  Indications: airway protection  Intubation method: video-assisted  Patient status: paralyzed (RSI)  Preoxygenation: nonrebreather mask  Sedatives: etomidate  Paralytic: rocuronium  Laryngoscope size: Hyperangulated 3.  Tube size: 7.5 mm  Tube type: cuffed  Number of attempts: 1  Cords visualized: yes  Post-procedure assessment: chest rise and CO2 detector  Breath sounds: equal  Cuff inflated: yes  ETT to teeth: 23 cm  Tube secured with: ETT jim  Chest x-ray interpreted by me and other physician (Dr. Nieves).  Chest x-ray findings: endotracheal tube in appropriate position  Patient tolerance: patient tolerated the procedure well with no immediate complications        Supervised by attending, Dr. Lizbeth Guillaume DO  Emergency Medicine, PGY2       tbd at [facility] setting

## 2022-10-18 NOTE — ED ADULT NURSE NOTE - SUICIDE SCREENING QUESTION 3
[facility]       Patient: Saqib Frias Patient Status:  Inpatient    1948 MRN [id number]   Location [facility] [address] Attending Sung Walker,    Hosp Day # 3 PCP Latia Garcia MD       S:     States breathing is better      Patient Active Problem List    Diagnosis Date Noted   • Congestive heart failure of unknown etiology (CMS/HCC) 10/14/2022     Priority: Low   • Acute on chronic diastolic congestive heart failure (CMS/HCC) 10/14/2022     Priority: Low   • Acute respiratory failure with hypoxia (CMS/HCC) 10/14/2022     Priority: Low   • Atrial fibrillation (CMS/HCC) 10/08/2022     Priority: Low   • Acute exacerbation of CHF (congestive heart failure) (CMS/[facility]) 10/08/2022     Priority: Low   • Anxiety 10/04/2022     Priority: Low   • Atherosclerosis of aorta (CMS/[facility]) 2022     Priority: Low     10/8/18 CT Abdomen and Pelvis -  Atelectasis, scarring bilaterally.  Calcifications coronary arteries.  Cyst in the left lobe of the liver.  Subcentimeter low-density right lobe of the liver.  Cholelithiasis.  No evidence of acute cholecystitis.  Low-density lesion in the spleen measuring 11.4 mm   There is infiltration of the mesenteric fat in the upper abdomen slightly to the left of midline.  There are small lymph nodes in this region.  Infiltration extends down into the mid abdomen.  However seen also on the CT of .  Increase in number and size of retroperitoneal lymph nodes.  Seen also previously.  Calcification abdominal aorta.    20 CTA Chest- The heart is enlarged multichamber prominence.  Moderate atherosclerotic calcifications are identified in the coronary arteries.     • CAD (coronary artery disease) 2021     Priority: Low   • Venous stasis ulcer of ankle, right (CMS/[facility]) 10/07/2020     Priority: Low   • Prediabetes 2020     Priority: Low   • Benign prostatic hyperplasia with urinary frequency 2020     Priority: Low   • Hydronephrosis 2020      Priority: Low   • Primary osteoarthritis involving multiple joints 02/28/2020     Priority: Low   • History of rectal cancer 02/28/2020     Priority: Low   • Colostomy in place (CMS/HCC) 02/28/2020     Priority: Low   • Hyperlipidemia 02/28/2020     Priority: Low   • Melanoma of skin (CMS/HCC) 02/28/2020     Priority: Low   • Absence of bladder continence 09/19/2019     Priority: Low   • Gastroesophageal reflux disease 07/10/2018     Priority: Low   • Balance disorder 04/04/2018     Priority: Low   • Chronic venous stasis dermatitis of both lower extremities 06/15/2017     Priority: Low   • Bilateral edema of lower extremity 05/31/2017     Priority: Low   • Peripheral vascular disease (CMS/HCC) 03/17/2017     Priority: Low   • Benign essential hypertension 11/19/2016     Priority: Low   • Morbid obesity with BMI of 40.0-44.9, adult (CMS/HCC) 11/19/2016     Priority: Low     1/10/22 BMI 41.98       Past Medical History:   Diagnosis Date   • Benign localized prostatic hyperplasia with lower urinary tract symptoms (LUTS)    • Coronary artery disease    • Erectile dysfunction    • Essential (primary) hypertension    • High cholesterol    • Kidney stone    • Macular puckering, right    • Malignant neoplasm (CMS/HCC)     colon cancer   • Morbid obesity (CMS/HCC)    • Osteoarthritis     ankles, feet   • Other secondary cataract, bilateral    • PAD (peripheral artery disease) (CMS/HCC)    • Plantar fasciitis     chronic   • Venous stasis ulcer of ankle, right (CMS/HCC) 10/07/2020     Past Surgical History:   Procedure Laterality Date   • Colostomy     • Colostomy     • Cystoscopy w/ ureteral stent removal Left 01/27/2020   • Cystoscopy,dil urethral stricture  01/06/2020   • Hernia repair       Prior to Admission medications    Medication Sig Start Date End Date Taking? Authorizing Provider   senna (SENOKOT) 8.6 MG tablet Take 2 tablets by mouth daily as needed for Constipation.   Yes Outside Provider   Multiple Vitamin  (MULTIVITAMIN ADULT PO) Take 1 tablet by mouth daily.   Yes Outside Provider   acetaminophen (TYLENOL) 325 MG tablet Take 2 tablets by mouth every 4 hours as needed for Pain. 10/12/22  Yes Nathanael Murphy MD   benazepril (LOTENSIN) 20 MG tablet Take 1 tablet by mouth daily. 10/12/22  Yes Nathanael Murphy MD   furosemide (LASIX) 40 MG tablet Take 1 tablet by mouth 2 times daily. 10/12/22  Yes Nathanael Murphy MD   apixaBAN (ELIQUIS) 5 MG Tab Take 1 tablet by mouth every 12 hours. 10/12/22  Yes Nathanael Murphy MD   doxycycline hyclate (VIBRAMYCIN) 100 MG capsule Take 1 capsule by mouth every 12 hours for 4 days.  Patient taking differently: Take 100 mg by mouth every 12 hours. For 4 Days   Start date 10/12/22  End Date 10/16/22 10/12/22 10/16/22 Yes Nathanael Murphy MD   halobetasol (ULTRAVATE) 0.05 % cream Apply 1 application topically 2 times daily. apply to BLE 7/12/21  Yes Provider Not In System   Vitamin D, Cholecalciferol, 50 mcg (2,000 units) capsule Take 50 mcg by mouth daily. 7/10/21  Yes Latia Garcia MD   oxybutynin (DITROPAN-XL) 10 MG 24 hr tablet Take 10 mg by mouth daily.    Yes Outside Provider   potassium CHLORIDE (KLOR-CON M) 20 MEQ sharan ER tablet Take 20 mEq by mouth daily.   Yes Outside Provider   ascorbic acid (VITAMIN C) 250 MG tablet Take 250 mg by mouth daily.   Yes Outside Provider   vitamin E 400 UNIT capsule Take 400 Units by mouth daily.   Yes Outside Provider   metoPROLOL succinate (TOPROL-XL) 25 MG 24 hr tablet Take 25 mg by mouth daily. 6/4/19  Yes Outside Provider   simvastatin (ZOCOR) 40 MG tablet Take 40 mg by mouth nightly.  6/4/19  Yes Outside Provider   docusate sodium-sennosides (SENOKOT S) 50-8.6 MG per tablet Take 2 tablets by mouth daily as needed for Constipation. 10/12/22 10/14/22  Nathanael Murphy MD   Multiple Vitamins-Minerals (MULTIVITAL PO) Take 1 tablet by mouth daily.  10/14/22  Outside Provider     Current Facility-Administered Medications    Medication Dose Route Frequency Provider Last Rate Last Admin   • ipratropium-albuterol (DUONEB) 0.5-2.5 (3) MG/3ML nebulizer solution 3 mL  3 mL Nebulization BID Resp Nathanael Murphy MD   3 mL at 10/18/22 0812   • guaiFENesin (MUCINEX) ER tablet 600 mg  600 mg Oral 2 times per day Alisia Gamboa MD   600 mg at 10/18/22 0735   • doxycycline hyclate (VIBRAMYCIN) capsule 100 mg  100 mg Oral 2 times per day Alisia Gamboa MD   100 mg at 10/18/22 0735   • fluticasone (FLONASE) 50 MCG/ACT nasal spray 1 spray  1 spray Each Nare Daily Alisia Gamboa MD   1 spray at 10/18/22 0813   • torsemide (DEMADEX) tablet 10 mg  10 mg Oral BID Nathanael Murphy MD   10 mg at 10/18/22 0735   • melatonin tablet 6 mg  6 mg Oral Nightly Jose Lozada MD   6 mg at 10/17/22 2332   • spironolactone (ALDACTONE) tablet 25 mg  25 mg Oral Daily Nathanael Murphy MD   25 mg at 10/18/22 0735   • metoPROLOL succinate (TOPROL-XL) ER tablet 50 mg  50 mg Oral BID Nathanael Murphy MD   50 mg at 10/18/22 0735   • metoPROLOL (LOPRESSOR) injection 5 mg  5 mg Intravenous Q6H PRN Nathanael Murphy MD       • hydrALAZINE (APRESOLINE) injection 10 mg  10 mg Intravenous Q6H PRN Nathanael Murphy MD       • apixaBAN (ELIQUIS) tablet 5 mg  5 mg Oral 2 times per day Nathanael Murphy MD   5 mg at 10/18/22 0735   • ascorbic acid (VITAMIN C) tablet 250 mg  250 mg Oral Daily Nathanael Murphy MD   250 mg at 10/18/22 0734   • clobetasol (TEMOVATE) 0.05 % cream 1 application  1 application Topical 2 times per day Nathanael Murphy MD   1 application at 10/18/22 0736   • oxybutynin (DITROPAN-XL) ER tablet 10 mg  10 mg Oral Daily Nathanael Murphy MD   10 mg at 10/18/22 0785   • atorvastatin (LIPITOR) tablet 20 mg  20 mg Oral Nightly Nathanael Murphy MD   20 mg at 10/17/22 2592   • cholecalciferol (VITAMIN D) tablet 25 mcg  25 mcg Oral Daily Nathanael Murphy MD   25 mcg at 10/18/22 0735   • vitamin E capsule 400 Units  400 Units Oral  Daily Nathanael Murphy MD   400 Units at 10/18/22 0734   • sodium chloride 0.9 % flush bag 25 mL  25 mL Intravenous PRN Nathanael Murphy MD       • sodium chloride (PF) 0.9 % injection 2 mL  2 mL Intracatheter 2 times per day Nathanael Murphy MD   2 mL at 10/18/22 0738   • sodium chloride 0.9 % flush bag 25 mL  25 mL Intravenous PRN Nathanael Murphy MD       • sodium chloride (NORMAL SALINE) 0.9 % bolus 500 mL  500 mL Intravenous PRN Nathanael Murphy MD       • Magnesium Standard Replacement Protocol   Does not apply See Admin Instructions Nathanael Murphy MD       • Potassium Replacement (Levels 3.6 - 4)   Does not apply See Admin Instructions Nathanael Murphy MD       • ondansetron (ZOFRAN) injection 4 mg  4 mg Intravenous BID PRN Nathanael Murphy MD       • acetaminophen (TYLENOL) tablet 650 mg  650 mg Oral Q4H PRN Nathanael Murphy MD       • polyethylene glycol (MIRALAX) packet 17 g  17 g Oral Daily PRN Nathanael Murphy MD       • docusate sodium-sennosides (SENOKOT S) 50-8.6 MG 2 tablet  2 tablet Oral Daily PRN Nathanael Murphy MD       • nystatin (MYCOSTATIN) powder   Topical TID Nathanael Murphy MD   Given at 10/17/22 2332     ALLERGIES:   Allergen Reactions   • Strawberry   (Food Or Med) HIVES     Social History     Tobacco Use   • Smoking status: Former Smoker     Packs/day: 0.00   • Smokeless tobacco: Never Used   • Tobacco comment: QUIT    Substance Use Topics   • Alcohol use: Yes     Comment: socially, monthly     Family History   Problem Relation Age of Onset   • Cancer Mother    • Cancer, Lung Father        Review of Systems:  All 10 systems were otherwise reviewed in detail and were negative, except as outlined above.    Objective:    Vitals:    10/18/22 0733   BP: 118/67   Pulse: 90   Resp: 16   Temp: 97.5 °F (36.4 °C)     Weight    10/14/22 0751 10/14/22 1227 10/16/22 0508 10/18/22 0612   Weight: 135.2 kg (298 lb 1 oz) 131 kg (288 lb 12.8 oz) 132.8 kg (292 lb 12.3  oz) 128.3 kg (282 lb 13.6 oz)          Intake/Output Summary (Last 24 hours) at 10/18/2022 0849  Last data filed at 10/18/2022 0659  Gross per 24 hour   Intake 360 ml   Output 2050 ml   Net -1690 ml       Physical Exam  Constitutional:       Appearance: He is well-developed.   HENT:      Head: Normocephalic and atraumatic.   Eyes:      General: No scleral icterus.  Neck:      Vascular: No carotid bruit or JVD.   Cardiovascular:      Rate and Rhythm: Normal rate. Rhythm irregularly irregular.      Pulses: Intact distal pulses.      Heart sounds: No murmur heard.    No friction rub. No gallop.   Pulmonary:      Breath sounds: Examination of the right-lower field reveals decreased breath sounds. Examination of the left-lower field reveals decreased breath sounds. Decreased breath sounds and wheezing present. No rales.   Abdominal:      General: There is no distension.      Palpations: Abdomen is soft.   Musculoskeletal:      Cervical back: Neck supple.      Right lower leg: Edema (3=) present.      Left lower leg: Edema (3+) present.   Skin:     General: Skin is warm and dry.      Findings: Rash present. Rash is scaling.      Comments: +stasis changes   Neurological:      Mental Status: He is alert.             Data Review:    CMP  Recent Labs     10/16/22  0656 10/17/22  0454 10/18/22  0430   SODIUM 138 137 136   POTASSIUM 3.9 4.0 4.3   CO2 39* 38* 36*   ANIONGAP 5* 5* 5*   GLUCOSE 101* 110* 110*   BUN 35* 33* 33*   CREATININE 1.37* 1.43* 1.32*   BCRAT 26* 23 25   CALCIUM 9.5 9.7 9.5        CBC w/ Diff  Recent Labs     10/16/22  0656 10/17/22  0454 10/18/22  0430   WBC 10.6 10.3 9.0   RBC 5.61 5.58 5.65   HGB 15.4 15.2 15.2   HCT 51.3* 50.7 51.2*    298 311   MCV 91.4 90.9 90.6   MCH 27.5 27.2 26.9   MCHC 30.0* 30.0* 29.7*   NRBCRE 0 0 0        Trop/NT-ProBNP  No results for input(s): HTROPI, NTPROB in the last 72 hours.     Encounter Date: 10/14/22   Electrocardiogram 12-Lead   Result Value    Ventricular Rate  EKG/Min (BPM) 129    Atrial Rate (BPM) 163    QRS-Interval (MSEC) 94    QT-Interval (MSEC) 318    QTc 466    R Axis (Degrees) 19    T Axis (Degrees) 90    REPORT TEXT      Atrial fibrillation  with rapid ventricular response  Abnormal ECG  When compared with ECG of  08-OCT-2022 20:03,  Nonspecific T wave abnormality now evident in  Lateral leads  Confirmed by FERNANDA ROMERO M.D. ([id number]) on 10/14/2022 1:19:28 PM             All of the above data was reviewed personally.    Assessment:    1. Acute on chronic HFpEF and R sided CHF  2. Persistent AF  3. HTN  4. Pulm HTN  5. CAD, mild in past on cath  6. CKD  7. COPD    Plan:    1. With concern re rising CO2, IV lasix was held- now PO diuretics  2. torsemide 10mg po bid  3. Would maintain spironolactone 25mg daily, started this admit.  4. Appreciate pulm eval - will have pt further review role of RHC with Dr. Walters  5. Increased Toprol to 50mg BID- rate controlled  6. Cont Eliquis  7. Cont statin  8. Recent echo reviewed, no need to repeat at this time  9. Monitor tele  10. On dc will need to order fluid restriction at SNF  11. Will follow    Weight down  I/Os -4.8 L  Renal fxn stable    Agree discharge planning  Will need to see our APN 1 week    Pt of Dr. Walters       No

## 2022-12-05 ENCOUNTER — EMERGENCY (EMERGENCY)
Facility: HOSPITAL | Age: 87
LOS: 1 days | Discharge: HOME | End: 2022-12-05
Attending: EMERGENCY MEDICINE
Payer: MEDICARE

## 2022-12-05 VITALS
WEIGHT: 106.04 LBS | HEIGHT: 62 IN | DIASTOLIC BLOOD PRESSURE: 72 MMHG | SYSTOLIC BLOOD PRESSURE: 121 MMHG | TEMPERATURE: 98 F | OXYGEN SATURATION: 96 % | RESPIRATION RATE: 19 BRPM | HEART RATE: 75 BPM

## 2022-12-05 VITALS
RESPIRATION RATE: 18 BRPM | TEMPERATURE: 98 F | HEART RATE: 70 BPM | DIASTOLIC BLOOD PRESSURE: 70 MMHG | OXYGEN SATURATION: 98 % | SYSTOLIC BLOOD PRESSURE: 111 MMHG

## 2022-12-05 DIAGNOSIS — Z95.2 PRESENCE OF PROSTHETIC HEART VALVE: Chronic | ICD-10-CM

## 2022-12-05 DIAGNOSIS — Z95.0 PRESENCE OF CARDIAC PACEMAKER: Chronic | ICD-10-CM

## 2022-12-05 DIAGNOSIS — Z90.722 ACQUIRED ABSENCE OF OVARIES, BILATERAL: Chronic | ICD-10-CM

## 2022-12-05 DIAGNOSIS — Z98.890 OTHER SPECIFIED POSTPROCEDURAL STATES: Chronic | ICD-10-CM

## 2022-12-05 LAB
ALBUMIN SERPL ELPH-MCNC: 4 G/DL — SIGNIFICANT CHANGE UP (ref 3.3–5)
ALP SERPL-CCNC: 52 U/L — SIGNIFICANT CHANGE UP (ref 40–120)
ALT FLD-CCNC: 25 U/L — SIGNIFICANT CHANGE UP (ref 10–45)
ANION GAP SERPL CALC-SCNC: 13 MMOL/L — SIGNIFICANT CHANGE UP (ref 5–17)
APTT BLD: 37 SEC — HIGH (ref 27.5–35.5)
AST SERPL-CCNC: 19 U/L — SIGNIFICANT CHANGE UP (ref 10–40)
BASOPHILS # BLD AUTO: 0.01 K/UL — SIGNIFICANT CHANGE UP (ref 0–0.2)
BASOPHILS NFR BLD AUTO: 0.2 % — SIGNIFICANT CHANGE UP (ref 0–2)
BILIRUB SERPL-MCNC: 0.3 MG/DL — SIGNIFICANT CHANGE UP (ref 0.2–1.2)
BUN SERPL-MCNC: 28 MG/DL — HIGH (ref 7–23)
CALCIUM SERPL-MCNC: 9.7 MG/DL — SIGNIFICANT CHANGE UP (ref 8.4–10.5)
CHLORIDE SERPL-SCNC: 100 MMOL/L — SIGNIFICANT CHANGE UP (ref 96–108)
CO2 SERPL-SCNC: 24 MMOL/L — SIGNIFICANT CHANGE UP (ref 22–31)
CREAT SERPL-MCNC: 0.85 MG/DL — SIGNIFICANT CHANGE UP (ref 0.5–1.3)
EGFR: 63 ML/MIN/1.73M2 — SIGNIFICANT CHANGE UP
EOSINOPHIL # BLD AUTO: 0.08 K/UL — SIGNIFICANT CHANGE UP (ref 0–0.5)
EOSINOPHIL NFR BLD AUTO: 1.3 % — SIGNIFICANT CHANGE UP (ref 0–6)
GLUCOSE SERPL-MCNC: 138 MG/DL — HIGH (ref 70–99)
HCT VFR BLD CALC: 38.5 % — SIGNIFICANT CHANGE UP (ref 34.5–45)
HGB BLD-MCNC: 12.6 G/DL — SIGNIFICANT CHANGE UP (ref 11.5–15.5)
IMM GRANULOCYTES NFR BLD AUTO: 0.2 % — SIGNIFICANT CHANGE UP (ref 0–0.9)
INR BLD: 1.49 RATIO — HIGH (ref 0.88–1.16)
LIDOCAIN IGE QN: 72 U/L — HIGH (ref 7–60)
LYMPHOCYTES # BLD AUTO: 0.54 K/UL — LOW (ref 1–3.3)
LYMPHOCYTES # BLD AUTO: 9.1 % — LOW (ref 13–44)
MCHC RBC-ENTMCNC: 30.6 PG — SIGNIFICANT CHANGE UP (ref 27–34)
MCHC RBC-ENTMCNC: 32.7 GM/DL — SIGNIFICANT CHANGE UP (ref 32–36)
MCV RBC AUTO: 93.4 FL — SIGNIFICANT CHANGE UP (ref 80–100)
MONOCYTES # BLD AUTO: 0.19 K/UL — SIGNIFICANT CHANGE UP (ref 0–0.9)
MONOCYTES NFR BLD AUTO: 3.2 % — SIGNIFICANT CHANGE UP (ref 2–14)
NEUTROPHILS # BLD AUTO: 5.1 K/UL — SIGNIFICANT CHANGE UP (ref 1.8–7.4)
NEUTROPHILS NFR BLD AUTO: 86 % — HIGH (ref 43–77)
NRBC # BLD: 0 /100 WBCS — SIGNIFICANT CHANGE UP (ref 0–0)
PLATELET # BLD AUTO: 168 K/UL — SIGNIFICANT CHANGE UP (ref 150–400)
POTASSIUM SERPL-MCNC: 4.1 MMOL/L — SIGNIFICANT CHANGE UP (ref 3.5–5.3)
POTASSIUM SERPL-SCNC: 4.1 MMOL/L — SIGNIFICANT CHANGE UP (ref 3.5–5.3)
PROT SERPL-MCNC: 6.8 G/DL — SIGNIFICANT CHANGE UP (ref 6–8.3)
PROTHROM AB SERPL-ACNC: 17.3 SEC — HIGH (ref 10.5–13.4)
RBC # BLD: 4.12 M/UL — SIGNIFICANT CHANGE UP (ref 3.8–5.2)
RBC # FLD: 14.6 % — HIGH (ref 10.3–14.5)
SODIUM SERPL-SCNC: 137 MMOL/L — SIGNIFICANT CHANGE UP (ref 135–145)
WBC # BLD: 5.93 K/UL — SIGNIFICANT CHANGE UP (ref 3.8–10.5)
WBC # FLD AUTO: 5.93 K/UL — SIGNIFICANT CHANGE UP (ref 3.8–10.5)

## 2022-12-05 PROCEDURE — 99284 EMERGENCY DEPT VISIT MOD MDM: CPT | Mod: 25

## 2022-12-05 PROCEDURE — 73502 X-RAY EXAM HIP UNI 2-3 VIEWS: CPT

## 2022-12-05 PROCEDURE — 85730 THROMBOPLASTIN TIME PARTIAL: CPT

## 2022-12-05 PROCEDURE — 73502 X-RAY EXAM HIP UNI 2-3 VIEWS: CPT | Mod: 26,RT

## 2022-12-05 PROCEDURE — 76377 3D RENDER W/INTRP POSTPROCES: CPT | Mod: 26

## 2022-12-05 PROCEDURE — 99284 EMERGENCY DEPT VISIT MOD MDM: CPT

## 2022-12-05 PROCEDURE — 85610 PROTHROMBIN TIME: CPT

## 2022-12-05 PROCEDURE — 76377 3D RENDER W/INTRP POSTPROCES: CPT

## 2022-12-05 PROCEDURE — 72192 CT PELVIS W/O DYE: CPT | Mod: 26,MA

## 2022-12-05 PROCEDURE — 80053 COMPREHEN METABOLIC PANEL: CPT

## 2022-12-05 PROCEDURE — 83690 ASSAY OF LIPASE: CPT

## 2022-12-05 PROCEDURE — 72100 X-RAY EXAM L-S SPINE 2/3 VWS: CPT | Mod: 26

## 2022-12-05 PROCEDURE — 72131 CT LUMBAR SPINE W/O DYE: CPT | Mod: 26,MA

## 2022-12-05 PROCEDURE — 85025 COMPLETE CBC W/AUTO DIFF WBC: CPT

## 2022-12-05 PROCEDURE — 72100 X-RAY EXAM L-S SPINE 2/3 VWS: CPT

## 2022-12-05 PROCEDURE — 72131 CT LUMBAR SPINE W/O DYE: CPT | Mod: MA

## 2022-12-05 PROCEDURE — 72192 CT PELVIS W/O DYE: CPT | Mod: MA

## 2022-12-05 RX ORDER — LIDOCAINE 4 G/100G
1 CREAM TOPICAL ONCE
Refills: 0 | Status: COMPLETED | OUTPATIENT
Start: 2022-12-05 | End: 2022-12-05

## 2022-12-05 RX ORDER — ACETAMINOPHEN 500 MG
650 TABLET ORAL ONCE
Refills: 0 | Status: COMPLETED | OUTPATIENT
Start: 2022-12-05 | End: 2022-12-05

## 2022-12-05 RX ADMIN — LIDOCAINE 1 PATCH: 4 CREAM TOPICAL at 14:52

## 2022-12-05 RX ADMIN — Medication 50 MILLIGRAM(S): at 14:51

## 2022-12-05 RX ADMIN — LIDOCAINE 1 PATCH: 4 CREAM TOPICAL at 19:53

## 2022-12-05 RX ADMIN — Medication 650 MILLIGRAM(S): at 14:51

## 2022-12-05 NOTE — ED PROVIDER NOTE - NSICDXPASTMEDICALHX_GEN_ALL_CORE_FT
PAST MEDICAL HISTORY:  Aortic stenosis     Bladder cancer TURBT x 2 in the past    Cancer of bladder     Cerebrovascular accident (CVA) left MCA  2017  aphasia    Chronic CHF     CVA (cerebrovascular accident)     HLD (hyperlipidemia)     HTN (hypertension)     HTN (hypertension)     Paroxysmal atrial fibrillation on Eliquis    Pneumonia discharged 7/16/19    Severe aortic stenosis     Severe mitral valve regurgitation

## 2022-12-05 NOTE — ED PROVIDER NOTE - NSPTACCESSSVCSAPPTDETAILS_ED_ALL_ED_FT
Please call the patient's daughter tomorrow at [phone]. She has lumbar radiculopathy. Thank you.

## 2022-12-05 NOTE — ED ADULT NURSE NOTE - OBJECTIVE STATEMENT
89 year old female presented to ED from home with c/o of right hip pain radiating down leg x3 days. pt states she was recently diagnosed with arthritis in the right hip. pt denies trauma, denies falls. pt denies CP, SOB, nausea/vomiting, numbness/tingling, fever, cough, chills, dizziness, headache, blurred vision, neuro intact. pt a&ox3, lung sounds clear, heart rate regular, abdomen soft nontender nondistended to palp. skin intact. Will continue to monitor and assess while offering support and reassurance.

## 2022-12-05 NOTE — ED PROVIDER NOTE - PROGRESS NOTE DETAILS
xray nonactionalbe pt abulatory walking in ed w cane - willdc on pred and fu w ith ortho and spine Cecy Cotto M.D. (Resident Physician): CT lumbar spine showed lumbar radiculopathy. Spoke with pt's daughter and son-in-law. Will dc pt with tylenol, pt, and spine f/u.

## 2022-12-05 NOTE — ED PROVIDER NOTE - PHYSICAL EXAMINATION
PHYSICAL EXAM:  GENERAL: Sitting comfortable in bed, in no acute distress  HENMT: Atraumatic, moist mucous membranes, no oropharyngeal exudates or vesicles, uvula is midline EYES: Clear bilaterally, PERRL, EOMs intact b/l  HEART: RRR, S1/S2, no murmur  RESPIRATORY: Clear to auscultation bilaterally, no wheezes/rhonchi/rales  ABDOMEN: +BS, soft, nontender, nondistended  MUSCULOSKELETAL: No spinal ttp, mild right lumbar paraspinal ttp, pt able to ambulate normally  NEURO: A&Ox4, CN II-XII intact, no focal motor deficits or sensory deficits   SKIN: Skin normal color for race, warm, dry and intact. No evidence of rash

## 2022-12-05 NOTE — ED ADULT NURSE REASSESSMENT NOTE - NS ED NURSE REASSESS COMMENT FT1
Received report from MUKUND Caldwell. Pt is A&Ox3, breathing spontaneously, unlabored and speaking in full sentences on RA. Awaiting dispo. Pt safety and comfort measures provided.

## 2022-12-05 NOTE — ED PROVIDER NOTE - CLINICAL SUMMARY MEDICAL DECISION MAKING FREE TEXT BOX
lori - 89 f ppm, on ac with atraumatic rll hip pain rad down into leg - no fever no trauma no redness able to ambulate only mild relief w Tylenol seen at urgent care report of neg xray no imaging with her- told it was arthritis -- on exam no point ttp no hernia ano rash no sadde anesthesia from rt hip no pain w log roll no shortening or rotational deformity - strength rle 5/5 - no midlinettp - richar radicular as slt pos - will add pred and lido patch- unsure if ppm compatible w mri will refer to ortho and spine center- pt walks w cane

## 2022-12-05 NOTE — ED PROVIDER NOTE - OBJECTIVE STATEMENT
89F PMH afib on eliquis, pacemaker, HTN, HLD 89F PMH afib on eliquis, CHF, pacemaker, CVA, HTN, HLD p/w right leg pain. Pt says the pain is located in her right lower back and radiates down her 89F PMH afib on eliquis, CHF, pacemaker, CVA, HTN, HLD p/w right leg pain. Pt says the pain is located in her right lower back and radiates down her right leg. The pain is intermittent and sharp at times. Denies fever, weakness, numbness, tingling, incontinence. No hx of trauma or falls. Pt has been taking tylenol for the pain. No N/V, CP, SOB, abdominal pain, urinary symptoms, diarrhea.

## 2022-12-05 NOTE — SPEECH LANGUAGE PATHOLOGY EVALUATION - SLP PERTINENT HISTORY OF CURRENT PROBLEM
89 yo  woman with PMHx of HTN, and HLD brought by EMS to [facility] for Aphasia, confusion and not feeling good. Patient last normal was 12PM. So TPA was not given. PE c/w sensory aphasia. CT head shows: Area of hypodensity involving the left insula and left temporal lobe suspicious for an acute infarct in the left MCA territory distribution. No acute intracranial hemorrhage. Patient was outside window for TPA or endovascular. NIHSS:5 Dysphagia: pending; hMRS: 1 per attending note: On neurological exam she is fluent, does not follow simple or complex commands, wernicke's aphasia; appears to understand parts of conversation, able to mimic.it is difficult to assess if she has a visual field deficit. impression: Left MCA territory, inferior division infarction
89 yo  woman with PMHx of HTN, and HLD brought by EMS to [facility] for Aphasia, confusion and not feeling good. Patient last normal was 12PM. So TPA was not given. PE c/w sensory aphasia. CT head shows: Area of hypodensity involving the left insula and left temporal lobe suspicious for an acute infarct in the left MCA territory distribution. No acute intracranial hemorrhage. Patient was outside window for TPA or endovascular. NIHSS:5 Dysphagia: pending; hMRS: 1 per attending note: On neurological exam she is fluent, does not follow simple or complex commands, wernicke's aphasia; appears to understand parts of conversation, able to mimic.it is difficult to assess if she has a visual field deficit. impression: Left MCA territory, inferior division infarction
Rhomboid Transposition Flap Text: The defect edges were debeveled with a #15 scalpel blade.  Given the location of the defect and the proximity to free margins a rhomboid transposition flap was deemed most appropriate.  Using a sterile surgical marker, an appropriate rhomboid flap was drawn incorporating the defect.    The area thus outlined was incised deep to adipose tissue with a #15 scalpel blade.  The skin margins were undermined to an appropriate distance in all directions utilizing iris scissors.

## 2022-12-05 NOTE — ED PROVIDER NOTE - NSFOLLOWUPINSTRUCTIONS_ED_ALL_ED_FT
You have been evaluated in the Emergency Department today for hip pain. Your evaluation showed that you have nerve compression in your lumbar spine.    The Hospital will call tomorrow to schedule an appointment with the Spine Clinic.    Please take Tylenol for pain and schedule an appointment with your physical therapist.    Your results are contained within your discharge papers.    Return to the Emergency Department if you experience worsening or uncontrolled pain, weakness, numbness/tingling, fevers 100.4°F or greater, urinary/fecal incontinence, or any other concerning symptoms.    Thank you for choosing us for your care.

## 2022-12-05 NOTE — ED PROVIDER NOTE - PATIENT PORTAL LINK FT
You can access the FollowMyHealth Patient Portal offered by [facility] by registering at the following website: [email or web address]. By joining [employer]’s FollowMyHealth portal, you will also be able to view your health information using other applications (apps) compatible with our system. You can access the FollowMyHealth Patient Portal offered by [facility] by registering at the following website: [email or web address]. By joining [employer]’s FollowMyHealth portal, you will also be able to view your health information using other applications (apps) compatible with our system.

## 2022-12-05 NOTE — ED PROVIDER NOTE - NSICDXPASTSURGICALHX_GEN_ALL_CORE_FT
PAST SURGICAL HISTORY:  H/O shoulder surgery arthroscopic  10+ yrs ago  right    History of bilateral oophorectomy     Pacemaker     S/P cystoscopy TURBT x 2    S/P TAVR (transcatheter aortic valve replacement)

## 2022-12-05 NOTE — ED PROVIDER NOTE - CARE PLAN
Principal Discharge DX:	Hip pain  Secondary Diagnosis:	Sciatica   1 Principal Discharge DX:	Hip pain

## 2022-12-05 NOTE — ED CLERICAL - NS ED CLERK NOTE PRE-ARRIVAL INFORMATION; ADDITIONAL PRE-ARRIVAL INFORMATION
CC/Reason For referral: patient has been c/o RLL and low back. She is on ac and cannot take NSAIDS.  Etiology of pain unknown. Needs diagnosis and pain management as not getting relief from tylenol  Preferred Consultant(if applicable): Lisker is her cardiologist  Who admits for you (if needed): na  Do you have documents you would like to fax over? no  Would you still like to speak to an ED attending? if possible

## 2022-12-06 NOTE — ED POST DISCHARGE NOTE - RESULT SUMMARY
Pt's daughter called regarding hard copy of imaging discs as well as the spine referral. Directed her to medical records for any discs of imaging, additionally discussed case w/ referral coordinator Saul Moran who will reach out to them regarding spine appt. Daughter appreciative. - Sekou Rawls PA-C

## 2022-12-06 NOTE — ED POST DISCHARGE NOTE - DETAILS
12/6/22: Pt's CT on incidental/ recommended f/u board for "recommended dedicated CT chest" to evaluated bilateral lower lobe consolidations and pleural effusions. Spoke w/ daughter Avis w/ pt on phone as well. informed them of this result. Pt has hx CHF, denies cough, sob, fever/chills. Advised them to f/u with PMD for evaluation and possible further imaging if deemed necessary by PMD. Daughter and pt acknowledged understanding, appreciative of call. - Sekou Rawls PA-C

## 2022-12-07 ENCOUNTER — APPOINTMENT (OUTPATIENT)
Dept: ELECTROPHYSIOLOGY | Facility: CLINIC | Age: 87
End: 2022-12-07
Payer: MEDICARE

## 2022-12-07 ENCOUNTER — NON-APPOINTMENT (OUTPATIENT)
Age: 87
End: 2022-12-07

## 2022-12-07 PROCEDURE — 93296 REM INTERROG EVL PM/IDS: CPT

## 2022-12-07 PROCEDURE — 93294 REM INTERROG EVL PM/LDLS PM: CPT

## 2022-12-12 ENCOUNTER — APPOINTMENT (OUTPATIENT)
Dept: ORTHOPEDIC SURGERY | Facility: CLINIC | Age: 87
End: 2022-12-12

## 2022-12-12 DIAGNOSIS — R26.89 OTHER ABNORMALITIES OF GAIT AND MOBILITY: ICD-10-CM

## 2022-12-12 DIAGNOSIS — M54.50 LOW BACK PAIN, UNSPECIFIED: ICD-10-CM

## 2022-12-12 PROCEDURE — 99204 OFFICE O/P NEW MOD 45 MIN: CPT

## 2022-12-12 NOTE — PHYSICAL EXAM
[de-identified] : Lumbar Physical Exam\par \par Antalgic gait, slow and unsteady on her feet\par \par Reflexes\par Patellar - normal\par Gastroc - normal\par Clonus - No\par \par Hip Exam - Normal\par \par Straight leg raise - none\par \par Pulses - 2+ dp/pt\par \par Range of motion - normal\par \par Sensation \par Sensation intact to light touch in L1, L2, L3, L4, L5 and S1 dermatomes bilaterally\par \par Motor\par 	IP	Quad	HS	TA	Gastroc	EHL\par Right	5/5	5/5	5/5	5/5	5/5	5/5\par Left	5/5	5/5	5/5	5/5	5/5	5/5 [de-identified] : CT scan reviewed\par Spondylosis noted\par Evidence of significant lumbar stenosis noted\par \par

## 2022-12-12 NOTE — ASSESSMENT
[FreeTextEntry1] : This is a 89-year-old female here today for evaluation treatment of her back and leg symptoms.  She does have some fairly significant radiculopathy.  She is a poor surgical candidate and given the fact that she is on antiplatelet treatment I would not rush to do anything aggressive in terms of a myelogram.  Rather she may be a candidate for an injection or other nonoperative modalities of care.  We will get her referred to [facility].  I will see her back in approximately 5 to 6 weeks.  I encouraged her to reach out to me at any point if her symptoms worsen or change in any way.

## 2022-12-12 NOTE — HISTORY OF PRESENT ILLNESS
[de-identified] : This is a 89-year-old female presenting with 3-week history of low back pain with right radicular symptoms down her posterior thigh to her heel.  Patient went to the emergency department and had a CAT scan done at that time and was told she has disc degeneration and a disc herniation.  Patient wears depends however there has been no change in her bowel or bladder function over the past 2 to 3 years.  She walks with a walker at baseline and this has also been for the past few years.  Patient states the pain is intermittent however it has woken her up at night and it tends to significantly disable her from being able to walk due to the pain.  Patient states that she takes Tylenol on a regular basis with minimal relief of her symptoms.  She has a pacemaker and defibrillator as well as on Eloquist and is therefore unable to take anti-inflammatories.

## 2022-12-13 ENCOUNTER — NON-APPOINTMENT (OUTPATIENT)
Age: 87
End: 2022-12-13

## 2022-12-13 ENCOUNTER — APPOINTMENT (OUTPATIENT)
Dept: CARDIOLOGY | Facility: CLINIC | Age: 87
End: 2022-12-13

## 2022-12-13 VITALS
HEART RATE: 79 BPM | BODY MASS INDEX: 19.39 KG/M2 | DIASTOLIC BLOOD PRESSURE: 78 MMHG | OXYGEN SATURATION: 100 % | SYSTOLIC BLOOD PRESSURE: 130 MMHG | WEIGHT: 106 LBS

## 2022-12-13 DIAGNOSIS — I10 ESSENTIAL (PRIMARY) HYPERTENSION: ICD-10-CM

## 2022-12-13 PROCEDURE — 93000 ELECTROCARDIOGRAM COMPLETE: CPT

## 2022-12-13 PROCEDURE — 99214 OFFICE O/P EST MOD 30 MIN: CPT

## 2022-12-13 NOTE — HISTORY OF PRESENT ILLNESS
[FreeTextEntry1] : She has been having severe right hip/nerve pain. Seen in the ER. Referred for pain management.\par Suggested the possibility for spinal injections.\par No chest pain \par \par Prior:\par Feeling okay. some fatigue with walking.\par No chest pain or orthopnea.\par Seen by Dr. Madrid. Labs all okay.\par \par Prior:\par She is accompanied by her daughter and reports feeling okay overall.  Her biggest concern seems to be related to decreased urine output and a burning sensation that makes her feel like she has to urinate.\par She was recently seen by new internist.\par Recent blood work was reportedly normal.\par Her other big concern is fatigue for which she occasionally takes naps.  She has not reported any inappropriate falling asleep.\par No bleeding or falls.\par \par Echocardiography showed normal left ventricular systolic function with mild to moderate mitral regurgitation and mild pulmonary hypertension.\par The TAVR appears well-seated with no significant regurgitation.  Normal gradients.

## 2022-12-13 NOTE — DISCUSSION/SUMMARY
[FreeTextEntry1] : She is a 89-year-old with a history of cardiomyopathy (nonobstructive coronary disease), paroxysmal atrial fibrillation and stable systolic heart failure symptoms.  \par Bi-V pacemaker interrogated and shows consistent bi-V pacing, no sign of congestive heart failure on OptiVol since November.  Atrial fibrillation has been persistent.\par Her blood pressure control is excellent on her current regimen.\par  \par Continue NOAC for stroke risk reduction given her paroxysmal atrial fibrillation history.\par We discussed the possibility of spinal injections for pain relief. PRN ibuprofen okay too, if needed.\par I would agree with withholding eliquis for 3 days prior to injection. Continue aspirin therapy.\par Plan reviewed with pt and her daughter.\par Routine follow-up in 6 months, or sooner if symptoms arise.  \par Pacemaker interrogation at each visit. [EKG obtained to assist in diagnosis and management of assessed problem(s)] : EKG obtained to assist in diagnosis and management of assessed problem(s)

## 2022-12-13 NOTE — PHYSICAL EXAM
[General Appearance - Well Developed] : well developed [General Appearance - Well Nourished] : well nourished [Normal Conjunctiva] : the conjunctiva exhibited no abnormalities [Normal Oral Mucosa] : normal oral mucosa [No Oral Pallor] : no oral pallor [Normal Jugular Venous A Waves Present] : normal jugular venous A waves present [Normal Jugular Venous V Waves Present] : normal jugular venous V waves present [No Jugular Venous Owens A Waves] : no jugular venous owens A waves [] : no respiratory distress [Respiration, Rhythm And Depth] : normal respiratory rhythm and effort [Bowel Sounds] : normal bowel sounds [Abdomen Soft] : soft [Abnormal Walk] : normal gait [FreeTextEntry1] : Uses a walker [Nail Clubbing] : no clubbing of the fingernails [Cyanosis, Localized] : no localized cyanosis [Skin Turgor] : normal skin turgor [Oriented To Time, Place, And Person] : oriented to person, place, and time [Affect] : the affect was normal [Mood] : the mood was normal [No Anxiety] : not feeling anxious

## 2023-01-04 NOTE — SWALLOW BEDSIDE ASSESSMENT ADULT - ASR SWALLOW RECOMMEND DIAG
VFSS/MBS/MD, please write order for Modified Barium Swallow Study. Will schedule exam upon receipt of order in Radiology.
Would defer MBS pending subjective reevaluation tomorrow, 5/31. Plan for MBS 6/1.
denied

## 2023-01-18 ENCOUNTER — APPOINTMENT (OUTPATIENT)
Dept: ORTHOPEDIC SURGERY | Facility: CLINIC | Age: 88
End: 2023-01-18

## 2023-03-08 ENCOUNTER — APPOINTMENT (OUTPATIENT)
Dept: ELECTROPHYSIOLOGY | Facility: CLINIC | Age: 88
End: 2023-03-08
Payer: MEDICARE

## 2023-03-08 ENCOUNTER — NON-APPOINTMENT (OUTPATIENT)
Age: 88
End: 2023-03-08

## 2023-03-08 PROCEDURE — 93296 REM INTERROG EVL PM/IDS: CPT

## 2023-03-08 PROCEDURE — 93294 REM INTERROG EVL PM/LDLS PM: CPT

## 2023-03-25 ENCOUNTER — INPATIENT (INPATIENT)
Facility: HOSPITAL | Age: 88
LOS: 2 days | Discharge: HOME HEALTH | DRG: 200 | End: 2023-03-28
Attending: STUDENT IN AN ORGANIZED HEALTH CARE EDUCATION/TRAINING PROGRAM | Admitting: STUDENT IN AN ORGANIZED HEALTH CARE EDUCATION/TRAINING PROGRAM
Payer: MEDICARE

## 2023-03-25 VITALS
RESPIRATION RATE: 18 BRPM | TEMPERATURE: 97 F | HEART RATE: 99 BPM | WEIGHT: 115.08 LBS | DIASTOLIC BLOOD PRESSURE: 93 MMHG | SYSTOLIC BLOOD PRESSURE: 150 MMHG | OXYGEN SATURATION: 95 % | HEIGHT: 60 IN

## 2023-03-25 DIAGNOSIS — Z95.0 PRESENCE OF CARDIAC PACEMAKER: Chronic | ICD-10-CM

## 2023-03-25 DIAGNOSIS — Z95.2 PRESENCE OF PROSTHETIC HEART VALVE: Chronic | ICD-10-CM

## 2023-03-25 DIAGNOSIS — Z98.890 OTHER SPECIFIED POSTPROCEDURAL STATES: Chronic | ICD-10-CM

## 2023-03-25 DIAGNOSIS — Z90.722 ACQUIRED ABSENCE OF OVARIES, BILATERAL: Chronic | ICD-10-CM

## 2023-03-25 PROCEDURE — 71045 X-RAY EXAM CHEST 1 VIEW: CPT | Mod: 26

## 2023-03-25 PROCEDURE — 99285 EMERGENCY DEPT VISIT HI MDM: CPT | Mod: GC

## 2023-03-25 RX ORDER — ACETAMINOPHEN 500 MG
1000 TABLET ORAL ONCE
Refills: 0 | Status: COMPLETED | OUTPATIENT
Start: 2023-03-25 | End: 2023-03-25

## 2023-03-25 RX ORDER — MORPHINE SULFATE 50 MG/1
2 CAPSULE, EXTENDED RELEASE ORAL ONCE
Refills: 0 | Status: DISCONTINUED | OUTPATIENT
Start: 2023-03-25 | End: 2023-03-25

## 2023-03-25 NOTE — ED ADULT TRIAGE NOTE - CHIEF COMPLAINT QUOTE
mechanical fall at home, fell forward picking something up. no headstrike no LOC. c/o upper back pain. on blood thinners

## 2023-03-25 NOTE — ED PROVIDER NOTE - PHYSICAL EXAMINATION
Gen: WDWN, NAD, comfortable appearing   HEENT: Atraumatic head, PERRLA, EOMI, no nasal discharge, mucous membranes moist, no oropharyngeal edema/erythema/exudates   CV: RRR, +S1/S2, no M/R/G, equal b/l radial pulses 2+  Resp: CTAB, no W/R/R, SPO2 >95% on RA, no increased WOB   GI: Abdomen soft non-distended, mild LUQ abdominal pain with no rebound guarding, no masses/organomegaly   MSK/Skin: Left paraspinal thoracic TTP over ribs, Left anterior rib pain around T5 region, no midline spinal TTP, no CVA tenderness, no open wounds, no bruising, no LE edema  Neuro: A&Ox4, moving all 4 extremities spontaneously, gross sensation intact in UE and LE BL  Psych: appropriate mood

## 2023-03-25 NOTE — ED PROVIDER NOTE - OBJECTIVE STATEMENT
89F PMH afib on eliquis, CHF, pacemaker, CVA, HTN, HLD presenting with chest and back pain status post mechanical fall.  Patient's reports that she slipped, fell backwards and hit upper back on sofa with no head trauma, LOC, shortness of breath, focal weakness, numbness/tingling.  Localizes pain to left upper back and left-sided lower chest pain under her breast with no associated diaphoresis palpitations. Denies any saddle anesthesia, urinary/fecal incontinence, weakness.

## 2023-03-25 NOTE — ED PROVIDER NOTE - ATTENDING CONTRIBUTION TO CARE
pt is a 89 y/o female on eliquis lost her balance fell backwards onto soft arm and then the ground c/o l sided chest wall tenderness, and upper back pain, no head injury or loc, nc/at discussed with family on the phone, analgesia, CTH/Chest possible a/p as well, imaging ordered.

## 2023-03-25 NOTE — ED PROVIDER NOTE - PROGRESS NOTE DETAILS
Ariel, PGY-3  Radiology called regarding CT chest showing multiple rib fractures (some acute v.s chronic) with small apical pneumo. Patient not hypoxic or working to breathe. Placed on NC and trauma surgery consulted and will see patient. Pending official CT reads Ariel, PGY-3  Surgery reports admit to their service with no need for SICU at this time.

## 2023-03-26 DIAGNOSIS — M54.9 DORSALGIA, UNSPECIFIED: ICD-10-CM

## 2023-03-26 LAB
ALBUMIN SERPL ELPH-MCNC: 4.2 G/DL — SIGNIFICANT CHANGE UP (ref 3.3–5)
ALP SERPL-CCNC: 54 U/L — SIGNIFICANT CHANGE UP (ref 40–120)
ALT FLD-CCNC: 28 U/L — SIGNIFICANT CHANGE UP (ref 10–45)
ANION GAP SERPL CALC-SCNC: 12 MMOL/L — SIGNIFICANT CHANGE UP (ref 5–17)
APTT BLD: 32 SEC — SIGNIFICANT CHANGE UP (ref 27.5–35.5)
AST SERPL-CCNC: 20 U/L — SIGNIFICANT CHANGE UP (ref 10–40)
BASOPHILS # BLD AUTO: 0.02 K/UL — SIGNIFICANT CHANGE UP (ref 0–0.2)
BASOPHILS NFR BLD AUTO: 0.2 % — SIGNIFICANT CHANGE UP (ref 0–2)
BILIRUB SERPL-MCNC: 0.6 MG/DL — SIGNIFICANT CHANGE UP (ref 0.2–1.2)
BUN SERPL-MCNC: 32 MG/DL — HIGH (ref 7–23)
CALCIUM SERPL-MCNC: 10.1 MG/DL — SIGNIFICANT CHANGE UP (ref 8.4–10.5)
CHLORIDE SERPL-SCNC: 104 MMOL/L — SIGNIFICANT CHANGE UP (ref 96–108)
CO2 SERPL-SCNC: 27 MMOL/L — SIGNIFICANT CHANGE UP (ref 22–31)
CREAT SERPL-MCNC: 0.82 MG/DL — SIGNIFICANT CHANGE UP (ref 0.5–1.3)
EGFR: 65 ML/MIN/1.73M2 — SIGNIFICANT CHANGE UP
EOSINOPHIL # BLD AUTO: 0.13 K/UL — SIGNIFICANT CHANGE UP (ref 0–0.5)
EOSINOPHIL NFR BLD AUTO: 1.4 % — SIGNIFICANT CHANGE UP (ref 0–6)
GLUCOSE SERPL-MCNC: 90 MG/DL — SIGNIFICANT CHANGE UP (ref 70–99)
HCT VFR BLD CALC: 40.6 % — SIGNIFICANT CHANGE UP (ref 34.5–45)
HGB BLD-MCNC: 12.7 G/DL — SIGNIFICANT CHANGE UP (ref 11.5–15.5)
IMM GRANULOCYTES NFR BLD AUTO: 0.4 % — SIGNIFICANT CHANGE UP (ref 0–0.9)
INR BLD: 1.16 RATIO — SIGNIFICANT CHANGE UP (ref 0.88–1.16)
LACTATE SERPL-SCNC: 0.9 MMOL/L — SIGNIFICANT CHANGE UP (ref 0.5–2)
LIDOCAIN IGE QN: 42 U/L — SIGNIFICANT CHANGE UP (ref 7–60)
LYMPHOCYTES # BLD AUTO: 0.83 K/UL — LOW (ref 1–3.3)
LYMPHOCYTES # BLD AUTO: 9.1 % — LOW (ref 13–44)
MCHC RBC-ENTMCNC: 30.7 PG — SIGNIFICANT CHANGE UP (ref 27–34)
MCHC RBC-ENTMCNC: 31.3 GM/DL — LOW (ref 32–36)
MCV RBC AUTO: 98.1 FL — SIGNIFICANT CHANGE UP (ref 80–100)
MONOCYTES # BLD AUTO: 1.02 K/UL — HIGH (ref 0–0.9)
MONOCYTES NFR BLD AUTO: 11.2 % — SIGNIFICANT CHANGE UP (ref 2–14)
NEUTROPHILS # BLD AUTO: 7.09 K/UL — SIGNIFICANT CHANGE UP (ref 1.8–7.4)
NEUTROPHILS NFR BLD AUTO: 77.7 % — HIGH (ref 43–77)
NRBC # BLD: 0 /100 WBCS — SIGNIFICANT CHANGE UP (ref 0–0)
PLATELET # BLD AUTO: 144 K/UL — LOW (ref 150–400)
POTASSIUM SERPL-MCNC: 4.2 MMOL/L — SIGNIFICANT CHANGE UP (ref 3.5–5.3)
POTASSIUM SERPL-SCNC: 4.2 MMOL/L — SIGNIFICANT CHANGE UP (ref 3.5–5.3)
PROT SERPL-MCNC: 6.8 G/DL — SIGNIFICANT CHANGE UP (ref 6–8.3)
PROTHROM AB SERPL-ACNC: 13.5 SEC — HIGH (ref 10.5–13.4)
RBC # BLD: 4.14 M/UL — SIGNIFICANT CHANGE UP (ref 3.8–5.2)
RBC # FLD: 13.1 % — SIGNIFICANT CHANGE UP (ref 10.3–14.5)
SARS-COV-2 RNA SPEC QL NAA+PROBE: SIGNIFICANT CHANGE UP
SODIUM SERPL-SCNC: 143 MMOL/L — SIGNIFICANT CHANGE UP (ref 135–145)
TROPONIN T, HIGH SENSITIVITY RESULT: 27 NG/L — SIGNIFICANT CHANGE UP (ref 0–51)
TROPONIN T, HIGH SENSITIVITY RESULT: 28 NG/L — SIGNIFICANT CHANGE UP (ref 0–51)
WBC # BLD: 9.13 K/UL — SIGNIFICANT CHANGE UP (ref 3.8–10.5)
WBC # FLD AUTO: 9.13 K/UL — SIGNIFICANT CHANGE UP (ref 3.8–10.5)

## 2023-03-26 PROCEDURE — 99222 1ST HOSP IP/OBS MODERATE 55: CPT

## 2023-03-26 PROCEDURE — 71260 CT THORAX DX C+: CPT | Mod: 26,MA

## 2023-03-26 PROCEDURE — 74177 CT ABD & PELVIS W/CONTRAST: CPT | Mod: 26,MA

## 2023-03-26 PROCEDURE — 72125 CT NECK SPINE W/O DYE: CPT | Mod: 26,MA

## 2023-03-26 PROCEDURE — 71045 X-RAY EXAM CHEST 1 VIEW: CPT | Mod: 26

## 2023-03-26 PROCEDURE — 70450 CT HEAD/BRAIN W/O DYE: CPT | Mod: 26,MA

## 2023-03-26 RX ORDER — PANTOPRAZOLE SODIUM 20 MG/1
40 TABLET, DELAYED RELEASE ORAL
Refills: 0 | Status: DISCONTINUED | OUTPATIENT
Start: 2023-03-26 | End: 2023-03-28

## 2023-03-26 RX ORDER — KETOROLAC TROMETHAMINE 30 MG/ML
10 SYRINGE (ML) INJECTION EVERY 6 HOURS
Refills: 0 | Status: DISCONTINUED | OUTPATIENT
Start: 2023-03-26 | End: 2023-03-26

## 2023-03-26 RX ORDER — ASPIRIN/CALCIUM CARB/MAGNESIUM 324 MG
81 TABLET ORAL DAILY
Refills: 0 | Status: DISCONTINUED | OUTPATIENT
Start: 2023-03-26 | End: 2023-03-28

## 2023-03-26 RX ORDER — ATORVASTATIN CALCIUM 80 MG/1
40 TABLET, FILM COATED ORAL AT BEDTIME
Refills: 0 | Status: DISCONTINUED | OUTPATIENT
Start: 2023-03-26 | End: 2023-03-28

## 2023-03-26 RX ORDER — METOPROLOL TARTRATE 50 MG
25 TABLET ORAL DAILY
Refills: 0 | Status: DISCONTINUED | OUTPATIENT
Start: 2023-03-26 | End: 2023-03-27

## 2023-03-26 RX ORDER — VALSARTAN 80 MG/1
80 TABLET ORAL DAILY
Refills: 0 | Status: DISCONTINUED | OUTPATIENT
Start: 2023-03-26 | End: 2023-03-26

## 2023-03-26 RX ORDER — AMIODARONE HYDROCHLORIDE 400 MG/1
200 TABLET ORAL DAILY
Refills: 0 | Status: DISCONTINUED | OUTPATIENT
Start: 2023-03-26 | End: 2023-03-27

## 2023-03-26 RX ORDER — LIDOCAINE 4 G/100G
1 CREAM TOPICAL EVERY 24 HOURS
Refills: 0 | Status: DISCONTINUED | OUTPATIENT
Start: 2023-03-26 | End: 2023-03-28

## 2023-03-26 RX ORDER — SACUBITRIL AND VALSARTAN 24; 26 MG/1; MG/1
1 TABLET, FILM COATED ORAL
Refills: 0 | Status: DISCONTINUED | OUTPATIENT
Start: 2023-03-26 | End: 2023-03-28

## 2023-03-26 RX ORDER — ACETAMINOPHEN 500 MG
650 TABLET ORAL EVERY 6 HOURS
Refills: 0 | Status: DISCONTINUED | OUTPATIENT
Start: 2023-03-26 | End: 2023-03-28

## 2023-03-26 RX ORDER — APIXABAN 2.5 MG/1
2.5 TABLET, FILM COATED ORAL
Refills: 0 | Status: DISCONTINUED | OUTPATIENT
Start: 2023-03-26 | End: 2023-03-26

## 2023-03-26 RX ORDER — DIGOXIN 250 MCG
125 TABLET ORAL DAILY
Refills: 0 | Status: DISCONTINUED | OUTPATIENT
Start: 2023-03-26 | End: 2023-03-28

## 2023-03-26 RX ADMIN — Medication 400 MILLIGRAM(S): at 00:42

## 2023-03-26 RX ADMIN — MORPHINE SULFATE 2 MILLIGRAM(S): 50 CAPSULE, EXTENDED RELEASE ORAL at 01:50

## 2023-03-26 RX ADMIN — Medication 125 MICROGRAM(S): at 06:26

## 2023-03-26 RX ADMIN — Medication 650 MILLIGRAM(S): at 23:42

## 2023-03-26 RX ADMIN — ATORVASTATIN CALCIUM 40 MILLIGRAM(S): 80 TABLET, FILM COATED ORAL at 21:53

## 2023-03-26 RX ADMIN — MORPHINE SULFATE 2 MILLIGRAM(S): 50 CAPSULE, EXTENDED RELEASE ORAL at 00:30

## 2023-03-26 RX ADMIN — Medication 81 MILLIGRAM(S): at 06:26

## 2023-03-26 RX ADMIN — LIDOCAINE 1 PATCH: 4 CREAM TOPICAL at 13:14

## 2023-03-26 RX ADMIN — SACUBITRIL AND VALSARTAN 1 TABLET(S): 24; 26 TABLET, FILM COATED ORAL at 18:47

## 2023-03-26 RX ADMIN — Medication 650 MILLIGRAM(S): at 13:14

## 2023-03-26 RX ADMIN — Medication 25 MILLIGRAM(S): at 06:27

## 2023-03-26 RX ADMIN — Medication 650 MILLIGRAM(S): at 13:45

## 2023-03-26 RX ADMIN — Medication 1000 MILLIGRAM(S): at 01:30

## 2023-03-26 RX ADMIN — Medication 1000 MILLIGRAM(S): at 01:50

## 2023-03-26 RX ADMIN — Medication 650 MILLIGRAM(S): at 18:26

## 2023-03-26 RX ADMIN — APIXABAN 2.5 MILLIGRAM(S): 2.5 TABLET, FILM COATED ORAL at 06:35

## 2023-03-26 RX ADMIN — LIDOCAINE 1 PATCH: 4 CREAM TOPICAL at 19:50

## 2023-03-26 NOTE — H&P ADULT - HISTORY OF PRESENT ILLNESS
89F PMH afib on eliquis, CHF, pacemaker, CVA, HTN, HLD presenting with chest and back pain status post mechanical fall.  Patient's reports that she slipped, fell backwards and hit upper back on sofa with no head trauma, no LOC, shortness of breath, focal weakness, numbness/tingling.  Localizes pain to left upper back and left-sided lower chest pain under her breast with no associated diaphoresis palpitations. Denies any saddle anesthesia, urinary/fecal incontinence, weakness. CT demonstrated trace apical pneumo and nondisplaced fractures of anterior ribs 5 through 10.

## 2023-03-26 NOTE — PHYSICAL THERAPY INITIAL EVALUATION ADULT - ADDITIONAL COMMENTS
Pt lives in an apt with no steps to enter and +elevator access. Pt has HHA mon-fri for 10 hours/day and sat/sun for 9 hours/day. Pt owns rollator, RW, straight point cane, shower chair and commode.

## 2023-03-26 NOTE — PHYSICAL THERAPY INITIAL EVALUATION ADULT - ACTIVE RANGE OF MOTION EXAMINATION, REHAB EVAL
deffered ROM of LUE 2/2 to pain/bilateral upper extremity Active ROM was WFL (within functional limits)/bilateral  lower extremity Active ROM was WFL (within functional limits)/deficits as listed below deferred ROM of LUE 2/2 to pain/bilateral upper extremity Active ROM was WFL (within functional limits)/bilateral  lower extremity Active ROM was WFL (within functional limits)/deficits as listed below

## 2023-03-26 NOTE — PHYSICAL THERAPY INITIAL EVALUATION ADULT - PERTINENT HX OF CURRENT PROBLEM, REHAB EVAL
89F PMH afib on eliquis, CHF, pacemaker, CVA, HTN, HLD presenting with chest and back pain status post mechanical fall.  Patient's reports that she slipped, fell backwards and hit upper back on sofa with no head trauma, no LOC, shortness of breath, focal weakness, numbness/tingling.  Localizes pain to left upper back and left-sided lower chest pain under her breast with no associated diaphoresis palpitations. Denies any saddle anesthesia, urinary/fecal incontinence, weakness. CT demonstrated trace apical pneumo and nondisplaced fractures of anterior ribs 5 through 10.  Hosp course: 3/25: CXR: Small left apical pneumothorax. Small left pleural effusion. Left-sided rib fractures better appreciated on CT scan of chest. 3/26: CT head: No acute intracranial hemorrhage or mass effect. CT cervical spine: No evidence for acute displaced fracture or malalignment. Trace apical left pneumothorax. CT chest: Left fifth through 10th rib fractures with trace left apical pneumothorax and small left pleural fluid. Age indeterminate superior T10 endplate compression deformity not seen in 2021, correlate clinically. No acute injury to the abdomen/pelvis. CXR: Persistent small left apical pneumothorax and small left pleural effusion.

## 2023-03-26 NOTE — H&P ADULT - ASSESSMENT
89F PMH afib on eliquis, CHF, pacemaker, CVA, HTN, HLD presenting with chest and back pain status post mechanical fall. CT demonstrated trace apical pneumo and nondisplaced fractures of anterior ribs 5 through 10.      Plan:    - Admit to surgery to Dr. Ross  - No surgical intervention at this time  - Multimodal pain control  - Resume home meds  - Regular diet  - Chest XR in the PM  - DVt ppx    Patient seen and examined. Plan discussed with Dr. Cody Matamoros MD, PGY2  [phone]

## 2023-03-26 NOTE — ED ADULT NURSE NOTE - OBJECTIVE STATEMENT
89 y/o female A&Ox4, came in s/p fall, lost her balance while stand and fell backwards onto the arm of a chair than to the ground. Denies hitting her head and no LOC. complaint soft left sided chest wall/rib pains, left should and upper back pain. Patient uses walker to ambulate at baseline.

## 2023-03-26 NOTE — H&P ADULT - ATTENDING COMMENTS
ATTENDING ATTESTATION:    89F history of afib on eliquis s/p PPM, AS s/p TAVR, HFrEF (2019, EF 35-40%), CVA, HTN s/p mechanical fall with following injuries:  - Left 5-10 nondisplaced rib fractures  - Small hemopneumothorax    GCS 15  Moving all extremities    WBC 9  Hb 12.7  Cr 0.8    CTH - negative  CT c-spine - negative  CT chest - left 5-10 nondisplaced posterolateral rib fractures, small hemopneumothorax  CT A/P - age indeterminate T10 superior endplate compression deformity    A/P: S/p mechanical fall with above injuries  - admit to trauma  - multimodal pain therapy  - interval CXR to follow hemopneumothorax  - hold eliquis until hemothorax is stable  - regular diet  - lovenox ppx      Total time spent in the care of this patient today (excluding critical care, teaching & procedures): 50 minutes    Over 50% of the total time was spent in discussion and coordination of care with consulting services, dietary and rehab services.    Glenda Ross MD  Acute Care Surgery ATTENDING ATTESTATION:    89F history of afib on eliquis s/p PPM, AS s/p TAVR, HFrEF (2019, EF 35-40%), CVA, HTN s/p mechanical fall with following injuries:  - Left 5-10 nondisplaced rib fractures  - Small hemopneumothorax    GCS 15  Moving all extremities    WBC 9  Hb 12.7  Cr 0.8    CTH - negative  CT c-spine - negative  CT chest - left 5-10 nondisplaced posterolateral rib fractures, small hemopneumothorax  CT A/P - age indeterminate T10 superior endplate compression deformity    A/P: S/p mechanical fall with above injuries  - admit to trauma  - multimodal pain therapy  - interval CXR to follow hemopneumothorax  - hold eliquis until hemothorax is stable  - med reconciliation  - regular diet  - lovenox ppx      Total time spent in the care of this patient today (excluding critical care, teaching & procedures): 50 minutes    Over 50% of the total time was spent in discussion and coordination of care with consulting services, dietary and rehab services.    Glenda Ross MD  Acute Care Surgery

## 2023-03-26 NOTE — ED ADULT NURSE NOTE - NSICDXPASTSURGICALHX_GEN_ALL_CORE_FT
PAST SURGICAL HISTORY:  H/O shoulder surgery arthroscopic  10+ yrs ago  right    History of bilateral oophorectomy     Pacemaker     S/P cystoscopy TURBT x 2    S/P TAVR (transcatheter aortic valve replacement)     
normal...

## 2023-03-27 ENCOUNTER — TRANSCRIPTION ENCOUNTER (OUTPATIENT)
Age: 88
End: 2023-03-27
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
## 2023-03-27 DIAGNOSIS — M81.0 AGE-RELATED OSTEOPOROSIS WITHOUT CURRENT PATHOLOGICAL FRACTURE: ICD-10-CM

## 2023-03-27 DIAGNOSIS — I50.22 CHRONIC SYSTOLIC (CONGESTIVE) HEART FAILURE: ICD-10-CM

## 2023-03-27 DIAGNOSIS — S22.42XA MULTIPLE FRACTURES OF RIBS, LEFT SIDE, INITIAL ENCOUNTER FOR CLOSED FRACTURE: ICD-10-CM

## 2023-03-27 DIAGNOSIS — I48.0 PAROXYSMAL ATRIAL FIBRILLATION: ICD-10-CM

## 2023-03-27 DIAGNOSIS — G47.00 INSOMNIA, UNSPECIFIED: ICD-10-CM

## 2023-03-27 DIAGNOSIS — I10 ESSENTIAL (PRIMARY) HYPERTENSION: ICD-10-CM

## 2023-03-27 DIAGNOSIS — E78.5 HYPERLIPIDEMIA, UNSPECIFIED: ICD-10-CM

## 2023-03-27 LAB
ANION GAP SERPL CALC-SCNC: 9 MMOL/L — SIGNIFICANT CHANGE UP (ref 5–17)
APTT BLD: 31.3 SEC — SIGNIFICANT CHANGE UP (ref 27.5–35.5)
BUN SERPL-MCNC: 29 MG/DL — HIGH (ref 7–23)
CALCIUM SERPL-MCNC: 9.4 MG/DL — SIGNIFICANT CHANGE UP (ref 8.4–10.5)
CHLORIDE SERPL-SCNC: 106 MMOL/L — SIGNIFICANT CHANGE UP (ref 96–108)
CO2 SERPL-SCNC: 25 MMOL/L — SIGNIFICANT CHANGE UP (ref 22–31)
CREAT SERPL-MCNC: 0.77 MG/DL — SIGNIFICANT CHANGE UP (ref 0.5–1.3)
EGFR: 71 ML/MIN/1.73M2 — SIGNIFICANT CHANGE UP
GLUCOSE SERPL-MCNC: 93 MG/DL — SIGNIFICANT CHANGE UP (ref 70–99)
HCT VFR BLD CALC: 37.8 % — SIGNIFICANT CHANGE UP (ref 34.5–45)
HGB BLD-MCNC: 12.3 G/DL — SIGNIFICANT CHANGE UP (ref 11.5–15.5)
INR BLD: 1.16 RATIO — SIGNIFICANT CHANGE UP (ref 0.88–1.16)
MAGNESIUM SERPL-MCNC: 2 MG/DL — SIGNIFICANT CHANGE UP (ref 1.6–2.6)
MCHC RBC-ENTMCNC: 31.1 PG — SIGNIFICANT CHANGE UP (ref 27–34)
MCHC RBC-ENTMCNC: 32.5 GM/DL — SIGNIFICANT CHANGE UP (ref 32–36)
MCV RBC AUTO: 95.5 FL — SIGNIFICANT CHANGE UP (ref 80–100)
NRBC # BLD: 0 /100 WBCS — SIGNIFICANT CHANGE UP (ref 0–0)
PHOSPHATE SERPL-MCNC: 3.1 MG/DL — SIGNIFICANT CHANGE UP (ref 2.5–4.5)
PLATELET # BLD AUTO: 128 K/UL — LOW (ref 150–400)
POTASSIUM SERPL-MCNC: 4.2 MMOL/L — SIGNIFICANT CHANGE UP (ref 3.5–5.3)
POTASSIUM SERPL-SCNC: 4.2 MMOL/L — SIGNIFICANT CHANGE UP (ref 3.5–5.3)
PROTHROM AB SERPL-ACNC: 13.5 SEC — HIGH (ref 10.5–13.4)
RBC # BLD: 3.96 M/UL — SIGNIFICANT CHANGE UP (ref 3.8–5.2)
RBC # FLD: 13 % — SIGNIFICANT CHANGE UP (ref 10.3–14.5)
SODIUM SERPL-SCNC: 140 MMOL/L — SIGNIFICANT CHANGE UP (ref 135–145)
WBC # BLD: 7.81 K/UL — SIGNIFICANT CHANGE UP (ref 3.8–10.5)
WBC # FLD AUTO: 7.81 K/UL — SIGNIFICANT CHANGE UP (ref 3.8–10.5)

## 2023-03-27 PROCEDURE — 99232 SBSQ HOSP IP/OBS MODERATE 35: CPT

## 2023-03-27 PROCEDURE — 99223 1ST HOSP IP/OBS HIGH 75: CPT

## 2023-03-27 RX ORDER — MOMETASONE FUROATE 1 MG/G
0 CREAM TOPICAL
Qty: 0 | Refills: 0 | DISCHARGE

## 2023-03-27 RX ORDER — ERGOCALCIFEROL 1.25 MG/1
0 CAPSULE ORAL
Refills: 0 | DISCHARGE

## 2023-03-27 RX ORDER — TRAMADOL HYDROCHLORIDE 50 MG/1
50 TABLET ORAL EVERY 4 HOURS
Refills: 0 | Status: DISCONTINUED | OUTPATIENT
Start: 2023-03-27 | End: 2023-03-28

## 2023-03-27 RX ORDER — SERTRALINE 25 MG/1
3 TABLET, FILM COATED ORAL
Qty: 0 | Refills: 0 | DISCHARGE

## 2023-03-27 RX ORDER — METOPROLOL TARTRATE 50 MG
25 TABLET ORAL
Refills: 0 | Status: DISCONTINUED | OUTPATIENT
Start: 2023-03-27 | End: 2023-03-28

## 2023-03-27 RX ORDER — APIXABAN 2.5 MG/1
1 TABLET, FILM COATED ORAL
Qty: 0 | Refills: 0 | DISCHARGE

## 2023-03-27 RX ORDER — MIRTAZAPINE 45 MG/1
15 TABLET, ORALLY DISINTEGRATING ORAL AT BEDTIME
Refills: 0 | Status: DISCONTINUED | OUTPATIENT
Start: 2023-03-27 | End: 2023-03-28

## 2023-03-27 RX ORDER — TRAMADOL HYDROCHLORIDE 50 MG/1
25 TABLET ORAL EVERY 4 HOURS
Refills: 0 | Status: DISCONTINUED | OUTPATIENT
Start: 2023-03-27 | End: 2023-03-28

## 2023-03-27 RX ORDER — ALENDRONATE SODIUM 70 MG/1
1 TABLET ORAL
Refills: 0 | DISCHARGE

## 2023-03-27 RX ORDER — MULTIVIT-MIN/FERROUS GLUCONATE 9 MG/15 ML
0 LIQUID (ML) ORAL
Refills: 0 | DISCHARGE

## 2023-03-27 RX ORDER — OXYCODONE HYDROCHLORIDE 5 MG/1
2.5 TABLET ORAL ONCE
Refills: 0 | Status: DISCONTINUED | OUTPATIENT
Start: 2023-03-27 | End: 2023-03-27

## 2023-03-27 RX ORDER — ACETAMINOPHEN 500 MG
2 TABLET ORAL
Qty: 0 | Refills: 0 | DISCHARGE
Start: 2023-03-27

## 2023-03-27 RX ORDER — ALENDRONATE SODIUM 70 MG/1
1 TABLET ORAL
Qty: 0 | Refills: 0 | DISCHARGE

## 2023-03-27 RX ORDER — VALSARTAN 80 MG/1
1 TABLET ORAL
Qty: 0 | Refills: 0 | DISCHARGE

## 2023-03-27 RX ORDER — ATORVASTATIN CALCIUM 80 MG/1
1 TABLET, FILM COATED ORAL
Qty: 0 | Refills: 0 | DISCHARGE

## 2023-03-27 RX ORDER — APIXABAN 2.5 MG/1
2.5 TABLET, FILM COATED ORAL EVERY 12 HOURS
Refills: 0 | Status: DISCONTINUED | OUTPATIENT
Start: 2023-03-27 | End: 2023-03-28

## 2023-03-27 RX ORDER — MIRTAZAPINE 45 MG/1
1 TABLET, ORALLY DISINTEGRATING ORAL
Refills: 0 | DISCHARGE

## 2023-03-27 RX ORDER — BUMETANIDE 0.25 MG/ML
1 INJECTION INTRAMUSCULAR; INTRAVENOUS
Refills: 0 | Status: DISCONTINUED | OUTPATIENT
Start: 2023-03-28 | End: 2023-03-28

## 2023-03-27 RX ADMIN — LIDOCAINE 1 PATCH: 4 CREAM TOPICAL at 00:57

## 2023-03-27 RX ADMIN — MIRTAZAPINE 15 MILLIGRAM(S): 45 TABLET, ORALLY DISINTEGRATING ORAL at 21:27

## 2023-03-27 RX ADMIN — Medication 25 MILLIGRAM(S): at 05:53

## 2023-03-27 RX ADMIN — OXYCODONE HYDROCHLORIDE 2.5 MILLIGRAM(S): 5 TABLET ORAL at 10:01

## 2023-03-27 RX ADMIN — APIXABAN 2.5 MILLIGRAM(S): 2.5 TABLET, FILM COATED ORAL at 18:25

## 2023-03-27 RX ADMIN — PANTOPRAZOLE SODIUM 40 MILLIGRAM(S): 20 TABLET, DELAYED RELEASE ORAL at 05:53

## 2023-03-27 RX ADMIN — ATORVASTATIN CALCIUM 40 MILLIGRAM(S): 80 TABLET, FILM COATED ORAL at 21:27

## 2023-03-27 RX ADMIN — Medication 125 MICROGRAM(S): at 05:52

## 2023-03-27 RX ADMIN — Medication 650 MILLIGRAM(S): at 06:22

## 2023-03-27 RX ADMIN — Medication 650 MILLIGRAM(S): at 00:12

## 2023-03-27 RX ADMIN — AMIODARONE HYDROCHLORIDE 200 MILLIGRAM(S): 400 TABLET ORAL at 05:52

## 2023-03-27 RX ADMIN — OXYCODONE HYDROCHLORIDE 2.5 MILLIGRAM(S): 5 TABLET ORAL at 10:31

## 2023-03-27 RX ADMIN — LIDOCAINE 1 PATCH: 4 CREAM TOPICAL at 12:28

## 2023-03-27 RX ADMIN — TRAMADOL HYDROCHLORIDE 50 MILLIGRAM(S): 50 TABLET ORAL at 21:50

## 2023-03-27 RX ADMIN — TRAMADOL HYDROCHLORIDE 50 MILLIGRAM(S): 50 TABLET ORAL at 21:26

## 2023-03-27 RX ADMIN — Medication 81 MILLIGRAM(S): at 12:27

## 2023-03-27 RX ADMIN — SACUBITRIL AND VALSARTAN 1 TABLET(S): 24; 26 TABLET, FILM COATED ORAL at 05:52

## 2023-03-27 RX ADMIN — LIDOCAINE 1 PATCH: 4 CREAM TOPICAL at 22:39

## 2023-03-27 RX ADMIN — Medication 650 MILLIGRAM(S): at 12:27

## 2023-03-27 RX ADMIN — Medication 650 MILLIGRAM(S): at 05:52

## 2023-03-27 RX ADMIN — SACUBITRIL AND VALSARTAN 1 TABLET(S): 24; 26 TABLET, FILM COATED ORAL at 21:46

## 2023-03-27 RX ADMIN — Medication 650 MILLIGRAM(S): at 18:23

## 2023-03-27 NOTE — DISCHARGE NOTE PROVIDER - NSDCCPCAREPLAN_GEN_ALL_CORE_FT
PRINCIPAL DISCHARGE DIAGNOSIS  Diagnosis: Rib fracture  Assessment and Plan of Treatment: Rib fractures take time to heal.   Please continue to take tylenol as needed for pain.   A prescription for tramadol was sent to the pharmacy.  Please take as prescribed for severe pain.   Please continue to take deep breaths and use your incentive spirometer.   Please follow up with your PCP in 1-2 weeks.   If you develop severe chest pain, shortness of breath, fevere or chills, please report to the hospital for evaluation.

## 2023-03-27 NOTE — DISCHARGE NOTE PROVIDER - NSDCFUSCHEDAPPT_GEN_ALL_CORE_FT
[facility]  ELECTROPH [address]  Scheduled Appointment: 06/07/2023    Lisker, Jay J  [facility]  CARDIOLOGY [address] [facility]   Scheduled Appointment: 06/13/2023

## 2023-03-27 NOTE — CONSULT NOTE ADULT - ASSESSMENT
89 year old female with PMH pAF s/p PPM, heart failure with reduced EF, AS s/p TAVR, CVA, HTN and HLD who presents after a mechanical fall found to have left fifth through 10th rib fractures with trace left apical pneumothorax admitted to trauma service with medicine consulted for comanagement.

## 2023-03-27 NOTE — CONSULT NOTE ADULT - SUBJECTIVE AND OBJECTIVE BOX
[facility] Division of Hospital Medicine  Jarocho Miranda DO  Pager ([place], 9N-5F): [phone]  Other Times:  [phone]    Patient is a 89y old  Female who presents with a chief complaint of mechanical fall (27 Mar 2023 08:37)    HPI: 89 year old female with PMH pAF s/p PPM, heart failure with reduced EF, AS s/p TAVR, CVA, HTN and HLD who presents after a mechanical fall. The patient was putting her socks on while sitting when she fell backwards and hit her back on a wooden sofa frame. She denies any prodromal symptoms such as chest pain, shortness of breath, dizziness, lightheadedness. Upon arrival to the ED, vital signs were /93, HR 99, RR 18, temperature 97.4 degrees Farenheit and saturating 95% on room air. Trauma survey showed left fifth through 10th rib fractures with trace left apical pneumothorax and she was admitted to the trauma surgery service for further management. Currently, she feels well and her only complaint is back pain.    SUBJECTIVE / OVERNIGHT EVENTS: No acute events overnight. Patient seen and examined at bedside this morning, complains of L sided back pain but otherwise denies shortness of breath, cough, abdominal pain, nausea, vomiting.    REVIEW OF SYSTEMS:    CONSTITUTIONAL: No weakness, fevers or chills  EYES/ENT: No visual changes;  No vertigo or throat pain   NECK: No pain or stiffness  RESPIRATORY: No cough, wheezing, hemoptysis; No shortness of breath  CARDIOVASCULAR: No chest pain or palpitations  GASTROINTESTINAL: No abdominal or epigastric pain. No nausea, vomiting, or hematemesis; No diarrhea or constipation. No melena or hematochezia.  GENITOURINARY: No dysuria, frequency or hematuria  NEUROLOGICAL: No numbness or weakness  SKIN: No itching, burning, rashes, or lesions  MSK: Endorses L sided ribcage/back pain  HEME: No easy bleeding, no easy bruising  All other review of systems is negative unless indicated above.    Allergies: Sulfur    Home Medications: Digoxin 125mcg daily, Eliquis 2.5mg BID, Aspirin 81mg daily, Bumex 1mg every other day, Multivitamin daily, Biotin daily, Vitamin D daily, Entresto 24-26mg BID, Metoprolol Succinate 25mg BID, PreserVision BID, Alendronate 70mg once weekly on , Lipitor 40mg daily, Mirtazapine 15mg daily    Surgical History: R shoulder arthroscopic surgery, bilateral oophorectomy, PPM, TAVR    Social History: Denies tobacco, alcohol or drug use    Family History: Brother  from heart disease    MEDICATIONS  (STANDING):  acetaminophen     Tablet .. 650 milliGRAM(s) Oral every 6 hours  apixaban 2.5 milliGRAM(s) Oral every 12 hours  aspirin enteric coated 81 milliGRAM(s) Oral daily  atorvastatin 40 milliGRAM(s) Oral at bedtime  digoxin     Tablet 125 MICROGram(s) Oral daily  lidocaine   4% Patch 1 Patch Transdermal every 24 hours  metoprolol succinate ER 25 milliGRAM(s) Oral two times a day  mirtazapine 15 milliGRAM(s) Oral at bedtime  pantoprazole    Tablet 40 milliGRAM(s) Oral before breakfast  sacubitril 24 mG/valsartan 26 mG 1 Tablet(s) Oral two times a day    MEDICATIONS  (PRN):  traMADol 25 milliGRAM(s) Oral every 4 hours PRN Moderate Pain (4 - 6)  traMADol 50 milliGRAM(s) Oral every 4 hours PRN Severe Pain (7 - 10)      CAPILLARY BLOOD GLUCOSE        I&O's Summary    26 Mar 2023 07:01  -  27 Mar 2023 07:00  --------------------------------------------------------  IN: 820 mL / OUT: 900 mL / NET: -80 mL    27 Mar 2023 07:01  -  27 Mar 2023 14:55  --------------------------------------------------------  IN: 400 mL / OUT: 500 mL / NET: -100 mL        PHYSICAL EXAM:  Vital Signs Last 24 Hrs  T(C): 36.3 (27 Mar 2023 14:10), Max: 36.7 (27 Mar 2023 01:16)  T(F): 97.4 (27 Mar 2023 14:10), Max: 98 (27 Mar 2023 01:16)  HR: 73 (27 Mar 2023 14:10) (70 - 80)  BP: 136/84 (27 Mar 2023 14:10) (118/65 - 153/93)  BP(mean): --  RR: 18 (27 Mar 2023 14:10) (18 - 18)  SpO2: 95% (27 Mar 2023 14:10) (94% - 97%)    Parameters below as of 27 Mar 2023 14:10  Patient On (Oxygen Delivery Method): room air    CONSTITUTIONAL: Elderly female laying in bed in NAD, well-developed, well-groomed  EYES: EOMI; conjunctiva and sclera clear  ENMT: Moist oral mucosa, no pharyngeal injection or exudates  RESPIRATORY: Normal respiratory effort; lungs are clear to auscultation bilaterally  CARDIOVASCULAR: Regular rate and rhythm, normal S1 and S2, +systolic murmur No lower extremity edema  ABDOMEN: Soft, nondistended, nontender to palpation  MUSCULOSKELETAL: No joint swelling, +TTP of L ribcage and back  PSYCH: A+O to person, place, and partially to time, (knew the month but not the year); affect appropriate  NEUROLOGY: CN 2-12 are intact and symmetric; no gross sensory deficits   SKIN: No rashes; no palpable lesions    LABS:                        12.3   7.81  )-----------( 128      ( 27 Mar 2023 07:17 )             37.8     03-27    140  |  106  |  29<H>  ----------------------------<  93  4.2   |  25  |  0.77    Ca    9.4      27 Mar 2023 07:17  Phos  3.1     03-27  Mg     2.0     -27    TPro  6.8  /  Alb  4.2  /  TBili  0.6  /  DBili  x   /  AST  20  /  ALT  28  /  AlkPhos  54  03-26    PT/INR - ( 27 Mar 2023 07:18 )   PT: 13.5 sec;   INR: 1.16 ratio         PTT - ( 27 Mar 2023 07:18 )  PTT:31.3 sec

## 2023-03-27 NOTE — CHART NOTE - NSCHARTNOTEFT_GEN_A_CORE
TERTIARY TRAUMA SURVEY  ------------------------------------------------------------------------------------  Date of TTS: 3/27/23  Time: 10:00  Admit Date: 3/26/23    Trauma Activation: Mechanical Fall  Admit GCS: 15    HPI: 89F PMH afib on eliquis, CHF, pacemaker, CVA, HTN, HLD presenting with chest and back pain status post mechanical fall.  Patient's reports that she slipped, fell backwards and hit upper back on sofa with no head trauma, no LOC, shortness of breath, focal weakness, numbness/tingling.  Localizes pain to left upper back and left-sided lower chest pain under her breast with no associated diaphoresis palpitations. Denies any saddle anesthesia, urinary/fecal incontinence, weakness. CT demonstrated trace apical pneumo and nondisplaced fractures of anterior ribs 5 through 10.   (26 Mar 2023 04:49)    INTERVAL EVENTS: Patient seen and examined at bedside. No additional complaints from initial examination.    PAST MEDICAL & SURGICAL HISTORY:  HTN (hypertension)  HLD (hyperlipidemia)  Paroxysmal atrial fibrillation  on Eliquis  Severe aortic stenosis  Severe mitral valve regurgitation  Cerebrovascular accident (CVA)  left MCA  2017  aphasia  Bladder cancer  TURBT x 2 in the past  Pneumonia  CVA (cerebrovascular accident)  HTN (hypertension)  Cancer of bladder  Aortic stenosis  Chronic CHF  H/O shoulder surgery  arthroscopic  10+ yrs ago  right  S/P cystoscopy  TURBT x 2  History of bilateral oophorectomy  S/P TAVR (transcatheter aortic valve replacement)  Pacemaker    FAMILY HISTORY:  No pertinent family history in first degree relatives    ALLERGIES: codeine (Nausea)  Sulfur (Fever)    Vital Signs Last 24 Hrs  T(C): 36.3 (27 Mar 2023 08:30), Max: 36.7 (26 Mar 2023 12:59)  T(F): 97.3 (27 Mar 2023 08:30), Max: 98 (26 Mar 2023 12:59)  HR: 80 (27 Mar 2023 08:30) (68 - 80)  BP: 153/93 (27 Mar 2023 08:30) (118/65 - 153/93)  BP(mean): --  RR: 18 (27 Mar 2023 08:30) (18 - 18)  SpO2: 96% (27 Mar 2023 08:30) (94% - 97%)  Parameters below as of 27 Mar 2023 08:30  Patient On (Oxygen Delivery Method): room air    I&O's Summary  26 Mar 2023 07:01  -  27 Mar 2023 07:00  IN: 820 mL / OUT: 900 mL / NET: -80 mL  27 Mar 2023 07:01  -  27 Mar 2023 10:29  IN: 160 mL / OUT: 400 mL / NET: -240 mL    PHYSICAL EXAM:  General: NAD  HEENT: NC/AT; Normal inspection of eyes and nose; Moist mucous membranes, no oral lesions  Neck: Soft, supple, full ROM. No cervical or paraspinal tenderness.   Cardio: RRR.   Chest: Decreased effort secondary to pain, +chest wall tenderness over left chest.  GI/Abd: Soft, NT/ND.  Vascular: Extremities warm; B/L UE and LE pulses 2+  Skin: No rashes; Normal color  Musculoskeletal: All 4 extremities moving spontaneously, LUE limited by pain. Full ROM of shoulders, elbows, wrists, fingers, knees, ankles bilaterally. No tenderness to palpation of joints or extremities.  Neuro: Strength 5/5 in B/L UE/LE, LUE limited by pain. Sensation to light touch intact in B/L UE/LE.   CRANIAL NERVES: III/IV/VI - EOM's intact, painless. V - Normal sensation throughout 3 branches. VII - Normal and symmetric eyebrow raise; cheek puff symmetric; normal and symmetric smile; Normal strength with eye closing b/l. VIII-IX/X - Normal palate rise, + gag reflex. XI - normal shoulder shrug, neck flexion & lateral rotation. XII - Normal and symmetric tongue protrusion.    LABS:                     12.3   7.81  )-----------( 128      ( 27 Mar 2023 07:17 )             37.8     03-27    140  |  106  |  29<H>  ----------------------------<  93  4.2   |  25  |  0.77    Ca    9.4      27 Mar 2023 07:17  Phos  3.1     03-27  Mg     2.0     03-27    TPro  6.8  /  Alb  4.2  /  TBili  0.6  /  DBili  x   /  AST  20  /  ALT  28  /  AlkPhos  54  03-26    PT/INR - ( 27 Mar 2023 07:18 )   PT: 13.5 sec;   INR: 1.16 ratio     PTT - ( 27 Mar 2023 07:18 )  PTT:31.3 sec    ------------------------------------------------------------------------------------------  RADIOLOGICAL FINDINGS REVIEW:  CXR 3/25: Small left apical pneumothorax. Small left pleural effusion. Left-sided rib fractures better appreciated on CT scan of chest.  CXR 3/26: Persistent small left apical pneumothorax and small left pleural effusion.  CT head: No acute intracranial hemorrhage or mass effect.  CT cervical spine: No evidence for acute displaced fracture or   malalignment.  Chest CT: Left fifth through 10th rib fractures with trace left apical pneumothorax   and small left pleural fluid. Age indeterminate superior T10 endplate compression deformity.  ABD/Pelvis CT: No acute injury to the abdomen/pelvis.    List Injuries Identified to Date:    - L 5th-10th rib fx  - Trace L apical pneumothorax  - Superior T10 endplate compression deformity    List Operative and Interventional Radiological Procedures: None    Consults (Date):  None    INTERPRETATION/ASSESSMENT:   89F PMH afib on eliquis, CHF, pacemaker, CVA, HTN, HLD presenting with chest and back pain status post mechanical fall. CT demonstrated trace apical pneumo and nondisplaced fractures of anterior ribs 5 through 10.      Plan:  - No surgical intervention at this time  - Multimodal pain control  - Resume Eliquis today  - Regular diet  - DVT ppx  - Potential d/c today    ACS/Trauma Surgery  [id number] TERTIARY TRAUMA SURVEY  ------------------------------------------------------------------------------------  Date of TTS: 3/27/23  Time: 10:00  Admit Date: 3/26/23    Trauma Activation: Mechanical Fall  Admit GCS: 15    HPI: 89F PMH afib on eliquis, CHF, pacemaker, CVA, HTN, HLD presenting with chest and back pain status post mechanical fall.  Patient's reports that she slipped, fell backwards and hit upper back on sofa with no head trauma, no LOC, shortness of breath, focal weakness, numbness/tingling.  Localizes pain to left upper back and left-sided lower chest pain under her breast with no associated diaphoresis palpitations. Denies any saddle anesthesia, urinary/fecal incontinence, weakness. CT demonstrated trace apical pneumo and nondisplaced fractures of anterior ribs 5 through 10.   (26 Mar 2023 04:49)    INTERVAL EVENTS: Patient seen and examined at bedside. No additional complaints from initial examination.    PAST MEDICAL & SURGICAL HISTORY:  HTN (hypertension)  HLD (hyperlipidemia)  Paroxysmal atrial fibrillation  on Eliquis  Severe aortic stenosis  Severe mitral valve regurgitation  Cerebrovascular accident (CVA)  left MCA  2017  aphasia  Bladder cancer  TURBT x 2 in the past  Pneumonia  CVA (cerebrovascular accident)  HTN (hypertension)  Cancer of bladder  Aortic stenosis  Chronic CHF  H/O shoulder surgery  arthroscopic  10+ yrs ago  right  S/P cystoscopy  TURBT x 2  History of bilateral oophorectomy  S/P TAVR (transcatheter aortic valve replacement)  Pacemaker    FAMILY HISTORY:  No pertinent family history in first degree relatives    ALLERGIES: codeine (Nausea)  Sulfur (Fever)    Vital Signs Last 24 Hrs  T(C): 36.3 (27 Mar 2023 08:30), Max: 36.7 (26 Mar 2023 12:59)  T(F): 97.3 (27 Mar 2023 08:30), Max: 98 (26 Mar 2023 12:59)  HR: 80 (27 Mar 2023 08:30) (68 - 80)  BP: 153/93 (27 Mar 2023 08:30) (118/65 - 153/93)  BP(mean): --  RR: 18 (27 Mar 2023 08:30) (18 - 18)  SpO2: 96% (27 Mar 2023 08:30) (94% - 97%)  Parameters below as of 27 Mar 2023 08:30  Patient On (Oxygen Delivery Method): room air    I&O's Summary  26 Mar 2023 07:01  -  27 Mar 2023 07:00  IN: 820 mL / OUT: 900 mL / NET: -80 mL  27 Mar 2023 07:01  -  27 Mar 2023 10:29  IN: 160 mL / OUT: 400 mL / NET: -240 mL    PHYSICAL EXAM:  General: NAD  HEENT: NC/AT; Normal inspection of eyes and nose; Moist mucous membranes, no oral lesions  Neck: Soft, supple, full ROM. No cervical or paraspinal tenderness.   Cardio: RRR.   Chest: Decreased effort secondary to pain, +chest wall tenderness over left chest.  GI/Abd: Soft, NT/ND.  Vascular: Extremities warm; B/L UE and LE pulses 2+  Skin: No rashes; Normal color  Musculoskeletal: All 4 extremities moving spontaneously, LUE limited by pain. Full ROM of shoulders, elbows, wrists, fingers, knees, ankles bilaterally.  L paraspinal thoracic TTP over ribs, L anterior rib pain around T5 region, no midline spinal TTP, no CVA tenderness, no open wounds, no bruising, no LE edema  Neuro: Strength 5/5 in B/L UE/LE, LUE limited by pain. Sensation to light touch intact in B/L UE/LE.   CRANIAL NERVES: III/IV/VI - EOM's intact, painless. V - Normal sensation throughout 3 branches. VII - Normal and symmetric eyebrow raise; cheek puff symmetric; normal and symmetric smile; Normal strength with eye closing b/l. VIII-IX/X - Normal palate rise, + gag reflex. XI - normal shoulder shrug, neck flexion & lateral rotation. XII - Normal and symmetric tongue protrusion.    LABS:                     12.3   7.81  )-----------( 128      ( 27 Mar 2023 07:17 )             37.8     03-27    140  |  106  |  29<H>  ----------------------------<  93  4.2   |  25  |  0.77    Ca    9.4      27 Mar 2023 07:17  Phos  3.1     03-27  Mg     2.0     03-27    TPro  6.8  /  Alb  4.2  /  TBili  0.6  /  DBili  x   /  AST  20  /  ALT  28  /  AlkPhos  54  03-26    PT/INR - ( 27 Mar 2023 07:18 )   PT: 13.5 sec;   INR: 1.16 ratio     PTT - ( 27 Mar 2023 07:18 )  PTT:31.3 sec    ------------------------------------------------------------------------------------------  RADIOLOGICAL FINDINGS REVIEW:  CXR 3/25: Small left apical pneumothorax. Small left pleural effusion. Left-sided rib fractures better appreciated on CT scan of chest.  CXR 3/26: Persistent small left apical pneumothorax and small left pleural effusion.  CT head: No acute intracranial hemorrhage or mass effect.  CT cervical spine: No evidence for acute displaced fracture or   malalignment.  Chest CT: Left fifth through 10th rib fractures with trace left apical pneumothorax   and small left pleural fluid. Age indeterminate superior T10 endplate compression deformity.  ABD/Pelvis CT: No acute injury to the abdomen/pelvis.    List Injuries Identified to Date:    - L 5th-10th rib fx  - Trace L apical pneumothorax  - Superior T10 endplate compression deformity    List Operative and Interventional Radiological Procedures: None    Consults (Date):  None    INTERPRETATION/ASSESSMENT:   89F PMH afib on eliquis, CHF, pacemaker, CVA, HTN, HLD presenting with chest and back pain status post mechanical fall. CT demonstrated trace apical pneumo and nondisplaced fractures of anterior ribs 5 through 10.      Plan:  - No surgical intervention at this time  - Multimodal pain control  - Resume Eliquis today  - Regular diet  - DVT ppx  - Appreciate medicine recommendations  - Anticipate d/c today    ACS/Trauma Surgery  [id number]

## 2023-03-27 NOTE — CONSULT NOTE ADULT - PROBLEM SELECTOR RECOMMENDATION 3
-Patient with a history of paroxysmal atrial fibrillation currently in NSR, continue home doses of Digoxin 125mcg daily, Toprol 25mg BID and Eliquis 2.5mg BID

## 2023-03-27 NOTE — CONSULT NOTE ADULT - PROBLEM SELECTOR RECOMMENDATION 2
-Patient presenting after a mechanical fall with no prodromal symptoms such as chest pain, shortness of breath, dizziness or lightheadedness  -PT consulted, outpatient PT

## 2023-03-27 NOTE — CONSULT NOTE ADULT - PROBLEM SELECTOR RECOMMENDATION 4
-Patient with a history of CHF with reduced EF not currently in exacerbation, continue home medications os Bumex 1mg every other day, Entresto 24-26mg BID and Toprol 25mg BID -Patient with a history of CHF with reduced EF not currently in exacerbation, continue home medications of Bumex 1mg every other day, Entresto 24-26mg BID and Toprol 25mg BID

## 2023-03-27 NOTE — CONSULT NOTE ADULT - PROBLEM SELECTOR RECOMMENDATION 9
-Patient presenting after a mechanical fall found to have left fifth through 10th rib fractures with trace left apical pneumothorax  -Multimodal pain control per primary team

## 2023-03-27 NOTE — DISCHARGE NOTE PROVIDER - NSDCMRMEDTOKEN_GEN_ALL_CORE_FT
acetaminophen 325 mg oral tablet: 2 tab(s) orally every 6 hours  alendronate 70 mg oral tablet: 1 orally Once weekly on Wednesdays  apixaban 2.5 mg oral tablet: 1 tab(s) orally every 12 hours  aspirin 81 mg oral delayed release tablet: 1 tab(s) orally once a day  atorvastatin 40 mg oral tablet: 1 tab(s) orally once a day (at bedtime)  Biotin: Daily  bumetanide 1 mg oral tablet: 1 tab(s) orally every other day  DIGOXIN  125 MCG TABS: 1 tab(s) orally once a day  ENTRESTO  24-26 MG TABS: 1 tab(s) orally 2 times a day  METOPROLOL SUCC ER 25 MG TAB: TAKE 1 TABLET BY MOUTH EVERY 12 HOURS  mirtazapine 15 mg oral tablet: 1 orally once a day (at bedtime)  Multiple Vitamins oral tablet: 1 tab(s) orally once a day  Preservision: Twice daily  Vitamin D: Daily   acetaminophen 325 mg oral tablet: 2 tab(s) orally every 6 hours  alendronate 70 mg oral tablet: 1 orally Once weekly on Wednesdays  apixaban 2.5 mg oral tablet: 1 tab(s) orally every 12 hours  aspirin 81 mg oral delayed release tablet: 1 tab(s) orally once a day  atorvastatin 40 mg oral tablet: 1 tab(s) orally once a day (at bedtime)  Biotin: Daily  bumetanide 1 mg oral tablet: 1 tab(s) orally every other day  DIGOXIN  125 MCG TABS: 1 tab(s) orally once a day  ENTRESTO  24-26 MG TABS: 1 tab(s) orally 2 times a day  METOPROLOL SUCC ER 25 MG TAB: TAKE 1 TABLET BY MOUTH EVERY 12 HOURS  mirtazapine 15 mg oral tablet: 1 orally once a day (at bedtime)  Multiple Vitamins oral tablet: 1 tab(s) orally once a day  Preservision: Twice daily  traMADol 50 mg oral tablet: 0.5 tab(s) orally every 6 hours as needed for  severe pain MDD: 4  Vitamin D: Daily

## 2023-03-27 NOTE — DISCHARGE NOTE PROVIDER - HOSPITAL COURSE
89F PMH afib on eliquis, CHF, pacemaker, CVA, HTN, HLD presenting with chest and back pain status post mechanical fall.  Patient's reports that she slipped, fell backwards and hit upper back on sofa with no head trauma, no LOC, shortness of breath, focal weakness, numbness/tingling.  Localizes pain to left upper back and left-sided lower chest pain under her breast with no associated diaphoresis palpitations. Denies any saddle anesthesia, urinary/fecal incontinence, weakness. CT demonstrated trace apical pneumo and nondisplaced fractures of anterior ribs 5 through 10.       Patient was admitted to Trauma service for further management.  Patient received multimodal pain management.  Incentive spirometry was encouraged.  Repeat CXR was stable.  She was evaluated by physical therapy who recommended outpatient physical therapy.     On day of discharge, patient was tolerating regular diet and pain was controlled.      She is to follow up with her PCP in 1-2 weeks.   89F PMH A-Fib on Eliquis, CHF, pacemaker, CVA, HTN, HLD presenting with chest and back pain status post mechanical fall.  Patient's reports that she slipped, fell backwards and hit upper back on sofa with no head trauma, no LOC, shortness of breath, focal weakness, numbness/tingling.  Localizes pain to left upper back and left-sided lower chest pain under her breast with no associated diaphoresis palpitations. Denies any saddle anesthesia, urinary/fecal incontinence, weakness. CT demonstrated left trace apical pneumo and left nondisplaced fractures of anterior ribs 5 through 10.       Patient was admitted to Trauma service for further management.  Patient received multimodal pain management.  Incentive spirometry was encouraged.  Repeat CXR was stable.  She was evaluated by physical therapy who recommended home physical therapy.     On day of discharge, patient was tolerating regular diet and pain was controlled.      She is to follow up with her PCP in 1-2 weeks.

## 2023-03-27 NOTE — DISCHARGE NOTE PROVIDER - CARE PROVIDER_API CALL
Monserrat Madrid J  Internal Medicine  [address] [place] [address]  Phone: [phone]  Fax: [phone]  Follow Up Time:

## 2023-03-28 ENCOUNTER — TRANSCRIPTION ENCOUNTER (OUTPATIENT)
Age: 88
End: 2023-03-28

## 2023-03-28 LAB
ANION GAP SERPL CALC-SCNC: 8 MMOL/L — SIGNIFICANT CHANGE UP (ref 5–17)
BUN SERPL-MCNC: 29 MG/DL — HIGH (ref 7–23)
CALCIUM SERPL-MCNC: 9.3 MG/DL — SIGNIFICANT CHANGE UP (ref 8.4–10.5)
CHLORIDE SERPL-SCNC: 105 MMOL/L — SIGNIFICANT CHANGE UP (ref 96–108)
CO2 SERPL-SCNC: 27 MMOL/L — SIGNIFICANT CHANGE UP (ref 22–31)
CREAT SERPL-MCNC: 0.85 MG/DL — SIGNIFICANT CHANGE UP (ref 0.5–1.3)
EGFR: 63 ML/MIN/1.73M2 — SIGNIFICANT CHANGE UP
GLUCOSE SERPL-MCNC: 89 MG/DL — SIGNIFICANT CHANGE UP (ref 70–99)
HCT VFR BLD CALC: 37.3 % — SIGNIFICANT CHANGE UP (ref 34.5–45)
HGB BLD-MCNC: 12 G/DL — SIGNIFICANT CHANGE UP (ref 11.5–15.5)
MAGNESIUM SERPL-MCNC: 2 MG/DL — SIGNIFICANT CHANGE UP (ref 1.6–2.6)
MCHC RBC-ENTMCNC: 31.5 PG — SIGNIFICANT CHANGE UP (ref 27–34)
MCHC RBC-ENTMCNC: 32.2 GM/DL — SIGNIFICANT CHANGE UP (ref 32–36)
MCV RBC AUTO: 97.9 FL — SIGNIFICANT CHANGE UP (ref 80–100)
NRBC # BLD: 0 /100 WBCS — SIGNIFICANT CHANGE UP (ref 0–0)
PHOSPHATE SERPL-MCNC: 3.1 MG/DL — SIGNIFICANT CHANGE UP (ref 2.5–4.5)
PLATELET # BLD AUTO: 125 K/UL — LOW (ref 150–400)
POTASSIUM SERPL-MCNC: 4.5 MMOL/L — SIGNIFICANT CHANGE UP (ref 3.5–5.3)
POTASSIUM SERPL-SCNC: 4.5 MMOL/L — SIGNIFICANT CHANGE UP (ref 3.5–5.3)
RBC # BLD: 3.81 M/UL — SIGNIFICANT CHANGE UP (ref 3.8–5.2)
RBC # FLD: 13.1 % — SIGNIFICANT CHANGE UP (ref 10.3–14.5)
SODIUM SERPL-SCNC: 140 MMOL/L — SIGNIFICANT CHANGE UP (ref 135–145)
WBC # BLD: 7.85 K/UL — SIGNIFICANT CHANGE UP (ref 3.8–10.5)
WBC # FLD AUTO: 7.85 K/UL — SIGNIFICANT CHANGE UP (ref 3.8–10.5)

## 2023-03-28 PROCEDURE — 85730 THROMBOPLASTIN TIME PARTIAL: CPT

## 2023-03-28 PROCEDURE — 84100 ASSAY OF PHOSPHORUS: CPT

## 2023-03-28 PROCEDURE — 74177 CT ABD & PELVIS W/CONTRAST: CPT | Mod: MA

## 2023-03-28 PROCEDURE — 99232 SBSQ HOSP IP/OBS MODERATE 35: CPT

## 2023-03-28 PROCEDURE — 85025 COMPLETE CBC W/AUTO DIFF WBC: CPT

## 2023-03-28 PROCEDURE — 85027 COMPLETE CBC AUTOMATED: CPT

## 2023-03-28 PROCEDURE — 72125 CT NECK SPINE W/O DYE: CPT | Mod: MA

## 2023-03-28 PROCEDURE — 71045 X-RAY EXAM CHEST 1 VIEW: CPT

## 2023-03-28 PROCEDURE — 80053 COMPREHEN METABOLIC PANEL: CPT

## 2023-03-28 PROCEDURE — 85610 PROTHROMBIN TIME: CPT

## 2023-03-28 PROCEDURE — 83735 ASSAY OF MAGNESIUM: CPT

## 2023-03-28 PROCEDURE — 97116 GAIT TRAINING THERAPY: CPT

## 2023-03-28 PROCEDURE — 87635 SARS-COV-2 COVID-19 AMP PRB: CPT

## 2023-03-28 PROCEDURE — 36415 COLL VENOUS BLD VENIPUNCTURE: CPT

## 2023-03-28 PROCEDURE — 83690 ASSAY OF LIPASE: CPT

## 2023-03-28 PROCEDURE — 83605 ASSAY OF LACTIC ACID: CPT

## 2023-03-28 PROCEDURE — 97161 PT EVAL LOW COMPLEX 20 MIN: CPT

## 2023-03-28 PROCEDURE — 70450 CT HEAD/BRAIN W/O DYE: CPT | Mod: MA

## 2023-03-28 PROCEDURE — 99285 EMERGENCY DEPT VISIT HI MDM: CPT | Mod: 25

## 2023-03-28 PROCEDURE — 84484 ASSAY OF TROPONIN QUANT: CPT

## 2023-03-28 PROCEDURE — 71260 CT THORAX DX C+: CPT | Mod: MA

## 2023-03-28 PROCEDURE — 97530 THERAPEUTIC ACTIVITIES: CPT

## 2023-03-28 PROCEDURE — 80048 BASIC METABOLIC PNL TOTAL CA: CPT

## 2023-03-28 RX ORDER — METOPROLOL TARTRATE 50 MG
0 TABLET ORAL
Qty: 0 | Refills: 3 | DISCHARGE

## 2023-03-28 RX ORDER — TRAMADOL HYDROCHLORIDE 50 MG/1
0.5 TABLET ORAL
Qty: 6 | Refills: 0
Start: 2023-03-28 | End: 2023-03-30

## 2023-03-28 RX ORDER — INFLUENZA VIRUS VACCINE 15; 15; 15; 15 UG/.5ML; UG/.5ML; UG/.5ML; UG/.5ML
0.7 SUSPENSION INTRAMUSCULAR ONCE
Refills: 0 | Status: DISCONTINUED | OUTPATIENT
Start: 2023-03-28 | End: 2023-03-28

## 2023-03-28 RX ADMIN — Medication 650 MILLIGRAM(S): at 05:24

## 2023-03-28 RX ADMIN — LIDOCAINE 1 PATCH: 4 CREAM TOPICAL at 00:30

## 2023-03-28 RX ADMIN — BUMETANIDE 1 MILLIGRAM(S): 0.25 INJECTION INTRAMUSCULAR; INTRAVENOUS at 05:26

## 2023-03-28 RX ADMIN — Medication 650 MILLIGRAM(S): at 12:09

## 2023-03-28 RX ADMIN — Medication 650 MILLIGRAM(S): at 00:22

## 2023-03-28 RX ADMIN — Medication 81 MILLIGRAM(S): at 12:09

## 2023-03-28 RX ADMIN — Medication 650 MILLIGRAM(S): at 06:03

## 2023-03-28 RX ADMIN — Medication 125 MICROGRAM(S): at 05:24

## 2023-03-28 RX ADMIN — SACUBITRIL AND VALSARTAN 1 TABLET(S): 24; 26 TABLET, FILM COATED ORAL at 05:25

## 2023-03-28 RX ADMIN — Medication 25 MILLIGRAM(S): at 05:24

## 2023-03-28 RX ADMIN — PANTOPRAZOLE SODIUM 40 MILLIGRAM(S): 20 TABLET, DELAYED RELEASE ORAL at 09:44

## 2023-03-28 RX ADMIN — APIXABAN 2.5 MILLIGRAM(S): 2.5 TABLET, FILM COATED ORAL at 05:24

## 2023-03-28 RX ADMIN — LIDOCAINE 1 PATCH: 4 CREAM TOPICAL at 12:15

## 2023-03-28 RX ADMIN — Medication 650 MILLIGRAM(S): at 01:00

## 2023-03-28 NOTE — PROGRESS NOTE ADULT - PROBLEM SELECTOR PLAN 4
-Patient with a history of CHF with reduced EF not currently in exacerbation, continue home medications of Bumex 1mg every other day, Entresto 24-26mg BID and Toprol 25mg BID

## 2023-03-28 NOTE — PROGRESS NOTE ADULT - TIME BILLING
I saw and evaluated patient and agree with above note  patient is doing well  I reviewed with nursing team patient has been ambulating with assistance very effectively  would be appropriate at this stage for discharge home with home physical therapy
I saw and evaluated patient and agree with above note  resting comfortably  reported to have taken several steps with physical therapy yesterday  has home nursing and disposition is home with home physical therapy  plan of care coordinated with son
Review of tests, imaging, labs, documents, medical management, coordination of care and counseling.

## 2023-03-28 NOTE — PATIENT PROFILE ADULT - FALL HARM RISK - HARM RISK INTERVENTIONS

## 2023-03-28 NOTE — PROGRESS NOTE ADULT - ASSESSMENT
89F PMH afib on eliquis, CHF, pacemaker, CVA, HTN, HLD presenting with chest and back pain status post mechanical fall. CT demonstrated trace apical pneumo and nondisplaced fractures of anterior ribs 5 through 10.      Plan:  - No surgical intervention at this time  - Multimodal pain control  - Regular diet  - DVT ppx: PO Eliquis  - Potential d/c home with home PT today    ACS/Trauma Surgery  [id number]
 89F PMH afib on eliquis, CHF, pacemaker, CVA, HTN, HLD presenting with chest and back pain status post mechanical fall. CT demonstrated trace apical pneumo and nondisplaced fractures of anterior ribs 5 through 10.      Plan:  - No surgical intervention at this time  - Multimodal pain control  - Resume Eliquis today  - Regular diet  - DVT ppx  - Potential d/c today    ACS/Trauma Surgery  [id number]
89 year old female with PMH pAF s/p PPM, heart failure with reduced EF, AS s/p TAVR, CVA, HTN and HLD who presents after a mechanical fall found to have left fifth through 10th rib fractures with trace left apical pneumothorax admitted to trauma service with medicine consulted for comanagement.

## 2023-03-28 NOTE — PROGRESS NOTE ADULT - PROBLEM SELECTOR PLAN 3
-Patient with a history of paroxysmal atrial fibrillation currently in NSR, continue home doses of Digoxin 125mcg daily, Toprol 25mg BID and Eliquis 2.5mg BID Paramedian Forehead Flap Text: A decision was made to reconstruct the defect utilizing an interpolation axial flap and a staged reconstruction.  A telfa template was made of the defect.  This telfa template was then used to outline the paramedian forehead pedicle flap.  The donor area for the pedicle flap was then injected with anesthesia.  The flap was excised through the skin and subcutaneous tissue down to the layer of the underlying musculature.  The pedicle flap was carefully excised within this deep plane to maintain its blood supply.  The edges of the donor site were undermined.   The donor site was closed in a primary fashion.  The pedicle was then rotated into position and sutured.  Once the tube was sutured into place, adequate blood supply was confirmed with blanching and refill.  The pedicle was then wrapped with xeroform gauze and dressed appropriately with a telfa and gauze bandage to ensure continued blood supply and protect the attached pedicle.

## 2023-03-28 NOTE — PROGRESS NOTE ADULT - SUBJECTIVE AND OBJECTIVE BOX
ACS/TRAUMA SURGERY PROGRESS NOTE:    SUBJECTIVE: Patient seen and examined at bedside with surgical team, patient with L chest pain consistent with injuries. Denies fever, chills, nausea, vomiting, abdominal pain.    OBJECTIVE:  Vital Signs Last 24 Hrs  T(C): 36.7 (27 Mar 2023 04:46), Max: 36.7 (26 Mar 2023 12:59)  T(F): 98 (27 Mar 2023 04:46), Max: 98 (26 Mar 2023 12:59)  HR: 74 (27 Mar 2023 04:46) (68 - 74)  BP: 149/95 (27 Mar 2023 04:46) (118/65 - 149/95)  BP(mean): --  RR: 18 (27 Mar 2023 04:46) (18 - 18)  SpO2: 97% (27 Mar 2023 04:46) (94% - 97%)  Parameters below as of 27 Mar 2023 04:46  Patient On (Oxygen Delivery Method): room air    I&O's Detail  26 Mar 2023 07:01  -  27 Mar 2023 07:00  --------------------------------------------------------  IN:    Oral Fluid: 820 mL  Total IN: 820 mL  OUT:    Voided (mL): 900 mL  Total OUT: 900 mL  Total NET: -80 mL    PHYSICAL EXAM:  Constitutional: A&Ox3, NAD, cooperative to examination.  Respiratory: CTA b/l, unlabored breathing, b/l equal chest rise.  Abdomen: Soft, non-distended. +LUQ TTP. No rebound or guarding.  MSK/Extremities: L paraspinal thoracic TTP over ribs, L anterior rib pain around T5 region, no midline spinal TTP, no CVA tenderness, no open wounds, no bruising, no LE edema    LABS:                      12.3   7.81  )-----------( 128      ( 27 Mar 2023 07:17 )             37.8     03-27    140  |  106  |  29<H>  ----------------------------<  93  4.2   |  25  |  0.77    Ca    9.4      27 Mar 2023 07:17  Phos  3.1     03-27  Mg     2.0     03-27    TPro  6.8  /  Alb  4.2  /  TBili  0.6  /  DBili  x   /  AST  20  /  ALT  28  /  AlkPhos  54  03-26    PT/INR - ( 27 Mar 2023 07:18 )   PT: 13.5 sec;   INR: 1.16 ratio    PTT - ( 27 Mar 2023 07:18 )  PTT:31.3 sec    LIVER FUNCTIONS - ( 26 Mar 2023 00:52 )  Alb: 4.2 g/dL / Pro: 6.8 g/dL / ALK PHOS: 54 U/L / ALT: 28 U/L / AST: 20 U/L / GGT: x 
ACS/TRAUMA SURGERY PROGRESS NOTE:    SUBJECTIVE: Patient seen and examined at bedside with surgical team. Patient with improving L chest pain consistent with injuries. Denies fever, chills, nausea, vomiting, abdominal pain.    OBJECTIVE:  Vital Signs Last 24 Hrs  T(C): 36.4 (28 Mar 2023 04:35), Max: 36.4 (27 Mar 2023 20:19)  T(F): 97.5 (28 Mar 2023 04:35), Max: 97.6 (27 Mar 2023 20:19)  HR: 70 (28 Mar 2023 04:35) (69 - 80)  BP: 138/85 (28 Mar 2023 04:35) (99/65 - 153/93)  BP(mean): --  RR: 18 (28 Mar 2023 04:35) (18 - 18)  SpO2: 96% (28 Mar 2023 04:35) (92% - 96%)    Parameters below as of 28 Mar 2023 04:35  Patient On (Oxygen Delivery Method): room air      I&O's Detail    27 Mar 2023 07:01  -  28 Mar 2023 07:00  --------------------------------------------------------  IN:    Oral Fluid: 400 mL  Total IN: 400 mL    OUT:    Voided (mL): 800 mL  Total OUT: 800 mL    Total NET: -400 mL      LABS:                                 12.0   7.85  )-----------( 125      ( 28 Mar 2023 06:27 )             37.3     03-28    140  |  105  |  29<H>  ----------------------------<  89  4.5   |  27  |  0.85    Ca    9.3      28 Mar 2023 06:26  Phos  3.1     03-28  Mg     2.0     03-28    PT/INR - ( 27 Mar 2023 07:18 )   PT: 13.5 sec;   INR: 1.16 ratio    PTT - ( 27 Mar 2023 07:18 )  PTT:31.3 sec      PHYSICAL EXAM:  Constitutional: A&Ox3, NAD, cooperative to examination.  Respiratory: CTA b/l, unlabored breathing, b/l equal chest rise.  Abdomen: Soft, non-distended. +LUQ mild TTP. No rebound or guarding.  MSK/Extremities: Mild L paraspinal thoracic TTP over ribs, L anterior rib pain around T5 region, no midline spinal TTP, no CVA tenderness, no open wounds, no bruising, no LE edema
[facility] Division of Hospital Medicine  Jarocho Miranda DO  Pager ([place], 0D-0D): [phone]  Other Times:  [phone]    Patient is a 89y old  Female who presents with a chief complaint of mechanical fall (28 Mar 2023 07:43)    SUBJECTIVE / OVERNIGHT EVENTS: No acute events overnight. Patient seen and examined at bedside this morning, has L sided ribcage and back pain from her fractures but otherwise feels well, denies shortness of breath, abdominal pain, nausea, vomiting.    REVIEW OF SYSTEMS:    CONSTITUTIONAL: No weakness, fevers or chills  EYES/ENT: No visual changes;  No vertigo or throat pain   NECK: No pain or stiffness  RESPIRATORY: No cough, wheezing, hemoptysis; No shortness of breath  CARDIOVASCULAR: No chest pain or palpitations  GASTROINTESTINAL: No abdominal or epigastric pain. No nausea, vomiting, or hematemesis; No diarrhea or constipation. No melena or hematochezia.  GENITOURINARY: No dysuria, frequency or hematuria  NEUROLOGICAL: No numbness or weakness  SKIN: No itching, burning, rashes, or lesions  MSK: Endorses L sided ribcage/back pain  HEME: No easy bleeding, no easy bruising  All other review of systems is negative unless indicated above.    MEDICATIONS  (STANDING):  acetaminophen     Tablet .. 650 milliGRAM(s) Oral every 6 hours  apixaban 2.5 milliGRAM(s) Oral every 12 hours  aspirin enteric coated 81 milliGRAM(s) Oral daily  atorvastatin 40 milliGRAM(s) Oral at bedtime  buMETAnide 1 milliGRAM(s) Oral <User Schedule>  digoxin     Tablet 125 MICROGram(s) Oral daily  influenza  Vaccine (HIGH DOSE) 0.7 milliLiter(s) IntraMuscular once  lidocaine   4% Patch 1 Patch Transdermal every 24 hours  metoprolol succinate ER 25 milliGRAM(s) Oral two times a day  mirtazapine 15 milliGRAM(s) Oral at bedtime  pantoprazole    Tablet 40 milliGRAM(s) Oral before breakfast  sacubitril 24 mG/valsartan 26 mG 1 Tablet(s) Oral two times a day    MEDICATIONS  (PRN):  traMADol 25 milliGRAM(s) Oral every 4 hours PRN Moderate Pain (4 - 6)  traMADol 50 milliGRAM(s) Oral every 4 hours PRN Severe Pain (7 - 10)      CAPILLARY BLOOD GLUCOSE        I&O's Summary    27 Mar 2023 07:01  -  28 Mar 2023 07:00  --------------------------------------------------------  IN: 400 mL / OUT: 800 mL / NET: -400 mL    28 Mar 2023 07:01  -  28 Mar 2023 10:55  --------------------------------------------------------  IN: 240 mL / OUT: 500 mL / NET: -260 mL        PHYSICAL EXAM:  Vital Signs Last 24 Hrs  T(C): 36.4 (28 Mar 2023 09:22), Max: 36.4 (27 Mar 2023 20:19)  T(F): 97.5 (28 Mar 2023 09:22), Max: 97.6 (27 Mar 2023 20:19)  HR: 66 (28 Mar 2023 09:22) (66 - 73)  BP: 112/73 (28 Mar 2023 09:22) (99/65 - 138/85)  BP(mean): --  RR: 18 (28 Mar 2023 09:22) (18 - 18)  SpO2: 94% (28 Mar 2023 09:22) (92% - 96%)    Parameters below as of 28 Mar 2023 09:22  Patient On (Oxygen Delivery Method): room air    CONSTITUTIONAL: Elderly female laying in bed in NAD, well-developed, well-groomed  EYES: EOMI; conjunctiva and sclera clear  ENMT: Moist oral mucosa, no pharyngeal injection or exudates  RESPIRATORY: Normal respiratory effort; lungs are clear to auscultation bilaterally  CARDIOVASCULAR: Regular rate and rhythm, normal S1 and S2, +systolic murmur No lower extremity edema  ABDOMEN: Soft, nondistended, nontender to palpation  MUSCULOSKELETAL: No joint swelling, +TTP of L ribcage and back  PSYCH: A+O to person, place (knows she is in the hospital but forgot the name), but not time; affect appropriate  NEUROLOGY: CN 2-12 are intact and symmetric; no gross sensory deficits   SKIN: No rashes; no palpable lesions    LABS:                        12.0   7.85  )-----------( 125      ( 28 Mar 2023 06:27 )             37.3     03-28    140  |  105  |  29<H>  ----------------------------<  89  4.5   |  27  |  0.85    Ca    9.3      28 Mar 2023 06:26  Phos  3.1     03-28  Mg     2.0     03-28      PT/INR - ( 27 Mar 2023 07:18 )   PT: 13.5 sec;   INR: 1.16 ratio         PTT - ( 27 Mar 2023 07:18 )  PTT:31.3 sec

## 2023-03-28 NOTE — DISCHARGE NOTE NURSING/CASE MANAGEMENT/SOCIAL WORK - NSDCPEFALRISK_GEN_ALL_CORE
For information on Fall & Injury Prevention, visit: [email or web address] OR  [email or web address] OR  https://www.cdc.gov/steadi/patient.html

## 2023-03-29 VITALS
OXYGEN SATURATION: 94 % | RESPIRATION RATE: 19 BRPM | DIASTOLIC BLOOD PRESSURE: 76 MMHG | TEMPERATURE: 98 F | SYSTOLIC BLOOD PRESSURE: 110 MMHG | HEART RATE: 72 BPM

## 2023-05-15 ENCOUNTER — RX RENEWAL (OUTPATIENT)
Age: 88
End: 2023-05-15

## 2023-05-15 RX ORDER — SACUBITRIL AND VALSARTAN 24; 26 MG/1; MG/1
24-26 TABLET, FILM COATED ORAL
Qty: 180 | Refills: 3 | Status: ACTIVE | COMMUNITY
Start: 2019-12-20 | End: 1900-01-01

## 2023-05-26 NOTE — DISCHARGE NOTE NURSING/CASE MANAGEMENT/SOCIAL WORK - NSDCPEFALRISK_GEN_ALL_CORE
For information on Fall & Injury Prevention, visit: [email or web address] OR  [email or web address] OR  https://www.cdc.gov/steadi/patient.html
[place] [phone]

## 2023-06-07 ENCOUNTER — APPOINTMENT (OUTPATIENT)
Dept: ELECTROPHYSIOLOGY | Facility: CLINIC | Age: 88
End: 2023-06-07
Payer: MEDICARE

## 2023-06-07 ENCOUNTER — NON-APPOINTMENT (OUTPATIENT)
Age: 88
End: 2023-06-07

## 2023-06-07 PROCEDURE — 93296 REM INTERROG EVL PM/IDS: CPT

## 2023-06-07 PROCEDURE — 93294 REM INTERROG EVL PM/LDLS PM: CPT

## 2023-06-08 NOTE — ED ADULT NURSE NOTE - NS ED NURSE LEVEL OF CONSCIOUSNESS SPEECH
Nursing notes reviewed and accepted.    Loren Austin is a 24 month old female who presents for 2-year-old well child exam.  Patient presents with Mother and with sibling(s).    Concerns raised today include:     Recovering from stomach bug over the past 24 hours. Last vomited about 12 hours ago. Ate well this morning.     Seen in Walk-In clinic on 5/22 for tick bite. Given prophylaxis dose of Doxycycline x 1. No rash has been noticed.     Elimination: normal voiding and stooling pattern.  Feeding: Diet is adequate.  Milk Intake: adequate     SOCIAL:  Primary caretakers: Mom, Dad   Siblings: Sister, Soila    or schooling:      DEVELOPMENT:  kicks ball forward, walks up stairs, removes article of clothing (not hat), combines 2 words, uses own name to refer to self, vocabulary of more than 20 words, strangers understand half child's speech, understands a two-step verbal command (\" the toy: put it away\") and points to 6 named body parts    Birth history, medical history, surgical history, and family history reviewed and updated.    PHYSICAL EXAM:  Pulse 104, resp. rate 36, height 34.65\" (88 cm), weight 12.4 kg (27 lb 4.8 oz).    GENERAL:  Well appearing  female, nontoxic, no acute distress.  Alert and interactive.  SKIN: Warm, normal turgor.  No cyanosis.  No bruises or lesions.  HEAD:  Normocephalic, atraumatic.    EYES:  Conjunctivae without injection or icterus.  Pupils equal, round, reactive to light; extraocular movements intact.  NOSE: No flaring.  EARS:  Tympanic membranes transparent with good landmarks.  THROAT:  Oropharynx with moist mucous membranes and no lesions.  NECK:  Supple, no lymphadenopathy or masses.  HEART:  Regular rate and rhythm.  Quiet precordium.  Normal S1, S2.  No murmurs, rubs, gallops.   LUNGS:  Clear to auscultation bilaterally.  No wheezes, rales, rhonchi.  Normal work of breathing.  ABDOMEN:  Soft, nontender.  No organomegaly or masses.  GENITOURINARY:  Jose J 1  female  EXTREMITIES:  Warm, dry, without abnormalities.  NEUROLOGIC:  Normal tone, bulk, strength.    ASSESSMENT:  24 month old female well child.  Normal growth and development   History of tick bite s/p prophylaxis - asymptomatic     PLAN:  All parental concerns and questions discussed.    Anticipatory guidance provided, handout given.              Safety/car/bicycle/fire/sharp objects/falls/water              Development              Discipline              Diet              Television              Analgesics/antipyretics              Sun exposure              Tobacco-free home              Dental care              Lead exposure risk: none                Immunizations per orders.  Risks, benefits, and side effects discussed.    Return to clinic in 6 months for well child examination or sooner prn illness/concerns.               Speaking Coherently

## 2023-06-13 ENCOUNTER — APPOINTMENT (OUTPATIENT)
Dept: CARDIOLOGY | Facility: CLINIC | Age: 88
End: 2023-06-13
Payer: MEDICARE

## 2023-06-13 ENCOUNTER — NON-APPOINTMENT (OUTPATIENT)
Age: 88
End: 2023-06-13

## 2023-06-13 VITALS
OXYGEN SATURATION: 98 % | SYSTOLIC BLOOD PRESSURE: 104 MMHG | BODY MASS INDEX: 19.39 KG/M2 | DIASTOLIC BLOOD PRESSURE: 72 MMHG | WEIGHT: 106 LBS | HEART RATE: 78 BPM

## 2023-06-13 PROCEDURE — 99214 OFFICE O/P EST MOD 30 MIN: CPT

## 2023-06-13 PROCEDURE — 93000 ELECTROCARDIOGRAM COMPLETE: CPT

## 2023-06-13 NOTE — DISCUSSION/SUMMARY
[FreeTextEntry1] : She is a 89-year-old with a history of cardiomyopathy (nonobstructive coronary disease), paroxysmal atrial fibrillation and stable systolic heart failure symptoms.  \par HFrEF: OptiVol has been normal.\par Atrial fibrillation: Still still in atrial fibrillation with biventricular pacing.  Pacing.  Continue rate control continue rate control with digoxin and meth and metoprolol.  And metoprolol.  Oral anticoagulation to reduce her stroke risk should be stroke risk should be continued with Entresto Entresto 2.5 mg 1 5 mg twice daily 2.5 mg twice daily (weight and age) and age).\par HTN:  Her blood pressure control is excellent on her current regimen.\par   \par Plan reviewed with pt and her daughter.\par Routine follow-up in 6 months, or sooner if symptoms arise.  \par Pacemaker interrogation at next visit. [EKG obtained to assist in diagnosis and management of assessed problem(s)] : EKG obtained to assist in diagnosis and management of assessed problem(s)

## 2023-06-13 NOTE — PHYSICAL EXAM
[General Appearance - Well Nourished] : well nourished [General Appearance - Well Developed] : well developed [Normal Conjunctiva] : the conjunctiva exhibited no abnormalities [Normal Oral Mucosa] : normal oral mucosa [No Oral Pallor] : no oral pallor [Normal Jugular Venous A Waves Present] : normal jugular venous A waves present [Normal Jugular Venous V Waves Present] : normal jugular venous V waves present [No Jugular Venous Owens A Waves] : no jugular venous owens A waves [] : no respiratory distress [Respiration, Rhythm And Depth] : normal respiratory rhythm and effort [Bowel Sounds] : normal bowel sounds [Abdomen Soft] : soft [Abnormal Walk] : normal gait [Nail Clubbing] : no clubbing of the fingernails [Cyanosis, Localized] : no localized cyanosis [Skin Turgor] : normal skin turgor [Oriented To Time, Place, And Person] : oriented to person, place, and time [Affect] : the affect was normal [Mood] : the mood was normal [No Anxiety] : not feeling anxious [FreeTextEntry1] : Uses a walker

## 2023-06-13 NOTE — HISTORY OF PRESENT ILLNESS
[FreeTextEntry1] : Has been doing well. Still walking around the block with the assistance of a walker.\par Seen by PMD last week and labs were reportedly normal.\par She is accompanied by her home health aide and and I reviewed her medical issues with her daughter over the telephone telephone.\par \par Prior:\par She has been having severe right hip/nerve pain. Seen in the ER. Referred for pain management.\par Suggested the possibility for spinal injections.\par No chest pain \par \par Prior:\par Feeling okay. some fatigue with walking.\par No chest pain or orthopnea.\par Seen by Dr. Madrid. Labs all okay.\par \par Prior:\par She is accompanied by her daughter and reports feeling okay overall.  Her biggest concern seems to be related to decreased urine output and a burning sensation that makes her feel like she has to urinate.\par She was recently seen by new internist.\par Recent blood work was reportedly normal.\par Her other big concern is fatigue for which she occasionally takes naps.  She has not reported any inappropriate falling asleep.\par No bleeding or falls.\par \par Echocardiography showed normal left ventricular systolic function with mild to moderate mitral regurgitation and mild pulmonary hypertension.\par The TAVR appears well-seated with no significant regurgitation.  Normal gradients.

## 2023-07-24 NOTE — DISCHARGE NOTE NURSING/CASE MANAGEMENT/SOCIAL WORK - NSTRANSFERBELONGINGSRESP_GEN_A_NUR
Consult Hospitalist History and Physical    Patient: Marika Boss MRN: [id number]  SSN: [id number]    YOB: 1961  Age: 64 y.o. Sex: male      Subjective:      Marika Boss is a 64 y.o. male with a past medical history of hypertension, COPD, diabetes, PTSD, bipolar disorder that presented to the [place] for manic episodes, grandiose ideas, some episodes of harming others. Patient was TDO and then transferred to behavioral health at [facility] under Dr. Yvette Jain care. Patient is a good historian when it comes to his medical care. He says he takes amlodipine 10 mg daily and hydrochlorothiazide 12.5 mg daily for his blood pressure. He denies any chest pain but does say when he gets anxious and overwhelmed he does have palpitations. He says he takes alogliptin 12.5 mg daily for glucose control. Metformin he is allergic to. He does not take insulin. He says he has a risk inhaler but has not used it in several years. Patient does feel anxious and overwhelmed. This is associated with palpitations. He denies any chest pain. Medical team was consulted on for full history and physical.    History reviewed. No pertinent past medical history. HTN, COPD, DM, PTSD, Bipolar    No past surgical history on file. Abdominal  hernia    No family history on file.  HTN    Social History     Tobacco Use    Smoking status: Never    Smokeless tobacco: Never   Substance Use Topics    Alcohol use: Never      Prior to Admission medications    Not on File     Current Facility-Administered Medications   Medication Dose Route Frequency Provider Last Rate Last Admin    albuterol sulfate HFA (PROVENTIL;VENTOLIN;PROAIR) 108 (90 Base) MCG/ACT inhaler 2 puff  2 puff Inhalation Q6H PRN Kiara Chris PA-C        acetaminophen (TYLENOL) tablet 650 mg  650 mg Oral ONCE PRN Norm Agosto MD        aluminum & magnesium hydroxide-simethicone (MAALOX) 200-200-20 MG/5ML suspension 30 mL  30 mL Oral Q6H PRN Norm Agosto
yes

## 2023-08-21 ENCOUNTER — EMERGENCY (EMERGENCY)
Facility: HOSPITAL | Age: 88
LOS: 1 days | Discharge: HOME | End: 2023-08-21
Attending: EMERGENCY MEDICINE
Payer: MEDICARE

## 2023-08-21 VITALS
HEART RATE: 87 BPM | DIASTOLIC BLOOD PRESSURE: 94 MMHG | TEMPERATURE: 98 F | RESPIRATION RATE: 17 BRPM | OXYGEN SATURATION: 98 % | SYSTOLIC BLOOD PRESSURE: 144 MMHG

## 2023-08-21 VITALS
HEART RATE: 71 BPM | RESPIRATION RATE: 18 BRPM | DIASTOLIC BLOOD PRESSURE: 75 MMHG | WEIGHT: 108.03 LBS | OXYGEN SATURATION: 96 % | HEIGHT: 64 IN | SYSTOLIC BLOOD PRESSURE: 114 MMHG | TEMPERATURE: 98 F

## 2023-08-21 DIAGNOSIS — Z98.890 OTHER SPECIFIED POSTPROCEDURAL STATES: Chronic | ICD-10-CM

## 2023-08-21 DIAGNOSIS — Z95.0 PRESENCE OF CARDIAC PACEMAKER: Chronic | ICD-10-CM

## 2023-08-21 DIAGNOSIS — Z90.722 ACQUIRED ABSENCE OF OVARIES, BILATERAL: Chronic | ICD-10-CM

## 2023-08-21 DIAGNOSIS — Z95.2 PRESENCE OF PROSTHETIC HEART VALVE: Chronic | ICD-10-CM

## 2023-08-21 LAB
ALBUMIN SERPL ELPH-MCNC: 4.3 G/DL — SIGNIFICANT CHANGE UP (ref 3.3–5)
ALP SERPL-CCNC: 55 U/L — SIGNIFICANT CHANGE UP (ref 40–120)
ALT FLD-CCNC: 22 U/L — SIGNIFICANT CHANGE UP (ref 10–45)
ANION GAP SERPL CALC-SCNC: 12 MMOL/L — SIGNIFICANT CHANGE UP (ref 5–17)
APTT BLD: 37.7 SEC — HIGH (ref 24.5–35.6)
AST SERPL-CCNC: 22 U/L — SIGNIFICANT CHANGE UP (ref 10–40)
BASOPHILS # BLD AUTO: 0.03 K/UL — SIGNIFICANT CHANGE UP (ref 0–0.2)
BASOPHILS NFR BLD AUTO: 0.4 % — SIGNIFICANT CHANGE UP (ref 0–2)
BILIRUB SERPL-MCNC: 0.4 MG/DL — SIGNIFICANT CHANGE UP (ref 0.2–1.2)
BLD GP AB SCN SERPL QL: NEGATIVE — SIGNIFICANT CHANGE UP
BUN SERPL-MCNC: 40 MG/DL — HIGH (ref 7–23)
CALCIUM SERPL-MCNC: 10.2 MG/DL — SIGNIFICANT CHANGE UP (ref 8.4–10.5)
CHLORIDE SERPL-SCNC: 102 MMOL/L — SIGNIFICANT CHANGE UP (ref 96–108)
CO2 SERPL-SCNC: 25 MMOL/L — SIGNIFICANT CHANGE UP (ref 22–31)
CREAT SERPL-MCNC: 0.99 MG/DL — SIGNIFICANT CHANGE UP (ref 0.5–1.3)
EGFR: 52 ML/MIN/1.73M2 — LOW
EOSINOPHIL # BLD AUTO: 0.15 K/UL — SIGNIFICANT CHANGE UP (ref 0–0.5)
EOSINOPHIL NFR BLD AUTO: 1.9 % — SIGNIFICANT CHANGE UP (ref 0–6)
GLUCOSE SERPL-MCNC: 97 MG/DL — SIGNIFICANT CHANGE UP (ref 70–99)
HCT VFR BLD CALC: 40.1 % — SIGNIFICANT CHANGE UP (ref 34.5–45)
HGB BLD-MCNC: 13 G/DL — SIGNIFICANT CHANGE UP (ref 11.5–15.5)
IMM GRANULOCYTES NFR BLD AUTO: 0.5 % — SIGNIFICANT CHANGE UP (ref 0–0.9)
INR BLD: 1.37 RATIO — HIGH (ref 0.85–1.18)
LYMPHOCYTES # BLD AUTO: 0.86 K/UL — LOW (ref 1–3.3)
LYMPHOCYTES # BLD AUTO: 10.8 % — LOW (ref 13–44)
MCHC RBC-ENTMCNC: 30.7 PG — SIGNIFICANT CHANGE UP (ref 27–34)
MCHC RBC-ENTMCNC: 32.4 GM/DL — SIGNIFICANT CHANGE UP (ref 32–36)
MCV RBC AUTO: 94.6 FL — SIGNIFICANT CHANGE UP (ref 80–100)
MONOCYTES # BLD AUTO: 0.8 K/UL — SIGNIFICANT CHANGE UP (ref 0–0.9)
MONOCYTES NFR BLD AUTO: 10.1 % — SIGNIFICANT CHANGE UP (ref 2–14)
NEUTROPHILS # BLD AUTO: 6.05 K/UL — SIGNIFICANT CHANGE UP (ref 1.8–7.4)
NEUTROPHILS NFR BLD AUTO: 76.3 % — SIGNIFICANT CHANGE UP (ref 43–77)
NRBC # BLD: 0 /100 WBCS — SIGNIFICANT CHANGE UP (ref 0–0)
PLATELET # BLD AUTO: 152 K/UL — SIGNIFICANT CHANGE UP (ref 150–400)
POTASSIUM SERPL-MCNC: 4.6 MMOL/L — SIGNIFICANT CHANGE UP (ref 3.5–5.3)
POTASSIUM SERPL-SCNC: 4.6 MMOL/L — SIGNIFICANT CHANGE UP (ref 3.5–5.3)
PROT SERPL-MCNC: 7.1 G/DL — SIGNIFICANT CHANGE UP (ref 6–8.3)
PROTHROM AB SERPL-ACNC: 14.9 SEC — HIGH (ref 9.5–13)
RBC # BLD: 4.24 M/UL — SIGNIFICANT CHANGE UP (ref 3.8–5.2)
RBC # FLD: 14.3 % — SIGNIFICANT CHANGE UP (ref 10.3–14.5)
RH IG SCN BLD-IMP: POSITIVE — SIGNIFICANT CHANGE UP
SODIUM SERPL-SCNC: 139 MMOL/L — SIGNIFICANT CHANGE UP (ref 135–145)
WBC # BLD: 7.93 K/UL — SIGNIFICANT CHANGE UP (ref 3.8–10.5)
WBC # FLD AUTO: 7.93 K/UL — SIGNIFICANT CHANGE UP (ref 3.8–10.5)

## 2023-08-21 PROCEDURE — 71045 X-RAY EXAM CHEST 1 VIEW: CPT

## 2023-08-21 PROCEDURE — 99284 EMERGENCY DEPT VISIT MOD MDM: CPT | Mod: FS,GC

## 2023-08-21 PROCEDURE — 86901 BLOOD TYPING SEROLOGIC RH(D): CPT

## 2023-08-21 PROCEDURE — 93005 ELECTROCARDIOGRAM TRACING: CPT

## 2023-08-21 PROCEDURE — 80053 COMPREHEN METABOLIC PANEL: CPT

## 2023-08-21 PROCEDURE — 86850 RBC ANTIBODY SCREEN: CPT

## 2023-08-21 PROCEDURE — 85730 THROMBOPLASTIN TIME PARTIAL: CPT

## 2023-08-21 PROCEDURE — 85610 PROTHROMBIN TIME: CPT

## 2023-08-21 PROCEDURE — 85025 COMPLETE CBC W/AUTO DIFF WBC: CPT

## 2023-08-21 PROCEDURE — 86900 BLOOD TYPING SEROLOGIC ABO: CPT

## 2023-08-21 PROCEDURE — 71045 X-RAY EXAM CHEST 1 VIEW: CPT | Mod: 26

## 2023-08-21 PROCEDURE — 99285 EMERGENCY DEPT VISIT HI MDM: CPT | Mod: 25

## 2023-08-21 NOTE — ED PROVIDER NOTE - OBJECTIVE STATEMENT
89 y/o F pt with PMHx of A-miguelito (on eliquis), CHF, presents to the ED for blood on rectum since this morning. Notes that she wears a pamper during the night and when she went to the bathroom this morning, she found blood on her diaper. Patient is accompanied by her aide that stays with her 24/7. Per aide, patient had some blood on the rectum but it has been resolving. Notes BM have been regular but notes that the stool has been firm and dark. Did not have a BM today. Denies fever, chills, abd pain, nausea, vomiting, sick contacts

## 2023-08-21 NOTE — ED PROVIDER NOTE - PATIENT PORTAL LINK FT
You can access the FollowMyHealth Patient Portal offered by [facility] by registering at the following website: [email or web address]. By joining [facility]’s FollowMyHealth portal, you will also be able to view your health information using other applications (apps) compatible with our system.

## 2023-08-21 NOTE — ED PROVIDER NOTE - PROGRESS NOTE DETAILS
Rectal exam shows external hemorrhoids.  Patient also had bleeding on her underwear/diaper earlier this morning.  Hemoglobin stable.  Dispo to home with PMD follow-up.

## 2023-08-21 NOTE — ED PROVIDER NOTE - NSFOLLOWUPCLINICS_GEN_ALL_ED_FT
Gastroenterology at [facility]  Gastroenterology  [address] [place] [id number]  Phone: [phone]  Fax:

## 2023-08-21 NOTE — ED PROVIDER NOTE - RAPID ASSESSMENT
89 y/o female afib of eliquis, chf. accompanied by HHA presenting for BRBPR x 1 this morning noted in panties. no clots, no hx hemorrhoids, no further episodes. patient with no cp/sob/near syncope. patient reporting she otherwise feels well. no abd pain or change in bowel habits.

## 2023-08-21 NOTE — ED PROVIDER NOTE - CLINICAL SUMMARY MEDICAL DECISION MAKING FREE TEXT BOX
89-year-old female with a history of A-fib on Eliquis, CHF presented to the ED with BRBPR.  Concern for GIB.  Lab work grossly unremarkable.  Dispo to home with PMD follow-up. 89-year-old female with a history of A-fib on Eliquis, CHF presented to the ED with BRBPR.  Concern for GIB.  Lab work grossly unremarkable.  Dispo to home with PMD follow-up.    DAVY Virgen MD: Agree with resident/ACP MDM, assessment and plan as above. Exam and labs normal aside from external hemorrhoids, non-bleeding and no blood or stool in rectal vault. VSS. Pt well-appearing. Has not had additional bleeding since this AM. Pt and family comfortable with outpt GI f/u with return precautions

## 2023-08-21 NOTE — ED PROVIDER NOTE - NSFOLLOWUPINSTRUCTIONS_ED_ALL_ED_FT
Today in the emergency department your evaluated for bloody stools.  Your lab work looked good and your blood counts were not concerning for an active bleed.  Please follow-up with a gastroenterologist if your symptoms persist.    Please follow-up with your primary care doctor within 1 to 2 weeks of discharge from the emergency department.    Please bring a copy of your results with you.  Please return to the emergency department for worsening of your symptoms.    You may take Acetaminophen over the counter as needed for pain and/or fever. Use as directed and see medication warnings.  You may take Ibuprofen over the counter as needed for pain and/or fever. Use as directed and see medication warnings.    DISCHARGE INSTRUCTIONS:  Seek care immediately if:  You vomit blood or cannot stop vomiting.  You have blood in your bowel movement or it looks like tar.  You have bleeding from your rectum.  Your abdomen is larger than usual, more painful, and hard.  You have severe pain in your abdomen.  You stop passing gas and having bowel movements.  You feel weak, dizzy, or faint.    Contact your healthcare provider if:  You have a fever.  You have new signs and symptoms.  Your symptoms do not get better with treatment.  You have questions or concerns about your condition or care.

## 2023-08-21 NOTE — ED ADULT NURSE NOTE - OBJECTIVE STATEMENT
Pt is 89Y F, pmhx pacemaker on eliquis, HTN, HLD, c/o bleeding from perineal area noted this morning. Pt denies having any urinary symptoms, pt endorses normal BM, no straining, pt states stools have looked normal, noted some blood in underwear today, unsure of the source, pt denies any SOB, chest pain, NVD, dizziness, or any other symptoms, pt placed on cardiac monitor- atrial paced, A&Ox3, ambulates with walker or cane and assistance at baseline, updated on plan of care

## 2023-09-06 ENCOUNTER — NON-APPOINTMENT (OUTPATIENT)
Age: 88
End: 2023-09-06

## 2023-09-06 ENCOUNTER — APPOINTMENT (OUTPATIENT)
Dept: ELECTROPHYSIOLOGY | Facility: CLINIC | Age: 88
End: 2023-09-06
Payer: MEDICARE

## 2023-09-06 PROCEDURE — 93296 REM INTERROG EVL PM/IDS: CPT

## 2023-09-06 PROCEDURE — 93294 REM INTERROG EVL PM/LDLS PM: CPT

## 2023-10-01 ENCOUNTER — RX RENEWAL (OUTPATIENT)
Age: 88
End: 2023-10-01

## 2023-10-01 RX ORDER — APIXABAN 2.5 MG/1
2.5 TABLET, FILM COATED ORAL
Qty: 180 | Refills: 3 | Status: ACTIVE | COMMUNITY
Start: 2017-06-14 | End: 1900-01-01

## 2023-10-15 ENCOUNTER — RX RENEWAL (OUTPATIENT)
Age: 88
End: 2023-10-15

## 2023-10-15 RX ORDER — BUMETANIDE 1 MG/1
1 TABLET ORAL
Qty: 45 | Refills: 3 | Status: ACTIVE | COMMUNITY
Start: 2019-10-08 | End: 1900-01-01

## 2023-12-06 ENCOUNTER — NON-APPOINTMENT (OUTPATIENT)
Age: 88
End: 2023-12-06

## 2023-12-06 ENCOUNTER — APPOINTMENT (OUTPATIENT)
Dept: ELECTROPHYSIOLOGY | Facility: CLINIC | Age: 88
End: 2023-12-06
Payer: MEDICARE

## 2023-12-07 PROCEDURE — 93296 REM INTERROG EVL PM/IDS: CPT

## 2023-12-07 PROCEDURE — 93294 REM INTERROG EVL PM/LDLS PM: CPT

## 2023-12-12 ENCOUNTER — NON-APPOINTMENT (OUTPATIENT)
Age: 88
End: 2023-12-12

## 2023-12-12 ENCOUNTER — APPOINTMENT (OUTPATIENT)
Dept: CARDIOLOGY | Facility: CLINIC | Age: 88
End: 2023-12-12
Payer: MEDICARE

## 2023-12-12 VITALS
SYSTOLIC BLOOD PRESSURE: 92 MMHG | WEIGHT: 105 LBS | DIASTOLIC BLOOD PRESSURE: 56 MMHG | HEART RATE: 71 BPM | BODY MASS INDEX: 19.2 KG/M2

## 2023-12-12 PROCEDURE — 93000 ELECTROCARDIOGRAM COMPLETE: CPT

## 2023-12-12 PROCEDURE — 99214 OFFICE O/P EST MOD 30 MIN: CPT

## 2023-12-13 ENCOUNTER — NON-APPOINTMENT (OUTPATIENT)
Age: 88
End: 2023-12-13

## 2023-12-13 ENCOUNTER — APPOINTMENT (OUTPATIENT)
Dept: ELECTROPHYSIOLOGY | Facility: CLINIC | Age: 88
End: 2023-12-13
Payer: MEDICARE

## 2023-12-13 VITALS
HEART RATE: 73 BPM | WEIGHT: 105 LBS | SYSTOLIC BLOOD PRESSURE: 118 MMHG | BODY MASS INDEX: 19.2 KG/M2 | DIASTOLIC BLOOD PRESSURE: 77 MMHG

## 2023-12-13 PROCEDURE — 93281 PM DEVICE PROGR EVAL MULTI: CPT

## 2023-12-13 PROCEDURE — 93000 ELECTROCARDIOGRAM COMPLETE: CPT | Mod: 59

## 2024-01-12 ENCOUNTER — EMERGENCY (EMERGENCY)
Facility: HOSPITAL | Age: 89
LOS: 1 days | Discharge: HOME | End: 2024-01-12
Attending: EMERGENCY MEDICINE
Payer: MEDICARE

## 2024-01-12 VITALS
TEMPERATURE: 97 F | RESPIRATION RATE: 18 BRPM | DIASTOLIC BLOOD PRESSURE: 81 MMHG | SYSTOLIC BLOOD PRESSURE: 116 MMHG | HEART RATE: 97 BPM | OXYGEN SATURATION: 92 %

## 2024-01-12 VITALS
RESPIRATION RATE: 20 BRPM | SYSTOLIC BLOOD PRESSURE: 126 MMHG | DIASTOLIC BLOOD PRESSURE: 89 MMHG | WEIGHT: 108.03 LBS | OXYGEN SATURATION: 95 % | HEART RATE: 117 BPM

## 2024-01-12 DIAGNOSIS — Z95.0 PRESENCE OF CARDIAC PACEMAKER: Chronic | ICD-10-CM

## 2024-01-12 DIAGNOSIS — Z95.2 PRESENCE OF PROSTHETIC HEART VALVE: Chronic | ICD-10-CM

## 2024-01-12 DIAGNOSIS — Z98.890 OTHER SPECIFIED POSTPROCEDURAL STATES: Chronic | ICD-10-CM

## 2024-01-12 DIAGNOSIS — Z90.722 ACQUIRED ABSENCE OF OVARIES, BILATERAL: Chronic | ICD-10-CM

## 2024-01-12 LAB
ALBUMIN SERPL ELPH-MCNC: 4.4 G/DL — SIGNIFICANT CHANGE UP (ref 3.3–5)
ALBUMIN SERPL ELPH-MCNC: 4.4 G/DL — SIGNIFICANT CHANGE UP (ref 3.3–5)
ALP SERPL-CCNC: 64 U/L — SIGNIFICANT CHANGE UP (ref 40–120)
ALP SERPL-CCNC: 64 U/L — SIGNIFICANT CHANGE UP (ref 40–120)
ALT FLD-CCNC: 32 U/L — SIGNIFICANT CHANGE UP (ref 10–45)
ALT FLD-CCNC: 32 U/L — SIGNIFICANT CHANGE UP (ref 10–45)
ANION GAP SERPL CALC-SCNC: 14 MMOL/L — SIGNIFICANT CHANGE UP (ref 5–17)
ANION GAP SERPL CALC-SCNC: 14 MMOL/L — SIGNIFICANT CHANGE UP (ref 5–17)
APPEARANCE UR: CLEAR — SIGNIFICANT CHANGE UP
APPEARANCE UR: CLEAR — SIGNIFICANT CHANGE UP
AST SERPL-CCNC: 24 U/L — SIGNIFICANT CHANGE UP (ref 10–40)
AST SERPL-CCNC: 24 U/L — SIGNIFICANT CHANGE UP (ref 10–40)
BACTERIA # UR AUTO: NEGATIVE /HPF — SIGNIFICANT CHANGE UP
BACTERIA # UR AUTO: NEGATIVE /HPF — SIGNIFICANT CHANGE UP
BASE EXCESS BLDV CALC-SCNC: 2 MMOL/L — SIGNIFICANT CHANGE UP (ref -2–3)
BASE EXCESS BLDV CALC-SCNC: 2 MMOL/L — SIGNIFICANT CHANGE UP (ref -2–3)
BASOPHILS # BLD AUTO: 0.02 K/UL — SIGNIFICANT CHANGE UP (ref 0–0.2)
BASOPHILS # BLD AUTO: 0.02 K/UL — SIGNIFICANT CHANGE UP (ref 0–0.2)
BASOPHILS NFR BLD AUTO: 0.2 % — SIGNIFICANT CHANGE UP (ref 0–2)
BASOPHILS NFR BLD AUTO: 0.2 % — SIGNIFICANT CHANGE UP (ref 0–2)
BILIRUB SERPL-MCNC: 0.5 MG/DL — SIGNIFICANT CHANGE UP (ref 0.2–1.2)
BILIRUB SERPL-MCNC: 0.5 MG/DL — SIGNIFICANT CHANGE UP (ref 0.2–1.2)
BILIRUB UR-MCNC: NEGATIVE — SIGNIFICANT CHANGE UP
BILIRUB UR-MCNC: NEGATIVE — SIGNIFICANT CHANGE UP
BUN SERPL-MCNC: 35 MG/DL — HIGH (ref 7–23)
BUN SERPL-MCNC: 35 MG/DL — HIGH (ref 7–23)
CA-I SERPL-SCNC: 1.33 MMOL/L — SIGNIFICANT CHANGE UP (ref 1.15–1.33)
CA-I SERPL-SCNC: 1.33 MMOL/L — SIGNIFICANT CHANGE UP (ref 1.15–1.33)
CALCIUM SERPL-MCNC: 10.8 MG/DL — HIGH (ref 8.4–10.5)
CALCIUM SERPL-MCNC: 10.8 MG/DL — HIGH (ref 8.4–10.5)
CAST: 3 /LPF — SIGNIFICANT CHANGE UP (ref 0–4)
CAST: 3 /LPF — SIGNIFICANT CHANGE UP (ref 0–4)
CHLORIDE BLDV-SCNC: 103 MMOL/L — SIGNIFICANT CHANGE UP (ref 96–108)
CHLORIDE BLDV-SCNC: 103 MMOL/L — SIGNIFICANT CHANGE UP (ref 96–108)
CHLORIDE SERPL-SCNC: 104 MMOL/L — SIGNIFICANT CHANGE UP (ref 96–108)
CHLORIDE SERPL-SCNC: 104 MMOL/L — SIGNIFICANT CHANGE UP (ref 96–108)
CO2 BLDV-SCNC: 29 MMOL/L — HIGH (ref 22–26)
CO2 BLDV-SCNC: 29 MMOL/L — HIGH (ref 22–26)
CO2 SERPL-SCNC: 25 MMOL/L — SIGNIFICANT CHANGE UP (ref 22–31)
CO2 SERPL-SCNC: 25 MMOL/L — SIGNIFICANT CHANGE UP (ref 22–31)
COLOR SPEC: YELLOW — SIGNIFICANT CHANGE UP
COLOR SPEC: YELLOW — SIGNIFICANT CHANGE UP
CREAT SERPL-MCNC: 0.88 MG/DL — SIGNIFICANT CHANGE UP (ref 0.5–1.3)
CREAT SERPL-MCNC: 0.88 MG/DL — SIGNIFICANT CHANGE UP (ref 0.5–1.3)
DIFF PNL FLD: NEGATIVE — SIGNIFICANT CHANGE UP
DIFF PNL FLD: NEGATIVE — SIGNIFICANT CHANGE UP
EGFR: 60 ML/MIN/1.73M2 — SIGNIFICANT CHANGE UP
EGFR: 60 ML/MIN/1.73M2 — SIGNIFICANT CHANGE UP
EOSINOPHIL # BLD AUTO: 0.1 K/UL — SIGNIFICANT CHANGE UP (ref 0–0.5)
EOSINOPHIL # BLD AUTO: 0.1 K/UL — SIGNIFICANT CHANGE UP (ref 0–0.5)
EOSINOPHIL NFR BLD AUTO: 1.2 % — SIGNIFICANT CHANGE UP (ref 0–6)
EOSINOPHIL NFR BLD AUTO: 1.2 % — SIGNIFICANT CHANGE UP (ref 0–6)
GAS PNL BLDV: 138 MMOL/L — SIGNIFICANT CHANGE UP (ref 136–145)
GAS PNL BLDV: 138 MMOL/L — SIGNIFICANT CHANGE UP (ref 136–145)
GAS PNL BLDV: SIGNIFICANT CHANGE UP
GLUCOSE BLDV-MCNC: 89 MG/DL — SIGNIFICANT CHANGE UP (ref 70–99)
GLUCOSE BLDV-MCNC: 89 MG/DL — SIGNIFICANT CHANGE UP (ref 70–99)
GLUCOSE SERPL-MCNC: 95 MG/DL — SIGNIFICANT CHANGE UP (ref 70–99)
GLUCOSE SERPL-MCNC: 95 MG/DL — SIGNIFICANT CHANGE UP (ref 70–99)
GLUCOSE UR QL: NEGATIVE MG/DL — SIGNIFICANT CHANGE UP
GLUCOSE UR QL: NEGATIVE MG/DL — SIGNIFICANT CHANGE UP
HCO3 BLDV-SCNC: 28 MMOL/L — SIGNIFICANT CHANGE UP (ref 22–29)
HCO3 BLDV-SCNC: 28 MMOL/L — SIGNIFICANT CHANGE UP (ref 22–29)
HCT VFR BLD CALC: 40.1 % — SIGNIFICANT CHANGE UP (ref 34.5–45)
HCT VFR BLD CALC: 40.1 % — SIGNIFICANT CHANGE UP (ref 34.5–45)
HCT VFR BLDA CALC: 38 % — SIGNIFICANT CHANGE UP (ref 34.5–46.5)
HCT VFR BLDA CALC: 38 % — SIGNIFICANT CHANGE UP (ref 34.5–46.5)
HGB BLD CALC-MCNC: 13.8 G/DL — SIGNIFICANT CHANGE UP (ref 11.7–16.1)
HGB BLD CALC-MCNC: 13.8 G/DL — SIGNIFICANT CHANGE UP (ref 11.7–16.1)
HGB BLD-MCNC: 13.3 G/DL — SIGNIFICANT CHANGE UP (ref 11.5–15.5)
HGB BLD-MCNC: 13.3 G/DL — SIGNIFICANT CHANGE UP (ref 11.5–15.5)
IMM GRANULOCYTES NFR BLD AUTO: 0.5 % — SIGNIFICANT CHANGE UP (ref 0–0.9)
IMM GRANULOCYTES NFR BLD AUTO: 0.5 % — SIGNIFICANT CHANGE UP (ref 0–0.9)
KETONES UR-MCNC: ABNORMAL MG/DL
KETONES UR-MCNC: ABNORMAL MG/DL
LACTATE BLDV-MCNC: 1.4 MMOL/L — SIGNIFICANT CHANGE UP (ref 0.5–2)
LACTATE BLDV-MCNC: 1.4 MMOL/L — SIGNIFICANT CHANGE UP (ref 0.5–2)
LEUKOCYTE ESTERASE UR-ACNC: NEGATIVE — SIGNIFICANT CHANGE UP
LEUKOCYTE ESTERASE UR-ACNC: NEGATIVE — SIGNIFICANT CHANGE UP
LIDOCAIN IGE QN: 42 U/L — SIGNIFICANT CHANGE UP (ref 7–60)
LIDOCAIN IGE QN: 42 U/L — SIGNIFICANT CHANGE UP (ref 7–60)
LYMPHOCYTES # BLD AUTO: 0.84 K/UL — LOW (ref 1–3.3)
LYMPHOCYTES # BLD AUTO: 0.84 K/UL — LOW (ref 1–3.3)
LYMPHOCYTES # BLD AUTO: 9.7 % — LOW (ref 13–44)
LYMPHOCYTES # BLD AUTO: 9.7 % — LOW (ref 13–44)
MCHC RBC-ENTMCNC: 31 PG — SIGNIFICANT CHANGE UP (ref 27–34)
MCHC RBC-ENTMCNC: 31 PG — SIGNIFICANT CHANGE UP (ref 27–34)
MCHC RBC-ENTMCNC: 33.2 GM/DL — SIGNIFICANT CHANGE UP (ref 32–36)
MCHC RBC-ENTMCNC: 33.2 GM/DL — SIGNIFICANT CHANGE UP (ref 32–36)
MCV RBC AUTO: 93.5 FL — SIGNIFICANT CHANGE UP (ref 80–100)
MCV RBC AUTO: 93.5 FL — SIGNIFICANT CHANGE UP (ref 80–100)
MONOCYTES # BLD AUTO: 0.9 K/UL — SIGNIFICANT CHANGE UP (ref 0–0.9)
MONOCYTES # BLD AUTO: 0.9 K/UL — SIGNIFICANT CHANGE UP (ref 0–0.9)
MONOCYTES NFR BLD AUTO: 10.4 % — SIGNIFICANT CHANGE UP (ref 2–14)
MONOCYTES NFR BLD AUTO: 10.4 % — SIGNIFICANT CHANGE UP (ref 2–14)
NEUTROPHILS # BLD AUTO: 6.79 K/UL — SIGNIFICANT CHANGE UP (ref 1.8–7.4)
NEUTROPHILS # BLD AUTO: 6.79 K/UL — SIGNIFICANT CHANGE UP (ref 1.8–7.4)
NEUTROPHILS NFR BLD AUTO: 78 % — HIGH (ref 43–77)
NEUTROPHILS NFR BLD AUTO: 78 % — HIGH (ref 43–77)
NITRITE UR-MCNC: NEGATIVE — SIGNIFICANT CHANGE UP
NITRITE UR-MCNC: NEGATIVE — SIGNIFICANT CHANGE UP
NRBC # BLD: 0 /100 WBCS — SIGNIFICANT CHANGE UP (ref 0–0)
NRBC # BLD: 0 /100 WBCS — SIGNIFICANT CHANGE UP (ref 0–0)
OTHER CELLS CSF MANUAL: 4.7 ML/DL — LOW (ref 18–22)
OTHER CELLS CSF MANUAL: 4.7 ML/DL — LOW (ref 18–22)
PCO2 BLDV: 47 MMHG — HIGH (ref 39–42)
PCO2 BLDV: 47 MMHG — HIGH (ref 39–42)
PH BLDV: 7.38 — SIGNIFICANT CHANGE UP (ref 7.32–7.43)
PH BLDV: 7.38 — SIGNIFICANT CHANGE UP (ref 7.32–7.43)
PH UR: 5 — SIGNIFICANT CHANGE UP (ref 5–8)
PH UR: 5 — SIGNIFICANT CHANGE UP (ref 5–8)
PLATELET # BLD AUTO: 170 K/UL — SIGNIFICANT CHANGE UP (ref 150–400)
PLATELET # BLD AUTO: 170 K/UL — SIGNIFICANT CHANGE UP (ref 150–400)
PO2 BLDV: 21 MMHG — LOW (ref 25–45)
PO2 BLDV: 21 MMHG — LOW (ref 25–45)
POTASSIUM BLDV-SCNC: 4.5 MMOL/L — SIGNIFICANT CHANGE UP (ref 3.5–5.1)
POTASSIUM BLDV-SCNC: 4.5 MMOL/L — SIGNIFICANT CHANGE UP (ref 3.5–5.1)
POTASSIUM SERPL-MCNC: 4.8 MMOL/L — SIGNIFICANT CHANGE UP (ref 3.5–5.3)
POTASSIUM SERPL-MCNC: 4.8 MMOL/L — SIGNIFICANT CHANGE UP (ref 3.5–5.3)
POTASSIUM SERPL-SCNC: 4.8 MMOL/L — SIGNIFICANT CHANGE UP (ref 3.5–5.3)
POTASSIUM SERPL-SCNC: 4.8 MMOL/L — SIGNIFICANT CHANGE UP (ref 3.5–5.3)
PROT SERPL-MCNC: 7.2 G/DL — SIGNIFICANT CHANGE UP (ref 6–8.3)
PROT SERPL-MCNC: 7.2 G/DL — SIGNIFICANT CHANGE UP (ref 6–8.3)
PROT UR-MCNC: 30 MG/DL
PROT UR-MCNC: 30 MG/DL
RBC # BLD: 4.29 M/UL — SIGNIFICANT CHANGE UP (ref 3.8–5.2)
RBC # BLD: 4.29 M/UL — SIGNIFICANT CHANGE UP (ref 3.8–5.2)
RBC # FLD: 14.5 % — SIGNIFICANT CHANGE UP (ref 10.3–14.5)
RBC # FLD: 14.5 % — SIGNIFICANT CHANGE UP (ref 10.3–14.5)
RBC CASTS # UR COMP ASSIST: 2 /HPF — SIGNIFICANT CHANGE UP (ref 0–4)
RBC CASTS # UR COMP ASSIST: 2 /HPF — SIGNIFICANT CHANGE UP (ref 0–4)
SAO2 % BLDV: 24.4 % — LOW (ref 67–88)
SAO2 % BLDV: 24.4 % — LOW (ref 67–88)
SODIUM SERPL-SCNC: 143 MMOL/L — SIGNIFICANT CHANGE UP (ref 135–145)
SODIUM SERPL-SCNC: 143 MMOL/L — SIGNIFICANT CHANGE UP (ref 135–145)
SP GR SPEC: >1.03 — HIGH (ref 1–1.03)
SP GR SPEC: >1.03 — HIGH (ref 1–1.03)
SQUAMOUS # UR AUTO: 7 /HPF — HIGH (ref 0–5)
SQUAMOUS # UR AUTO: 7 /HPF — HIGH (ref 0–5)
UROBILINOGEN FLD QL: 0.2 MG/DL — SIGNIFICANT CHANGE UP (ref 0.2–1)
UROBILINOGEN FLD QL: 0.2 MG/DL — SIGNIFICANT CHANGE UP (ref 0.2–1)
WBC # BLD: 8.69 K/UL — SIGNIFICANT CHANGE UP (ref 3.8–10.5)
WBC # BLD: 8.69 K/UL — SIGNIFICANT CHANGE UP (ref 3.8–10.5)
WBC # FLD AUTO: 8.69 K/UL — SIGNIFICANT CHANGE UP (ref 3.8–10.5)
WBC # FLD AUTO: 8.69 K/UL — SIGNIFICANT CHANGE UP (ref 3.8–10.5)
WBC UR QL: 6 /HPF — HIGH (ref 0–5)
WBC UR QL: 6 /HPF — HIGH (ref 0–5)

## 2024-01-12 PROCEDURE — 85014 HEMATOCRIT: CPT

## 2024-01-12 PROCEDURE — 82803 BLOOD GASES ANY COMBINATION: CPT

## 2024-01-12 PROCEDURE — 85018 HEMOGLOBIN: CPT

## 2024-01-12 PROCEDURE — 80053 COMPREHEN METABOLIC PANEL: CPT

## 2024-01-12 PROCEDURE — 87086 URINE CULTURE/COLONY COUNT: CPT

## 2024-01-12 PROCEDURE — 85025 COMPLETE CBC W/AUTO DIFF WBC: CPT

## 2024-01-12 PROCEDURE — 74177 CT ABD & PELVIS W/CONTRAST: CPT | Mod: 26,MG

## 2024-01-12 PROCEDURE — 73502 X-RAY EXAM HIP UNI 2-3 VIEWS: CPT

## 2024-01-12 PROCEDURE — 83605 ASSAY OF LACTIC ACID: CPT

## 2024-01-12 PROCEDURE — 73502 X-RAY EXAM HIP UNI 2-3 VIEWS: CPT | Mod: 26,RT

## 2024-01-12 PROCEDURE — 74177 CT ABD & PELVIS W/CONTRAST: CPT | Mod: MG

## 2024-01-12 PROCEDURE — 81001 URINALYSIS AUTO W/SCOPE: CPT

## 2024-01-12 PROCEDURE — 84295 ASSAY OF SERUM SODIUM: CPT

## 2024-01-12 PROCEDURE — 99285 EMERGENCY DEPT VISIT HI MDM: CPT | Mod: GC

## 2024-01-12 PROCEDURE — 99284 EMERGENCY DEPT VISIT MOD MDM: CPT | Mod: 25

## 2024-01-12 PROCEDURE — 82947 ASSAY GLUCOSE BLOOD QUANT: CPT

## 2024-01-12 PROCEDURE — 82435 ASSAY OF BLOOD CHLORIDE: CPT

## 2024-01-12 PROCEDURE — G1004: CPT

## 2024-01-12 PROCEDURE — 82330 ASSAY OF CALCIUM: CPT

## 2024-01-12 PROCEDURE — 83690 ASSAY OF LIPASE: CPT

## 2024-01-12 PROCEDURE — 84132 ASSAY OF SERUM POTASSIUM: CPT

## 2024-01-12 RX ORDER — ACETAMINOPHEN 500 MG
650 TABLET ORAL ONCE
Refills: 0 | Status: COMPLETED | OUTPATIENT
Start: 2024-01-12 | End: 2024-01-12

## 2024-01-12 RX ADMIN — Medication 650 MILLIGRAM(S): at 19:32

## 2024-01-12 NOTE — ED PROVIDER NOTE - NSFOLLOWUPCLINICSTOKEN_GEN_ALL_ED_FT
[id number]: || ||00\01||False;[id number]: || ||00\01||False;[id number]: || ||00\01||False; [id number]: || ||00\01||False;265426: || ||00\01||False;236031: || ||00\01||False; [id number]: || ||00\01||False;[id number]: || ||00\01||False;891021: || ||00\01||False; [id number]: || ||00\01||False;085637: || ||00\01||False;[id number]: || ||00\01||False;

## 2024-01-12 NOTE — ED PROVIDER NOTE - PHYSICAL EXAMINATION
Attn - alert, NAD, no pallor or jaundice, PERRL 3 mm, moist mm, skin - warm and dry, Lungs - clear, no w/r/r, good BS bilaterally, Cor - rr, no M, no rub, Abdo - ND, soft, min tenderness RLQ, no HSM, no CVAT, no guarding or rebound. no palpable hernia.  Extremities - no edema, no calf tenderness, distal pulses intact and symmetrical, able to perform SLR bilat without groin or hip pain. Neuro - intact and non-focal

## 2024-01-12 NOTE — ED PROVIDER NOTE - RAPID ASSESSMENT
89 yo female PMHx HTN, CVA, HLD, aortic stenosis s/p TAVR, CHF  presents to ED c/o RLQ pain radiating down front of leg to mid thigh x 2 days. Pain is constant, has not tried any meds at home for it. Pain "not terrible, but annoying". Had similar pain a few years ago and unsure what it was from. Denies numbness/tingling, urinary sxs, constipation, bowel/bladder incontinence, trouble breathing, chest pain, diarrhea.     **Patient was rapidly assessed by Sekou beckman Kearney, PA-C. A limited history was obtained. The patient will be seen and further examined and worked up in the main ED and their care will be completed by the main ED team. Receiving team will follow up on labs, analgesia, any clinical imaging, and perform reassessment and disposition of the patient as clinically indicated. All decisions regarding the progression of care will be made at their discretion. 89 yo female PMHx HTN, CVA, HLD, aortic stenosis s/p TAVR, CHF  presents to ED c/o RLQ pain radiating down front of leg to mid thigh x 2 days. Pain is constant, has not tried any meds at home for it. Pain "not terrible, but annoying". Had similar pain a few years ago and unsure what it was from. Denies numbness/tingling, urinary sxs, constipation, bowel/bladder incontinence, trouble breathing, chest pain, diarrhea.     **Patient was rapidly assessed by Sekou beckman Kearney, PA-C. A limited history was obtained. The patient will be seen and further examined and worked up in the main ED and their care will be completed by the main ED team. Receiving team will follow up on labs, analgesia, any clinical imaging, and perform reassessment and disposition of the patient as clinically indicated. All decisions regarding the progression of care will be made at their discretion.

## 2024-01-12 NOTE — ED PROVIDER NOTE - NSFOLLOWUPCLINICS_GEN_ALL_ED_FT
[facility]  Orthopedic Surgery  [address]  [place] [address]  Phone: [phone]  Fax:     [facility] Orthopedic Surgery  Orthopedic Surgery  [address], 3rd & 4th [place] [address]  Phone: [phone]  Fax:     [facility] [place]  Orthopedic Surgery  [address]  [place] [address]  Phone: [phone]  Fax:      [facility]  Orthopedic Surgery  [address]  [place] [address]  Phone: [phone]  Fax:     [facility] Orthopedic Surgery  Orthopedic Surgery  [address], 3rd & 4th [place] [address]  Phone: [phone]  Fax:     [facility] [place]  Orthopedic Surgery  [address]  [place] [address]  Phone: [phone]  Fax:      [facility]  Orthopedic Surgery  [address]  [place] [address]  Phone: [phone]  Fax:     [facility] Orthopedic Surgery  Orthopedic Surgery  [address], 3rd & 4th [place] [address]  Phone: [phone]  Fax:     [facility] [place]  Orthopedic Surgery  [address]  [place] [address]  Phone: [phone]  Fax:      [facility]  Orth[facility]  [address]  [place] [address]  Phone: [phone]  Fax:     [facility] Orthopedic Surgery  Orthopedic Surgery  [address] 3rd & 4th [place] [address]  Phone: [phone]  Fax:     [facility] [place]  Orthopedic Surgery  [address]  [place] [address]  Phone: [phone]  Fax:

## 2024-01-12 NOTE — ED PROVIDER NOTE - PROGRESS NOTE DETAILS
Luana Logan MD, PGY2: signed out to me pending ambulation trial and dispo, ct without acute intraabdominal pathology. pt has lower lumbar spine degenerative changes. Luana Logan MD, PGY2:  patient reassessed at bedside, states she has mild pain with range of motion of the right hip, states overall that the pain is manageable with over-the-counter Tylenol and lidocaine patches.  Patient ambulated with her walker, patient able to ambulate without issue.  Patient overall well-appearing.  Informed of all findings on labs and imaging and plan for discharge home with orthopedic follow-up for evaluation of back pain with radiation to the groin.  Patient also instructed to see her primary care physician.  Patient verbalized understanding and agrees with plan.  Aide at bedside also verbalized understanding and patient is ready for discharge. Luaan Logan MD, PGY2:  patient reassessed at bedside, states she has mild pain with range of motion of the right hip, states overall that the pain is manageable with over-the-counter Tylenol and lidocaine patches.  Patient ambulated with her walker, patient able to ambulate without issue.  Patient overall well-appearing.  Informed of all findings on labs and imaging and plan for discharge home with orthopedic follow-up for evaluation of back pain with radiation to the groin.  Patient also instructed to see her primary care physician.  Patient verbalized understanding and agrees with plan.  Aide at bedside also verbalized understanding and patient is ready for discharge.

## 2024-01-12 NOTE — ED ADULT NURSE NOTE - OBJECTIVE STATEMENT
89yF, presents to the ED with home health aid at bedside, ambulates with a walker, c/o right lower quadrant pain radiating to R groin and down the front of the thigh x2 days. Pain started gradually. Pt denies any injury/trauma/falls. Denies any other sx. Gross neuro intact, no difficulty speaking in complete sentences, pulses x 4, moving all extremities, abdomen soft nondistended, skin intact. P

## 2024-01-12 NOTE — ED PROVIDER NOTE - NSFOLLOWUPINSTRUCTIONS_ED_ALL_ED_FT
You were seen in the emergency department for right groin pain.  At this time your CAT scan shows no abnormal findings, your x-ray shows some degenerative changes of your lumbar spine, your pain in your groin may be related to your spine.  Your blood work was within normal limits.      We would like you to follow-up with both your primary care physician and an orthopedist for further evaluation of your pain.    You are to take Tylenol and ibuprofen as needed for pain.   -- Please use 1,000mg Tylenol (also called acetaminophen) every 6 hours & 400mg Motrin (also called Advil or ibuprofen) every 6 hours as needed for pain/discomfort/swelling. You can get these without a prescription. Don't use more than 3500mg of Tylenol in any 24-hour period. Make sure your other prescription/over-the-counter medications don't contain any Tylenol so you don't take too much. If you have any stomach discomfort while taking Motrin, you can use TUMS or Pepcid or Zantac (these can all be bought without a prescription).      The emergency room if you are unable to walk, have weakness, fall, worsening pain, worsening abdominal pain, constipation, severe diarrhea, vomiting.

## 2024-01-12 NOTE — ED PROVIDER NOTE - BIRTH SEX
<--- Click to Launch ICDx for PreOp, PostOp and Procedure
<--- Click to Launch ICDx for PreOp, PostOp and Procedure
Female
<--- Click to Launch ICDx for PreOp, PostOp and Procedure

## 2024-01-12 NOTE — ED PROVIDER NOTE - CLINICAL SUMMARY MEDICAL DECISION MAKING FREE TEXT BOX
Attending note.  See differential above.  Atraumatic right lower quadrant/right groin/right hip pain x 2 days.  Rapid access PA ordered labs, CT scan abdomen pelvis.  X-ray right hip and pelvis, urinalysis, Tylenol for pain.  Reassess and ambulate if studies are unremarkable

## 2024-01-12 NOTE — ED PROVIDER NOTE - PATIENT PORTAL LINK FT
You can access the FollowMyHealth Patient Portal offered by [facility] by registering at the following website: [email or web address]. By joining [employer]’s FollowMyHealth portal, you will also be able to view your health information using other applications (apps) compatible with our system. You can access the FollowMyHealth Patient Portal offered by [facility] by registering at the following website: [email or web address]. By joining [employer]’s FollowMyHealth portal, you will also be able to view your health information using other applications (apps) compatible with our system. You can access the FollowMyHealth Patient Portal offered by [facility] by registering at the following website: [email or web address]. By joining [employer]’s FollowMyHealth portal, you will also be able to view your health information using other applications (apps) compatible with our system. You can access the FollowMyHealth Patient Portal offered by [facility] by registering at the following website: [email or web address]. By joining [employer]’s FollowMyHealth portal, you will also be able to view your health information using other applications (apps) compatible with our system.

## 2024-01-12 NOTE — ED PROVIDER NOTE - DIFFERENTIAL DIAGNOSIS
Is atraumaticRight lower quadrant/right groin/right thigh pain with differential limited to pubic ramus fracture versus hip fracture versus pyelonephritis versus appendicitis versus sigmoid colitis versus hip arthritis Differential Diagnosis

## 2024-01-12 NOTE — ED ADULT TRIAGE NOTE - SOURCE OF INFORMATION
Anesthesia Pre-Procedure Evaluation    Patient: Daphney Madera   MRN:     [id number] Gender:   female   Age:    16 year old :      2003        Preoperative Diagnosis: Hematochezia [K92.1]  Iron deficiency anemia due to chronic blood loss [D50.0]   Procedure(s):  upper endoscopy with biopsies  COLONOSCOPY, WITH POLYPECTOMY AND BIOPSY     LABS:  CBC:   Lab Results   Component Value Date    WBC 2.9 (L) 2017    HGB 9.8 (L) 2017    HCT 33.1 (L) 2017     (L) 2017     BMP:   Lab Results   Component Value Date     2017    POTASSIUM 3.9 2017    CHLORIDE 105 2017    CO2 26 2017    BUN 9 2017    CR 0.83 (H) 2017     (H) 2017     COAGS:   Lab Results   Component Value Date    PTT 35 2017    INR 1.13 2017     POC:   Lab Results   Component Value Date    HCG Negative 2020     OTHER:   Lab Results   Component Value Date    PHONG 8.4 (L) 2017    PHOS 3.2 2017    ALBUMIN 3.6 2017    PROTTOTAL 7.3 2017    ALT 36 2017    AST 42 (H) 2017    ALKPHOS 93 2017    BILITOTAL 0.4 2017        Preop Vitals    BP Readings from Last 3 Encounters:   20 116/78   17 125/64    Pulse Readings from Last 3 Encounters:   20 60   17 96      Resp Readings from Last 3 Encounters:   20 12   17 18    SpO2 Readings from Last 3 Encounters:   20 98%   17 99%      Temp Readings from Last 1 Encounters:   20 36.7  C (98  F) (Axillary)    Ht Readings from Last 1 Encounters:   No data found for Ht      Wt Readings from Last 1 Encounters:   20 64.8 kg (142 lb 13.7 oz) (81 %, Z= 0.88)*     * Growth percentiles are based on CDC (Girls, 2-20 Years) data.    There is no height or weight on file to calculate BMI.     LDA:  Peripheral IV 20 Right (Active)   Site Assessment WDL 20 1020   Line Status Saline locked 20 1020   Phlebitis Scale 
Component      Latest Ref Rng & Units 12/8/2017   Fasting Status      hrs NONF   Sodium      135 - 145 mmol/L 141   Potassium      3.4 - 5.1 mmol/L 3.6   Chloride      98 - 107 mmol/L 103   CO2      21 - 32 mmol/L 26   ANION GAP      10 - 20 mmol/L 16   Glucose      65 - 99 mg/dL 263 (H)   BUN      6 - 20 mg/dL 59 (H)   Creatinine      0.51 - 0.95 mg/dL 1.74 (H)   GFR Estimate,        30   GFR Estimate, Non African American       26   BUN/CREATININE RATIO      7 - 25 34 (H)   CALCIUM      8.4 - 10.2 mg/dL 8.7   B-TYPE NATRIURETIC PEPTIDE      <100 pg/mL 86     
Dr. Manriquez reviewed recent labs; stable findings.  Per Dr. Manriquez patient to increase KCL 30 mEq daily.  
Left message for patient to return call  Orders pended  
Left message for patient to return call.  
Patient informed and lab results/recommendations and verbalized understanding.  
0-->no symptoms 06/17/20 1020   Infiltration Scale 0 06/17/20 1020   Infiltration Site Treatment Method  None 06/17/20 1020   Number of days: 0        Past Medical History:   Diagnosis Date     Acne vulgaris 4/29/2020     ADHD (attention deficit hyperactivity disorder), combined type 1/3/2012    IMO Update 10/11 IMO Update 10/11     Anemia 1/9/2020     Depressive disorder 4/29/2020     Hematochezia 2/24/2020     Leukopenia 11/30/2017     Low ferritin 1/9/2020     Myopia of both eyes 11/3/2016    Last Assessment & Plan:  Copies of spectacle and contact lens prescriptions given.  I fit Daphney with AirOptix Hydraglyde to try to improve comfort. Good initial vision, comfort and fit.     Restless legs 1/9/2020      Past Surgical History:   Procedure Laterality Date     ENT SURGERY  2007    Tonsillectomy     REMOVAL OF NAIL BED        Allergies   Allergen Reactions     Fructose GI Disturbance     Lactose GI Disturbance        Anesthesia Evaluation    ROS/Med Hx    No history of anesthetic complications  (-) malignant hyperthermia and tuberculosis  Comments: Met with Daphney and her mother. She has been NPO and has done well with prior anesthesia cares. Now for upper and lower endoscopy for bleeding. No history of coagulopathy.     Cardiovascular Findings - negative ROS    Neuro Findings   Comments: ADHD; depressive disorder    Pulmonary Findings   (+) asthma  (-) apnea    Asthma  Control: well controlled    HENT Findings - negative HENT ROS    Skin Findings - negative skin ROS      GI/Hepatic/Renal Findings   (-) GERD  Comments: hematochazia    Endocrine/Metabolic Findings - negative ROS      Genetic/Syndrome Findings - negative genetics/syndromes ROS    Hematology/Oncology Findings - negative hematology/oncology ROS    Additional Notes  Allergies:   -- Fructose -- GI Disturbance   -- Lactose -- GI Disturbance    Current Facility-Administered Medications:  lactated ringers infusion  sodium chloride (PF) 0.9% PF flush 1-5 
mL    Medications Prior to Admission:  ferrous sulfate (FEROSUL) 325 (65 Fe) MG tablet, Take 325 mg by mouth daily, Disp: , Rfl: , Past Month at Unknown time  fish oil-omega-3 fatty acids 1000 MG capsule, , Disp: , Rfl: , Past Month at Unknown time  ibuprofen (ADVIL/MOTRIN) 200 MG capsule, Take 400 mg by mouth every 8 hours as needed, Disp: , Rfl: , Past Month at Unknown time  IBUPROFEN PO, Take 400 mg by mouth, Disp: , Rfl: , Past Month at Unknown time  vitamin C (ASCORBIC ACID) 500 MG tablet, , Disp: , Rfl: , Past Month at Unknown time  albuterol (PROAIR HFA/PROVENTIL HFA/VENTOLIN HFA) 108 (90 Base) MCG/ACT inhaler, Inhale 2 puffs into the lungs every 4 hours as needed for wheezing, Disp: , Rfl: , More than a month at Unknown time  azithromycin (ZITHROMAX) 250 MG tablet,   0 Refill(s), Acute, Disp: , Rfl:   ISOtretinoin (ACCUTANE) 40 MG capsule, Take 40 mg by mouth 2 times daily, Disp: , Rfl:   levonorgestrel (LILETTA) 19.5 MCG/DAY IUD, 1 Intra Uterine Device by Intrauterine route, Disp: , Rfl:   metroNIDAZOLE (METROGEL) 0.75 % vaginal gel, , Disp: , Rfl:   norgestim-eth estrad triphasic (TRI-ESTARYLLA) 0.18/0.215/0.25 MG-35 MCG tablet,   0 Refill(s), Disp: , Rfl:             PHYSICAL EXAM:   Mental Status/Neuro: A/A/O   Airway: Facies: Feasible  Mallampati: I  Mouth/Opening: Full  TM distance: > 6 cm  Neck ROM: Full   Respiratory: Auscultation: CTAB     Resp. Rate: Normal     Resp. Effort: Normal      CV: Rhythm: Regular  Rate: Age appropriate  Heart: Normal Sounds  Edema: None   Comments:      Dental: Details                  Assessment:   ASA SCORE: 2    H&P: History and physical reviewed and following examination; no interval change.    NPO Status: NPO Appropriate     Plan:   Anes. Type:  MAC   Pre-Medication: None   Induction:  IV (Standard)   Airway: Native Airway   Access/Monitoring: PIV   Maintenance: Propofol Sedation     Postop Plan:   Postop Pain: None  Postop Sedation/Airway: Not planned  Disposition: 
Outpatient     PONV Management: Pediatric Risk Factors: Age 3-17   Prevention:, Propofol     CONSENT: Direct conversation   Plan and risks discussed with: Patient; Mother   Blood Products: Consent Deferred (Minimal Blood Loss)       Comments for Plan/Consent:  She requests sedation/GA and mother is in agreement. Procedures and risks explained. They understood and consented. Qs answered.            Warren Rosenthal MD  
Patient

## 2024-02-08 ENCOUNTER — NON-APPOINTMENT (OUTPATIENT)
Age: 89
End: 2024-02-08

## 2024-02-09 ENCOUNTER — EMERGENCY (EMERGENCY)
Facility: HOSPITAL | Age: 89
LOS: 1 days | Discharge: HOME | End: 2024-02-09
Attending: EMERGENCY MEDICINE
Payer: MEDICARE

## 2024-02-09 VITALS
SYSTOLIC BLOOD PRESSURE: 103 MMHG | TEMPERATURE: 97 F | RESPIRATION RATE: 18 BRPM | OXYGEN SATURATION: 97 % | HEART RATE: 109 BPM | DIASTOLIC BLOOD PRESSURE: 65 MMHG

## 2024-02-09 VITALS
OXYGEN SATURATION: 98 % | TEMPERATURE: 98 F | RESPIRATION RATE: 16 BRPM | HEART RATE: 100 BPM | DIASTOLIC BLOOD PRESSURE: 79 MMHG | SYSTOLIC BLOOD PRESSURE: 113 MMHG

## 2024-02-09 DIAGNOSIS — Z90.722 ACQUIRED ABSENCE OF OVARIES, BILATERAL: Chronic | ICD-10-CM

## 2024-02-09 DIAGNOSIS — Z95.0 PRESENCE OF CARDIAC PACEMAKER: Chronic | ICD-10-CM

## 2024-02-09 DIAGNOSIS — Z98.890 OTHER SPECIFIED POSTPROCEDURAL STATES: Chronic | ICD-10-CM

## 2024-02-09 DIAGNOSIS — Z95.2 PRESENCE OF PROSTHETIC HEART VALVE: Chronic | ICD-10-CM

## 2024-02-09 PROCEDURE — 73030 X-RAY EXAM OF SHOULDER: CPT | Mod: 26,RT

## 2024-02-09 PROCEDURE — 76377 3D RENDER W/INTRP POSTPROCES: CPT | Mod: 26

## 2024-02-09 PROCEDURE — 99284 EMERGENCY DEPT VISIT MOD MDM: CPT | Mod: 25

## 2024-02-09 PROCEDURE — 73060 X-RAY EXAM OF HUMERUS: CPT | Mod: 26,RT

## 2024-02-09 PROCEDURE — 73200 CT UPPER EXTREMITY W/O DYE: CPT | Mod: MA

## 2024-02-09 PROCEDURE — 96372 THER/PROPH/DIAG INJ SC/IM: CPT

## 2024-02-09 PROCEDURE — 73200 CT UPPER EXTREMITY W/O DYE: CPT | Mod: 26,RT,MA

## 2024-02-09 PROCEDURE — 73030 X-RAY EXAM OF SHOULDER: CPT

## 2024-02-09 PROCEDURE — 73060 X-RAY EXAM OF HUMERUS: CPT

## 2024-02-09 PROCEDURE — 99284 EMERGENCY DEPT VISIT MOD MDM: CPT | Mod: GC

## 2024-02-09 PROCEDURE — 76377 3D RENDER W/INTRP POSTPROCES: CPT

## 2024-02-09 RX ORDER — KETOROLAC TROMETHAMINE 30 MG/ML
15 SYRINGE (ML) INJECTION ONCE
Refills: 0 | Status: DISCONTINUED | OUTPATIENT
Start: 2024-02-09 | End: 2024-02-09

## 2024-02-09 RX ADMIN — Medication 15 MILLIGRAM(S): at 15:34

## 2024-02-09 NOTE — ED ADULT NURSE NOTE - IN THE PAST 12 MONTHS HAVE YOU USED DRUGS OTHER THAN THOSE REQUIRED FOR MEDICAL REASON?
Post-Care Instructions: I reviewed with the patient in detail post-care instructions. Patient is to keep the biopsy site dry overnight, and then apply Polysporin or Vaseline twice daily until healed. Patient may apply hydrogen peroxide soaks to remove any crusting. No

## 2024-02-09 NOTE — ED PROVIDER NOTE - CLINICAL SUMMARY MEDICAL DECISION MAKING FREE TEXT BOX
The patient is a 89 yo female history of Afib on eliquis, CHF, chronic right shoulder pain who presents for shoulder pain. Moderate suspicion for fx due to age and mechanism of injury, most likely chronic changes. Plan for xray of shoulder and humerus. Plan to treat pain with toradol. dispo pending xray.

## 2024-02-09 NOTE — ED ADULT NURSE NOTE - NSFALLHARMRISKINTERV_ED_ALL_ED

## 2024-02-09 NOTE — ED PROVIDER NOTE - PATIENT PORTAL LINK FT
You can access the FollowMyHealth Patient Portal offered by [facility] by registering at the following website: [email or web address]. By joining [employer]’s FollowMyHealth portal, you will also be able to view your health information using other applications (apps) compatible with our system.

## 2024-02-09 NOTE — ED ADULT NURSE NOTE - OBJECTIVE STATEMENT
89yo F PMH Afib, CHF, CVA (2017), complains of R shoulder pain and dislocation. Patient dislocated 89yo F PMH Afib (on eliquis), CHF, CVA (2017), Bladder cancer, aortic stenosis complains of R shoulder pain and dislocation. Patient dislocated L shoulder this morning picking up towel from floor. Patient has history of shoulder pain but denies injury or fall. Patient has limited ROM in L arm due to pain. Patient is alert but oriented only to person and place. Aide at bedside states that patient is at baseline mental status. Patient denies numbness, tingling, pain in arm/hand, SOB, dizziness.

## 2024-02-09 NOTE — ED ADULT NURSE NOTE - DISCHARGE DATE/TIME
Patient and daughter decided to leave and seek higher evaluation and treatment at ED for PMH of congestive heart failure and worsening symptoms. Patient AOx3, in no apparent distress and in stable condition upon arrival. Patient accompanied by daughter in private vehicle. Daughter transported mother, instable condition by private vehicle to [place] for higher evaluation and treatment. Ria Horne RN  
09-Feb-2024 20:50

## 2024-02-09 NOTE — ED ADULT NURSE NOTE - NS ED NURSE LEVEL OF CONSCIOUSNESS MENTAL STATUS
Interval Events: pt seen and examined  vomited 3x overnight, now nausea has resolved  tolerated breakfast this morning  abdominal pain is improving  + having bms     MEDICATIONS  (STANDING):  aspirin enteric coated 81 milliGRAM(s) Oral at bedtime  buDESOnide 160 MICROgram(s)/formoterol 4.5 MICROgram(s) Inhaler 2 Puff(s) Inhalation two times a day  dextrose 5%. 1000 milliLiter(s) (50 mL/Hr) IV Continuous <Continuous>  dextrose 50% Injectable 12.5 Gram(s) IV Push once  dextrose 50% Injectable 25 Gram(s) IV Push once  docusate sodium 100 milliGRAM(s) Oral three times a day  doxazosin 2 milliGRAM(s) Oral at bedtime  finasteride 5 milliGRAM(s) Oral at bedtime  furosemide    Tablet 40 milliGRAM(s) Oral daily  heparin  Injectable 5000 Unit(s) SubCutaneous every 8 hours  insulin glargine Injectable (LANTUS) 10 Unit(s) SubCutaneous at bedtime  insulin lispro (HumaLOG) corrective regimen sliding scale   SubCutaneous three times a day before meals  insulin lispro (HumaLOG) corrective regimen sliding scale   SubCutaneous at bedtime  metoprolol succinate ER 25 milliGRAM(s) Oral at bedtime  pantoprazole    Tablet 40 milliGRAM(s) Oral before breakfast  polyethylene glycol 3350 17 Gram(s) Oral daily  senna 2 Tablet(s) Oral at bedtime  simvastatin 20 milliGRAM(s) Oral at bedtime    MEDICATIONS  (PRN):  dextrose 40% Gel 15 Gram(s) Oral once PRN Blood Glucose LESS THAN 70 milliGRAM(s)/deciliter      Allergies    clindamycin (Other)  Demerol HCl (Rash)  fish (Anaphylaxis)  Levaquin (Rash)  penicillin (Hives)  shellfish (Anaphylaxis)  sulfa drugs (Hives)    Intolerances        Review of Systems:    General:  No wt loss, fevers, chills, night sweats,fatigue,   Eyes:  Good vision, no reported pain  ENT:  No sore throat, pain, runny nose, dysphagia  CV:  No pain, palpitations, hypo/hypertension  Resp:  No dyspnea, cough, tachypnea, wheezing  GI:  No pain, No nausea, No vomiting, No diarrhea, No constipation, No weight loss, No fever, No pruritis, No rectal bleeding, No melena, No dysphagia  :  No pain, bleeding, incontinence, nocturia  Muscle:  No pain, weakness  Neuro:  No weakness, tingling, memory problems  Psych:  No fatigue, insomnia, mood problems, depression  Endocrine:  No polyuria, polydypsia, cold/heat intolerance  Heme:  No petechiae, ecchymosis, easy bruisability  Skin:  No rash, tattoos, scars, edema      Vital Signs Last 24 Hrs  T(C): 36.7 (27 Mar 2019 11:50), Max: 36.7 (27 Mar 2019 11:50)  T(F): 98.1 (27 Mar 2019 11:50), Max: 98.1 (27 Mar 2019 11:50)  HR: 83 (27 Mar 2019 11:50) (62 - 83)  BP: 151/76 (27 Mar 2019 11:50) (111/66 - 151/76)  BP(mean): --  RR: 18 (27 Mar 2019 11:50) (18 - 18)  SpO2: 98% (27 Mar 2019 11:50) (96% - 99%)    PHYSICAL EXAM:    Constitutional: NAD, well-developed  HEENT: EOMI, throat clear  Neck: No LAD, supple  Respiratory: CTA and P  Cardiovascular: S1 and S2, RRR, no M  Gastrointestinal: BS+, soft, NT/ND, neg HSM,  Extremities: No peripheral edema, neg clubing, cyanosis  Vascular: 2+ peripheral pulses  Neurological: A/O x 3, no focal deficits  Psychiatric: Normal mood, normal affect  Skin: No rashes      LABS:                        11.2   8.47  )-----------( 142      ( 27 Mar 2019 10:12 )             35.0     03-27    140  |  101  |  47<H>  ----------------------------<  218<H>  3.9   |  25  |  2.03<H>    Ca    8.8      27 Mar 2019 06:15  Phos  2.7     03-26  Mg     2.2     03-27    TPro  6.6  /  Alb  4.1  /  TBili  0.8  /  DBili  x   /  AST  14  /  ALT  10  /  AlkPhos  76  03-26    PT/INR - ( 26 Mar 2019 01:00 )   PT: 12.7 sec;   INR: 1.11 ratio         PTT - ( 26 Mar 2019 01:00 )  PTT:31.9 sec      RADIOLOGY & ADDITIONAL TESTS:
Patient is a 78y old  Male who presents with a chief complaint of near syncope, chest pain, abd pain (27 Mar 2019 01:31)      SUBJECTIVE / OVERNIGHT EVENTS: Had episode of vomiting last night after eating chicken salad which he thought was old/rotten. This AM feels well, denies any nausea, vomiting, tolerated breakfast/lunch without issues. Still with mild lower abdominal discomfort but improving with passing gas. Reports a lot of GI issues with constipation/diarrhea after starting po iron and colace.  Denies sob, chest pain, palpitations, lightheadedness. walking in hallways. Had BM 2 days ago, and thats hes no longer constipated but passing a  lot gas.     MEDICATIONS  (STANDING):  aspirin enteric coated 81 milliGRAM(s) Oral at bedtime  buDESOnide 160 MICROgram(s)/formoterol 4.5 MICROgram(s) Inhaler 2 Puff(s) Inhalation two times a day  dextrose 5%. 1000 milliLiter(s) (50 mL/Hr) IV Continuous <Continuous>  dextrose 50% Injectable 12.5 Gram(s) IV Push once  dextrose 50% Injectable 25 Gram(s) IV Push once  docusate sodium 100 milliGRAM(s) Oral three times a day  doxazosin 2 milliGRAM(s) Oral at bedtime  finasteride 5 milliGRAM(s) Oral at bedtime  furosemide    Tablet 40 milliGRAM(s) Oral daily  heparin  Injectable 5000 Unit(s) SubCutaneous every 8 hours  insulin glargine Injectable (LANTUS) 10 Unit(s) SubCutaneous at bedtime  insulin lispro (HumaLOG) corrective regimen sliding scale   SubCutaneous three times a day before meals  insulin lispro (HumaLOG) corrective regimen sliding scale   SubCutaneous at bedtime  metoprolol succinate ER 25 milliGRAM(s) Oral at bedtime  pantoprazole    Tablet 40 milliGRAM(s) Oral before breakfast  polyethylene glycol 3350 17 Gram(s) Oral daily  senna 2 Tablet(s) Oral at bedtime  simvastatin 20 milliGRAM(s) Oral at bedtime    MEDICATIONS  (PRN):  dextrose 40% Gel 15 Gram(s) Oral once PRN Blood Glucose LESS THAN 70 milliGRAM(s)/deciliter      Vital Signs Last 24 Hrs  T(C): 36.7 (27 Mar 2019 11:50), Max: 36.7 (27 Mar 2019 11:50)  T(F): 98.1 (27 Mar 2019 11:50), Max: 98.1 (27 Mar 2019 11:50)  HR: 83 (27 Mar 2019 11:50) (62 - 83)  BP: 151/76 (27 Mar 2019 11:50) (111/66 - 151/76)  BP(mean): --  RR: 18 (27 Mar 2019 11:50) (18 - 18)  SpO2: 98% (27 Mar 2019 11:50) (96% - 99%)  CAPILLARY BLOOD GLUCOSE      POCT Blood Glucose.: 170 mg/dL (27 Mar 2019 11:47)  POCT Blood Glucose.: 183 mg/dL (27 Mar 2019 07:51)  POCT Blood Glucose.: 211 mg/dL (26 Mar 2019 22:54)  POCT Blood Glucose.: 217 mg/dL (26 Mar 2019 21:00)  POCT Blood Glucose.: 197 mg/dL (26 Mar 2019 17:44)    I&O's Summary    26 Mar 2019 07:01  -  27 Mar 2019 07:00  --------------------------------------------------------  IN: 120 mL / OUT: 500 mL / NET: -380 mL    27 Mar 2019 07:01  -  27 Mar 2019 14:13  --------------------------------------------------------  IN: 560 mL / OUT: 0 mL / NET: 560 mL        PHYSICAL EXAM:  GENERAL: NAD, well-developed  HEAD:  Atraumatic, Normocephalic  NECK: Supple, No JVD  CHEST/LUNG: Clear to auscultation bilaterally; No wheeze  HEART: Regular rate and rhythm;   ABDOMEN: Soft, Nontender, Nondistended; Bowel sounds present  EXTREMITIES:  2+ Peripheral Pulses, No clubbing, cyanosis, or edema  PSYCH: AAOx3  NEUROLOGY: non-focal  SKIN: No rashes or lesions    LABS:                        11.2   8.47  )-----------( 142      ( 27 Mar 2019 10:12 )             35.0     03-27    140  |  101  |  47<H>  ----------------------------<  218<H>  3.9   |  25  |  2.03<H>    Ca    8.8      27 Mar 2019 06:15  Phos  2.7     03-26  Mg     2.2     03-27    TPro  6.6  /  Alb  4.1  /  TBili  0.8  /  DBili  x   /  AST  14  /  ALT  10  /  AlkPhos  76  03-26    PT/INR - ( 26 Mar 2019 01:00 )   PT: 12.7 sec;   INR: 1.11 ratio         PTT - ( 26 Mar 2019 01:00 )  PTT:31.9 sec  CARDIAC MARKERS ( 26 Mar 2019 01:00 )  x     / x     / 63 U/L / x     / 3.1 ng/mL              RADIOLOGY & ADDITIONAL TESTS:    tele reviewed: afib-vpaced     Imaging Personally Reviewed:    Consultant(s) Notes Reviewed:  cards/GI     Care Discussed with Consultants/Other Providers:
Awake/Cooperative
SUBJECTIVE:  Seen in ED room.  Had CP and lower abd pain earlier, relieved with 4mg PRN IV Morphine.  Currently without CP or abd pain.  NAD.    MEDICATIONS  (STANDING):  aspirin enteric coated 81 milliGRAM(s) Oral at bedtime  buDESOnide 160 MICROgram(s)/formoterol 4.5 MICROgram(s) Inhaler 2 Puff(s) Inhalation two times a day  dextrose 5%. 1000 milliLiter(s) (50 mL/Hr) IV Continuous <Continuous>  dextrose 50% Injectable 12.5 Gram(s) IV Push once  dextrose 50% Injectable 25 Gram(s) IV Push once  docusate sodium 100 milliGRAM(s) Oral three times a day  doxazosin 2 milliGRAM(s) Oral at bedtime  famotidine    Tablet 20 milliGRAM(s) Oral daily  finasteride 5 milliGRAM(s) Oral at bedtime  furosemide    Tablet 40 milliGRAM(s) Oral daily  insulin glargine Injectable (LANTUS) 10 Unit(s) SubCutaneous at bedtime  insulin lispro (HumaLOG) corrective regimen sliding scale   SubCutaneous three times a day before meals  insulin lispro (HumaLOG) corrective regimen sliding scale   SubCutaneous at bedtime  metoprolol succinate ER 25 milliGRAM(s) Oral at bedtime  pantoprazole    Tablet 40 milliGRAM(s) Oral before breakfast  polyethylene glycol 3350 17 Gram(s) Oral daily  senna 2 Tablet(s) Oral at bedtime  simvastatin 20 milliGRAM(s) Oral at bedtime    MEDICATIONS  (PRN):  dextrose 40% Gel 15 Gram(s) Oral once PRN Blood Glucose LESS THAN 70 milliGRAM(s)/deciliter  morphine  - Injectable 4 milliGRAM(s) IV Push every 4 hours PRN Severe Pain (7 - 10)  morphine  - Injectable 2 milliGRAM(s) IV Push every 4 hours PRN Moderate Pain (4 - 6)      Vital Signs Last 24 Hrs  T(C): 36.7 (26 Mar 2019 08:00), Max: 36.9 (26 Mar 2019 05:33)  T(F): 98.1 (26 Mar 2019 08:00), Max: 98.5 (26 Mar 2019 05:33)  HR: 70 (26 Mar 2019 08:00) (64 - 88)  BP: 114/76 (26 Mar 2019 08:00) (114/76 - 151/70)  BP(mean): --  RR: 16 (26 Mar 2019 08:00) (16 - 18)  SpO2: 100% (26 Mar 2019 08:00) (98% - 100%)    CAPILLARY BLOOD GLUCOSE      POCT Blood Glucose.: 192 mg/dL (26 Mar 2019 09:10)  POCT Blood Glucose.: 128 mg/dL (26 Mar 2019 00:42)    I&O's Summary      PHYSICAL EXAM:  GENERAL: Looks stated age, NAD  CARDIOVASCULAR: Normal S1, S2  PULMONARY: Lungs clear to auscultation bilaterally. No wheezes/rales/rhonchi  GI: Abdomen soft, mild epigastric and lower abd TTP. Bowel sounds present  MSK/Ext:  No leg edema.  No calf tenderness bilaterally  PSYCH: Normal Affect. AAOx3      LABS:                        12.6   10.2  )-----------( 153      ( 26 Mar 2019 01:00 )             37.4     03-26    139  |  97  |  38<H>  ----------------------------<  147<H>  3.0<L>   |  28  |  1.95<H>    Ca    9.6      26 Mar 2019 01:00  Phos  2.7     03-26  Mg     2.2     03-26    TPro  6.6  /  Alb  4.1  /  TBili  0.8  /  DBili  x   /  AST  14  /  ALT  10  /  AlkPhos  76  03-26    PT/INR - ( 26 Mar 2019 01:00 )   PT: 12.7 sec;   INR: 1.11 ratio         PTT - ( 26 Mar 2019 01:00 )  PTT:31.9 sec  CARDIAC MARKERS ( 26 Mar 2019 01:00 )  x     / x     / 63 U/L / x     / 3.1 ng/mL            RADIOLOGY & ADDITIONAL TESTS:

## 2024-02-09 NOTE — ED PROVIDER NOTE - PHYSICAL EXAMINATION
Physical Exam:  Gen: NAD, non-toxic appearing  Head: NCAT  HEENT: Normal conjunctiva, trachea midline, moist mucous membranes  Lung: CTAB, no respiratory distress, no wheezes/rhonchi/rales B/L, speaking in full sentences  CV: RRR, no murmurs, rubs or gallops  Abd: Soft, NT, ND, no guarding, rigidity, rebound tenderness, or CVA tenderness   MSK: Minimal pain with palpation over glenoid, normal strength in fingers  Neuro: No focal motor deficits, sensation intact  Skin: Warm, well perfused, no visible rashes, no leg swelling  Psych: appropriate affect and mood

## 2024-02-09 NOTE — ED PROVIDER NOTE - PROGRESS NOTE DETAILS
spoke to ortho, agreed with ct shoulder. Requested for call back after ct shoulder is completed. plan for call back.   Earnestine Orozco MD PGY-1 Spoke to ortho, they reviewed the ct and indicated there was no acute fx. Okay to dc with outpatient ortho follow up. Plan to dc after ctr is back.   Earnestine Orozco MD PGY-1

## 2024-02-09 NOTE — ED PROVIDER NOTE - OBJECTIVE STATEMENT
The patient is a 89 yo female history of Afib on eliquis, CHF, chronic right shoulder pain who presents for shoulder pain. She reports she was reaching to  something on the floor and when she withdrew her hand she pain in her shoulder. No LOC. Took 1000 mg of tylenol at 9 am then when to urgent care where they did an xray that showed chronic dislocation was unable r/o fx.

## 2024-02-09 NOTE — ED PROVIDER NOTE - NSFOLLOWUPCLINICS_GEN_ALL_ED_FT
[facility]  Orthopedic Surgery  [address] [place] [address]  Phone: [phone]  Fax:     [facility]  Orthopedic Surgery  [address]  [place] [address]  Phone: [phone]  Fax:     [facility]  Orthopedic Surgery  [address], 3rd & 4th [place] [address]  Phone: [phone]  Fax:

## 2024-02-09 NOTE — ED PROVIDER NOTE - NSFOLLOWUPINSTRUCTIONS_ED_ALL_ED_FT
You were evaluated in the emergency department for shoulder pain. Your ct showed no new fractures.     Please follow up with the orthopedic doctor. The referral center should call you within the next week to schedule an appointment if they do not, use the phone numbers provided below to schedule an appointment.     Please take tylenol and ibuprofen as per package instructions as needed for pain.

## 2024-02-14 ENCOUNTER — APPOINTMENT (OUTPATIENT)
Dept: GERIATRICS | Facility: CLINIC | Age: 89
End: 2024-02-14

## 2024-02-21 ENCOUNTER — NON-APPOINTMENT (OUTPATIENT)
Age: 89
End: 2024-02-21

## 2024-02-21 ENCOUNTER — APPOINTMENT (OUTPATIENT)
Dept: CARDIOLOGY | Facility: CLINIC | Age: 89
End: 2024-02-21
Payer: MEDICARE

## 2024-02-21 VITALS
HEIGHT: 62 IN | OXYGEN SATURATION: 97 % | WEIGHT: 105 LBS | BODY MASS INDEX: 19.32 KG/M2 | RESPIRATION RATE: 17 BRPM | SYSTOLIC BLOOD PRESSURE: 130 MMHG | HEART RATE: 98 BPM | DIASTOLIC BLOOD PRESSURE: 90 MMHG

## 2024-02-21 DIAGNOSIS — I50.32 CHRONIC DIASTOLIC (CONGESTIVE) HEART FAILURE: ICD-10-CM

## 2024-02-21 DIAGNOSIS — I48.91 UNSPECIFIED ATRIAL FIBRILLATION: ICD-10-CM

## 2024-02-21 PROCEDURE — 93000 ELECTROCARDIOGRAM COMPLETE: CPT

## 2024-02-21 PROCEDURE — 99214 OFFICE O/P EST MOD 30 MIN: CPT

## 2024-02-21 PROCEDURE — G2211 COMPLEX E/M VISIT ADD ON: CPT

## 2024-02-21 RX ORDER — DIGOXIN 125 UG/1
125 TABLET ORAL DAILY
Qty: 90 | Refills: 3 | Status: DISCONTINUED | COMMUNITY
Start: 2019-12-20 | End: 2024-02-21

## 2024-02-25 LAB
ALBUMIN SERPL ELPH-MCNC: 4.6 G/DL
ALP BLD-CCNC: 64 U/L
ALT SERPL-CCNC: 32 U/L
ANION GAP SERPL CALC-SCNC: 14 MMOL/L
AST SERPL-CCNC: 24 U/L
BILIRUB SERPL-MCNC: 0.4 MG/DL
BUN SERPL-MCNC: 43 MG/DL
CALCIUM SERPL-MCNC: 10.1 MG/DL
CHLORIDE SERPL-SCNC: 103 MMOL/L
CHOLEST SERPL-MCNC: 174 MG/DL
CO2 SERPL-SCNC: 23 MMOL/L
CREAT SERPL-MCNC: 1.04 MG/DL
EGFR: 49 ML/MIN/1.73M2
GLUCOSE SERPL-MCNC: 91 MG/DL
HCT VFR BLD CALC: 40.1 %
HDLC SERPL-MCNC: 73 MG/DL
HGB BLD-MCNC: 13 G/DL
LDLC SERPL CALC-MCNC: 90 MG/DL
MCHC RBC-ENTMCNC: 30.7 PG
MCHC RBC-ENTMCNC: 32.4 GM/DL
MCV RBC AUTO: 94.6 FL
NONHDLC SERPL-MCNC: 102 MG/DL
NT-PROBNP SERPL-MCNC: 2077 PG/ML
PLATELET # BLD AUTO: 161 K/UL
POTASSIUM SERPL-SCNC: 4.6 MMOL/L
PROT SERPL-MCNC: 7.2 G/DL
RBC # BLD: 4.24 M/UL
RBC # FLD: 15.8 %
SODIUM SERPL-SCNC: 140 MMOL/L
TRIGL SERPL-MCNC: 61 MG/DL
TSH SERPL-ACNC: 2.76 UIU/ML
WBC # FLD AUTO: 7.35 K/UL

## 2024-03-06 ENCOUNTER — NON-APPOINTMENT (OUTPATIENT)
Age: 89
End: 2024-03-06

## 2024-03-06 ENCOUNTER — APPOINTMENT (OUTPATIENT)
Dept: ELECTROPHYSIOLOGY | Facility: CLINIC | Age: 89
End: 2024-03-06
Payer: MEDICARE

## 2024-03-06 PROCEDURE — 93296 REM INTERROG EVL PM/IDS: CPT

## 2024-03-06 PROCEDURE — 93294 REM INTERROG EVL PM/LDLS PM: CPT

## 2024-03-15 NOTE — ED PROVIDER NOTE - CARE PLAN
no Principal Discharge DX:	Fall   Principal Discharge DX:	Fall  Secondary Diagnosis:	Back pain due to injury

## 2024-03-21 NOTE — DISCHARGE NOTE ADULT - NS AS DC STROKE PERSONAL RISK FACTORS
Patient presents to clinic with mother and mother states that patient's eyes were red this morning.   Luz Elena Cabrera LPN ....................  3/21/2024   9:32 AM  EXT 0146     high blood pressure/atrial fibrillation/high cholesterol

## 2024-04-02 ENCOUNTER — APPOINTMENT (OUTPATIENT)
Dept: GERIATRICS | Facility: CLINIC | Age: 89
End: 2024-04-02
Payer: MEDICARE

## 2024-04-02 VITALS
WEIGHT: 109 LBS | DIASTOLIC BLOOD PRESSURE: 86 MMHG | SYSTOLIC BLOOD PRESSURE: 124 MMHG | HEART RATE: 100 BPM | BODY MASS INDEX: 20.06 KG/M2 | RESPIRATION RATE: 18 BRPM | HEIGHT: 62 IN | TEMPERATURE: 208.04 F

## 2024-04-02 DIAGNOSIS — G47.00 INSOMNIA, UNSPECIFIED: ICD-10-CM

## 2024-04-02 DIAGNOSIS — R26.89 OTHER ABNORMALITIES OF GAIT AND MOBILITY: ICD-10-CM

## 2024-04-02 DIAGNOSIS — R41.3 OTHER AMNESIA: ICD-10-CM

## 2024-04-02 DIAGNOSIS — R63.0 ANOREXIA: ICD-10-CM

## 2024-04-02 DIAGNOSIS — M81.0 AGE-RELATED OSTEOPOROSIS W/OUT CURRENT PATHOLOGICAL FRACTURE: ICD-10-CM

## 2024-04-02 PROCEDURE — 99204 OFFICE O/P NEW MOD 45 MIN: CPT

## 2024-04-02 RX ORDER — TIZANIDINE 2 MG/1
2 TABLET ORAL EVERY 6 HOURS
Qty: 56 | Refills: 0 | Status: DISCONTINUED | COMMUNITY
Start: 2022-12-12 | End: 2024-04-02

## 2024-04-02 RX ORDER — ALENDRONATE SODIUM 70 MG/1
70 TABLET ORAL
Qty: 12 | Refills: 3 | Status: ACTIVE | COMMUNITY
Start: 2017-08-03 | End: 1900-01-01

## 2024-04-02 NOTE — HISTORY OF PRESENT ILLNESS
[FreeTextEntry1] : Mrs. Solorio is a 89 yo F coming from home lives alone, has HHA Augustina from Monday to Friday for 12 hours. Accompanied to current visit by her HHA and her daughter Avis available over the phone. History obtained from pt and assisted by her daughter and HHA. Has Hx of memory changes (due to CVA?), HFpEF, CAD, Afib (on Eliquis), hearing loss (with bilateral hearing aids), OP (on alendronate), HTN and unsteady gait.  Comes to the office as an initial visit to establish care. Complains of vision changes, following with ophthalmologist regularly and was recently Rx abx drops.  She has memory changes, daughter reported sometimes is more confused than others. Her HHA assists with her care, including her medications using a pill box. Medication reconciliation was completed, only change pt not taking Tizanidine and also not taking Zoloft 150mg. She is now taking mirtazapine at night, unsure of dose. Restless sleeper wakes a few times throughout the night. Also has trouble falling asleep. Sometimes naps or rests after breakfast. Takes mirtazapine at 7pm.  Weight remains stable. Drinks ensure daily in between meals.   Last hospital admission was 1 year ago after a fall, resulting in a compression fracture. Falls was described after falling while "straightening her bed" and losing her balance. No other falls reported since.   She sometimes has urine leakage and wears diapers. Daughter reports family is very involved in her care. And she will be staying with her daughter Avis for 1 week.    Advance directives, HCP completed. She 3 children, Juan, Avis, Kayli. Avis helps with her care most. Avis and Juan are the HCP and family will bring a copy to next visit. Avis lives in [place], Juan lives in [place] and Kayli lives in [place].   RTC in 3 months for med rec and blood work. [Any fall with injury in past year] : Patient reported fall with injury in the past year [Independent] : transferring/mobility [Full assistance needed] : Assistance needed managing medications [] : Assistance needed managing finances. [PHQ-2 Negative - No further assessment needed] : PHQ-2 Negative - No further assessment needed [0] : 2) Feeling down, depressed, or hopeless: Not at all (0) [[id number]] : 0

## 2024-04-02 NOTE — PHYSICAL EXAM

## 2024-04-02 NOTE — REASON FOR VISIT
[Initial Evaluation] : an initial evaluation [Formal Caregiver] : formal caregiver [FreeTextEntry1] : establish care

## 2024-04-02 NOTE — ASSESSMENT
[FreeTextEntry1] : - requested to bring all medications to next visit for medication reconciliation.  - discussed option with daughter about doing memory testing on next visit, she feels it would not likely change her management, but will discuss with her siblings - will repeat blood work on next visit, fl/u in 3 months or earlier if needed

## 2024-04-11 NOTE — ED ADULT NURSE NOTE - NS PRO PASSIVE SMOKE EXP
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    Hypercalcemia 02/15/2024    Hyperprolactinemia (HCC) 02/15/2024    Stenosis of left carotid artery 2024    Coronary artery disease involving native coronary artery 2024    Ischemic cardiomyopathy 2024    Mixed hyperlipidemia 2024    Ulcerative pancolitis without complication (HCC) 2024    Polyp of ascending colon 2023    Acquired right foot drop 2023    Hypomagnesemia 2023    Hair loss 2023    Colitis 2023    History of biliary dyskinesia 2023    DMII (diabetes mellitus, type 2) ([facility]) 2023    Gallbladder polyp 2022    Leiomyoma of uterus 2013      LOS (days): 2  Geometric Mean LOS (GMLOS) (days): 8.2  Days to GMLOS:5.8     OBJECTIVE:    Risk of Unplanned Readmission Score: 21.36         Current admission status: Inpatient       Preferred Pharmacy:   [id number] - [place] - [address] [address]  Phone: [phone] Fax: [phone]    [facility] [place] - [address]  [address]  [place] [address]  Phone: [phone] Fax: [phone]    Primary Care Provider: Toya Titus MD    Primary Insurance: [facility]  Secondary Insurance:     ASSESSMENT:  Active Health Care Proxies    There are no active Health Care Proxies on file.       Advance  Directives  Does patient have a Health Care POA?: No  Was patient offered paperwork?: Yes (refused)  Does patient currently have a Health Care decision maker?: No  Does patient have Advance Directives?: No  Was patient offered paperwork?: Yes (refused)  Primary Contact:  Luke Perry         Readmission Root Cause  30 Day Readmission: No    Patient Information  Admitted from:: Home  Mental Status: Alert, Other (Comment) (just extubated)  During Assessment patient was accompanied by: Spouse  Assessment information provided by:: Patient, Spouse  Primary Caregiver: Self  Support Systems: Self, Spouse/significant other, Son, Daughter  County of Residence: [employer]  Home entry access options. Select all that apply.: Stairs  Number of steps to enter home.: 2  Type of Current Residence: 2 story home, Other (Comment) (first floor setup)  Upon entering residence, is there a bedroom on the main floor (no further steps)?: Yes  Upon entering residence, is there a bathroom on the main floor (no further steps)?: Yes  Living Arrangements: Lives w/ Spouse/significant other  Is patient a ?: No    Activities of Daily Living Prior to Admission  Functional Status: Independent  Completes ADLs independently?: Yes  Ambulates independently?: Yes  Does patient use assisted devices?: No  Does patient currently own DME?: No  Does patient have a history of Outpatient Therapy (PT/OT)?: No  Does the patient have a history of Short-Term Rehab?: No  Does patient have a history of HHC?: No  Does patient currently have HHC?: No         Patient Information Continued  Income Source: Unemployed  Does patient have prescription coverage?: Yes  Does patient receive dialysis treatments?: No  Does patient have a history of substance abuse?: No  Does patient have a history of Mental Health Diagnosis?: No         Means of Transportation  Means [facility] to [facility]:: Family transport      Social Determinants of Health (SDOH)      Flowsheet Row Most  Recent Value   Housing Stability    In the last 12 months, was there a time when you were not able to pay the mortgage or rent on time? N   In the last 12 months, how many places have you lived? 1   In the last 12 months, was there a time when you did not have a steady place to sleep or slept in a shelter (including now)? N   Transportation Needs    In the past 12 months, has lack of transportation kept you from medical appointments or from getting medications? no   In the past 12 months, has lack of transportation kept you from meetings, work, or from getting things needed for daily living? No   Food Insecurity    Within the past 12 months, you worried that your food would run out before you got the money to buy more. Never true   Within the past 12 months, the food you bought just didn't last and you didn't have money to get more. Never true   Utilities    In the past 12 months has the electric, gas, oil, or water company threatened to shut off services in your home? No            DISCHARGE DETAILS:    Discharge planning discussed with:: Post-op care discussed with pt and ---they are refusing inpt rehab and HHC.  will only agree to transport her to OP rehab once, twice or three times a week. Instructed  that pt is meant to be homebound after surgery.  Freedom of Choice: Yes     CM contacted family/caregiver?: Yes  Were Treatment Team discharge recommendations reviewed with patient/caregiver?: Yes  Did patient/caregiver verbalize understanding of patient care needs?: No (refusing STR and HHC)  Were patient/caregiver advised of the risks associated with not following Treatment Team discharge recommendations?: Yes         Requested Home Health Care         Is the patient interested in HHC at discharge?: No         Other Referral/Resources/Interventions Provided:  Referral Comments: Pt recommended for rehab,  and pt refusing and refusing HHC.    Would you like to participate in our [facility] service program?  : No - Declined    Treatment Team Recommendation: Short Term Rehab                                         Family notified::  here  Additional Comments: Pt. extubated today and CM able to speak with pt and . Explained need for STR and/or HHC as recommended, both are refusing both.  only agrees to transport pt to outpt rehab once, twice, or three times a week. Explained pt should be home bound after surgery until cleared by physician.                     No

## 2024-06-05 ENCOUNTER — NON-APPOINTMENT (OUTPATIENT)
Age: 89
End: 2024-06-05

## 2024-06-05 ENCOUNTER — APPOINTMENT (OUTPATIENT)
Dept: ELECTROPHYSIOLOGY | Facility: CLINIC | Age: 89
End: 2024-06-05
Payer: MEDICARE

## 2024-06-05 PROCEDURE — 93296 REM INTERROG EVL PM/IDS: CPT

## 2024-06-05 PROCEDURE — 93294 REM INTERROG EVL PM/LDLS PM: CPT

## 2024-06-11 ENCOUNTER — APPOINTMENT (OUTPATIENT)
Dept: CARDIOLOGY | Facility: CLINIC | Age: 89
End: 2024-06-11
Payer: MEDICARE

## 2024-06-11 ENCOUNTER — NON-APPOINTMENT (OUTPATIENT)
Age: 89
End: 2024-06-11

## 2024-06-11 VITALS — BODY MASS INDEX: 19.94 KG/M2 | WEIGHT: 109 LBS | SYSTOLIC BLOOD PRESSURE: 110 MMHG | DIASTOLIC BLOOD PRESSURE: 80 MMHG

## 2024-06-11 DIAGNOSIS — I25.10 ATHEROSCLEROTIC HEART DISEASE OF NATIVE CORONARY ARTERY W/OUT ANGINA PECTORIS: ICD-10-CM

## 2024-06-11 DIAGNOSIS — I50.20 UNSPECIFIED SYSTOLIC (CONGESTIVE) HEART FAILURE: ICD-10-CM

## 2024-06-11 DIAGNOSIS — I63.132 CEREBRAL INFARCTION DUE TO EMBOLISM OF LEFT CAROTID ARTERY: ICD-10-CM

## 2024-06-11 PROCEDURE — G2211 COMPLEX E/M VISIT ADD ON: CPT

## 2024-06-11 PROCEDURE — 99214 OFFICE O/P EST MOD 30 MIN: CPT

## 2024-06-11 PROCEDURE — 93000 ELECTROCARDIOGRAM COMPLETE: CPT

## 2024-06-11 PROCEDURE — 93306 TTE W/DOPPLER COMPLETE: CPT

## 2024-06-11 NOTE — DISCUSSION/SUMMARY
[FreeTextEntry1] : She is a 89-year-old with a history of cardiomyopathy (nonobstructive coronary disease), paroxysmal atrial fibrillation and stable systolic heart failure symptoms.   HFrEF: OptiVol has been normal .No sign of CHF on exam. Continue Entresto. Atrial fibrillation: Still in atrial fibrillation /Atrial tachycardia with occasional biventricular pacing.  Borderline rate control continue rate control with metoprolol. Oral anticoagulation to reduce her stroke risk should be stroke risk should be continued with Eliquis 2.5 mg twice daily (weight and age) and age). She appears asymptomatic and no changes to her regimen were made. HTN:  Her blood pressure control is excellent on her current regimen.  Routine follow-up in 6 months, or sooner if symptoms arise.   Echocardiography is unchanged compared with prior. [EKG obtained to assist in diagnosis and management of assessed problem(s)] : EKG obtained to assist in diagnosis and management of assessed problem(s)

## 2024-06-11 NOTE — HISTORY OF PRESENT ILLNESS
[FreeTextEntry1] : Feeling okay. By her home health aide. She reports feeling well overall and is able to go about his usual back to her usual activities without much difficulty. No orthopnea, PND or leg edema.  Prior: She feels okay overall. Some shoulder pain is chronic. She is accompanied by her home health aide.  I reviewed her findings with her son, Juan. She denies any exertional chest pains or shortness of breath she has no orthopnea PND or leg edema but does report some difficulty sleeping, particularly falling asleep or waking up in the middle the night. She has discontinued her digoxin as discussed.  Prior: She reports not doing much Around the house.  She is no chest pains or shortness of breath and when she does walk denies any exertional chest pains. She is accompanied by home health aide. increased fatigue noted by pt's daughter, Avis.  No bleeding or bruising. Prior: Has been doing well. Still walking around the block with the assistance of a walker. Seen by PMD last week and labs were reportedly normal. She is accompanied by her home health aide and and I reviewed her medical issues with her daughter over the telephone telephone.  Prior: She has been having severe right hip/nerve pain. Seen in the ER. Referred for pain management. Suggested the possibility for spinal injections. No chest pain   Prior: Feeling okay. some fatigue with walking. No chest pain or orthopnea. Seen by Dr. Madrid. Labs all okay.  Prior: She is accompanied by her daughter and reports feeling okay overall.  Her biggest concern seems to be related to decreased urine output and a burning sensation that makes her feel like she has to urinate. She was recently seen by new internist. Recent blood work was reportedly normal. Her other big concern is fatigue for which she occasionally takes naps.  She has not reported any inappropriate falling asleep. No bleeding or falls.  Echocardiography showed normal left ventricular systolic function with mild to moderate mitral regurgitation and mild pulmonary hypertension. The TAVR appears well-seated with no significant regurgitation.  Normal gradients.

## 2024-06-11 NOTE — PHYSICAL EXAM
[General Appearance - Well Developed] : well developed [General Appearance - Well Nourished] : well nourished [Normal Conjunctiva] : the conjunctiva exhibited no abnormalities [Normal Oral Mucosa] : normal oral mucosa [No Oral Pallor] : no oral pallor [Normal Jugular Venous A Waves Present] : normal jugular venous A waves present [Normal Jugular Venous V Waves Present] : normal jugular venous V waves present [No Jugular Venous Owens A Waves] : no jugular venous owens A waves [] : no respiratory distress [Respiration, Rhythm And Depth] : normal respiratory rhythm and effort [Bowel Sounds] : normal bowel sounds [Abdomen Soft] : soft [Nail Clubbing] : no clubbing of the fingernails [Cyanosis, Localized] : no localized cyanosis [Skin Turgor] : normal skin turgor [Oriented To Time, Place, And Person] : oriented to person, place, and time [Affect] : the affect was normal [Mood] : the mood was normal [No Anxiety] : not feeling anxious [FreeTextEntry1] : Uses a walker

## 2024-06-24 ENCOUNTER — RX RENEWAL (OUTPATIENT)
Age: 89
End: 2024-06-24

## 2024-06-24 RX ORDER — METOPROLOL SUCCINATE 25 MG/1
25 TABLET, EXTENDED RELEASE ORAL
Qty: 180 | Refills: 3 | Status: ACTIVE | COMMUNITY
Start: 2019-10-08 | End: 1900-01-01

## 2024-07-03 ENCOUNTER — RX RENEWAL (OUTPATIENT)
Age: 89
End: 2024-07-03

## 2024-07-09 ENCOUNTER — APPOINTMENT (OUTPATIENT)
Dept: GERIATRICS | Facility: CLINIC | Age: 89
End: 2024-07-09
Payer: MEDICARE

## 2024-07-09 VITALS
BODY MASS INDEX: 19.74 KG/M2 | SYSTOLIC BLOOD PRESSURE: 105 MMHG | HEIGHT: 62 IN | WEIGHT: 107.25 LBS | TEMPERATURE: 96.4 F | DIASTOLIC BLOOD PRESSURE: 70 MMHG | OXYGEN SATURATION: 99 % | HEART RATE: 61 BPM

## 2024-07-09 DIAGNOSIS — M81.0 AGE-RELATED OSTEOPOROSIS W/OUT CURRENT PATHOLOGICAL FRACTURE: ICD-10-CM

## 2024-07-09 DIAGNOSIS — R41.3 OTHER AMNESIA: ICD-10-CM

## 2024-07-09 DIAGNOSIS — G47.00 INSOMNIA, UNSPECIFIED: ICD-10-CM

## 2024-07-09 DIAGNOSIS — R63.0 ANOREXIA: ICD-10-CM

## 2024-07-09 DIAGNOSIS — R26.89 OTHER ABNORMALITIES OF GAIT AND MOBILITY: ICD-10-CM

## 2024-07-09 PROCEDURE — 99214 OFFICE O/P EST MOD 30 MIN: CPT

## 2024-07-09 PROCEDURE — G2211 COMPLEX E/M VISIT ADD ON: CPT

## 2024-07-09 NOTE — ED ADULT NURSE NOTE - TEMPLATE
Problem: Behavior  Goal: Pt/Family maintain appropriate behavior and adhere to behavioral management agreement, if implemented  Description: INTERVENTIONS:  1. Assess patient/family's coping skills and  non-compliant behavior (including use of illegal substances)  2. Notify security of behavior or suspected illegal substances which indicate the need for search of the family and/or belongings  3. Encourage verbalization of thoughts and concerns in a socially appropriate manner  4. Utilize positive, consistent limit setting strategies supporting safety of patient, staff and others  5. Encourage participation in the decision making process about the behavioral management agreement  6. If a visitor's behavior poses a threat to safety call refer to organization policy.  7. Initiate consult with , Psychosocial CNS, Spiritual Care as appropriate  7/8/2024 2051 by Billie Linda RN  Outcome: Progressing  7/8/2024 0923 by Ever Rivas RN (Peters)  Outcome: Progressing     Problem: Anxiety  Goal: Will report anxiety at manageable levels  Description: INTERVENTIONS:  1. Administer medication as ordered  2. Teach and rehearse alternative coping skills  3. Provide emotional support with 1:1 interaction with staff  Outcome: Progressing     Problem: Sleep Disturbance  Goal: Will exhibit normal sleeping pattern  Description: INTERVENTIONS:  1. Administer medication as ordered  2. Decrease environmental stimuli, including noise, as appropriate  3. Discourage social isolation and naps during the day  Outcome: Progressing     Problem: Depression  Goal: Will be euthymic at discharge  Description: INTERVENTIONS:  1. Administer medication as ordered  2. Provide emotional support via 1:1 interaction with staff  3. Encourage involvement in milieu/groups/activities  4. Monitor for social isolation  7/8/2024 2051 by Billie Linda RN  Outcome: Progressing  7/8/2024 0923 by Ever Rivas (Peters)  Abdominal Pain, N/V/D

## 2024-07-10 ENCOUNTER — APPOINTMENT (OUTPATIENT)
Dept: CARDIOLOGY | Facility: CLINIC | Age: 89
End: 2024-07-10
Payer: MEDICARE

## 2024-07-10 ENCOUNTER — NON-APPOINTMENT (OUTPATIENT)
Age: 89
End: 2024-07-10

## 2024-07-10 VITALS
HEART RATE: 117 BPM | OXYGEN SATURATION: 96 % | DIASTOLIC BLOOD PRESSURE: 68 MMHG | SYSTOLIC BLOOD PRESSURE: 110 MMHG | BODY MASS INDEX: 19.02 KG/M2 | WEIGHT: 104 LBS

## 2024-07-10 DIAGNOSIS — I10 ESSENTIAL (PRIMARY) HYPERTENSION: ICD-10-CM

## 2024-07-10 DIAGNOSIS — R06.09 OTHER FORMS OF DYSPNEA: ICD-10-CM

## 2024-07-10 DIAGNOSIS — I48.91 UNSPECIFIED ATRIAL FIBRILLATION: ICD-10-CM

## 2024-07-10 PROCEDURE — G2211 COMPLEX E/M VISIT ADD ON: CPT

## 2024-07-10 PROCEDURE — 99214 OFFICE O/P EST MOD 30 MIN: CPT

## 2024-07-10 PROCEDURE — 93000 ELECTROCARDIOGRAM COMPLETE: CPT

## 2024-07-30 ENCOUNTER — NON-APPOINTMENT (OUTPATIENT)
Age: 89
End: 2024-07-30

## 2024-07-30 ENCOUNTER — APPOINTMENT (OUTPATIENT)
Dept: CARDIOLOGY | Facility: CLINIC | Age: 89
End: 2024-07-30
Payer: MEDICARE

## 2024-07-30 VITALS — BODY MASS INDEX: 19.02 KG/M2 | DIASTOLIC BLOOD PRESSURE: 52 MMHG | WEIGHT: 104 LBS | SYSTOLIC BLOOD PRESSURE: 90 MMHG

## 2024-07-30 DIAGNOSIS — I25.10 ATHEROSCLEROTIC HEART DISEASE OF NATIVE CORONARY ARTERY W/OUT ANGINA PECTORIS: ICD-10-CM

## 2024-07-30 DIAGNOSIS — I48.91 UNSPECIFIED ATRIAL FIBRILLATION: ICD-10-CM

## 2024-07-30 DIAGNOSIS — I50.20 UNSPECIFIED SYSTOLIC (CONGESTIVE) HEART FAILURE: ICD-10-CM

## 2024-07-30 PROCEDURE — 93000 ELECTROCARDIOGRAM COMPLETE: CPT

## 2024-07-30 PROCEDURE — 99214 OFFICE O/P EST MOD 30 MIN: CPT

## 2024-07-30 NOTE — HISTORY OF PRESENT ILLNESS
[FreeTextEntry1] : Some fatigue, but overall feels okay. No lightheadedness or dizziness.  No orthopnea or leg edema. No palpitations. No bleeding issues.  Prior: Seen today on an urgent basis for progressive dyspena. DAWKINS after initiating her walk...approx half a block. Had a dream that she ...  Prior: Feeling okay. By her home health aide. She reports feeling well overall and is able to go about his usual back to her usual activities without much difficulty. No orthopnea, PND or leg edema.  Prior: She feels okay overall. Some shoulder pain is chronic. She is accompanied by her home health aide.  I reviewed her findings with her son, Juan. She denies any exertional chest pains or shortness of breath she has no orthopnea PND or leg edema but does report some difficulty sleeping, particularly falling asleep or waking up in the middle the night. She has discontinued her digoxin as discussed.  Prior: She reports not doing much Around the house.  She is no chest pains or shortness of breath and when she does walk denies any exertional chest pains. She is accompanied by home health aide. increased fatigue noted by pt's daughter, Avis.  No bleeding or bruising. Prior: Has been doing well. Still walking around the block with the assistance of a walker. Seen by PMD last week and labs were reportedly normal. She is accompanied by her home health aide and and I reviewed her medical issues with her daughter over the telephone telephone.  Prior: She has been having severe right hip/nerve pain. Seen in the ER. Referred for pain management. Suggested the possibility for spinal injections. No chest pain   Prior: Feeling okay. some fatigue with walking. No chest pain or orthopnea. Seen by Dr. Madrid. Labs all okay.  Prior: She is accompanied by her daughter and reports feeling okay overall.  Her biggest concern seems to be related to decreased urine output and a burning sensation that makes her feel like she has to urinate. She was recently seen by new internist. Recent blood work was reportedly normal. Her other big concern is fatigue for which she occasionally takes naps.  She has not reported any inappropriate falling asleep. No bleeding or falls.  Echocardiography showed normal left ventricular systolic function with mild to moderate mitral regurgitation and mild pulmonary hypertension. The TAVR appears well-seated with no significant regurgitation.  Normal gradients.

## 2024-07-30 NOTE — PHYSICAL EXAM
[General Appearance - Well Developed] : well developed [General Appearance - Well Nourished] : well nourished [Normal Conjunctiva] : the conjunctiva exhibited no abnormalities [Normal Oral Mucosa] : normal oral mucosa [No Oral Pallor] : no oral pallor [Normal Jugular Venous A Waves Present] : normal jugular venous A waves present [Normal Jugular Venous V Waves Present] : normal jugular venous V waves present [No Jugular Venous Owens A Waves] : no jugular venous owens A waves [] : no respiratory distress [Respiration, Rhythm And Depth] : normal respiratory rhythm and effort [Bowel Sounds] : normal bowel sounds [Abdomen Soft] : soft [FreeTextEntry1] : Uses a walker [Nail Clubbing] : no clubbing of the fingernails [Cyanosis, Localized] : no localized cyanosis [Skin Turgor] : normal skin turgor [Oriented To Time, Place, And Person] : oriented to person, place, and time [Affect] : the affect was normal [Mood] : the mood was normal [No Anxiety] : not feeling anxious

## 2024-07-30 NOTE — DISCUSSION/SUMMARY
[FreeTextEntry1] : She is a 89-year-old with a history of cardiomyopathy (nonobstructive coronary disease), paroxysmal atrial fibrillation and stable systolic heart failure symptoms.   ECG: AF with VR~100. alternating BBB HFrEF:  No sign of CHF on exam, weight is lower now. Continue Entresto. Atrial fibrillation: Still in atrial fibrillation /Atrial tachycardia with RVR. and occasional biventricular pacing.  Improved rate control continue rate control with metoprolol 25 qam and 50 qpm. Continue Oral anticoagulation to reduce her stroke risk should be stroke risk should be continued with Eliquis 2.5 mg twice daily (weight and age) and age).   HTN:  Her blood pressure control is excellent on her current regimen. No dizziness. Routine follow-up in 3 weeks. months. [EKG obtained to assist in diagnosis and management of assessed problem(s)] : EKG obtained to assist in diagnosis and management of assessed problem(s)

## 2024-08-27 ENCOUNTER — RX RENEWAL (OUTPATIENT)
Age: 89
End: 2024-08-27

## 2024-09-04 ENCOUNTER — APPOINTMENT (OUTPATIENT)
Dept: ELECTROPHYSIOLOGY | Facility: CLINIC | Age: 89
End: 2024-09-04
Payer: MEDICARE

## 2024-09-04 ENCOUNTER — NON-APPOINTMENT (OUTPATIENT)
Age: 89
End: 2024-09-04

## 2024-09-04 PROCEDURE — 93294 REM INTERROG EVL PM/LDLS PM: CPT

## 2024-09-04 PROCEDURE — 93296 REM INTERROG EVL PM/IDS: CPT

## 2024-09-10 ENCOUNTER — RX RENEWAL (OUTPATIENT)
Age: 89
End: 2024-09-10

## 2024-09-24 ENCOUNTER — APPOINTMENT (OUTPATIENT)
Dept: CARDIOLOGY | Facility: CLINIC | Age: 89
End: 2024-09-24
Payer: MEDICARE

## 2024-09-24 ENCOUNTER — NON-APPOINTMENT (OUTPATIENT)
Age: 89
End: 2024-09-24

## 2024-09-24 VITALS
HEART RATE: 70 BPM | BODY MASS INDEX: 19.75 KG/M2 | OXYGEN SATURATION: 97 % | WEIGHT: 108 LBS | DIASTOLIC BLOOD PRESSURE: 64 MMHG | SYSTOLIC BLOOD PRESSURE: 90 MMHG

## 2024-09-24 DIAGNOSIS — Z95.3 PRESENCE OF XENOGENIC HEART VALVE: ICD-10-CM

## 2024-09-24 DIAGNOSIS — I48.91 UNSPECIFIED ATRIAL FIBRILLATION: ICD-10-CM

## 2024-09-24 DIAGNOSIS — I50.32 CHRONIC DIASTOLIC (CONGESTIVE) HEART FAILURE: ICD-10-CM

## 2024-09-24 DIAGNOSIS — I50.20 UNSPECIFIED SYSTOLIC (CONGESTIVE) HEART FAILURE: ICD-10-CM

## 2024-09-24 PROCEDURE — 93000 ELECTROCARDIOGRAM COMPLETE: CPT

## 2024-09-24 PROCEDURE — 99215 OFFICE O/P EST HI 40 MIN: CPT

## 2024-09-24 PROCEDURE — G2211 COMPLEX E/M VISIT ADD ON: CPT

## 2024-09-24 RX ORDER — DIGOXIN 125 UG/1
125 TABLET ORAL DAILY
Qty: 90 | Refills: 0 | Status: ACTIVE | COMMUNITY
Start: 2024-09-24

## 2024-09-24 NOTE — HISTORY OF PRESENT ILLNESS
[FreeTextEntry1] : DAWKINS when walking. No chest pain. No dyspnea at rest. No orthopnea or edema.  Prior: Some fatigue, but overall feels okay. No lightheadedness or dizziness.  No orthopnea or leg edema. No palpitations. No bleeding issues.  Prior: Seen today on an urgent basis for progressive dyspena. DAWKINS after initiating her walk...approx half a block. Had a dream that she ...  Prior: Feeling okay. By her home health aide. She reports feeling well overall and is able to go about his usual back to her usual activities without much difficulty. No orthopnea, PND or leg edema.  Prior: She feels okay overall. Some shoulder pain is chronic. She is accompanied by her home health aide.  I reviewed her findings with her son, Juan. She denies any exertional chest pains or shortness of breath she has no orthopnea PND or leg edema but does report some difficulty sleeping, particularly falling asleep or waking up in the middle the night. She has discontinued her digoxin as discussed.  Prior: She reports not doing much Around the house.  She is no chest pains or shortness of breath and when she does walk denies any exertional chest pains. She is accompanied by home health aide. increased fatigue noted by pt's daughter, Avis.  No bleeding or bruising. Prior: Has been doing well. Still walking around the block with the assistance of a walker. Seen by PMD last week and labs were reportedly normal. She is accompanied by her home health aide and and I reviewed her medical issues with her daughter over the telephone telephone.  Prior: She has been having severe right hip/nerve pain. Seen in the ER. Referred for pain management. Suggested the possibility for spinal injections. No chest pain   Prior: Feeling okay. some fatigue with walking. No chest pain or orthopnea. Seen by Dr. Madrid. Labs all okay.  Prior: She is accompanied by her daughter and reports feeling okay overall.  Her biggest concern seems to be related to decreased urine output and a burning sensation that makes her feel like she has to urinate. She was recently seen by new internist. Recent blood work was reportedly normal. Her other big concern is fatigue for which she occasionally takes naps.  She has not reported any inappropriate falling asleep. No bleeding or falls.  Echocardiography showed normal left ventricular systolic function with mild to moderate mitral regurgitation and mild pulmonary hypertension. The TAVR appears well-seated with no significant regurgitation.  Normal gradients.

## 2024-09-24 NOTE — DISCUSSION/SUMMARY
[FreeTextEntry1] : She is a 89-year-old with a history of cardiomyopathy (nonobstructive coronary disease), paroxysmal atrial fibrillation and stable systolic heart failure symptoms.   ECG: AF with moderate VR. HFrEF:  No sign of CHF on exam, weight is lower now. Continue Entresto  continue weekly torsemide. Atrial fibrillation: Still in atrial fibrillation now with normal ventricular rate. Continue Digoxin (obtain level - last taken 3 hours ago). and occasional biventricular pacing.  Improved rate control continue rate control with metoprolol 25 qam and 50 qpm. Continue Oral anticoagulation to reduce her stroke risk should be stroke risk should be continued with Eliquis 2.5 mg twice daily (weight and age) and age).   CONSIDER reducing toprol to 25 bid. HTN:  Her blood pressure control is excellent on her current regimen. No dizziness. Routine follow-up in 3 weeks. months. [EKG obtained to assist in diagnosis and management of assessed problem(s)] : EKG obtained to assist in diagnosis and management of assessed problem(s)

## 2024-10-01 LAB
ALBUMIN SERPL ELPH-MCNC: 4.2 G/DL
ALP BLD-CCNC: 65 U/L
ALT SERPL-CCNC: 40 U/L
ANION GAP SERPL CALC-SCNC: 15 MMOL/L
AST SERPL-CCNC: 27 U/L
BILIRUB SERPL-MCNC: 0.6 MG/DL
BUN SERPL-MCNC: 33 MG/DL
CALCIUM SERPL-MCNC: 10.1 MG/DL
CHLORIDE SERPL-SCNC: 105 MMOL/L
CHOLEST SERPL-MCNC: 181 MG/DL
CO2 SERPL-SCNC: 23 MMOL/L
CREAT SERPL-MCNC: 0.88 MG/DL
DIGOXIN SERPL-MCNC: <0.3 NG/ML
EGFR: 60 ML/MIN/1.73M2
GLUCOSE SERPL-MCNC: 98 MG/DL
HCT VFR BLD CALC: 43.9 %
HDLC SERPL-MCNC: 64 MG/DL
HGB BLD-MCNC: 14.2 G/DL
LDLC SERPL CALC-MCNC: 100 MG/DL
MCHC RBC-ENTMCNC: 31.4 PG
MCHC RBC-ENTMCNC: 32.3 GM/DL
MCV RBC AUTO: 97.1 FL
NONHDLC SERPL-MCNC: 117 MG/DL
NT-PROBNP SERPL-MCNC: 2596 PG/ML
PLATELET # BLD AUTO: 163 K/UL
POTASSIUM SERPL-SCNC: 4.2 MMOL/L
PROT SERPL-MCNC: 6.9 G/DL
RBC # BLD: 4.52 M/UL
RBC # FLD: 16.7 %
SODIUM SERPL-SCNC: 143 MMOL/L
TRIGL SERPL-MCNC: 95 MG/DL
TSH SERPL-ACNC: 3.85 UIU/ML
WBC # FLD AUTO: 8.14 K/UL

## 2024-10-11 ENCOUNTER — APPOINTMENT (OUTPATIENT)
Dept: GERIATRICS | Facility: CLINIC | Age: 89
End: 2024-10-11
Payer: MEDICARE

## 2024-10-11 VITALS
RESPIRATION RATE: 16 BRPM | SYSTOLIC BLOOD PRESSURE: 127 MMHG | HEART RATE: 93 BPM | DIASTOLIC BLOOD PRESSURE: 85 MMHG | BODY MASS INDEX: 19.02 KG/M2 | TEMPERATURE: 96.1 F | WEIGHT: 104 LBS

## 2024-10-11 DIAGNOSIS — R41.3 OTHER AMNESIA: ICD-10-CM

## 2024-10-11 DIAGNOSIS — R53.81 OTHER MALAISE: ICD-10-CM

## 2024-10-11 DIAGNOSIS — I48.91 UNSPECIFIED ATRIAL FIBRILLATION: ICD-10-CM

## 2024-10-11 PROCEDURE — G0008: CPT

## 2024-10-11 PROCEDURE — G0444 DEPRESSION SCREEN ANNUAL: CPT

## 2024-10-11 PROCEDURE — 99214 OFFICE O/P EST MOD 30 MIN: CPT

## 2024-10-11 PROCEDURE — 90662 IIV NO PRSV INCREASED AG IM: CPT

## 2024-10-11 PROCEDURE — G2211 COMPLEX E/M VISIT ADD ON: CPT

## 2024-10-11 RX ORDER — MIRTAZAPINE 7.5 MG/1
7.5 TABLET, FILM COATED ORAL
Refills: 0 | Status: ACTIVE | COMMUNITY
Start: 2024-10-11

## 2024-10-16 NOTE — PHYSICAL THERAPY INITIAL EVALUATION ADULT - ASSISTIVE DEVICE FOR TRANSFER: GAIT, REHAB EVAL
Problem: Pain  Goal: Verbalizes/displays adequate comfort level or baseline comfort level  Outcome: Completed     Problem: Wound:  Goal: Will show signs of wound healing; wound closure and no evidence of infection  Description: Will show signs of wound healing; wound closure and no evidence of infection  Outcome: Completed     Problem: Falls - Risk of:  Goal: Will remain free from falls  Description: Will remain free from falls  Outcome: Completed      no AD

## 2024-10-26 NOTE — ED ADULT TRIAGE NOTE - HEIGHT IN INCHES
Subjective/Objective Data:  On 3L NC. Breathing feels a little better. Still with some c/o abdominal bloating.      Subjectively diuresing. I&Os inaccurate. Negative at least 1.9 L overnight, 3.4 L since admit.     Remains in Afib. -140s. Low normal BP, SBP 90-110s. No dizziness/LH.     ECG post Dofetilde reviewed. QT/Qtc variable 2/2 Afib. Acceptable when reviewed by myself. QT/Qtc 320 msec/485 msec.       Vitals:    10/26/24 0547 10/26/24 0548 10/26/24 0715 10/26/24 1130   BP:   99/64 113/74   BP Location:   Right arm Right arm   Patient Position:   Lying Sitting   Pulse: (!) 123 (!) 123 (!) 118 (!) 116   Resp: (!) 29 23 18 18   Temp:   36.6 °C (97.9 °F) 36.4 °C (97.5 °F)   TempSrc:   Skin Skin   SpO2: 96% 95% 96% 98%   Weight:       Height:          Last I/O:  I/O last 3 completed shifts:  In: 300 (4.7 mL/kg) [P.O.:300]  Out: 2350 (36.5 mL/kg) [Urine:2350 (1 mL/kg/hr)]  Weight: 64.3 kg     Past Cardiology Tests (Last 3 Years):  EKG:    ECG 12 Lead 10/25/24 1025 -- Tikosyn Dose #4 QTc 407 250mcg dosing      ECG 12 Lead 10/24/2024 2246 -- Tikosyn Dose #3 QTc 404 250mcg dosing      ECG 12 lead 10/24/2024 10:44 ( Tikosyn Dose #2 )  Qtc 395 -- 250 mcg Dosing       ECG 12 lead 10/23/2024 @ 22:59 -- Tikosyn Dose #1 - Manual Qtc  464 msec  250 mcg Dosing       ECG 12 lead 10/23/2024 @ 15:33 ( Pre Tikosyn )       Echo:  Transthoracic Echocardiogram 10/24/2024   1. Left ventricular ejection fraction is severely decreased, by visual estimate at 25%.   2. There is global hypokinesis of the left ventricle with minor regional variations.   3. Spectral Doppler shows an abnormal pattern of left ventricular diastolic filling.   4. Left ventricular cavity size is mild to moderately dilated.   5. No left ventricular thrombus visualized.   6. There is mildly reduced right ventricular systolic function.   7. The left atrium is severely dilated.   8. Moderate pericardial effusion.   9. Absent A-wave on MV spectral Doppler  tracing, consistent with atrial fibrillation.  10. Severe mitral valve regurgitation.  11. Mildly elevated right ventricular systolic pressure.  12. The inferior vena cava appears severely dilated, with IVC inspiratory collapse less than 50%.      Transesophageal Echo (MIKE) With Possible Cardioversion 10/18/2024   1. The left ventricular systolic function is severely decreased, with a visually estimated ejection fraction of 25%.   2. Left ventricular diastolic filling was indeterminate.   3. Unsuccessful cardioversion for atrial fibrillation resulting in atrial tachycardia.   4. There is moderately reduced right ventricular systolic function.   5. The left atrium is mildly dilated.   6. Moderate to severe mitral valve regurgitation.   7. There is no shunt detected    Transesophageal Echo (MIKE) With Possible Cardioversion 09/20/2024   1. The left ventricular systolic function is severely decreased, with a visually estimated ejection fraction of 25%.   2. Left ventricular diastolic filling was indeterminate.   3. There is normal right ventricular global systolic function.   4. The left atrium is moderately dilated.   5. SOLOMON thrombus identified. DCCV aborted.   6. Moderate mitral valve regurgitation.   7. There is no evidence of a patent foramen ovale.   8. There is no shunt detected.    Diagnostic Results   Labs\    Results from last 7 days   Lab Units 10/26/24  0553 10/25/24  0433 10/24/24  0909   WBC AUTO x10*3/uL 7.6 10.4 11.9*   HEMOGLOBIN g/dL 13.6 14.8 14.8   HEMATOCRIT % 41.3 46.5* 44.3   PLATELETS AUTO x10*3/uL 282 279 312     Results from last 7 days   Lab Units 10/26/24  0553 10/25/24  0433 10/24/24  0909 10/24/24  0907 10/23/24  1254   SODIUM mmol/L 134* 136 136 136 139   POTASSIUM mmol/L 3.5 4.1 4.2 4.2 3.4*   CHLORIDE mmol/L 98 103 105 104 103   CO2 mmol/L 27 25 21 23 25   BUN mg/dL 15 12 16 16 16   CREATININE mg/dL 0.81 0.79 0.78 0.78 0.89   CALCIUM mg/dL 8.8 8.8 9.0 9.1 8.6   PROTEIN TOTAL g/dL  --   --  "  --  6.9 6.8   BILIRUBIN TOTAL mg/dL  --   --   --  0.6 0.6   ALK PHOS U/L  --   --   --  72 73   ALT U/L  --   --   --  70* 73*   AST U/L  --   --   --  29 35   GLUCOSE mg/dL 86 110* 148* 144* 132*       CV Labs  Troponin I, High Sensitivity   Date/Time Value Ref Range Status   10/23/2024 02:52 PM 14 (H) 0 - 13 ng/L Final   10/23/2024 12:54 PM 12 0 - 13 ng/L Final     BNP   Date/Time Value Ref Range Status   10/23/2024 12:54  (H) 0 - 99 pg/mL Final     Hemoglobin A1C   Date/Time Value Ref Range Status   08/30/2024 11:41 AM 5.6 <5.7 % Final     Comment:     Normal less than 5.7%  Prediabetes 5.7% to 6.4%  Diabetes 6.5% or higher      --HgbA1C levels may not be accurate in patients who have renal disease, received recent blood transfusions, are anemic, or who have dyshemoglobinemia.     Pertinent Imaging  XR chest 1 view 10/23/2024  Allowing for the aforementioned limitation, cardiomegaly and mild congestive heart failure.            Inpatient Medications:  Scheduled medications   Medication Dose Route Frequency    apixaban  5 mg oral BID    dofetilide  250 mcg oral q12h    empagliflozin  10 mg oral Daily    furosemide  40 mg intravenous BID    metoprolol tartrate  25 mg oral BID     PRN medications   Medication    acetaminophen    Or    acetaminophen    Or    acetaminophen     Continuous Medications   Medication Dose Last Rate     Physical Exam:  /74 (BP Location: Right arm, Patient Position: Sitting)   Pulse (!) 116   Temp 36.4 °C (97.5 °F) (Skin)   Resp 18   Ht 1.499 m (4' 11\")   Wt 64.3 kg (141 lb 12.1 oz)   SpO2 98%   BMI 28.63 kg/m²   Vitals and nursing notes reviewed.  GENERAL: Alert and awake, cooperative; in no acute distress; elderly female  SKIN: Warm and dry, cap refill <2  HEENT: Normocephalic, PEERL, mucous membranes pink and moist  NECK: +JVD, ~10 cm  CARDIAC: Irregular irregular rate and rhythm, tachycardic, S1S2, no murmurs or abnormal heart sounds  CHEST: Scattered wheezes in " "bilateral lower bases, diminished AE throughout, on supplemental oxygen  ABDOMEN: soft, mildly distended, non-tender with palpation  EXTREMITIES: No lower extremity edema, normal pulses all 4 extremities  NEURO: Alert and oriented, mental status at baseline, no focal deficits  PSYCH: High anxiety     Assessment/Plan   Lolis Auguste is a 71 y.o. female, with a PMH of persistent AF on Eliquis s/p failed DCCV 10/18/24 and acute systolic HF [potentially tachy-mediated], who presented to [facility] [place] [facility] [place]/23/[facility]24 for AF RVR. Patient was seen by Dr. Hernández today for routine follow-up, noted to be in persistent AF RVR. She was recently cardioverted 10/18 and discharged on Amiodarone but was unable to tolerated d/t extreme fatigue. Patient reports she has also not be She now has developed dyspnea at rest and leg edema, needing to increase her home Lasix x2 days, which improved her symptoms. Unfortunately, her BP is suboptimal for HF optimization d/t her arrhythmia. Dr. Hernández discussed patient with Dr. Ward and plan was for patient to be admitted for Tikosyn loading as bridge to ablation. ED course notable for AF RVR with HR in the 130s, labs w/ mild hypokalemia, renal fxn at baseline, troponin negative, and . CXR with \"mild congestive heart failure.\" Cardiology is consulted for \"afib RVR, potential Tikosyn load\".      Home CV Medications: Lasix 40mg BID, Eliquis 5mg BID, Metoprolol Tartrate 25mg TID     ASSESSMENT:  #Persistent AF with RVR- Failed DCCV, unable to tolerate Amiodarone  -[id number] score for Atrial Fibrillation Stroke Risk is 3, moderate-high for thromboembolic event  -c/w Eliquis 5mg BID (Confirmed she has not missed any doses)  -Metoprolol 25mg BID (she has not consistently taking at home as it makes her feel like her head is spinning\")  -Plan to Tikosyn load and keep in house for 5-6 doses  -10/26/24: Remains in Afib -140s, S/P 6 doses of Dofetilide. QT/Qtc variable 2/2 Afib. " Acceptable when reviewed by myself. QT/Qtc 320 msec/485 msec    -PLEASE NOTE: HER RATES ARE ALSO COMPENSATORY IN THE SETTING OF DEPRESSED LVEF  -DO NOT USE DILTIAZEM     #Acute on Chronic Systolic Heart Failure Stage C Class III/IV Symptoms   -Warm and Perfused at this time, Remains hypervolemic   -TTE confirms LVEF 25% with global Hypokinesia; evidence of Increase RAP and Volume Overload   -, CXR with mild pulmonary edema, mildly volume up on exam  -Guideline Directed Therapy has been limited by lower blood pressures and atrial fibrillation  -Tolerating diuresis with IV Lasix 40mg BID and Jardiance     RECOMMENDATIONS:  -Continue with Dofetilide 250mcg load q12hrs with mandated EKG 2-3 hrs post dose   -Keep magnesium greater than 2 and potassium greater than 4  -NPO at 0000 on Monday for DCCV if patient does not convert through the weekend  -c/w Eliquis 5mg BID for anticoagulation  -Will increase Metoprolol Tartrate to 25 mg TID. Hold for SBP <95, HR <60.    -Diuresis with furosemide 40mg IV BID.  Strict I/O. Daily weights.   -Added Jardiance to assist with Diuresis   -Will consider further GDMT if BP allows. Likely initiate Entresto once maintaining SR.   -Continuous telemetry monitoring while admitted  -Patient to FU with Dr. Garcia and Dr. Ward as outpatient post-discharge for plans for ablation    If patient decompensates due to atrial fibrillation, CARDIOVERSION IS RECOMMENDED  SHE IS APPROPRIATELY ANTICOAGULATED  DO NOT GIVE DILTIAZEM DUE TO SYSTOLIC HEART FAILURE  DO NOT GIVE AMIODARONE or DIGOXIN DUE TO DOFETILIDE      KRAIG Pearson-CNP   Advanced Practice Provider  Cardiology  [facility]  10/26/24 12:20 PM     0

## 2024-11-26 ENCOUNTER — APPOINTMENT (OUTPATIENT)
Dept: CARDIOLOGY | Facility: CLINIC | Age: 89
End: 2024-11-26
Payer: MEDICARE

## 2024-11-26 ENCOUNTER — NON-APPOINTMENT (OUTPATIENT)
Age: 89
End: 2024-11-26

## 2024-11-26 VITALS
SYSTOLIC BLOOD PRESSURE: 118 MMHG | HEART RATE: 110 BPM | BODY MASS INDEX: 18.84 KG/M2 | OXYGEN SATURATION: 97 % | WEIGHT: 103 LBS | DIASTOLIC BLOOD PRESSURE: 80 MMHG

## 2024-11-26 PROCEDURE — 93000 ELECTROCARDIOGRAM COMPLETE: CPT

## 2024-11-26 PROCEDURE — 99214 OFFICE O/P EST MOD 30 MIN: CPT

## 2024-11-26 PROCEDURE — G2211 COMPLEX E/M VISIT ADD ON: CPT

## 2024-11-26 RX ORDER — DIGOXIN 125 UG/1
125 TABLET ORAL DAILY
Qty: 90 | Refills: 3 | Status: ACTIVE | COMMUNITY
Start: 2024-11-26 | End: 1900-01-01

## 2024-11-26 NOTE — HISTORY OF PRESENT ILLNESS
[FreeTextEntry8] : Patient is being reevaluated for ongoing left lower back pain that can radiate to the left lower quadrant. She states it is an  annoying pain at a level of 4/10.... earlier in the week when it began about 5 or 6 days ago it was an 8/10. The pain comes and goes. It is definitely worse when she is walking and can be relieved if she sits and goes into bed. When she first wakes up in the morning she has no pain for about 5 minutes and then the pain comes back. It seems to be worse in the back and then go to her front. She has no nausea vomiting fever. She is having a little bit of a loose stool the past day or 2 but a bowel movement does not affect the pain. There was no trauma. She still thinks maybe it is similar to her prior diverticulitis
complains of pain/discomfort

## 2024-11-27 LAB
ALBUMIN SERPL ELPH-MCNC: 4.2 G/DL
ALP BLD-CCNC: 62 U/L
ALT SERPL-CCNC: 34 U/L
ANION GAP SERPL CALC-SCNC: 15 MMOL/L
AST SERPL-CCNC: 22 U/L
BILIRUB SERPL-MCNC: 0.7 MG/DL
BUN SERPL-MCNC: 42 MG/DL
CALCIUM SERPL-MCNC: 10.1 MG/DL
CHLORIDE SERPL-SCNC: 106 MMOL/L
CO2 SERPL-SCNC: 23 MMOL/L
CREAT SERPL-MCNC: 0.85 MG/DL
EGFR: 62 ML/MIN/1.73M2
GLUCOSE SERPL-MCNC: 84 MG/DL
HCT VFR BLD CALC: 39.5 %
HGB BLD-MCNC: 13.3 G/DL
MCHC RBC-ENTMCNC: 32.1 PG
MCHC RBC-ENTMCNC: 33.7 G/DL
MCV RBC AUTO: 95.4 FL
NT-PROBNP SERPL-MCNC: 2714 PG/ML
PLATELET # BLD AUTO: 150 K/UL
POTASSIUM SERPL-SCNC: 3.8 MMOL/L
PROT SERPL-MCNC: 6.8 G/DL
RBC # BLD: 4.14 M/UL
RBC # FLD: 15.3 %
SODIUM SERPL-SCNC: 143 MMOL/L
TSH SERPL-ACNC: 2.51 UIU/ML
WBC # FLD AUTO: 7.04 K/UL

## 2024-12-06 ENCOUNTER — APPOINTMENT (OUTPATIENT)
Dept: CARDIOLOGY | Facility: CLINIC | Age: 88
End: 2024-12-06
Payer: MEDICARE

## 2024-12-06 ENCOUNTER — NON-APPOINTMENT (OUTPATIENT)
Age: 88
End: 2024-12-06

## 2024-12-06 ENCOUNTER — APPOINTMENT (OUTPATIENT)
Dept: ELECTROPHYSIOLOGY | Facility: CLINIC | Age: 88
End: 2024-12-06

## 2024-12-06 VITALS — DIASTOLIC BLOOD PRESSURE: 70 MMHG | SYSTOLIC BLOOD PRESSURE: 110 MMHG | OXYGEN SATURATION: 98 % | HEART RATE: 77 BPM

## 2024-12-06 DIAGNOSIS — I50.20 UNSPECIFIED SYSTOLIC (CONGESTIVE) HEART FAILURE: ICD-10-CM

## 2024-12-06 DIAGNOSIS — I48.91 UNSPECIFIED ATRIAL FIBRILLATION: ICD-10-CM

## 2024-12-06 DIAGNOSIS — Z79.01 LONG TERM (CURRENT) USE OF ANTICOAGULANTS: ICD-10-CM

## 2024-12-06 DIAGNOSIS — I10 ESSENTIAL (PRIMARY) HYPERTENSION: ICD-10-CM

## 2024-12-06 DIAGNOSIS — I25.10 ATHEROSCLEROTIC HEART DISEASE OF NATIVE CORONARY ARTERY W/OUT ANGINA PECTORIS: ICD-10-CM

## 2024-12-06 DIAGNOSIS — R53.83 OTHER FATIGUE: ICD-10-CM

## 2024-12-06 DIAGNOSIS — R06.09 OTHER FORMS OF DYSPNEA: ICD-10-CM

## 2024-12-06 PROCEDURE — 99214 OFFICE O/P EST MOD 30 MIN: CPT

## 2024-12-06 PROCEDURE — 93000 ELECTROCARDIOGRAM COMPLETE: CPT

## 2024-12-06 PROCEDURE — 93296 REM INTERROG EVL PM/IDS: CPT

## 2024-12-06 PROCEDURE — 93294 REM INTERROG EVL PM/LDLS PM: CPT

## 2024-12-09 LAB
ANION GAP SERPL CALC-SCNC: 13 MMOL/L
BUN SERPL-MCNC: 42 MG/DL
CALCIUM SERPL-MCNC: 9.7 MG/DL
CHLORIDE SERPL-SCNC: 105 MMOL/L
CO2 SERPL-SCNC: 26 MMOL/L
CREAT SERPL-MCNC: 0.96 MG/DL
DIGOXIN SERPL-MCNC: 2.8 NG/ML
EGFR: 54 ML/MIN/1.73M2
GLUCOSE SERPL-MCNC: 81 MG/DL
NT-PROBNP SERPL-MCNC: 2649 PG/ML
POTASSIUM SERPL-SCNC: 4.9 MMOL/L
SODIUM SERPL-SCNC: 143 MMOL/L

## 2024-12-10 ENCOUNTER — APPOINTMENT (OUTPATIENT)
Dept: CARDIOLOGY | Facility: CLINIC | Age: 88
End: 2024-12-10

## 2024-12-13 ENCOUNTER — NON-APPOINTMENT (OUTPATIENT)
Age: 88
End: 2024-12-13

## 2024-12-13 ENCOUNTER — APPOINTMENT (OUTPATIENT)
Dept: CARDIOLOGY | Facility: CLINIC | Age: 88
End: 2024-12-13
Payer: MEDICARE

## 2024-12-13 VITALS
HEART RATE: 71 BPM | BODY MASS INDEX: 18.29 KG/M2 | OXYGEN SATURATION: 99 % | WEIGHT: 100 LBS | DIASTOLIC BLOOD PRESSURE: 70 MMHG | SYSTOLIC BLOOD PRESSURE: 110 MMHG

## 2024-12-13 PROCEDURE — G2211 COMPLEX E/M VISIT ADD ON: CPT

## 2024-12-13 PROCEDURE — 99214 OFFICE O/P EST MOD 30 MIN: CPT

## 2024-12-13 PROCEDURE — 93000 ELECTROCARDIOGRAM COMPLETE: CPT

## 2025-01-10 ENCOUNTER — APPOINTMENT (OUTPATIENT)
Dept: GERIATRICS | Facility: CLINIC | Age: 89
End: 2025-01-10
Payer: MEDICARE

## 2025-01-10 VITALS
SYSTOLIC BLOOD PRESSURE: 125 MMHG | DIASTOLIC BLOOD PRESSURE: 83 MMHG | WEIGHT: 97 LBS | HEART RATE: 81 BPM | TEMPERATURE: 97.4 F | RESPIRATION RATE: 14 BRPM | BODY MASS INDEX: 17.74 KG/M2

## 2025-01-10 DIAGNOSIS — G47.00 INSOMNIA, UNSPECIFIED: ICD-10-CM

## 2025-01-10 DIAGNOSIS — R63.4 ABNORMAL WEIGHT LOSS: ICD-10-CM

## 2025-01-10 DIAGNOSIS — R39.9 UNSPECIFIED SYMPTOMS AND SIGNS INVOLVING THE GENITOURINARY SYSTEM: ICD-10-CM

## 2025-01-10 PROCEDURE — 99215 OFFICE O/P EST HI 40 MIN: CPT

## 2025-01-10 RX ORDER — CEPHALEXIN 250 MG/1
250 CAPSULE ORAL
Qty: 10 | Refills: 0 | Status: ACTIVE | COMMUNITY
Start: 2025-01-10 | End: 1900-01-01

## 2025-01-11 ENCOUNTER — LABORATORY RESULT (OUTPATIENT)
Age: 89
End: 2025-01-11

## 2025-01-21 ENCOUNTER — NON-APPOINTMENT (OUTPATIENT)
Age: 89
End: 2025-01-21

## 2025-01-28 ENCOUNTER — APPOINTMENT (OUTPATIENT)
Dept: CARDIOLOGY | Facility: CLINIC | Age: 89
End: 2025-01-28
Payer: MEDICARE

## 2025-01-28 ENCOUNTER — NON-APPOINTMENT (OUTPATIENT)
Age: 89
End: 2025-01-28

## 2025-01-28 VITALS
WEIGHT: 99 LBS | HEART RATE: 72 BPM | BODY MASS INDEX: 18.22 KG/M2 | SYSTOLIC BLOOD PRESSURE: 126 MMHG | HEIGHT: 62 IN | DIASTOLIC BLOOD PRESSURE: 64 MMHG

## 2025-01-28 DIAGNOSIS — I50.32 CHRONIC DIASTOLIC (CONGESTIVE) HEART FAILURE: ICD-10-CM

## 2025-01-28 DIAGNOSIS — I25.10 ATHEROSCLEROTIC HEART DISEASE OF NATIVE CORONARY ARTERY W/OUT ANGINA PECTORIS: ICD-10-CM

## 2025-01-28 DIAGNOSIS — I48.91 UNSPECIFIED ATRIAL FIBRILLATION: ICD-10-CM

## 2025-01-28 PROCEDURE — G2211 COMPLEX E/M VISIT ADD ON: CPT

## 2025-01-28 PROCEDURE — 99214 OFFICE O/P EST MOD 30 MIN: CPT

## 2025-01-28 PROCEDURE — 93000 ELECTROCARDIOGRAM COMPLETE: CPT

## 2025-02-07 ENCOUNTER — APPOINTMENT (OUTPATIENT)
Dept: GERIATRICS | Facility: CLINIC | Age: 89
End: 2025-02-07
Payer: MEDICARE

## 2025-02-07 VITALS
RESPIRATION RATE: 14 BRPM | SYSTOLIC BLOOD PRESSURE: 122 MMHG | HEART RATE: 81 BPM | WEIGHT: 101 LBS | TEMPERATURE: 97.6 F | DIASTOLIC BLOOD PRESSURE: 86 MMHG | BODY MASS INDEX: 18.47 KG/M2

## 2025-02-07 DIAGNOSIS — M81.0 AGE-RELATED OSTEOPOROSIS W/OUT CURRENT PATHOLOGICAL FRACTURE: ICD-10-CM

## 2025-02-07 DIAGNOSIS — I50.20 UNSPECIFIED SYSTOLIC (CONGESTIVE) HEART FAILURE: ICD-10-CM

## 2025-02-07 DIAGNOSIS — F03.90 UNSPECIFIED DEMENTIA W/OUT BEHAVIORAL DISTURBANCE: ICD-10-CM

## 2025-02-07 DIAGNOSIS — I48.91 UNSPECIFIED ATRIAL FIBRILLATION: ICD-10-CM

## 2025-02-07 PROCEDURE — 99214 OFFICE O/P EST MOD 30 MIN: CPT

## 2025-02-07 PROCEDURE — G2211 COMPLEX E/M VISIT ADD ON: CPT

## 2025-02-07 RX ORDER — CHROMIUM 200 MCG
25 MCG TABLET ORAL DAILY
Refills: 0 | Status: ACTIVE | COMMUNITY
Start: 2025-02-07

## 2025-02-11 PROBLEM — F03.90 DEMENTIA WITHOUT BEHAVIORAL DISTURBANCE: Status: ACTIVE | Noted: 2025-02-11

## 2025-02-25 ENCOUNTER — APPOINTMENT (OUTPATIENT)
Dept: GERIATRICS | Facility: CLINIC | Age: 89
End: 2025-02-25
Payer: MEDICARE

## 2025-02-25 VITALS
HEART RATE: 76 BPM | TEMPERATURE: 97.5 F | HEIGHT: 62 IN | BODY MASS INDEX: 18.58 KG/M2 | RESPIRATION RATE: 16 BRPM | SYSTOLIC BLOOD PRESSURE: 127 MMHG | OXYGEN SATURATION: 97 % | DIASTOLIC BLOOD PRESSURE: 83 MMHG | WEIGHT: 101 LBS

## 2025-02-25 DIAGNOSIS — I50.32 CHRONIC DIASTOLIC (CONGESTIVE) HEART FAILURE: ICD-10-CM

## 2025-02-25 DIAGNOSIS — F03.90 UNSPECIFIED DEMENTIA W/OUT BEHAVIORAL DISTURBANCE: ICD-10-CM

## 2025-02-25 DIAGNOSIS — M81.0 AGE-RELATED OSTEOPOROSIS W/OUT CURRENT PATHOLOGICAL FRACTURE: ICD-10-CM

## 2025-02-25 DIAGNOSIS — I48.91 UNSPECIFIED ATRIAL FIBRILLATION: ICD-10-CM

## 2025-02-25 PROCEDURE — 99214 OFFICE O/P EST MOD 30 MIN: CPT

## 2025-02-25 PROCEDURE — G2211 COMPLEX E/M VISIT ADD ON: CPT

## 2025-03-07 ENCOUNTER — APPOINTMENT (OUTPATIENT)
Dept: ELECTROPHYSIOLOGY | Facility: CLINIC | Age: 89
End: 2025-03-07

## 2025-03-17 ENCOUNTER — RX RENEWAL (OUTPATIENT)
Age: 89
End: 2025-03-17

## 2025-03-19 ENCOUNTER — NON-APPOINTMENT (OUTPATIENT)
Age: 89
End: 2025-03-19

## 2025-03-19 ENCOUNTER — APPOINTMENT (OUTPATIENT)
Dept: CARDIOLOGY | Facility: CLINIC | Age: 89
End: 2025-03-19
Payer: MEDICARE

## 2025-03-19 VITALS
WEIGHT: 101 LBS | DIASTOLIC BLOOD PRESSURE: 60 MMHG | BODY MASS INDEX: 18.58 KG/M2 | SYSTOLIC BLOOD PRESSURE: 106 MMHG | HEIGHT: 62 IN | HEART RATE: 73 BPM

## 2025-03-19 DIAGNOSIS — I48.91 UNSPECIFIED ATRIAL FIBRILLATION: ICD-10-CM

## 2025-03-19 DIAGNOSIS — I50.20 UNSPECIFIED SYSTOLIC (CONGESTIVE) HEART FAILURE: ICD-10-CM

## 2025-03-19 DIAGNOSIS — I25.10 ATHEROSCLEROTIC HEART DISEASE OF NATIVE CORONARY ARTERY W/OUT ANGINA PECTORIS: ICD-10-CM

## 2025-03-19 PROCEDURE — 93000 ELECTROCARDIOGRAM COMPLETE: CPT

## 2025-03-19 PROCEDURE — 99214 OFFICE O/P EST MOD 30 MIN: CPT

## 2025-03-24 DIAGNOSIS — M79.604 LOW BACK PAIN, UNSPECIFIED: ICD-10-CM

## 2025-03-24 DIAGNOSIS — M54.50 LOW BACK PAIN, UNSPECIFIED: ICD-10-CM

## 2025-03-24 DIAGNOSIS — M25.561 PAIN IN RIGHT KNEE: ICD-10-CM

## 2025-03-25 NOTE — ED ADULT NURSE NOTE - NS ED NURSE RECORD ANOTHER HT AND WT
James R Lachman, M.D.  Attending, Orthopaedic Surgery  [facility]  [place] Office Phone: [phone] ? Fax: [phone]  [place] Office Phone: [phone] ? Fax:[phone]    : Desire Jefferson MA     Surgery Coordinators [place]: Shamika Phillips, [phone]  Tavia Resendez, [phone]  Surgery Coordinator [place]:  Louise Castro, [phone]                                                       Azucena Vásquez, [phone]                                                            [email or web address]   PRE-OPERATIVE AND POST-OPERATIVE INSTRUCTIONS    General Information:  Your surgery is with Dr. Lachman.  Dates can change (although rare) depending on emergencies.  Typical post operative visits are at the following intervals:  3 weeks post surgery(except 1 week for bunions and wound monitoring), 6 weeks post surgery, 3 months post surgery, 6 months post surgery, and then on a yearly basis.  However, this may change based on Dr. Lachmans’ recommendation.  #1 post-operative rule for foot/ankle surgery:  ONCE YOU ARE OUT OF YOUR CAST AND/OR REMOVABLE BOOT, SWELLING MAY PERSIST FOR MANY MONTHS.  YOU MIGHT ALSO EXPERIENCE A BLUISH DISCOLORATION OF YOUR LEG.  THIS IS NORMAL AND PART OF THE USUAL POSTOPERATIVE EXPERIENCE.    SMOKING:  Smoking results in incomplete healing of fractures (broken bones) and joints that my have been fused.  Smoking and nicotine also prevents the growth of bone into ankle replacements and bone healing.  It also slows the healing of muscles and skin (soft tissue).  Therefore, please do not have surgery if you continue to smoke.  We reserve the right to cancel your surgery if we suspect that you are smoking.  DO NOT use nicorette gum or other patches.  Please find an alternative method to quit smoking before your surgery.    Pre-Operative Information:  Surgery date and preoperative visits:  If you have  medical problems, such as an abnormal EKG, history of BLOOD CLOT, ANEURYSM, and any other heart condition, please inform us so that we can get your medical clearance several weeks before the surgery.  Please bring any important medical information, such as an EKG, chest x-ray, or echocardiogram, with you to ensure that your surgery will not be delayed.  If needed, you will receive your preoperative appointments in the mail or by phone from our scheduling office.  The location of the preoperative appointment will be given to you also.  You may not eat after midnight the night before surgery.  If you do, your surgery will be cancelled.   You will receive a phone call from your surgery center the day before your surgery (if your surgery is on a Monday, you will get a call the Friday before).   If you do not hear from someone by 4pm the day before your surgery, please call the Surgical coordinator (number above) to notify us.  Start taking Vitamin D3 4000 units per day and Calcium 1200mg per day immediately. You will continue this until your 3 month post-op visit. These are over the counter and available at all pharmacies and supermarkets.  FOR THOSE HAVING SURGERY AT [facility]- IF YOU WILL NEED CRUTCHES OR A ROLLING WALKER AFTER SURGERY, ASK FOR A PRESCRIPTION FOR THIS FROM OUR OFFICE TODAY.  THIS CANNOT BE HANDLED THE DAY OF SURGERY AS [facility] DOES NOT STOCK THESE.    Because bacterial can often enter any defect in the skin, it is important to avoid any cuts before surgery.  Any breaks in the skin on the leg will often result in your surgery being postponed.  Please avoid going on a very long walk the day prior to surgery, or doing other activities that could lead to irritation of the skin, including yard work, extra athletic activity, or shaving.   This could result in surgery cancellation.  You MUST be fasting the day of your surgery.  Therefore, please do not consume any foot or beverage  after midnight the night before surgery.  The morning of surgery you may take your usual medications with a sip of water.  It is important not to take anti-inflammatory medication like Ibuprofen, Motrin, Naproxen (Aleve), or Aspirin 7-10 days before surgery because they will make you bleed more than usual.  Vitamin, E, Plavix and Coumadin also have the same effect.  Stop Aspirin and Vitamin E two weeks before surgery.  YOUR MEDICAL DOCTOR SHOULD TELL YOU WHEN TO STOP COUMADIN OR PLAVIX.  If your surgery involves any bone healing, please do not take anti-inflammatories for at least 6 weeks after surgery.  This can impede bone healing (ibuprofen, Aleve, Relafen, iodine).  Tylenol is fine to take.    PREOPERATIVE BATHING INSTRUCTIONS:    Before your surgery, bathe with Hibiclens (4% Chlorhexidene) as instructed below.  This skin cleanser will help reduce the bacteria on your skin before surgery.  To avoid irritating your eyes, do not apply Hibiclens above the level of your neck.  On the evening before AND the morning of surgery, bathe your entire body except the face and scalp, then rinse freely.  DO NOT apply to your face or scalp, as Hibiclens can irritate your eyes.  Purchasing information:   Hibiclens is available without a prescription at most retail pharmacies.     ADDITIONAL INSTRUCTIONS:  PATIENTS HAVING FOOT/ANKLE SURGERY     In preparation for your upcoming surgery, we kindly request and advise the following:  Notify our office if you are taking any of the following:  Coumadin (warfarin):  Persantine (dipyridamole); Pletal (cilostazol); Plavix (clopidogrel); Ticlid (ticlopidine); Agrylin (anagrelide); Aggrenox (dipyridamole and aspirin) or other blood thinners,.  In addition, stop taking Vitamin E and herbal supplements.  Do not schedule any elective dental work for at least 6 months after surgery.  If you had an ankle replacement, you will need to take antibiotics before any future dental procedures. Your  dentist or our office can prescribe these for you.  1000mg of Amoxicillin 1 hour prior to any dental procedure is the recommended dosing.      THREE RULES:    After surgery you will most likely be given the instructions “KEEP YOUR TOES ABOVE YOUR NOSE.”  This means that you MUST have your feet elevated higher than your heart.  Keeping your toes above your nose helps to heal the muscles and skin (soft tissues) by reducing swelling in your leg.  This position also helps to prevent infection, and is very important in avoiding deep venous thrombosis (blood clots).    In order to keep the blood circulating in your legs and in order to avoid deep vein   thrombosis (blood clots), we ask patients to GET UP ONCE AN HOUR during the day.  This means you should at least cross the room and come back.  It does not mean you have to be up for long periods of time.  In most cases we will not have people immediately put any weight on their operated part.  This is important to prevent loosening of metal or other devices holding the bones together.  It also prevents irritation of the soft tissues which can lead to prolonged healing.  When we say get up once an hour, please walk, hop or move with an assisted device.  This is important!    Do not do any excessive walking during the first few days after surgery.  Recovering from surgery is a full-time task for the patient.  Postoperative care is important to avoid irritating the skin incision, which can lead to infection.  Please do not plan activities or go out of town for several weeks after surgery.  If you are unsure about your future activities, please schedule surgery only when you know it is acceptable for you.  Scheduling surgery and then canceling the date, prevents other people from having surgery on that date as it takes time to line everything up effectively.  If you cancel your surgery the week of your planned surgery, we reserve the right to cancel all future surgical  procedures.    THE DAY OF SURGERY:    Arrival to the hospital or outpatient surgical center on time is imperative.  If you arrive late, then your surgery will be cancelled.  You MUST have a family member/friend bring you, stay with you throughout the DURATION of your surgery, and drive you home.  You MUST be fasting the day of your surgery.  Therefore, do not consume any food or beverage after midnight the night before surgery.  At your pre-operative visit with the anesthesia staff, or during your phone screen, a nurse will instruct you what medications you will need to take the day of surgery.  MAKE SURE THAT THE PHARMACY LISTED IN THE ELECTRONIC MEDICAL RECORD (EPIC) IS YOUR PREFERRED PHARMACY. For example, if you are staying with family or a friend, and will not be near your preferred pharmacy, YOU MUST, tell the nurses checking you in the day of surgery so that this can be changed in the system.  If your prescriptions are sent to a pharmacy, this cannot be changed.      AFTER YOUR SURGERY:  Bleeding through the bandage almost always occurs.  Do not let this alarm you.  Simply add more gauze or a towel, call us, and come in for a dressing change.   If you think it is excessive, contact us immediately or go to the local emergency room.    Do not get the bandage wet.  Showering is possible with plastic protectors.   Be very careful, as the bathroom can be wet and slippery.  If you do get your dressing wet, it should be changed immediately.  Please contact us.      ONCE YOUR ARE OUT OF YOUR CAST AND/OR REMOVABLE BOOT, SWELLING MAY PERSIST FOR MANY MONTHS.  YOU MIGHT ALSO EXPERIENCE A BLUISH DISCOLORATION OF YOUR LEG.  THIS IS NORMAL AND PART OF THE USUAL POSTOPERATIVE EXPERIENCE.  WEARING COMPRESSION HOSE (ELASTIC STOCKINGS) CAN HELP AVOID SOME OF THIS SWELLING.      DRESSING:   The purpose of the surgical dressing is to keep your wound and the surgical site protected from the environment.  Most dressings contain  splints, which help to hold your foot and ankle in a corrected position, and also allow the surgical site to heal properly. Dressings will remain in place and undisturbed until the first postop visit.   If you have a drain in place, this will need to be removed in 1-3 days after surgery.  The time for the drain to be pulled will be written on your discharge instruction sheet.    CAST  INSTRUCTIONS:  You may or may not get a cast following surgery.  If you do, pay close attention to the following:    After application of a splint or cast, it is very important to elevate your leg for 24 to 72 hours.   The injured area should be elevated well above the heart.   Remember “Toes above your Nose”.  Rest and elevation greatly reduce pain and speed the healing process by minimizing early swelling.    CALL YOUR DOCTORS OFFICE OR VISIT LOCATION EMERGENCY ROOM IF YOU HAVE ANY OF THE FOLLOWING:    Significant increased pain, which may be caused by swelling, and the feeling that the splint or cast is too tight  Numbness and tingling in your hand or foot, which may be caused by too much pressure on the nerves  Burning and stinging, which may be caused by too much pressure on the skin  Excessive swelling below the cast, which may mean the cast is slowing your blood circulation  Loss of active movement of toes, which request an urgent evaluation  Loss of “capillary refill”.  Pinch the tip of toes and epifanio the skin.  Release pressure and if the skin does not return pink then call the office immediately.      DO NOT GET YOUR CAST WET.   Bacteria thrive in moist dark areas.  We do not want this.   If your cast becomes wet, return to the office and we will apply another one.    PAIN AFTER SURGERY:  Narcotic pain medication can and will depress your respiratory system if taken in excess.  The goal of pain management with narcotics is to be comfortable not pain free.  If you take enough narcotics to be pain free then you run the risk of  stopping breathing.  If this happens, call 911 immediately!  Pain in the heel is often  caused by pressure from the weight of your foot on the bed.  Make sure your heel is suspended off the bed by keeping a pillow underneath your calf not your knee.    Medications:  You will be given narcotic pain medication. Do NOT drive while taking narcotic medications. Medications such as Darvocet, Percocet, Vicoden or Tylenol #3, also contain acetaminophen (Tylenol). Do not take acetaminophen or Tylenol from home when taking theses medications. When you fill your prescription, you may ask the pharmacist if your pain medication has acetaminophen/Tylenol in it. It is okay to take Tylenol with Oxycontin/Oxycodone. Should you have pain after taking your prescription medication, ibuprophen (Motrin, Advil, and Alleve) is a common over the counter preparation and may often be taken with the prescription pain medication as long as you take them with food. These medications can irritate the stomach lining.   Unless you are allergic to aspirin or currently taking a blood thinner, Dr. Lachmans’ patients are requested to take one 81 mg aspirin every 12 hours until you are back to walking normally after surgery (This can be up to 6 weeks).  Narcotic medications commonly cause nausea. Taking them with food will decrease this side effect. If you are having extreme nausea, please contact us for an alternative medication or for something that can be taken with this medication to decrease the nausea.   Also, narcotic medications frequently cause constipation. An increase of fiber, fruits and vegetables in your diet may alleviate this problem, or if necessary, you may use an over-the-counter medication such as senekot, colace, or Fibercon for constipation problems.   You should resume all medications you were taking prior to the surgery unless otherwise specified.     Activity:   Because of your recent foot surgery, your activity level will  decrease. You will need to elevate your foot ABOVE the level of your heart for a minimum of four days. The length of time necessary for the swelling to go down, and for your wounds to heal properly depends greatly on your efforts here. Elevation is extremely important to avoid compromising the blood supply to your foot. Remember when your foot is down it will swell, which will increase pain and slow healing. Wiggle your toes frequently if possible.   If you go home with a regional block, (a type of anesthesia) the foot and leg will be numb. Think of ways to get into your house and around the house until the block wears off.   Keep in mind that it may be a legal issue if you drive while in a cast or splint, especially when the splint is on the right foot. You may call the Department of [employer] to schedule a road test if you have adaptive equipment applied to your car.   The amount of weight you are allowed to bear on your foot will be written on your discharge sheet filled out at the time of surgery. The following is an explanation of the possibilities:       COVID-19 Policies  [facility] has the following policies when it comes to ELECTIVE surgery  No elective surgery requiring anesthesia until 7 weeks after a patient tested positive for COVID-19   No elective surgery requiring anesthesia until 3 months after a patient was hospitalized for COVID-19      Non-weight bearing:   You are to put NO weight whatsoever on your foot. When using crutches or a walker, your foot should not touch the ground, except when you are standing. Then, it may rest on the ground. If you are to be non-weight bearing, and you are not compliant, you could compromise the surgery.     Some of our patients have been requesting prescriptions for a roll-a-bout knee scooter. [employer] and other insurances have been denying these claims, and you may either have to rent one or pay out of pocket to purchase one.  THIS SHOULD BE  PURCHASED PRIOR TO THE SURGERY AND YOU SHOULD BRING IT WITH YOU THE DAY OF THE SURGERY TO AIDE IN GETTING FROM THE CAR INTO THE HOUSE AFTER SURGERY.        6 weeks nonweightbearing   3 weeks in a splint   3 weeks in a boot- PT starts during this time.  We let you start Weightbearing at the 6 week visit.  Fully weightbearing by 8 weeks postop  Out of the boot and into a sneaker by 10 weeks postop.    Full recovery where you feel like you are returning to most activities by 3.5- 4 months postop.   No

## 2025-04-10 ENCOUNTER — APPOINTMENT (OUTPATIENT)
Dept: ELECTROPHYSIOLOGY | Facility: CLINIC | Age: 89
End: 2025-04-10

## 2025-05-16 ENCOUNTER — APPOINTMENT (OUTPATIENT)
Dept: GERIATRICS | Facility: CLINIC | Age: 89
End: 2025-05-16

## 2025-05-19 ENCOUNTER — APPOINTMENT (OUTPATIENT)
Dept: ELECTROPHYSIOLOGY | Facility: CLINIC | Age: 89
End: 2025-05-19

## 2025-05-20 ENCOUNTER — APPOINTMENT (OUTPATIENT)
Dept: GERIATRICS | Facility: CLINIC | Age: 89
End: 2025-05-20
Payer: MEDICARE

## 2025-05-20 VITALS
DIASTOLIC BLOOD PRESSURE: 83 MMHG | BODY MASS INDEX: 18.4 KG/M2 | TEMPERATURE: 97.7 F | HEART RATE: 88 BPM | HEIGHT: 62 IN | SYSTOLIC BLOOD PRESSURE: 123 MMHG | RESPIRATION RATE: 16 BRPM | WEIGHT: 100 LBS

## 2025-05-20 VITALS — OXYGEN SATURATION: 92 %

## 2025-05-20 DIAGNOSIS — Z23 ENCOUNTER FOR IMMUNIZATION: ICD-10-CM

## 2025-05-20 DIAGNOSIS — R79.89 OTHER SPECIFIED ABNORMAL FINDINGS OF BLOOD CHEMISTRY: ICD-10-CM

## 2025-05-20 DIAGNOSIS — M81.0 AGE-RELATED OSTEOPOROSIS W/OUT CURRENT PATHOLOGICAL FRACTURE: ICD-10-CM

## 2025-05-20 DIAGNOSIS — I50.20 UNSPECIFIED SYSTOLIC (CONGESTIVE) HEART FAILURE: ICD-10-CM

## 2025-05-20 DIAGNOSIS — I48.91 UNSPECIFIED ATRIAL FIBRILLATION: ICD-10-CM

## 2025-05-20 DIAGNOSIS — F03.90 UNSPECIFIED DEMENTIA W/OUT BEHAVIORAL DISTURBANCE: ICD-10-CM

## 2025-05-20 DIAGNOSIS — R73.01 IMPAIRED FASTING GLUCOSE: ICD-10-CM

## 2025-05-20 PROCEDURE — G2211 COMPLEX E/M VISIT ADD ON: CPT

## 2025-05-20 PROCEDURE — 90677 PCV20 VACCINE IM: CPT

## 2025-05-20 PROCEDURE — 99214 OFFICE O/P EST MOD 30 MIN: CPT

## 2025-05-20 PROCEDURE — G0009: CPT

## 2025-05-20 RX ORDER — MIRTAZAPINE 30 MG/1
30 TABLET, FILM COATED ORAL
Qty: 90 | Refills: 0 | Status: ACTIVE | COMMUNITY
Start: 2025-04-07

## 2025-05-21 LAB
25(OH)D3 SERPL-MCNC: 61.2 NG/ML
ALBUMIN SERPL ELPH-MCNC: 4.5 G/DL
ALP BLD-CCNC: 80 U/L
ALT SERPL-CCNC: 42 U/L
ANION GAP SERPL CALC-SCNC: 16 MMOL/L
AST SERPL-CCNC: 28 U/L
BILIRUB SERPL-MCNC: 0.4 MG/DL
BUN SERPL-MCNC: 41 MG/DL
CALCIUM SERPL-MCNC: 10.1 MG/DL
CHLORIDE SERPL-SCNC: 102 MMOL/L
CO2 SERPL-SCNC: 24 MMOL/L
CREAT SERPL-MCNC: 0.86 MG/DL
EGFRCR SERPLBLD CKD-EPI 2021: 61 ML/MIN/1.73M2
ESTIMATED AVERAGE GLUCOSE: 111 MG/DL
FOLATE SERPL-MCNC: >20 NG/ML
GLUCOSE SERPL-MCNC: 78 MG/DL
HBA1C MFR BLD HPLC: 5.5 %
HCT VFR BLD CALC: 40 %
HGB BLD-MCNC: 13.2 G/DL
MCHC RBC-ENTMCNC: 32 PG
MCHC RBC-ENTMCNC: 33 G/DL
MCV RBC AUTO: 97.1 FL
PLATELET # BLD AUTO: 151 K/UL
POTASSIUM SERPL-SCNC: 4.4 MMOL/L
PROT SERPL-MCNC: 7.2 G/DL
RBC # BLD: 4.12 M/UL
RBC # FLD: 14.8 %
SODIUM SERPL-SCNC: 142 MMOL/L
TSH SERPL-ACNC: 1.99 UIU/ML
VIT B12 SERPL-MCNC: 985 PG/ML
WBC # FLD AUTO: 8.27 K/UL

## 2025-05-27 ENCOUNTER — APPOINTMENT (OUTPATIENT)
Dept: CARDIOLOGY | Facility: CLINIC | Age: 89
End: 2025-05-27
Payer: MEDICARE

## 2025-05-27 ENCOUNTER — NON-APPOINTMENT (OUTPATIENT)
Age: 89
End: 2025-05-27

## 2025-05-27 VITALS
HEART RATE: 93 BPM | BODY MASS INDEX: 18.29 KG/M2 | SYSTOLIC BLOOD PRESSURE: 126 MMHG | DIASTOLIC BLOOD PRESSURE: 84 MMHG | WEIGHT: 100 LBS

## 2025-05-27 DIAGNOSIS — I10 ESSENTIAL (PRIMARY) HYPERTENSION: ICD-10-CM

## 2025-05-27 DIAGNOSIS — I25.10 ATHEROSCLEROTIC HEART DISEASE OF NATIVE CORONARY ARTERY W/OUT ANGINA PECTORIS: ICD-10-CM

## 2025-05-27 PROCEDURE — G2211 COMPLEX E/M VISIT ADD ON: CPT

## 2025-05-27 PROCEDURE — 99214 OFFICE O/P EST MOD 30 MIN: CPT

## 2025-05-27 PROCEDURE — 93000 ELECTROCARDIOGRAM COMPLETE: CPT

## 2025-06-10 ENCOUNTER — RX RENEWAL (OUTPATIENT)
Age: 89
End: 2025-06-10

## 2025-06-23 NOTE — ED PROVIDER NOTE - DATE/TIME 2
Requested Prescriptions     Signed Prescriptions Disp Refills    carvedilol (COREG) 25 MG tablet 180 tablet 2     Sig: Take 1 tablet by mouth 2 times daily (with meals)     Authorizing Provider: MEL LORENZ     Ordering User: ANNA EBRNAL MD    Future Appointments   Date Time Provider Department Center   11/21/2025  3:00 PM Mel Lorenz MD [facility]       
05-Dec-2022 19:33

## 2025-07-04 ENCOUNTER — NON-APPOINTMENT (OUTPATIENT)
Age: 89
End: 2025-07-04

## 2025-07-16 ENCOUNTER — APPOINTMENT (OUTPATIENT)
Dept: CARDIOLOGY | Facility: CLINIC | Age: 89
End: 2025-07-16

## 2025-08-11 ENCOUNTER — RX RENEWAL (OUTPATIENT)
Age: 89
End: 2025-08-11

## 2025-08-13 ENCOUNTER — APPOINTMENT (OUTPATIENT)
Dept: ELECTROPHYSIOLOGY | Facility: CLINIC | Age: 89
End: 2025-08-13

## 2025-08-27 ENCOUNTER — APPOINTMENT (OUTPATIENT)
Dept: GERIATRICS | Facility: CLINIC | Age: 89
End: 2025-08-27

## 2025-08-27 ENCOUNTER — EMERGENCY (EMERGENCY)
Facility: HOSPITAL | Age: 89
LOS: 1 days | End: 2025-08-27
Attending: STUDENT IN AN ORGANIZED HEALTH CARE EDUCATION/TRAINING PROGRAM
Payer: MEDICARE

## 2025-08-27 VITALS
HEART RATE: 89 BPM | SYSTOLIC BLOOD PRESSURE: 115 MMHG | OXYGEN SATURATION: 98 % | TEMPERATURE: 98 F | DIASTOLIC BLOOD PRESSURE: 82 MMHG | RESPIRATION RATE: 15 BRPM

## 2025-08-27 VITALS
RESPIRATION RATE: 18 BRPM | HEIGHT: 62 IN | TEMPERATURE: 99 F | HEART RATE: 87 BPM | WEIGHT: 100.09 LBS | DIASTOLIC BLOOD PRESSURE: 82 MMHG | SYSTOLIC BLOOD PRESSURE: 120 MMHG | OXYGEN SATURATION: 95 %

## 2025-08-27 DIAGNOSIS — Z90.722 ACQUIRED ABSENCE OF OVARIES, BILATERAL: Chronic | ICD-10-CM

## 2025-08-27 DIAGNOSIS — Z95.0 PRESENCE OF CARDIAC PACEMAKER: Chronic | ICD-10-CM

## 2025-08-27 DIAGNOSIS — Z95.2 PRESENCE OF PROSTHETIC HEART VALVE: Chronic | ICD-10-CM

## 2025-08-27 DIAGNOSIS — Z98.890 OTHER SPECIFIED POSTPROCEDURAL STATES: Chronic | ICD-10-CM

## 2025-08-27 PROCEDURE — 99283 EMERGENCY DEPT VISIT LOW MDM: CPT | Mod: FS

## 2025-08-27 PROCEDURE — 73630 X-RAY EXAM OF FOOT: CPT | Mod: 26,RT

## 2025-08-27 PROCEDURE — 28470 CLTX METATARSAL FX WO MNP EA: CPT | Mod: T5

## 2025-08-27 PROCEDURE — 73630 X-RAY EXAM OF FOOT: CPT

## 2025-08-27 PROCEDURE — 99283 EMERGENCY DEPT VISIT LOW MDM: CPT | Mod: 25

## 2025-08-27 RX ORDER — ACETAMINOPHEN 500 MG/5ML
650 LIQUID (ML) ORAL ONCE
Refills: 0 | Status: COMPLETED | OUTPATIENT
Start: 2025-08-27 | End: 2025-08-27

## 2025-08-27 RX ADMIN — Medication 650 MILLIGRAM(S): at 11:21
